# Patient Record
Sex: MALE | Race: WHITE | Employment: FULL TIME | URBAN - METROPOLITAN AREA
[De-identification: names, ages, dates, MRNs, and addresses within clinical notes are randomized per-mention and may not be internally consistent; named-entity substitution may affect disease eponyms.]

---

## 2017-09-26 ENCOUNTER — HOSPITAL ENCOUNTER (INPATIENT)
Age: 52
LOS: 7 days | Discharge: HOME HEALTH | DRG: 233 | End: 2017-10-04
Attending: EMERGENCY MEDICINE | Admitting: THORACIC SURGERY (CARDIOTHORACIC VASCULAR SURGERY)
Payer: COMMERCIAL

## 2017-09-26 ENCOUNTER — APPOINTMENT (OUTPATIENT)
Dept: GENERAL RADIOLOGY | Age: 52
DRG: 233 | End: 2017-09-26
Attending: EMERGENCY MEDICINE
Payer: COMMERCIAL

## 2017-09-26 DIAGNOSIS — I21.4 NSTEMI (NON-ST ELEVATED MYOCARDIAL INFARCTION) (HCC): Primary | ICD-10-CM

## 2017-09-26 PROBLEM — Z82.49 FAMILY HISTORY OF EARLY CAD: Status: ACTIVE | Noted: 2017-09-26

## 2017-09-26 LAB
ALBUMIN SERPL-MCNC: 4.2 G/DL (ref 3.5–5)
ALBUMIN/GLOB SERPL: 1.5 {RATIO} (ref 1.1–2.2)
ALP SERPL-CCNC: 97 U/L (ref 45–117)
ALT SERPL-CCNC: 32 U/L (ref 12–78)
ANION GAP SERPL CALC-SCNC: 10 MMOL/L (ref 5–15)
APPEARANCE UR: CLEAR
APTT PPP: 23.8 SEC (ref 22.1–32.5)
APTT PPP: 29.4 SEC (ref 22.1–32.5)
AST SERPL-CCNC: 33 U/L (ref 15–37)
ATRIAL RATE: 110 BPM
BACTERIA URNS QL MICRO: NEGATIVE /HPF
BASOPHILS # BLD: 0 K/UL (ref 0–0.1)
BASOPHILS # BLD: 0 K/UL (ref 0–0.1)
BASOPHILS NFR BLD: 0 % (ref 0–1)
BASOPHILS NFR BLD: 0 % (ref 0–1)
BILIRUB SERPL-MCNC: 0.6 MG/DL (ref 0.2–1)
BILIRUB UR QL: NEGATIVE
BUN SERPL-MCNC: 22 MG/DL (ref 6–20)
BUN/CREAT SERPL: 15 (ref 12–20)
CALCIUM SERPL-MCNC: 8.8 MG/DL (ref 8.5–10.1)
CALCULATED P AXIS, ECG09: 48 DEGREES
CALCULATED R AXIS, ECG10: -14 DEGREES
CALCULATED T AXIS, ECG11: 120 DEGREES
CHLORIDE SERPL-SCNC: 105 MMOL/L (ref 97–108)
CK MB CFR SERPL CALC: 11.9 % (ref 0–2.5)
CK MB SERPL-MCNC: 40.4 NG/ML (ref 5–25)
CK SERPL-CCNC: 197 U/L (ref 39–308)
CK SERPL-CCNC: 340 U/L (ref 39–308)
CO2 SERPL-SCNC: 22 MMOL/L (ref 21–32)
COLOR UR: ABNORMAL
CREAT SERPL-MCNC: 1.43 MG/DL (ref 0.7–1.3)
D DIMER PPP FEU-MCNC: 0.37 MG/L FEU (ref 0–0.65)
DIAGNOSIS, 93000: NORMAL
EOSINOPHIL # BLD: 0 K/UL (ref 0–0.4)
EOSINOPHIL # BLD: 0 K/UL (ref 0–0.4)
EOSINOPHIL NFR BLD: 0 % (ref 0–7)
EOSINOPHIL NFR BLD: 0 % (ref 0–7)
EPITH CASTS URNS QL MICRO: ABNORMAL /LPF
ERYTHROCYTE [DISTWIDTH] IN BLOOD BY AUTOMATED COUNT: 13.4 % (ref 11.5–14.5)
ERYTHROCYTE [DISTWIDTH] IN BLOOD BY AUTOMATED COUNT: 13.5 % (ref 11.5–14.5)
GLOBULIN SER CALC-MCNC: 2.8 G/DL (ref 2–4)
GLUCOSE BLD STRIP.AUTO-MCNC: 160 MG/DL (ref 65–100)
GLUCOSE BLD STRIP.AUTO-MCNC: 262 MG/DL (ref 65–100)
GLUCOSE SERPL-MCNC: 364 MG/DL (ref 65–100)
GLUCOSE UR STRIP.AUTO-MCNC: >1000 MG/DL
HCT VFR BLD AUTO: 40.9 % (ref 36.6–50.3)
HCT VFR BLD AUTO: 41.5 % (ref 36.6–50.3)
HGB BLD-MCNC: 14.4 G/DL (ref 12.1–17)
HGB BLD-MCNC: 14.5 G/DL (ref 12.1–17)
HGB UR QL STRIP: NEGATIVE
HYALINE CASTS URNS QL MICRO: ABNORMAL /LPF (ref 0–5)
KETONES UR QL STRIP.AUTO: ABNORMAL MG/DL
LEUKOCYTE ESTERASE UR QL STRIP.AUTO: NEGATIVE
LIPASE SERPL-CCNC: 95 U/L (ref 73–393)
LYMPHOCYTES # BLD: 1 K/UL (ref 0.8–3.5)
LYMPHOCYTES # BLD: 2.1 K/UL (ref 0.8–3.5)
LYMPHOCYTES NFR BLD: 10 % (ref 12–49)
LYMPHOCYTES NFR BLD: 19 % (ref 12–49)
MAGNESIUM SERPL-MCNC: 2.2 MG/DL (ref 1.6–2.4)
MCH RBC QN AUTO: 28.7 PG (ref 26–34)
MCH RBC QN AUTO: 28.8 PG (ref 26–34)
MCHC RBC AUTO-ENTMCNC: 34.9 G/DL (ref 30–36.5)
MCHC RBC AUTO-ENTMCNC: 35.2 G/DL (ref 30–36.5)
MCV RBC AUTO: 81.8 FL (ref 80–99)
MCV RBC AUTO: 82.2 FL (ref 80–99)
MONOCYTES # BLD: 0.6 K/UL (ref 0–1)
MONOCYTES # BLD: 0.6 K/UL (ref 0–1)
MONOCYTES NFR BLD: 6 % (ref 5–13)
MONOCYTES NFR BLD: 6 % (ref 5–13)
NEUTS SEG # BLD: 7.8 K/UL (ref 1.8–8)
NEUTS SEG # BLD: 8.3 K/UL (ref 1.8–8)
NEUTS SEG NFR BLD: 75 % (ref 32–75)
NEUTS SEG NFR BLD: 84 % (ref 32–75)
NITRITE UR QL STRIP.AUTO: NEGATIVE
P-R INTERVAL, ECG05: 176 MS
PH UR STRIP: 6.5 [PH] (ref 5–8)
PLATELET # BLD AUTO: 161 K/UL (ref 150–400)
PLATELET # BLD AUTO: 168 K/UL (ref 150–400)
POTASSIUM SERPL-SCNC: 4.8 MMOL/L (ref 3.5–5.1)
PROT SERPL-MCNC: 7 G/DL (ref 6.4–8.2)
PROT UR STRIP-MCNC: NEGATIVE MG/DL
Q-T INTERVAL, ECG07: 360 MS
QRS DURATION, ECG06: 92 MS
QTC CALCULATION (BEZET), ECG08: 487 MS
RBC # BLD AUTO: 5 M/UL (ref 4.1–5.7)
RBC # BLD AUTO: 5.05 M/UL (ref 4.1–5.7)
RBC #/AREA URNS HPF: ABNORMAL /HPF (ref 0–5)
SERVICE CMNT-IMP: ABNORMAL
SERVICE CMNT-IMP: ABNORMAL
SODIUM SERPL-SCNC: 137 MMOL/L (ref 136–145)
SP GR UR REFRACTOMETRY: 1.02 (ref 1–1.03)
THERAPEUTIC RANGE,PTTT: NORMAL SECS (ref 58–77)
THERAPEUTIC RANGE,PTTT: NORMAL SECS (ref 58–77)
TROPONIN I SERPL-MCNC: 1.78 NG/ML
TROPONIN I SERPL-MCNC: 10.3 NG/ML
TSH SERPL DL<=0.05 MIU/L-ACNC: 1.15 UIU/ML (ref 0.36–3.74)
UA: UC IF INDICATED,UAUC: ABNORMAL
UROBILINOGEN UR QL STRIP.AUTO: 0.2 EU/DL (ref 0.2–1)
VENTRICULAR RATE, ECG03: 110 BPM
WBC # BLD AUTO: 11 K/UL (ref 4.1–11.1)
WBC # BLD AUTO: 9.4 K/UL (ref 4.1–11.1)
WBC URNS QL MICRO: ABNORMAL /HPF (ref 0–4)

## 2017-09-26 PROCEDURE — 96374 THER/PROPH/DIAG INJ IV PUSH: CPT

## 2017-09-26 PROCEDURE — 83735 ASSAY OF MAGNESIUM: CPT | Performed by: EMERGENCY MEDICINE

## 2017-09-26 PROCEDURE — 99218 HC RM OBSERVATION: CPT

## 2017-09-26 PROCEDURE — 36415 COLL VENOUS BLD VENIPUNCTURE: CPT | Performed by: EMERGENCY MEDICINE

## 2017-09-26 PROCEDURE — C1769 GUIDE WIRE: HCPCS

## 2017-09-26 PROCEDURE — C1894 INTRO/SHEATH, NON-LASER: HCPCS

## 2017-09-26 PROCEDURE — 85730 THROMBOPLASTIN TIME PARTIAL: CPT | Performed by: EMERGENCY MEDICINE

## 2017-09-26 PROCEDURE — 77030019569 HC BND COMPR RAD TERU -B

## 2017-09-26 PROCEDURE — 82553 CREATINE MB FRACTION: CPT | Performed by: NURSE PRACTITIONER

## 2017-09-26 PROCEDURE — 74011250636 HC RX REV CODE- 250/636: Performed by: NURSE PRACTITIONER

## 2017-09-26 PROCEDURE — 93005 ELECTROCARDIOGRAM TRACING: CPT

## 2017-09-26 PROCEDURE — 82962 GLUCOSE BLOOD TEST: CPT

## 2017-09-26 PROCEDURE — 74011636637 HC RX REV CODE- 636/637: Performed by: NURSE PRACTITIONER

## 2017-09-26 PROCEDURE — 85730 THROMBOPLASTIN TIME PARTIAL: CPT | Performed by: NURSE PRACTITIONER

## 2017-09-26 PROCEDURE — 74011250636 HC RX REV CODE- 250/636

## 2017-09-26 PROCEDURE — 77030004549 HC CATH ANGI DX PRF MRTM -A

## 2017-09-26 PROCEDURE — B2151ZZ FLUOROSCOPY OF LEFT HEART USING LOW OSMOLAR CONTRAST: ICD-10-PCS | Performed by: INTERNAL MEDICINE

## 2017-09-26 PROCEDURE — 84443 ASSAY THYROID STIM HORMONE: CPT | Performed by: EMERGENCY MEDICINE

## 2017-09-26 PROCEDURE — 83690 ASSAY OF LIPASE: CPT | Performed by: EMERGENCY MEDICINE

## 2017-09-26 PROCEDURE — 74011250637 HC RX REV CODE- 250/637: Performed by: NURSE PRACTITIONER

## 2017-09-26 PROCEDURE — 99285 EMERGENCY DEPT VISIT HI MDM: CPT

## 2017-09-26 PROCEDURE — 74011250636 HC RX REV CODE- 250/636: Performed by: EMERGENCY MEDICINE

## 2017-09-26 PROCEDURE — 82550 ASSAY OF CK (CPK): CPT | Performed by: EMERGENCY MEDICINE

## 2017-09-26 PROCEDURE — 93041 RHYTHM ECG TRACING: CPT

## 2017-09-26 PROCEDURE — 4A023N8 MEASUREMENT OF CARDIAC SAMPLING AND PRESSURE, BILATERAL, PERCUTANEOUS APPROACH: ICD-10-PCS | Performed by: INTERNAL MEDICINE

## 2017-09-26 PROCEDURE — 74011250636 HC RX REV CODE- 250/636: Performed by: INTERNAL MEDICINE

## 2017-09-26 PROCEDURE — 74011000250 HC RX REV CODE- 250

## 2017-09-26 PROCEDURE — 85025 COMPLETE CBC W/AUTO DIFF WBC: CPT | Performed by: EMERGENCY MEDICINE

## 2017-09-26 PROCEDURE — C1751 CATH, INF, PER/CENT/MIDLINE: HCPCS

## 2017-09-26 PROCEDURE — 71010 XR CHEST PORT: CPT

## 2017-09-26 PROCEDURE — 77030029065 HC DRSG HEMO QCLOT ZMED -B

## 2017-09-26 PROCEDURE — 96375 TX/PRO/DX INJ NEW DRUG ADDON: CPT

## 2017-09-26 PROCEDURE — B2111ZZ FLUOROSCOPY OF MULTIPLE CORONARY ARTERIES USING LOW OSMOLAR CONTRAST: ICD-10-PCS | Performed by: INTERNAL MEDICINE

## 2017-09-26 PROCEDURE — 77030019698 HC SYR ANGI MDLON MRTM -A

## 2017-09-26 PROCEDURE — 93458 L HRT ARTERY/VENTRICLE ANGIO: CPT

## 2017-09-26 PROCEDURE — 77030010221 HC SPLNT WR POS TELE -B

## 2017-09-26 PROCEDURE — 81001 URINALYSIS AUTO W/SCOPE: CPT | Performed by: EMERGENCY MEDICINE

## 2017-09-26 PROCEDURE — 93460 R&L HRT ART/VENTRICLE ANGIO: CPT

## 2017-09-26 PROCEDURE — 84484 ASSAY OF TROPONIN QUANT: CPT | Performed by: EMERGENCY MEDICINE

## 2017-09-26 PROCEDURE — 77030008543 HC TBNG MON PRSS MRTM -A

## 2017-09-26 PROCEDURE — 85379 FIBRIN DEGRADATION QUANT: CPT | Performed by: EMERGENCY MEDICINE

## 2017-09-26 PROCEDURE — 80053 COMPREHEN METABOLIC PANEL: CPT | Performed by: EMERGENCY MEDICINE

## 2017-09-26 PROCEDURE — 74011636320 HC RX REV CODE- 636/320

## 2017-09-26 PROCEDURE — 74011000250 HC RX REV CODE- 250: Performed by: EMERGENCY MEDICINE

## 2017-09-26 RX ORDER — SODIUM CHLORIDE 0.9 % (FLUSH) 0.9 %
5-10 SYRINGE (ML) INJECTION AS NEEDED
Status: DISCONTINUED | OUTPATIENT
Start: 2017-09-26 | End: 2017-09-29

## 2017-09-26 RX ORDER — LIDOCAINE HYDROCHLORIDE 10 MG/ML
INJECTION, SOLUTION EPIDURAL; INFILTRATION; INTRACAUDAL; PERINEURAL
Status: COMPLETED
Start: 2017-09-26 | End: 2017-09-26

## 2017-09-26 RX ORDER — HEPARIN SODIUM 10000 [USP'U]/100ML
9.44-25 INJECTION, SOLUTION INTRAVENOUS
Status: DISCONTINUED | OUTPATIENT
Start: 2017-09-26 | End: 2017-09-26 | Stop reason: SDUPTHER

## 2017-09-26 RX ORDER — METOPROLOL TARTRATE 5 MG/5ML
5 INJECTION INTRAVENOUS
Status: COMPLETED | OUTPATIENT
Start: 2017-09-26 | End: 2017-09-26

## 2017-09-26 RX ORDER — MORPHINE SULFATE 4 MG/ML
2 INJECTION, SOLUTION INTRAMUSCULAR; INTRAVENOUS
Status: DISCONTINUED | OUTPATIENT
Start: 2017-09-26 | End: 2017-09-29

## 2017-09-26 RX ORDER — HEPARIN SODIUM 5000 [USP'U]/ML
4000 INJECTION, SOLUTION INTRAVENOUS; SUBCUTANEOUS AS NEEDED
Status: DISCONTINUED | OUTPATIENT
Start: 2017-09-26 | End: 2017-09-27

## 2017-09-26 RX ORDER — SODIUM CHLORIDE 0.9 % (FLUSH) 0.9 %
5-10 SYRINGE (ML) INJECTION EVERY 8 HOURS
Status: DISCONTINUED | OUTPATIENT
Start: 2017-09-26 | End: 2017-09-29

## 2017-09-26 RX ORDER — DEXTROSE 50 % IN WATER (D50W) INTRAVENOUS SYRINGE
12.5-25 AS NEEDED
Status: DISCONTINUED | OUTPATIENT
Start: 2017-09-26 | End: 2017-09-26 | Stop reason: SDUPTHER

## 2017-09-26 RX ORDER — MIDAZOLAM HYDROCHLORIDE 1 MG/ML
INJECTION, SOLUTION INTRAMUSCULAR; INTRAVENOUS
Status: COMPLETED
Start: 2017-09-26 | End: 2017-09-26

## 2017-09-26 RX ORDER — HEPARIN SODIUM 1000 [USP'U]/ML
2500 INJECTION, SOLUTION INTRAVENOUS; SUBCUTANEOUS ONCE
Status: COMPLETED | OUTPATIENT
Start: 2017-09-26 | End: 2017-09-26

## 2017-09-26 RX ORDER — HEPARIN SODIUM 200 [USP'U]/100ML
500 INJECTION, SOLUTION INTRAVENOUS ONCE
Status: COMPLETED | OUTPATIENT
Start: 2017-09-26 | End: 2017-09-26

## 2017-09-26 RX ORDER — HEPARIN SODIUM 5000 [USP'U]/ML
5000 INJECTION, SOLUTION INTRAVENOUS; SUBCUTANEOUS
Status: COMPLETED | OUTPATIENT
Start: 2017-09-26 | End: 2017-09-26

## 2017-09-26 RX ORDER — FUROSEMIDE 10 MG/ML
20 INJECTION INTRAMUSCULAR; INTRAVENOUS ONCE
Status: COMPLETED | OUTPATIENT
Start: 2017-09-26 | End: 2017-09-26

## 2017-09-26 RX ORDER — SODIUM CHLORIDE 9 MG/ML
100 INJECTION, SOLUTION INTRAVENOUS CONTINUOUS
Status: DISCONTINUED | OUTPATIENT
Start: 2017-09-26 | End: 2017-09-26

## 2017-09-26 RX ORDER — INSULIN LISPRO 100 [IU]/ML
INJECTION, SOLUTION INTRAVENOUS; SUBCUTANEOUS
Status: DISCONTINUED | OUTPATIENT
Start: 2017-09-26 | End: 2017-09-29

## 2017-09-26 RX ORDER — HEPARIN SODIUM 10000 [USP'U]/100ML
9.4-25 INJECTION, SOLUTION INTRAVENOUS
Status: DISCONTINUED | OUTPATIENT
Start: 2017-09-26 | End: 2017-09-27

## 2017-09-26 RX ORDER — VERAPAMIL HYDROCHLORIDE 2.5 MG/ML
INJECTION, SOLUTION INTRAVENOUS
Status: COMPLETED
Start: 2017-09-26 | End: 2017-09-26

## 2017-09-26 RX ORDER — METOPROLOL TARTRATE 25 MG/1
12.5 TABLET, FILM COATED ORAL EVERY 12 HOURS
Status: DISCONTINUED | OUTPATIENT
Start: 2017-09-26 | End: 2017-09-29

## 2017-09-26 RX ORDER — FENTANYL CITRATE 50 UG/ML
25-50 INJECTION, SOLUTION INTRAMUSCULAR; INTRAVENOUS
Status: DISCONTINUED | OUTPATIENT
Start: 2017-09-26 | End: 2017-09-26

## 2017-09-26 RX ORDER — HEPARIN SODIUM 5000 [USP'U]/ML
2000 INJECTION, SOLUTION INTRAVENOUS; SUBCUTANEOUS AS NEEDED
Status: DISCONTINUED | OUTPATIENT
Start: 2017-09-26 | End: 2017-09-27

## 2017-09-26 RX ORDER — MAGNESIUM SULFATE 100 %
4 CRYSTALS MISCELLANEOUS AS NEEDED
Status: DISCONTINUED | OUTPATIENT
Start: 2017-09-26 | End: 2017-09-28 | Stop reason: SDUPTHER

## 2017-09-26 RX ORDER — HEPARIN SODIUM 200 [USP'U]/100ML
INJECTION, SOLUTION INTRAVENOUS
Status: COMPLETED
Start: 2017-09-26 | End: 2017-09-26

## 2017-09-26 RX ORDER — DEXTROSE MONOHYDRATE 100 MG/ML
125-250 INJECTION, SOLUTION INTRAVENOUS AS NEEDED
Status: DISCONTINUED | OUTPATIENT
Start: 2017-09-26 | End: 2017-09-29

## 2017-09-26 RX ORDER — VERAPAMIL HYDROCHLORIDE 2.5 MG/ML
2.5 INJECTION, SOLUTION INTRAVENOUS ONCE
Status: COMPLETED | OUTPATIENT
Start: 2017-09-26 | End: 2017-09-26

## 2017-09-26 RX ORDER — INSULIN LISPRO 100 [IU]/ML
INJECTION, SOLUTION INTRAVENOUS; SUBCUTANEOUS 2 TIMES DAILY
Status: DISCONTINUED | OUTPATIENT
Start: 2017-09-26 | End: 2017-09-26

## 2017-09-26 RX ORDER — LIDOCAINE HYDROCHLORIDE 10 MG/ML
1-20 INJECTION INFILTRATION; PERINEURAL
Status: DISCONTINUED | OUTPATIENT
Start: 2017-09-26 | End: 2017-09-26

## 2017-09-26 RX ORDER — ATORVASTATIN CALCIUM 20 MG/1
20 TABLET, FILM COATED ORAL
Status: DISCONTINUED | OUTPATIENT
Start: 2017-09-26 | End: 2017-09-29

## 2017-09-26 RX ORDER — FENTANYL CITRATE 50 UG/ML
INJECTION, SOLUTION INTRAMUSCULAR; INTRAVENOUS
Status: COMPLETED
Start: 2017-09-26 | End: 2017-09-26

## 2017-09-26 RX ORDER — LIDOCAINE HYDROCHLORIDE 10 MG/ML
1-30 INJECTION, SOLUTION EPIDURAL; INFILTRATION; INTRACAUDAL; PERINEURAL
Status: DISCONTINUED | OUTPATIENT
Start: 2017-09-26 | End: 2017-09-26

## 2017-09-26 RX ORDER — LIDOCAINE HYDROCHLORIDE 10 MG/ML
INJECTION INFILTRATION; PERINEURAL
Status: COMPLETED
Start: 2017-09-26 | End: 2017-09-26

## 2017-09-26 RX ORDER — ACETAMINOPHEN 325 MG/1
650 TABLET ORAL
Status: DISCONTINUED | OUTPATIENT
Start: 2017-09-26 | End: 2017-09-29

## 2017-09-26 RX ORDER — ASPIRIN 81 MG/1
81 TABLET ORAL DAILY
Status: DISCONTINUED | OUTPATIENT
Start: 2017-09-27 | End: 2017-09-29

## 2017-09-26 RX ORDER — HEPARIN SODIUM 10000 [USP'U]/100ML
12-25 INJECTION, SOLUTION INTRAVENOUS CONTINUOUS
Status: DISCONTINUED | OUTPATIENT
Start: 2017-09-26 | End: 2017-09-26 | Stop reason: SDUPTHER

## 2017-09-26 RX ORDER — HEPARIN SODIUM 1000 [USP'U]/ML
INJECTION, SOLUTION INTRAVENOUS; SUBCUTANEOUS
Status: COMPLETED
Start: 2017-09-26 | End: 2017-09-26

## 2017-09-26 RX ORDER — MIDAZOLAM HYDROCHLORIDE 1 MG/ML
.5-2 INJECTION, SOLUTION INTRAMUSCULAR; INTRAVENOUS
Status: DISCONTINUED | OUTPATIENT
Start: 2017-09-26 | End: 2017-09-26

## 2017-09-26 RX ADMIN — LIDOCAINE HYDROCHLORIDE 2 ML: 10 INJECTION, SOLUTION EPIDURAL; INFILTRATION; INTRACAUDAL; PERINEURAL at 20:44

## 2017-09-26 RX ADMIN — HEPARIN SODIUM 2500 UNITS: 1000 INJECTION, SOLUTION INTRAVENOUS; SUBCUTANEOUS at 20:45

## 2017-09-26 RX ADMIN — HEPARIN SODIUM 1000 UNITS: 200 INJECTION, SOLUTION INTRAVENOUS at 20:53

## 2017-09-26 RX ADMIN — FENTANYL CITRATE 25 MCG: 50 INJECTION, SOLUTION INTRAMUSCULAR; INTRAVENOUS at 21:11

## 2017-09-26 RX ADMIN — HEPARIN SODIUM 4000 UNITS: 5000 INJECTION, SOLUTION INTRAVENOUS; SUBCUTANEOUS at 23:19

## 2017-09-26 RX ADMIN — FENTANYL CITRATE 25 MCG: 50 INJECTION, SOLUTION INTRAMUSCULAR; INTRAVENOUS at 20:45

## 2017-09-26 RX ADMIN — METOPROLOL TARTRATE 5 MG: 5 INJECTION INTRAVENOUS at 14:43

## 2017-09-26 RX ADMIN — VERAPAMIL HYDROCHLORIDE 2.5 MG: 2.5 INJECTION INTRAVENOUS at 20:51

## 2017-09-26 RX ADMIN — ATORVASTATIN CALCIUM 20 MG: 20 TABLET, FILM COATED ORAL at 20:03

## 2017-09-26 RX ADMIN — FENTANYL CITRATE 50 MCG: 50 INJECTION, SOLUTION INTRAMUSCULAR; INTRAVENOUS at 20:40

## 2017-09-26 RX ADMIN — IOPAMIDOL 70 ML: 755 INJECTION, SOLUTION INTRAVENOUS at 21:12

## 2017-09-26 RX ADMIN — NITROGLYCERIN 200 MCG: 5 INJECTION, SOLUTION INTRAVENOUS at 20:45

## 2017-09-26 RX ADMIN — MIDAZOLAM HYDROCHLORIDE 1 MG: 1 INJECTION, SOLUTION INTRAMUSCULAR; INTRAVENOUS at 20:50

## 2017-09-26 RX ADMIN — INSULIN LISPRO 5 UNITS: 100 INJECTION, SOLUTION INTRAVENOUS; SUBCUTANEOUS at 17:26

## 2017-09-26 RX ADMIN — VERAPAMIL HYDROCHLORIDE 2.5 MG: 2.5 INJECTION, SOLUTION INTRAVENOUS at 20:51

## 2017-09-26 RX ADMIN — Medication 10 ML: at 20:06

## 2017-09-26 RX ADMIN — MIDAZOLAM HYDROCHLORIDE 2 MG: 1 INJECTION, SOLUTION INTRAMUSCULAR; INTRAVENOUS at 20:40

## 2017-09-26 RX ADMIN — SODIUM CHLORIDE 1000 ML: 900 INJECTION, SOLUTION INTRAVENOUS at 14:42

## 2017-09-26 RX ADMIN — LIDOCAINE HYDROCHLORIDE 15 ML: 10 INJECTION, SOLUTION INFILTRATION; PERINEURAL at 21:11

## 2017-09-26 RX ADMIN — MIDAZOLAM HYDROCHLORIDE 2 MG: 1 INJECTION INTRAMUSCULAR; INTRAVENOUS at 20:40

## 2017-09-26 RX ADMIN — LIDOCAINE HYDROCHLORIDE 15 ML: 10 INJECTION INFILTRATION; PERINEURAL at 21:11

## 2017-09-26 RX ADMIN — HEPARIN SODIUM 1000 UNITS: 200 INJECTION, SOLUTION INTRAVENOUS at 20:51

## 2017-09-26 RX ADMIN — SODIUM CHLORIDE 100 ML/HR: 900 INJECTION, SOLUTION INTRAVENOUS at 17:19

## 2017-09-26 RX ADMIN — METOPROLOL TARTRATE 12.5 MG: 25 TABLET ORAL at 20:03

## 2017-09-26 RX ADMIN — Medication 10 ML: at 20:07

## 2017-09-26 RX ADMIN — HEPARIN SODIUM 5000 UNITS: 5000 INJECTION, SOLUTION INTRAVENOUS; SUBCUTANEOUS at 14:43

## 2017-09-26 RX ADMIN — HEPARIN SODIUM 1000 UNITS: 200 INJECTION, SOLUTION INTRAVENOUS at 20:52

## 2017-09-26 RX ADMIN — HEPARIN SODIUM AND DEXTROSE 9.4 UNITS/KG/HR: 10000; 5 INJECTION INTRAVENOUS at 17:20

## 2017-09-26 RX ADMIN — FUROSEMIDE 20 MG: 10 INJECTION, SOLUTION INTRAMUSCULAR; INTRAVENOUS at 23:18

## 2017-09-26 RX ADMIN — MIDAZOLAM HYDROCHLORIDE 1 MG: 1 INJECTION, SOLUTION INTRAMUSCULAR; INTRAVENOUS at 21:10

## 2017-09-26 RX ADMIN — IOPAMIDOL 18 ML: 755 INJECTION, SOLUTION INTRAVENOUS at 20:58

## 2017-09-26 RX ADMIN — MIDAZOLAM HYDROCHLORIDE 1 MG: 1 INJECTION, SOLUTION INTRAMUSCULAR; INTRAVENOUS at 20:45

## 2017-09-26 NOTE — ROUTINE PROCESS
Bedside shift change report given to Merry Saucedo (oncoming nurse) by Will RN (offgoing nurse). Report included the following information SBAR, Kardex, Intake/Output and MAR.

## 2017-09-26 NOTE — H&P
[address] [facility]  [phone]          CARDIOLOGY HISTORY AND PHYSICAL    Date of  Admission: 9/26/2017 12:55 PM     Admission type:Emergency     Subjective:      Mando Helms is a 45 y.o. male admitted for NSTEMI (non-ST elevated myocardial infarction) ([facility] No previous cardiac history. Significant family hx of mother and brother with MIs early 46s. Patient admitted with palpitations, mod substernal CP that started while he was at work this afternoon and lasted 40minutes. Associated chills, diaphoresis, n/v.  Had similar episode 1 month ago, but did not follow up with MD.  ECG with flipped Twaves and slight ST depression laterally. Troponin 1.7. Currently pain free. Cardiac risk factors: family history. Patient Active Problem List    Diagnosis Date Noted    NSTEMI (non-ST elevated myocardial infarction) ([facility] 09/26/2017    Family history of early CAD 09/26/2017      None  Past Medical History:   Diagnosis Date    Family history of early CAD 9/26/2017    Significant for mother, brother      Social History     Social History    Marital status:      Spouse name: N/A    Number of children: N/A    Years of education: N/A     Social History Main Topics    Smoking status: Never Smoker    Smokeless tobacco: Never Used    Alcohol use No    Drug use: None    Sexual activity: Not Asked     Other Topics Concern    None     Social History Narrative    None     No Known Allergies   History reviewed. No pertinent family history. Current Facility-Administered Medications   Medication Dose Route Frequency    sodium chloride (NS) flush 5-10 mL  5-10 mL IntraVENous Q8H    sodium chloride (NS) flush 5-10 mL  5-10 mL IntraVENous PRN     No current outpatient prescriptions on file.          Review of Symptoms: Prior to admitting symptoms  Constitutional: negative  Eyes: negative  Ears, nose, mouth, throat, and face: negative  Respiratory: negative  Cardiovascular: negative  Gastrointestinal: negative  Genitourinary:negative  Musculoskeletal:negative  Neurological: negative  Behvioral/Psych: negative  Endocrine: negative     Objective:   Physical Exam:  General Appearance:  WNWD  male in no acute distress  Chest:   Clear  Cardiovascular:  Regular rate and rhythm, no murmur.   Abdomen:   Soft, non-tender, bowel sounds are active.   Extremities: no peripheral edema  Skin:  Warm and dry.     Visit Vitals    BP (!) 152/98 (BP 1 Location: Right arm, BP Patient Position: At rest)    Pulse (!) 110    Temp 97.9 °F (36.6 °C)    Resp 18    Ht 6' 2\" (1.88 m)    Wt 105.9 kg (233 lb 7.5 oz)    SpO2 97%    BMI 29.98 kg/m2       Cardiographics    Telemetry: ST   ECG: ST with ST changes laterally  Echocardiogram: pending    Labs:  Recent Labs      09/26/17   1330   WBC  9.4   HGB  14.5   HCT  41.5   PLT  161     Recent Labs      09/26/17   1330   NA  137   K  4.8   CL  105   CO2  22   GLU  364*   BUN  22*   CREA  1.43*   CA  8.8   MG  2.2   ALB  4.2   TBILI  0.6   SGOT  33   ALT  32       Recent Labs      09/26/17   1330   TROIQ  1.78*           Assessment:       Active Problems:    NSTEMI (non-ST elevated myocardial infarction) ([facility] (9/26/2017)      Family history of early CAD (9/26/2017)      Overview: Significant for mother, brother         Plan:     NSTEMI:  Admit for cardiac cath today  Started on BB, IV heparin. Received ASA on arrival to ED. Start Lipitor tonight. Dr. Isabel Dickerson and I discussed the risks/benefits/alternatives of cardiac catheterization +/- PCI with the patient. Risks include (but are not limited to) bleeding, infection, cva/mi/tamponade/death. The patient understands and wishes to proceed.     HTN:  Starting BB, continue to monitor    ST/arrhythmia:  Starting BB    Elevated BS:  No previous hx of DM  Sliding scale now for coverage  Check HA1C in AM    SARA:  Likely r/t dehydration, n/v  Monitor  IV fluids pre cath      [facility]    9/26/2017         Agree with note as outlined by  NP. I confirm findings in history and physical exam. No additional findings noted. Agree with plan as outlined above.      Cady Dubois MD

## 2017-09-26 NOTE — IP AVS SNAPSHOT
[address] [facility] 
[phone] Patient: Jayce Vargas MRN: [id number] :1965 Current Discharge Medication List  
  
START taking these medications Dose & Instructions Dispensing Information Comments Morning Noon Evening Bedtime  
 aspirin 81 mg chewable tablet Your last dose was: Your next dose is:    
   
   
 Dose:  81 mg Take 1 Tab by mouth daily for 360 days. Quantity:  30 Tab Refills:  11  
     
   
   
   
  
 atorvastatin 20 mg tablet Commonly known as:  LIPITOR Your last dose was: Your next dose is:    
   
   
 Dose:  20 mg Take 1 Tab by mouth nightly. Quantity:  30 Tab Refills:  2  
     
   
   
   
  
 carvedilol 12.5 mg tablet Commonly known as:  Elisahlsatish Sharma Your last dose was: Your next dose is:    
   
   
 Dose:  12.5 mg Take 1 Tab by mouth two (2) times daily (with meals). Quantity:  60 Tab Refills:  2  
     
   
   
   
  
 furosemide 40 mg tablet Commonly known as:  LASIX Your last dose was: Your next dose is:    
   
   
 Dose:  40 mg Take 1 Tab by mouth daily for 7 days. Quantity:  7 Tab Refills:  1  
     
   
   
   
  
 glipiZIDE 5 mg tablet Commonly known as:  Syeda Marshall Your last dose was: Your next dose is:    
   
   
 Dose:  5 mg Take 1 Tab by mouth Before breakfast and dinner. Quantity:  60 Tab Refills:  2  
     
   
   
   
  
 glucose blood VI test strips strip Commonly known as:  ACCU-CHEK GUIDE Your last dose was: Your next dose is:    
   
   
 Dose:  1 Each  
1 Each by Does Not Apply route See Admin Instructions. Quantity:  50 Strip Refills:  1  
     
   
   
   
  
 * insulin glargine 100 unit/mL injection Commonly known as:  LANTUS Your last dose was: Your next dose is:    
   
   
 Dose:  25 Units 25 Units by SubCUTAneous route daily. Quantity:  2 Vial  
Refills:  2  
     
   
   
   
  
 * insulin glargine 100 unit/mL (3 mL) Inpn Commonly known as:  Marshall Popper Your last dose was: Your next dose is:    
   
   
 Dose:  25 Units 25 Units by SubCUTAneous route daily for 60 days. Quantity:  15 mL Refills:  1 Insulin Needles (Disposable) 32 gauge x 5/32\" Ndle Commonly known as:  Cinthia Pen Needle Your last dose was: Your next dose is:    
   
   
 Use as directed Quantity:  100 Pen Needle Refills:  1 Lancets Misc Commonly known as:  ACCU-CHEK FASTCLIX Your last dose was: Your next dose is:    
   
   
 by Does Not Apply route two (2) times a day. Quantity:  1 Each Refills:  2  
     
   
   
   
  
 lisinopril 2.5 mg tablet Commonly known as:  Mel Scales Your last dose was: Your next dose is:    
   
   
 Dose:  2.5 mg Take 1 Tab by mouth daily. Quantity:  30 Tab Refills:  2  
     
   
   
   
  
 oxyCODONE-acetaminophen 5-325 mg per tablet Commonly known as:  PERCOCET Your last dose was: Your next dose is:    
   
   
 Dose:  1-2 Tab Take 1-2 Tabs by mouth every six (6) hours as needed. Max Daily Amount: 8 Tabs. Indications: Pain Quantity:  40 Tab Refills:  0  
     
   
   
   
  
 potassium chloride SR 20 mEq tablet Commonly known as:  K-TAB Your last dose was: Your next dose is:    
   
   
 Dose:  20 mEq Take 1 Tab by mouth daily for 7 days. Quantity:  7 Tab Refills:  1  
     
   
   
   
  
 senna-docusate 8.6-50 mg per tablet Commonly known as:  Mariya Lock Your last dose was: Your next dose is:    
   
   
 Dose:  1 Tab Take 1 Tab by mouth two (2) times a day. Quantity:  60 Tab Refills:  0  
     
   
   
   
  
 * Notice:   This list has 2 medication(s) that are the same as other medications prescribed for you. Read the directions carefully, and ask your doctor or other care provider to review them with you. Where to Get Your Medications Information on where to get these meds will be given to you by the nurse or doctor. ! Ask your nurse or doctor about these medications  
  aspirin 81 mg chewable tablet  
 atorvastatin 20 mg tablet  
 carvedilol 12.5 mg tablet  
 furosemide 40 mg tablet  
 glipiZIDE 5 mg tablet  
 glucose blood VI test strips strip  
 insulin glargine 100 unit/mL (3 mL) Inpn  
 insulin glargine 100 unit/mL injection Insulin Needles (Disposable) 32 gauge x 5/32\" Ndle Lancets Misc  
 lisinopril 2.5 mg tablet  
 oxyCODONE-acetaminophen 5-325 mg per tablet  
 potassium chloride SR 20 mEq tablet  
 senna-docusate 8.6-50 mg per tablet

## 2017-09-26 NOTE — ED PROVIDER NOTES
[address]  EMERGENCY DEPARTMENT HISTORY AND PHYSICAL EXAM       Date of Service: 9/26/2017   Patient Name: Preeti Mcgarry   YOB: 1965  Medical Record Number: [id number]    History of Presenting Illness     Chief Complaint   Patient presents with    Chest Pain        History Provided By:  patient    Additional History:   Preeti Mcgarry is a 45 y.o. male who presents via EMS with spouse to the ED with cc of palpitations and moderate substernal chest pain that onset while he was at work this afternoon and lasted for 40 minutes before subsiding, along with associated chills, diaphoresis, nausea, and 2 episodes of vomiting. Pt states his symptoms were gradually improving but he went to [facility] and they administered aspirin 325 mg and one dose of nitroglycerine, and the symptoms have improved more since arriving to the ED. Pt reports history of similar symptoms twice before with palpitations and diaphoresis, but he has never been medically evaluate for the symptoms. He denies any shortness of breath, abdominal pain. He also denies any cardiac or GI history, but his family has a history of diabetes, cancer, and heart disease. Social Hx: - Tobacco, - EtOH, - Illicit Drugs    There are no other complaints, changes or physical findings at this time. Primary Care Provider: None     Past History     Past Medical History:   Past Medical History:   Diagnosis Date    Family history of early CAD 9/26/2017    Significant for mother, brother        Past Surgical History:   Past Surgical History:   Procedure Laterality Date    HX ORTHOPAEDIC      left side face metal         Family History:   History reviewed. No pertinent family history.      Social History:   Social History   Substance Use Topics    Smoking status: Never Smoker    Smokeless tobacco: Never Used    Alcohol use No        Allergies:   No Known Allergies      Review of Systems   Review of Systems Constitutional: Positive for chills and diaphoresis. Negative for fever. HENT: Negative. Negative for congestion, rhinorrhea and sinus pressure. Eyes: Negative. Negative for discharge and redness. Respiratory: Negative. Negative for chest tightness and shortness of breath. Cardiovascular: Positive for chest pain and palpitations. Gastrointestinal: Positive for nausea and vomiting. Negative for abdominal pain and blood in stool. Endocrine: Negative. Genitourinary: Negative. Negative for flank pain and hematuria. Musculoskeletal: Negative. Negative for back pain. Skin: Negative. Negative for rash. Neurological: Negative. Negative for dizziness, seizures, weakness, numbness and headaches. Hematological: Negative. All other systems reviewed and are negative. Physical Exam  Physical Exam   Constitutional: He is oriented to person, place, and time. He appears well-developed and well-nourished. No distress. HENT:   Head: Normocephalic and atraumatic. Nose: Nose normal.   Mouth/Throat: No oropharyngeal exudate. Eyes: Conjunctivae and EOM are normal. Pupils are equal, round, and reactive to light. No scleral icterus. Neck: Normal range of motion. Neck supple. No JVD present. No thyromegaly present. Cardiovascular: Regular rhythm, normal heart sounds, intact distal pulses and normal pulses. Tachycardia present. PMI is not displaced. Exam reveals no gallop and no friction rub. No murmur heard. Pulmonary/Chest: Effort normal and breath sounds normal. No stridor. No respiratory distress. He has no decreased breath sounds. He has no wheezes. He has no rhonchi. He has no rales. He exhibits no tenderness. Abdominal: Soft. Normal aorta and bowel sounds are normal. He exhibits no distension, no abdominal bruit, no pulsatile midline mass and no mass. There is no hepatosplenomegaly. There is no tenderness. There is no rebound, no guarding and no CVA tenderness. No hernia. Neurological: He is alert and oriented to person, place, and time. He has normal reflexes. He displays no atrophy and no tremor. No cranial nerve deficit or sensory deficit. He exhibits normal muscle tone. He displays no seizure activity. Coordination normal. GCS eye subscore is 4. GCS verbal subscore is 5. GCS motor subscore is 6. Reflex Scores:       Patellar reflexes are 2+ on the right side and 2+ on the left side. Skin: Skin is warm. No rash noted. He is not diaphoretic. No erythema. Nursing note and vitals reviewed. Medical Decision Making   I am the first provider for this patient. I reviewed the vital signs, available nursing notes, past medical history, past surgical history, family history and social history. Old Medical Records: Ambulance run sheet. Provider Notes:   DDx: coronary syndrome, aortic dissection, PE, arrhythmia, referred GI pain, endocrine abnormality     Impression/Plan: pt presents with rapid heart beat, vomiting, and vague chest discomfort earlier while at work. Went to [facility] was given nitro and Asprin with some improvement of symptoms. In the ER has elevated troponin. Will be seen by cardiology with plan for possible cardiac catheterization. Critical Care Time:   CRITICAL CARE NOTE :    2:30 PM      IMPENDING DETERIORATION -Cardiovascular    ASSOCIATED RISK FACTORS - Hypoxia and Dysrhythmia    MANAGEMENT- Bedside Assessment and Supervision of Care    INTERPRETATION -  ECG, xray     INTERVENTIONS - anticoagulation, IV betablocker     CASE REVIEW - Medical Sub-Specialist, Nursing and Family    TREATMENT RESPONSE -Stable    PERFORMED BY - Self        NOTES   :      I have spent 35 minutes of critical care time involved in lab review, consultations with specialist, family decision- making, bedside attention and documentation. During this entire length of time I was immediately available to the patient .     Jocy Carlson MD    ED Course:  1:37 PM Initial assessment performed. The patients presenting problems have been discussed, and they are in agreement with the care plan formulated and outlined with them. I have encouraged them to ask questions as they arise throughout their visit. Consult Note  2:25 PM  Jeanine Alonso MD spoke with Dr. Afshan Kessler,   Specialty: Cardiology  Discussed pt's hx, disposition, and available diagnostic and imaging results. Reviewed care plans. Consultant recommends plan as outlined: administered betablocker, bolus of anticoagulation, and they will evaluate in the ED and plan for possible cardiac catheterization. Diagnostic Study Results     Labs -      Recent Results (from the past 12 hour(s))   EKG, 12 LEAD, INITIAL    Collection Time: 09/26/17  1:03 PM   Result Value Ref Range    Ventricular Rate 110 BPM    Atrial Rate 110 BPM    P-R Interval 176 ms    QRS Duration 92 ms    Q-T Interval 360 ms    QTC Calculation (Bezet) 487 ms    Calculated P Axis 48 degrees    Calculated R Axis -14 degrees    Calculated T Axis 120 degrees    Diagnosis       Sinus tachycardia with occasional premature ventricular complexes  Left ventricular hypertrophy with repolarization abnormality  Abnormal ECG  No previous ECGs available     CK W/ REFLX CKMB    Collection Time: 09/26/17  1:30 PM   Result Value Ref Range     39 - 308 U/L   TROPONIN I    Collection Time: 09/26/17  1:30 PM   Result Value Ref Range    Troponin-I, Qt. 1.78 (H) <0.05 ng/mL   MAGNESIUM    Collection Time: 09/26/17  1:30 PM   Result Value Ref Range    Magnesium 2.2 1.6 - 2.4 mg/dL   CBC WITH AUTOMATED DIFF    Collection Time: 09/26/17  1:30 PM   Result Value Ref Range    WBC 9.4 4.1 - 11.1 K/uL    RBC 5.05 4. 10 - 5.70 M/uL    HGB 14.5 12.1 - 17.0 g/dL    HCT 41.5 36.6 - 50.3 %    MCV 82.2 80.0 - 99.0 FL    MCH 28.7 26.0 - 34.0 PG    MCHC 34.9 30.0 - 36.5 g/dL    RDW 13.5 11.5 - 14.5 %    PLATELET 264 663 - 367 K/uL    NEUTROPHILS 84 (H) 32 - 75 %    LYMPHOCYTES 10 (L) 12 - 49 %    MONOCYTES 6 5 - 13 %    EOSINOPHILS 0 0 - 7 %    BASOPHILS 0 0 - 1 %    ABS. NEUTROPHILS 7.8 1.8 - 8.0 K/UL    ABS. LYMPHOCYTES 1.0 0.8 - 3.5 K/UL    ABS. MONOCYTES 0.6 0.0 - 1.0 K/UL    ABS. EOSINOPHILS 0.0 0.0 - 0.4 K/UL    ABS. BASOPHILS 0.0 0.0 - 0.1 K/UL   METABOLIC PANEL, COMPREHENSIVE    Collection Time: 09/26/17  1:30 PM   Result Value Ref Range    Sodium 137 136 - 145 mmol/L    Potassium 4.8 3.5 - 5.1 mmol/L    Chloride 105 97 - 108 mmol/L    CO2 22 21 - 32 mmol/L    Anion gap 10 5 - 15 mmol/L    Glucose 364 (H) 65 - 100 mg/dL    BUN 22 (H) 6 - 20 MG/DL    Creatinine 1.43 (H) 0.70 - 1.30 MG/DL    BUN/Creatinine ratio 15 12 - 20      GFR est AA >60 >60 ml/min/1.73m2    GFR est non-AA 52 (L) >60 ml/min/1.73m2    Calcium 8.8 8.5 - 10.1 MG/DL    Bilirubin, total 0.6 0.2 - 1.0 MG/DL    ALT (SGPT) 32 12 - 78 U/L    AST (SGOT) 33 15 - 37 U/L    Alk. phosphatase 97 45 - 117 U/L    Protein, total 7.0 6.4 - 8.2 g/dL    Albumin 4.2 3.5 - 5.0 g/dL    Globulin 2.8 2.0 - 4.0 g/dL    A-G Ratio 1.5 1.1 - 2.2     D DIMER    Collection Time: 09/26/17  2:31 PM   Result Value Ref Range    D-dimer 0.37 0.00 - 0.65 mg/L FEU   TSH 3RD GENERATION    Collection Time: 09/26/17  2:31 PM   Result Value Ref Range    TSH 1.15 0.36 - 3.74 uIU/mL   LIPASE    Collection Time: 09/26/17  2:45 PM   Result Value Ref Range    Lipase 95 73 - 393 U/L       Radiologic Studies -  The following have been ordered and reviewed:  XR CHEST PORT   Final Result        CT Results  (Last 48 hours)    None        CXR Results  (Last 48 hours)               09/26/17 1439  XR CHEST PORT Final result    Impression:  IMPRESSION:   No acute cardiopulmonary disease radiographically. .  . Narrative:  INDICATION:  SOB        EXAM: Chest single view. COMPARISON: None. Chelsey Richey FINDINGS: A single frontal view of the chest at 1419 hours shows clear lungs.     The heart, mediastinum and pulmonary vasculature are normal  .  The bony thorax is unremarkable for age. .                 Vital Signs-Reviewed the patient's vital signs. Patient Vitals for the past 12 hrs:   Temp Pulse Resp BP SpO2   09/26/17 1607 - 88 16 112/82 99 %   09/26/17 1304 97.9 °F (36.6 °C) (!) 110 18 (!) 152/98 97 %       Medications Given in the ED:  Medications   sodium chloride (NS) flush 5-10 mL (not administered)   sodium chloride (NS) flush 5-10 mL (not administered)   sodium chloride (NS) flush 5-10 mL (not administered)   sodium chloride (NS) flush 5-10 mL (not administered)   acetaminophen (TYLENOL) tablet 650 mg (not administered)   morphine injection 2 mg (not administered)   aspirin delayed-release tablet 81 mg (not administered)   metoprolol tartrate (LOPRESSOR) tablet 12.5 mg (not administered)   atorvastatin (LIPITOR) tablet 20 mg (not administered)   heparin 25,000 units in D5W 250 ml infusion (not administered)   insulin lispro (HUMALOG) injection (not administered)   glucose chewable tablet 16 g (not administered)   glucagon (GLUCAGEN) injection 1 mg (not administered)   0.9% sodium chloride infusion (not administered)   dextrose 10% infusion 125-250 mL (not administered)   sodium chloride 0.9 % bolus infusion 1,000 mL (1,000 mL IntraVENous New Bag 9/26/17 1442)   heparin (porcine) injection 5,000 Units (5,000 Units IntraVENous Given 9/26/17 1443)   metoprolol (LOPRESSOR) injection 5 mg (5 mg IntraVENous Given 9/26/17 1443)       Pulse Oximetry Analysis - Normal 97% on room air     Cardiac Monitor:   Rate: 110  Rhythm: Sinus Tachycardia with occasional PVC's     EKG interpretation: (Preliminary) 1:03 PM  Rhythm: sinus tachycardia and PVC's; and regular . Rate (approx.): 110; Axis: normal; NM interval: normal; QRS interval: normal ; ST/T wave: normal; Other findings: left ventricular hypertrophy. Written by Mundo Diehl. Ke Donnelly, ED Scribe, as dictated by Nena Murray MD    Diagnosis   Clinical Impression:   1.  NSTEMI (non-ST elevated myocardial infarction) [facility])         Plan:  1: Admit to cardiology     Disposition Note:  4:16 PM  Patient is being admitted to the hospital. The results of their tests and reasons for their admission have been discussed with them and/or available family. They convey agreement and understanding for the need to be admitted and for their admission diagnosis. Consultation has been made with the inpatient physician specialist for hospitalization. Attestations: This note is prepared by Rosamaria Villaseñor. Alisa Lyons, acting as Scribe for Jules Aggarwal MD.    Jules Aggarawl MD: The scribe's documentation has been prepared under my direction and personally reviewed by me in its entirety. I confirm that the note above accurately reflects all work, treatment, procedures, and medical decision making performed by me.

## 2017-09-26 NOTE — IP AVS SNAPSHOT
[address] [facility] 
[phone] Patient: Philly Collier MRN: [id number] :1965 You are allergic to the following Allergen Reactions Latex Other (comments) Allergy documented in admission data base Influenza Virus Vaccine, Specific Other (comments) Allergy found in admission data base Recent Documentation Height Weight BMI Smoking Status 1.88 m 110.8 kg 31.36 kg/m2 Never Smoker Unresulted Labs Order Current Status BLOOD GAS, ARTERIAL Preliminary result Emergency Contacts Name Discharge Info Relation Home Work Mobile Pascale Bruno DISCHARGE CAREGIVER [3] Spouse [3]   [phone] About your hospitalization You were admitted on:  2017 You last received care in the:  [facility] 2 CRITICAL CARE 1 You were discharged on:  2017 Unit phone number:  [phone] Why you were hospitalized Your primary diagnosis was:  Nstemi (Non-St Elevated Myocardial Infarction) ([facility]) Your diagnoses also included:  Family History Of Early Cad, Systolic Heart Failure ([facility]), Coronary Artery Disease Involving Native Coronary Artery With Unstable Angina Pectoris (Hcc), Type Ii Diabetes Mellitus, Uncontrolled (Hcc), Cad (Coronary Artery Disease), Native Coronary Artery, Cad (Coronary Artery Disease), S/P Cabg X 3 Providers Seen During Your Hospitalizations Provider Role Specialty Primary office phone Verenice Stauffer MD Attending Provider Emergency Medicine [phone] Diane Lebron MD Attending Provider Cardiology [phone] Sumeet Hatch MD Attending Provider Cardiothoracic Surgery [phone] Your Primary Care Physician (PCP) Primary Care Physician Office Phone Office Fax NONE ** None ** ** None ** Follow-up Information Follow up With Details Comments Contact Info [facility]  This is your home health provider. They will contact you after discharge to arrange the first visit. [phone] Suresh Sorto MD Go on 11/7/2017 Cardiology follow up at [address] [facility], [address] [facility] 
[phone] Feliciano Galva III, DO Go on 11/21/2017 New PCP appointment at 11:00 AM. Please arrive at 10:30 AM with insurance card, photo ID, a list of medications, and copay if needed. Danyel Rashid [address] [facility] 
[phone] None   None ([phone]) Patient stated that they have no PCP Your Appointments Wednesday October 11, 2017  2:00 PM EDT  
POST OP with Suzy Fontanez MD  
[facility] - [address] [address] [phone] Wednesday October 11, 2017  2:30 PM EDT  
DIABETES INDIVIDUAL 1HR with MONIQUE [place]  
[facility] ([address]) [facility] [address] [facility]  
[phone] Monday November 06, 2017  1:15 PM EST  
POST OP with Suzy Fontanez MD  
[facility] - [address] [facility] ([facility]) [address] [facility] [address] [address]  
[phone] Tuesday November 07, 2017 11:15 AM EST HOSPITAL FOLLOW-UP with Suresh Sorto MD  
[facility] [address] [place] [address]) [address] [facility]  
[phone] Current Discharge Medication List  
  
START taking these medications Dose & Instructions Dispensing Information Comments Morning Noon Evening Bedtime  
 aspirin 81 mg chewable tablet Your last dose was: Your next dose is:    
   
   
 Dose:  81 mg Take 1 Tab by mouth daily for 360 days. Quantity:  30 Tab Refills:  11  
     
   
   
 atorvastatin 20 mg tablet Commonly known as:  LIPITOR Your last dose was: Your next dose is:    
   
   
 Dose:  20 mg Take 1 Tab by mouth nightly. Quantity:  30 Tab Refills:  2  
     
   
   
   
  
 carvedilol 12.5 mg tablet Commonly known as:  Carmina Sharma Your last dose was: Your next dose is:    
   
   
 Dose:  12.5 mg Take 1 Tab by mouth two (2) times daily (with meals). Quantity:  60 Tab Refills:  2  
     
   
   
   
  
 furosemide 40 mg tablet Commonly known as:  LASIX Your last dose was: Your next dose is:    
   
   
 Dose:  40 mg Take 1 Tab by mouth daily for 7 days. Quantity:  7 Tab Refills:  1  
     
   
   
   
  
 glipiZIDE 5 mg tablet Commonly known as:  Syeda Marshall Your last dose was: Your next dose is:    
   
   
 Dose:  5 mg Take 1 Tab by mouth Before breakfast and dinner. Quantity:  60 Tab Refills:  2  
     
   
   
   
  
 glucose blood VI test strips strip Commonly known as:  ACCU-CHEK GUIDE Your last dose was: Your next dose is:    
   
   
 Dose:  1 Each  
1 Each by Does Not Apply route See Admin Instructions. Quantity:  50 Strip Refills:  1  
     
   
   
   
  
 * insulin glargine 100 unit/mL injection Commonly known as:  LANTUS Your last dose was: Your next dose is:    
   
   
 Dose:  25 Units 25 Units by SubCUTAneous route daily. Quantity:  2 Vial  
Refills:  2  
     
   
   
   
  
 * insulin glargine 100 unit/mL (3 mL) Inpn Commonly known as:  Rocky Briceño Your last dose was: Your next dose is:    
   
   
 Dose:  25 Units 25 Units by SubCUTAneous route daily for 60 days. Quantity:  15 mL Refills:  1 Insulin Needles (Disposable) 32 gauge x 5/32\" Ndle Commonly known as:  Cinthia Pen Needle Your last dose was: Your next dose is:    
   
   
 Use as directed Quantity:  100 Pen Needle Refills:  1 Lancets Misc Commonly known as:  ACCU-CHEK FASTCLIX Your last dose was: Your next dose is:    
   
   
 by Does Not Apply route two (2) times a day. Quantity:  1 Each Refills:  2  
     
   
   
   
  
 lisinopril 2.5 mg tablet Commonly known as:  Thersia Kubas Your last dose was: Your next dose is:    
   
   
 Dose:  2.5 mg Take 1 Tab by mouth daily. Quantity:  30 Tab Refills:  2  
     
   
   
   
  
 oxyCODONE-acetaminophen 5-325 mg per tablet Commonly known as:  PERCOCET Your last dose was: Your next dose is:    
   
   
 Dose:  1-2 Tab Take 1-2 Tabs by mouth every six (6) hours as needed. Max Daily Amount: 8 Tabs. Indications: Pain Quantity:  40 Tab Refills:  0  
     
   
   
   
  
 potassium chloride SR 20 mEq tablet Commonly known as:  K-TAB Your last dose was: Your next dose is:    
   
   
 Dose:  20 mEq Take 1 Tab by mouth daily for 7 days. Quantity:  7 Tab Refills:  1  
     
   
   
   
  
 senna-docusate 8.6-50 mg per tablet Commonly known as:  Nick Spikes Your last dose was: Your next dose is:    
   
   
 Dose:  1 Tab Take 1 Tab by mouth two (2) times a day. Quantity:  60 Tab Refills:  0  
     
   
   
   
  
 * Notice: This list has 2 medication(s) that are the same as other medications prescribed for you. Read the directions carefully, and ask your doctor or other care provider to review them with you. Where to Get Your Medications Information on where to get these meds will be given to you by the nurse or doctor. ! Ask your nurse or doctor about these medications  
  aspirin 81 mg chewable tablet  
 atorvastatin 20 mg tablet  
 carvedilol 12.5 mg tablet  
 furosemide 40 mg tablet  
 glipiZIDE 5 mg tablet  
 glucose blood VI test strips strip  
 insulin glargine 100 unit/mL (3 mL) Inpn insulin glargine 100 unit/mL injection Insulin Needles (Disposable) 32 gauge x 5/32\" Ndle Lancets Misc  
 lisinopril 2.5 mg tablet  
 oxyCODONE-acetaminophen 5-325 mg per tablet  
 potassium chloride SR 20 mEq tablet  
 senna-docusate 8.6-50 mg per tablet Discharge Instructions [facility] 
 
[address] [place], [place] [address] [place] [address] [address] [facility] Office- [phone]  Fax- [phone]       Office- [phone]  Fax- [phone] 
_____________________________________________________________ Dr. Gregoria Mccullough, [facility] Sindy Padilla PA-C Name:[facility] & Date:  CABG 9/29/17 Discharge Date: 10/4/2017 Discharge to:  Home INSTRUCTIONS: 
NO SMOKING OR TOBACCO PRODUCTS Follow all the instructions in your discharge book You may shower. Wash all incisions twice daily with mild soap and water. No lotions, ointments or powder. Call the office immediately for any redness, swelling, or drainage from your incision. Take your temperature daily and call for a temperature of 101 degrees or higher or for any symptoms that make you think you have and infection. Weigh yourself each morning. Call if you gain more than 5 pounds in 48 hours. Use the incentive spirometer 6-8 times a day-10 breaths each time. Use a pillow or your bear to splint your breastbone when coughing or sneezing. If you feel your breast bone clicking or popping, notify the office immediately. Walk several hundred feet several times daily. DIET Eat an American Heart Association diet. If you are having trouble with your appetite, eat what you can. Try eating small, frequent meals throughout the day. Kcal ADA diet. Check your blood sugars  And keep a log of your results. ACTIVITY 
NO DRIVINGyou will be evaluated to drive at your follow up visit. Increase your activity by walking several times a day. Stay out of bed most of the day. When sitting, keep your legs elevated. You may ride in a car, but you must get out every hour and walk around. If you ride in a car with an airbag that can not be switched off, put the seat ALL the way back or ride in the back seat. NO LIFTING MORE THAN 5 POUND FOR 1 MONTH, THEN YOU CAN INCREASE TO NO MORE THAN 10 LBS FOR THE NEXT 2 MONTHS. AFTER 3 MONTHS, YOU WILL NO LONGER HAVE ANY LIFTING RESTRICTIONS. FOLLOW UP 
 
       1. Your first follow up appointment will be on 10/11 at 2pm 
       2. Your second follow up appointment will be on 11/6 at 1:15pm 
       3. Please call our office at [phone] if you are unable to make either one of these appointments. 4.  Call your cardiologist to make an appointment to see them in 4-5 weeks. 5.  Consult you primary care physician regarding your influenza &  
pneumovax vaccines. 6.   PLEASE bring your medication bottles to each appointment. 7. Please call Cardiac Rehab at [phone] if you do not already have an appointment. CALL [phone] FOR ANY QUESTIONS OR PROBLEMS. Signature:___________________________________________________ [employer] Activation Thank you for requesting access to [employer]. Please follow the instructions below to securely access and download your online medical record. [employer] allows you to send messages to your doctor, view your test results, renew your prescriptions, schedule appointments, and more. How Do I Sign Up? 1. In your internet browser, go to [email or web address] 
2. Click on the First Time User? Click Here link in the Sign In box.  You will be redirect to the New Member Sign Up page. 3. Enter your Crelow Access Code exactly as it appears below. You will not need to use this code after youve completed the sign-up process. If you do not sign up before the expiration date, you must request a new code. Crelow Access Code: [email or web address] Expires: 2017  1:40 PM (This is the date your Crelow access code will ) 4. Enter the last four digits of your Social Security Number (xxxx) and Date of Birth (mm/dd/yyyy) as indicated and click Submit. You will be taken to the next sign-up page. 5. Create a Crelow ID. This will be your Crelow login ID and cannot be changed, so think of one that is secure and easy to remember. 6. Create a Crelow password. You can change your password at any time. 7. Enter your Password Reset Question and Answer. This can be used at a later time if you forget your password. 8. Enter your e-mail address. You will receive e-mail notification when new information is available in [address] 9. Click Sign Up. You can now view and download portions of your medical record. 10. Click the Download Summary menu link to download a portable copy of your medical information. Additional Information If you have questions, please visit the Frequently Asked Questions section of the Crelow website at [email or web address] Remember, Crelow is NOT to be used for urgent needs. For medical emergencies, dial 911. Discharge Orders None Crelow Announcement We are excited to announce that we are making your provider's discharge notes available to you in Crelow. You will see these notes when they are completed and signed by the physician that discharged you from your recent hospital stay.   If you have any questions or concerns about any information you see in Crelow, please call the [employer] Information Department where you were seen or reach out to your Primary Care Provider for more information about your plan of care. Introducing [facility]! Gavi Augustin introduces DermLink patient portal. Now you can access parts of your medical record, email your doctor's office, and request medication refills online. 1. In your internet browser, go to [email or web address] 2. Click on the First Time User? Click Here link in the Sign In box. You will see the New Member Sign Up page. 3. Enter your DermLink Access Code exactly as it appears below. You will not need to use this code after youve completed the sign-up process. If you do not sign up before the expiration date, you must request a new code. · DermLink Access Code: [email or web address] Expires: 12/25/2017  1:40 PM 
 
4. Enter the last four digits of your Social Security Number (xxxx) and Date of Birth (mm/dd/yyyy) as indicated and click Submit. You will be taken to the next sign-up page. 5. Create a DermLink ID. This will be your DermLink login ID and cannot be changed, so think of one that is secure and easy to remember. 6. Create a DermLink password. You can change your password at any time. 7. Enter your Password Reset Question and Answer. This can be used at a later time if you forget your password. 8. Enter your e-mail address. You will receive e-mail notification when new information is available in [address] 9. Click Sign Up. You can now view and download portions of your medical record. 10. Click the Download Summary menu link to download a portable copy of your medical information. If you have questions, please visit the Frequently Asked Questions section of the DermLink website. Remember, DermLink is NOT to be used for urgent needs. For medical emergencies, dial 911. Now available from your iPhone and Android! General Information Please provide this summary of care documentation to your next provider. Patient Signature:  ____________________________________________________________ Date:  ____________________________________________________________  
  
Earnstine Da Provider Signature:  ____________________________________________________________ Date:  ____________________________________________________________

## 2017-09-26 NOTE — IP AVS SNAPSHOT
Summary of Care Report The Summary of Care report has been created to help improve care coordination. Users with access to [employer] or [address] (Web-based application) may access additional patient information including the Discharge Summary. If you are not currently a [address] user and need more information, please call the number listed below in the [place] [facility] section and ask to be connected with Medical Records. Facility Information Name Address Phone [address] [address] [phone] Patient Information Patient Name Sex  Melinda Alamo ([id number]) Male 1965 Discharge Information Admitting Provider Service Area Unit Catrachita Redd MD / [phone] [facility] 2 Critical Care  / [phone] Discharge Provider Discharge Date/Time Discharge Disposition Destination (none) 10/4/2017 (Pending) [facility] (none) Patient Language Language ENGLISH [13] Hospital Problems as of 10/4/2017  Reviewed: 2017  4:38 PM by Gissell Newsome MD  
  
  
  
 Class Noted - Resolved Last Modified POA Active Problems * (Principal)NSTEMI (non-ST elevated myocardial infarction) ([facility]  2017 - Present 2017 by Keri Mcfadden MD Yes Entered by Irma Salas NP Family history of early CAD  2017 - Present 2017 by Irma Salas NP Unknown Entered by Irma Salas NP Overview Signed 2017  3:23 PM by Irma Salas NP Significant for mother, brother Systolic heart failure ([facility]  2017 - Present 2017 by ITZ Gresham Unknown Entered by ITZ Gresham   Coronary artery disease involving native coronary artery with unstable angina pectoris ([facility]  9/27/2017 - Present 9/27/2017 by ITZ Rai Unknown Entered by ITZ Rai Type II diabetes mellitus, uncontrolled ([facility]  9/27/2017 - Present 9/27/2017 by ITZ Rai Unknown Entered by ITZ Rai  
  CAD (coronary artery disease), native coronary artery  9/27/2017 - Present 9/27/2017 by ITZ Rai Unknown Entered by ITZ Rai  
  CAD (coronary artery disease)  9/29/2017 - Present 9/29/2017 by Costa Carter MD Unknown Entered by Costa Carter MD  
  S/P CABG x 3  9/29/2017 - Present 9/29/2017 by ITZ Leon Unknown Entered by ITZ Leon Overview Signed 9/29/2017  2:00 PM by ITZ Leon  
   ON PUMP CABG X 3, LIMA to LAD, LRAG to OM, RSVG to PDA Left radial artery harvest 
Right greater saphenous vein harvest 
  
  
  
Non-Hospital Problems as of 10/4/2017  Reviewed: 9/27/2017  4:38 PM by Monique Goldstein MD  
 None You are allergic to the following Allergen Reactions Latex Other (comments) Allergy documented in admission data base Influenza Virus Vaccine, Specific Other (comments) Allergy found in admission data base Current Discharge Medication List  
  
START taking these medications Dose & Instructions Dispensing Information Comments  
 aspirin 81 mg chewable tablet Dose:  81 mg Take 1 Tab by mouth daily for 360 days. Quantity:  30 Tab Refills:  11  
   
 atorvastatin 20 mg tablet Commonly known as:  LIPITOR Dose:  20 mg Take 1 Tab by mouth nightly. Quantity:  30 Tab Refills:  2  
   
 carvedilol 12.5 mg tablet Commonly known as:  Haskel Scarce Dose:  12.5 mg Take 1 Tab by mouth two (2) times daily (with meals). Quantity:  60 Tab Refills:  2  
   
 furosemide 40 mg tablet Commonly known as:  LASIX Dose:  40 mg Take 1 Tab by mouth daily for 7 days. Quantity:  7 Tab Refills:  1 glipiZIDE 5 mg tablet Commonly known as:  Olevia Handy Dose:  5 mg Take 1 Tab by mouth Before breakfast and dinner. Quantity:  60 Tab Refills:  2  
   
 glucose blood VI test strips strip Commonly known as:  ACCU-CHEK GUIDE Dose:  1 Each  
1 Each by Does Not Apply route See Admin Instructions. Quantity:  50 Strip Refills:  1  
   
 * insulin glargine 100 unit/mL injection Commonly known as:  LANTUS Dose:  25 Units 25 Units by SubCUTAneous route daily. Quantity:  2 Vial  
Refills:  2  
   
 * insulin glargine 100 unit/mL (3 mL) Inpn Commonly known as:  Ellis Tovar Dose:  25 Units 25 Units by SubCUTAneous route daily for 60 days. Quantity:  15 mL Refills:  1 Insulin Needles (Disposable) 32 gauge x 5/32\" Ndle Commonly known as:  Cinthia Pen Needle Use as directed Quantity:  100 Pen Needle Refills:  1 Lancets Misc Commonly known as:  ACCU-CHEK FASTCLIX  
 by Does Not Apply route two (2) times a day. Quantity:  1 Each Refills:  2  
   
 lisinopril 2.5 mg tablet Commonly known as:  Lavell Shows Dose:  2.5 mg Take 1 Tab by mouth daily. Quantity:  30 Tab Refills:  2  
   
 oxyCODONE-acetaminophen 5-325 mg per tablet Commonly known as:  PERCOCET Dose:  1-2 Tab Take 1-2 Tabs by mouth every six (6) hours as needed. Max Daily Amount: 8 Tabs. Indications: Pain Quantity:  40 Tab Refills:  0  
   
 potassium chloride SR 20 mEq tablet Commonly known as:  K-TAB Dose:  20 mEq Take 1 Tab by mouth daily for 7 days. Quantity:  7 Tab Refills:  1  
   
 senna-docusate 8.6-50 mg per tablet Commonly known as:  Bonnie Pilon Dose:  1 Tab Take 1 Tab by mouth two (2) times a day. Quantity:  60 Tab Refills:  0  
   
 * Notice: This list has 2 medication(s) that are the same as other medications prescribed for you. Read the directions carefully, and ask your doctor or other care provider to review them with you. Surgery Information ID Date/Time Status Primary Surgeon All Procedures Location [id number] 9/29/2017 MD Dangelo ON PUMP CABG X 3 USING RIGHT SAPHENOUS VEIN AND LEFT RADIAL ARTERY VIA EVH WITH INTRAOPERATIVE ALFREDO MRM OPEN HEART Follow-up Information Follow up With Details Comments Contact Info [facility]  This is your home health provider. They will contact you after discharge to arrange the first visit. [phone] Hong Mohr MD Go on 11/7/2017 Cardiology follow up at [address] [facility],  [address] 
[phone] Gordo Kumar III, DO Go on 11/21/2017 New PCP appointment at 11:00 AM. Please arrive at 10:30 AM with insurance card, photo ID, a list of medications, and copay if needed. [address] [facility] [address] 
[phone] None   None ([phone]) Patient stated that they have no PCP Discharge Instructions Cardiac Surgery Specialist 
 
[address] [facility] 23                                       [facility], [address] [facility] Office- [phone]  Fax- [phone]       Office- [phone]  Fax- [phone] 
_____________________________________________________________ Dr. Sophia Madison, ACNP       [place]  ACNP Kanwal Shock CSA         BJ Sindy Minor PA-C Name:Robin [facility] Surgery & Date:  CABG 9/29/17 Discharge Date: 10/4/2017 Discharge to:  Home INSTRUCTIONS: 
NO SMOKING OR TOBACCO PRODUCTS Follow all the instructions in your discharge book You may shower. Wash all incisions twice daily with mild soap and water. No lotions, ointments or powder. Call the office immediately for any redness, swelling, or drainage from your incision. Take your temperature daily and call for a temperature of 101 degrees or higher or for any symptoms that make you think you have and infection. Weigh yourself each morning. Call if you gain more than 5 pounds in 48 hours. Use the incentive spirometer 6-8 times a day-10 breaths each time. Use a pillow or your bear to splint your breastbone when coughing or sneezing. If you feel your breast bone clicking or popping, notify the office immediately. Walk several hundred feet several times daily. DIET Eat an American Heart Association diet. If you are having trouble with your appetite, eat what you can. Try eating small, frequent meals throughout the day. Kcal ADA diet. Check your blood sugars  And keep a log of your results. ACTIVITY 
NO DRIVINGyou will be evaluated to drive at your follow up visit. Increase your activity by walking several times a day. Stay out of bed most of the day. When sitting, keep your legs elevated. You may ride in a car, but you must get out every hour and walk around. If you ride in a car with an airbag that can not be switched off, put the seat ALL the way back or ride in the back seat. NO LIFTING MORE THAN 5 POUND FOR 1 MONTH, THEN YOU CAN INCREASE TO NO MORE THAN 10 LBS FOR THE NEXT 2 MONTHS. AFTER 3 MONTHS, YOU WILL NO LONGER HAVE ANY LIFTING RESTRICTIONS. FOLLOW UP 
 
       1. Your first follow up appointment will be on 10/11 at 2pm 
       2. Your second follow up appointment will be on 11/6 at 1:15pm 
       3. Please call our office at [phone] if you are unable to make either one of these appointments. 4.  Call your cardiologist to make an appointment to see them in 4-5 weeks. 5.  Consult you primary care physician regarding your influenza &  
pneumovax vaccines. 6. PLEASE bring your medication bottles to each appointment. 7. Please call Cardiac Rehab at [phone] if you do not already have an appointment. CALL [phone] FOR ANY QUESTIONS OR PROBLEMS. Signature:___________________________________________________ MyChart Activation Thank you for requesting access to [employer]. Please follow the instructions below to securely access and download your online medical record. [employer] allows you to send messages to your doctor, view your test results, renew your prescriptions, schedule appointments, and more. How Do I Sign Up? 1. In your internet browser, go to [email or web address] 
2. Click on the First Time User? Click Here link in the Sign In box. You will be redirect to the New Member Sign Up page. 3. Enter your [employer] Access Code exactly as it appears below. You will not need to use this code after youve completed the sign-up process. If you do not sign up before the expiration date, you must request a new code. [employer] Access Code: [email or web address] Expires: 2017  1:40 PM (This is the date your [employer] access code will ) 4. Enter the last four digits of your Social Security Number (xxxx) and Date of Birth (mm/dd/yyyy) as indicated and click Submit. You will be taken to the next sign-up page. 5. Create a [employer] ID. This will be your [employer] login ID and cannot be changed, so think of one that is secure and easy to remember. 6. Create a [employer] password. You can change your password at any time. 7. Enter your Password Reset Question and Answer. This can be used at a later time if you forget your password. 8. Enter your e-mail address. You will receive e-mail notification when new information is available in [address] 9. Click Sign Up. You can now view and download portions of your medical record. 10. Click the Download Summary menu link to download a portable copy of your medical information. Additional Information If you have questions, please visit the Frequently Asked Questions section of the [employer] website at [email or web address] Remember, [employer] is NOT to be used for urgent needs. For medical emergencies, dial 911. Chart Review Routing History No Routing History on File

## 2017-09-26 NOTE — ED NOTES
Patient brought in via ems from [facility] for an abnormal ekg. States he was at work when he experienced substernal chest pain, diaphoresis, and felt like his heart was racing, nausea, vomiting x2. Episode lasted 40min according to patient. Denies cardiac hx.  Symptoms have resolved

## 2017-09-27 ENCOUNTER — ANESTHESIA EVENT (OUTPATIENT)
Dept: CARDIOTHORACIC SURGERY | Age: 52
DRG: 233 | End: 2017-09-27
Payer: COMMERCIAL

## 2017-09-27 ENCOUNTER — APPOINTMENT (OUTPATIENT)
Dept: GENERAL RADIOLOGY | Age: 52
DRG: 233 | End: 2017-09-27
Payer: COMMERCIAL

## 2017-09-27 ENCOUNTER — APPOINTMENT (OUTPATIENT)
Dept: GENERAL RADIOLOGY | Age: 52
DRG: 233 | End: 2017-09-27
Attending: THORACIC SURGERY (CARDIOTHORACIC VASCULAR SURGERY)
Payer: COMMERCIAL

## 2017-09-27 PROBLEM — I25.110 CORONARY ARTERY DISEASE INVOLVING NATIVE CORONARY ARTERY WITH UNSTABLE ANGINA PECTORIS (HCC): Status: ACTIVE | Noted: 2017-09-27

## 2017-09-27 PROBLEM — I50.20 SYSTOLIC HEART FAILURE (HCC): Status: ACTIVE | Noted: 2017-09-27

## 2017-09-27 PROBLEM — I25.10 CAD (CORONARY ARTERY DISEASE), NATIVE CORONARY ARTERY: Status: ACTIVE | Noted: 2017-09-27

## 2017-09-27 LAB
ANION GAP SERPL CALC-SCNC: 9 MMOL/L (ref 5–15)
APPEARANCE UR: CLEAR
APTT PPP: 30.8 SEC (ref 22.1–32.5)
ARTERIAL PATENCY WRIST A: YES
ATRIAL RATE: 86 BPM
ATRIAL RATE: 93 BPM
BACTERIA URNS QL MICRO: NEGATIVE /HPF
BASE DEFICIT BLDA-SCNC: 0.7 MMOL/L
BDY SITE: ABNORMAL
BILIRUB UR QL: NEGATIVE
BREATHS.SPONTANEOUS ON VENT: 18
BUN SERPL-MCNC: 19 MG/DL (ref 6–20)
BUN/CREAT SERPL: 15 (ref 12–20)
CALCIUM SERPL-MCNC: 8.4 MG/DL (ref 8.5–10.1)
CALCULATED P AXIS, ECG09: 41 DEGREES
CALCULATED P AXIS, ECG09: 53 DEGREES
CALCULATED R AXIS, ECG10: -19 DEGREES
CALCULATED R AXIS, ECG10: -8 DEGREES
CALCULATED T AXIS, ECG11: 103 DEGREES
CALCULATED T AXIS, ECG11: 136 DEGREES
CHLORIDE SERPL-SCNC: 106 MMOL/L (ref 97–108)
CHOLEST SERPL-MCNC: 158 MG/DL
CO2 SERPL-SCNC: 24 MMOL/L (ref 21–32)
COLOR UR: ABNORMAL
CREAT SERPL-MCNC: 1.26 MG/DL (ref 0.7–1.3)
DIAGNOSIS, 93000: NORMAL
DIAGNOSIS, 93000: NORMAL
EPITH CASTS URNS QL MICRO: ABNORMAL /LPF
ERYTHROCYTE [DISTWIDTH] IN BLOOD BY AUTOMATED COUNT: 13.6 % (ref 11.5–14.5)
EST. AVERAGE GLUCOSE BLD GHB EST-MCNC: 223 MG/DL
FIO2 ON VENT: 21 %
GAS FLOW.O2 O2 DELIVERY SYS: 0 L/MIN
GLUCOSE BLD STRIP.AUTO-MCNC: 225 MG/DL (ref 65–100)
GLUCOSE BLD STRIP.AUTO-MCNC: 247 MG/DL (ref 65–100)
GLUCOSE BLD STRIP.AUTO-MCNC: 268 MG/DL (ref 65–100)
GLUCOSE BLD STRIP.AUTO-MCNC: 281 MG/DL (ref 65–100)
GLUCOSE SERPL-MCNC: 246 MG/DL (ref 65–100)
GLUCOSE UR STRIP.AUTO-MCNC: 100 MG/DL
HBA1C MFR BLD: 9.4 % (ref 4.2–6.3)
HCO3 BLDA-SCNC: 22 MMOL/L (ref 22–26)
HCT VFR BLD AUTO: 38.2 % (ref 36.6–50.3)
HDLC SERPL-MCNC: 39 MG/DL
HDLC SERPL: 4.1 {RATIO} (ref 0–5)
HGB BLD-MCNC: 13.2 G/DL (ref 12.1–17)
HGB UR QL STRIP: NEGATIVE
HYALINE CASTS URNS QL MICRO: ABNORMAL /LPF (ref 0–5)
KETONES UR QL STRIP.AUTO: NEGATIVE MG/DL
LDLC SERPL CALC-MCNC: 79 MG/DL (ref 0–100)
LEUKOCYTE ESTERASE UR QL STRIP.AUTO: NEGATIVE
LIPID PROFILE,FLP: ABNORMAL
MCH RBC QN AUTO: 28.6 PG (ref 26–34)
MCHC RBC AUTO-ENTMCNC: 34.6 G/DL (ref 30–36.5)
MCV RBC AUTO: 82.7 FL (ref 80–99)
NITRITE UR QL STRIP.AUTO: NEGATIVE
P-R INTERVAL, ECG05: 174 MS
P-R INTERVAL, ECG05: 182 MS
PCO2 BLDA: 31 MMHG (ref 35–45)
PH BLDA: 7.47 [PH] (ref 7.35–7.45)
PH UR STRIP: 5 [PH] (ref 5–8)
PLATELET # BLD AUTO: 154 K/UL (ref 150–400)
PO2 BLDA: 96 MMHG (ref 80–100)
POTASSIUM SERPL-SCNC: 3.9 MMOL/L (ref 3.5–5.1)
PROT UR STRIP-MCNC: NEGATIVE MG/DL
Q-T INTERVAL, ECG07: 386 MS
Q-T INTERVAL, ECG07: 394 MS
QRS DURATION, ECG06: 94 MS
QRS DURATION, ECG06: 94 MS
QTC CALCULATION (BEZET), ECG08: 471 MS
QTC CALCULATION (BEZET), ECG08: 479 MS
RBC # BLD AUTO: 4.62 M/UL (ref 4.1–5.7)
RBC #/AREA URNS HPF: ABNORMAL /HPF (ref 0–5)
SAO2 % BLD: 98 % (ref 92–97)
SAO2% DEVICE SAO2% SENSOR NAME: ABNORMAL
SERVICE CMNT-IMP: ABNORMAL
SODIUM SERPL-SCNC: 139 MMOL/L (ref 136–145)
SP GR UR REFRACTOMETRY: 1.01 (ref 1–1.03)
SPECIMEN SITE: ABNORMAL
THERAPEUTIC RANGE,PTTT: NORMAL SECS (ref 58–77)
TRIGL SERPL-MCNC: 200 MG/DL (ref ?–150)
TROPONIN I SERPL-MCNC: 7.29 NG/ML
UROBILINOGEN UR QL STRIP.AUTO: 0.2 EU/DL (ref 0.2–1)
VENTRICULAR RATE, ECG03: 86 BPM
VENTRICULAR RATE, ECG03: 93 BPM
VLDLC SERPL CALC-MCNC: 40 MG/DL
WBC # BLD AUTO: 9.4 K/UL (ref 4.1–11.1)
WBC URNS QL MICRO: ABNORMAL /HPF (ref 0–4)

## 2017-09-27 PROCEDURE — 74011250637 HC RX REV CODE- 250/637: Performed by: NURSE PRACTITIONER

## 2017-09-27 PROCEDURE — 87086 URINE CULTURE/COLONY COUNT: CPT | Performed by: PHYSICIAN ASSISTANT

## 2017-09-27 PROCEDURE — 83036 HEMOGLOBIN GLYCOSYLATED A1C: CPT | Performed by: NURSE PRACTITIONER

## 2017-09-27 PROCEDURE — 3E043XZ INTRODUCTION OF VASOPRESSOR INTO CENTRAL VEIN, PERCUTANEOUS APPROACH: ICD-10-PCS | Performed by: INTERNAL MEDICINE

## 2017-09-27 PROCEDURE — 74011250636 HC RX REV CODE- 250/636

## 2017-09-27 PROCEDURE — 36600 WITHDRAWAL OF ARTERIAL BLOOD: CPT | Performed by: PHYSICIAN ASSISTANT

## 2017-09-27 PROCEDURE — 93880 EXTRACRANIAL BILAT STUDY: CPT

## 2017-09-27 PROCEDURE — 84484 ASSAY OF TROPONIN QUANT: CPT | Performed by: NURSE PRACTITIONER

## 2017-09-27 PROCEDURE — 74011250636 HC RX REV CODE- 250/636: Performed by: NURSE PRACTITIONER

## 2017-09-27 PROCEDURE — 86920 COMPATIBILITY TEST SPIN: CPT | Performed by: PHYSICIAN ASSISTANT

## 2017-09-27 PROCEDURE — 93306 TTE W/DOPPLER COMPLETE: CPT

## 2017-09-27 PROCEDURE — 74011250636 HC RX REV CODE- 250/636: Performed by: THORACIC SURGERY (CARDIOTHORACIC VASCULAR SURGERY)

## 2017-09-27 PROCEDURE — 02HV33Z INSERTION OF INFUSION DEVICE INTO SUPERIOR VENA CAVA, PERCUTANEOUS APPROACH: ICD-10-PCS | Performed by: ANESTHESIOLOGY

## 2017-09-27 PROCEDURE — 36415 COLL VENOUS BLD VENIPUNCTURE: CPT | Performed by: NURSE PRACTITIONER

## 2017-09-27 PROCEDURE — 77030018857

## 2017-09-27 PROCEDURE — 86900 BLOOD TYPING SEROLOGIC ABO: CPT | Performed by: PHYSICIAN ASSISTANT

## 2017-09-27 PROCEDURE — 94060 EVALUATION OF WHEEZING: CPT

## 2017-09-27 PROCEDURE — 85730 THROMBOPLASTIN TIME PARTIAL: CPT | Performed by: NURSE PRACTITIONER

## 2017-09-27 PROCEDURE — 99218 HC RM OBSERVATION: CPT

## 2017-09-27 PROCEDURE — 85027 COMPLETE CBC AUTOMATED: CPT | Performed by: NURSE PRACTITIONER

## 2017-09-27 PROCEDURE — 74011250636 HC RX REV CODE- 250/636: Performed by: INTERNAL MEDICINE

## 2017-09-27 PROCEDURE — 74011250637 HC RX REV CODE- 250/637: Performed by: PHYSICIAN ASSISTANT

## 2017-09-27 PROCEDURE — 71010 XR CHEST PORT: CPT

## 2017-09-27 PROCEDURE — 93922 UPR/L XTREMITY ART 2 LEVELS: CPT

## 2017-09-27 PROCEDURE — 77030012390 HC DRN CHST BTL GTNG -B

## 2017-09-27 PROCEDURE — 82803 BLOOD GASES ANY COMBINATION: CPT | Performed by: PHYSICIAN ASSISTANT

## 2017-09-27 PROCEDURE — 82962 GLUCOSE BLOOD TEST: CPT

## 2017-09-27 PROCEDURE — 74011636637 HC RX REV CODE- 636/637: Performed by: INTERNAL MEDICINE

## 2017-09-27 PROCEDURE — 65270000029 HC RM PRIVATE

## 2017-09-27 PROCEDURE — 80048 BASIC METABOLIC PNL TOTAL CA: CPT | Performed by: NURSE PRACTITIONER

## 2017-09-27 PROCEDURE — 93005 ELECTROCARDIOGRAM TRACING: CPT

## 2017-09-27 PROCEDURE — 80061 LIPID PANEL: CPT | Performed by: NURSE PRACTITIONER

## 2017-09-27 PROCEDURE — 81001 URINALYSIS AUTO W/SCOPE: CPT | Performed by: PHYSICIAN ASSISTANT

## 2017-09-27 RX ORDER — MUPIROCIN 20 MG/G
OINTMENT TOPICAL 2 TIMES DAILY
Status: DISCONTINUED | OUTPATIENT
Start: 2017-09-28 | End: 2017-09-29

## 2017-09-27 RX ORDER — SODIUM CHLORIDE 0.9 % (FLUSH) 0.9 %
5-10 SYRINGE (ML) INJECTION AS NEEDED
Status: DISCONTINUED | OUTPATIENT
Start: 2017-09-27 | End: 2017-09-29

## 2017-09-27 RX ORDER — CEFAZOLIN SODIUM IN 0.9 % NACL 2 G/100 ML
2 PLASTIC BAG, INJECTION (ML) INTRAVENOUS
Status: DISCONTINUED | OUTPATIENT
Start: 2017-09-27 | End: 2017-09-27

## 2017-09-27 RX ORDER — SODIUM CHLORIDE 9 MG/ML
20 INJECTION, SOLUTION INTRAVENOUS CONTINUOUS
Status: DISCONTINUED | OUTPATIENT
Start: 2017-09-27 | End: 2017-09-29

## 2017-09-27 RX ORDER — ENOXAPARIN SODIUM 100 MG/ML
1 INJECTION SUBCUTANEOUS EVERY 12 HOURS
Status: COMPLETED | OUTPATIENT
Start: 2017-09-27 | End: 2017-09-27

## 2017-09-27 RX ORDER — SODIUM CHLORIDE 0.9 % (FLUSH) 0.9 %
5-10 SYRINGE (ML) INJECTION EVERY 8 HOURS
Status: DISCONTINUED | OUTPATIENT
Start: 2017-09-27 | End: 2017-09-29

## 2017-09-27 RX ORDER — FUROSEMIDE 40 MG/1
20 TABLET ORAL DAILY
Status: DISCONTINUED | OUTPATIENT
Start: 2017-09-27 | End: 2017-09-29

## 2017-09-27 RX ORDER — CHLORHEXIDINE GLUCONATE 1.2 MG/ML
15 RINSE ORAL EVERY 12 HOURS
Status: DISCONTINUED | OUTPATIENT
Start: 2017-09-28 | End: 2017-09-29

## 2017-09-27 RX ORDER — CHLORHEXIDINE GLUCONATE 1.2 MG/ML
15 RINSE ORAL EVERY 12 HOURS
Status: DISCONTINUED | OUTPATIENT
Start: 2017-09-27 | End: 2017-09-27

## 2017-09-27 RX ORDER — ENOXAPARIN SODIUM 100 MG/ML
1 INJECTION SUBCUTANEOUS EVERY 12 HOURS
Status: DISCONTINUED | OUTPATIENT
Start: 2017-09-28 | End: 2017-09-28

## 2017-09-27 RX ORDER — AMIODARONE HYDROCHLORIDE 200 MG/1
400 TABLET ORAL 2 TIMES DAILY
Status: DISCONTINUED | OUTPATIENT
Start: 2017-09-27 | End: 2017-09-29

## 2017-09-27 RX ORDER — SODIUM CHLORIDE 9 MG/ML
100 INJECTION, SOLUTION INTRAVENOUS CONTINUOUS
Status: DISCONTINUED | OUTPATIENT
Start: 2017-09-28 | End: 2017-09-29

## 2017-09-27 RX ADMIN — Medication 10 ML: at 21:15

## 2017-09-27 RX ADMIN — Medication 10 ML: at 16:01

## 2017-09-27 RX ADMIN — SODIUM CHLORIDE 20 ML/HR: 900 INJECTION, SOLUTION INTRAVENOUS at 19:00

## 2017-09-27 RX ADMIN — HEPARIN SODIUM 4000 UNITS: 5000 INJECTION, SOLUTION INTRAVENOUS; SUBCUTANEOUS at 06:43

## 2017-09-27 RX ADMIN — Medication 10 ML: at 21:16

## 2017-09-27 RX ADMIN — AMIODARONE HYDROCHLORIDE 400 MG: 200 TABLET ORAL at 18:37

## 2017-09-27 RX ADMIN — Medication 10 ML: at 06:00

## 2017-09-27 RX ADMIN — INSULIN LISPRO 3 UNITS: 100 INJECTION, SOLUTION INTRAVENOUS; SUBCUTANEOUS at 16:37

## 2017-09-27 RX ADMIN — ENOXAPARIN SODIUM 100 MG: 100 INJECTION SUBCUTANEOUS at 11:19

## 2017-09-27 RX ADMIN — INSULIN LISPRO 5 UNITS: 100 INJECTION, SOLUTION INTRAVENOUS; SUBCUTANEOUS at 21:14

## 2017-09-27 RX ADMIN — INSULIN LISPRO 3 UNITS: 100 INJECTION, SOLUTION INTRAVENOUS; SUBCUTANEOUS at 07:30

## 2017-09-27 RX ADMIN — METOPROLOL TARTRATE 12.5 MG: 25 TABLET ORAL at 21:09

## 2017-09-27 RX ADMIN — ENOXAPARIN SODIUM 100 MG: 100 INJECTION SUBCUTANEOUS at 21:14

## 2017-09-27 RX ADMIN — FUROSEMIDE 20 MG: 20 TABLET ORAL at 11:18

## 2017-09-27 RX ADMIN — INSULIN LISPRO 5 UNITS: 100 INJECTION, SOLUTION INTRAVENOUS; SUBCUTANEOUS at 11:30

## 2017-09-27 RX ADMIN — ATORVASTATIN CALCIUM 20 MG: 20 TABLET, FILM COATED ORAL at 21:04

## 2017-09-27 RX ADMIN — METOPROLOL TARTRATE 12.5 MG: 25 TABLET ORAL at 08:18

## 2017-09-27 RX ADMIN — ASPIRIN 81 MG: 81 TABLET, COATED ORAL at 08:18

## 2017-09-27 RX ADMIN — Medication 10 ML: at 16:02

## 2017-09-27 RX ADMIN — AMIODARONE HYDROCHLORIDE 400 MG: 200 TABLET ORAL at 11:19

## 2017-09-27 NOTE — PROGRESS NOTES
1400: Assumed care of patient from [facility] at this time. Patient in bed, wife at bedside, resting comfortably on room air. No c/o pain; patient in NSR. AOx4. Lung sounds clear, but diminished at the bases bilaterally. 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 Trace edema BLE. 1530: MD Yesy Magana at bedside; no new orders received. 1600: DTC nurse at bedside.

## 2017-09-27 NOTE — PROGRESS NOTES
[facility] Consult Service Progress NOTE  Pulmonary, Critical Care, and Sleep Medicine    Name: Jonas Felix MRN: [id number]   : 1965 Hospital: [place] [facility]   Date: 2017  Admission Date: 2017     Chart and notes reviewed. Data reviewed. I have evaluated and examined the patient. Jonas Felix is a 45 y.o. male admitted for NSTEMI (non-ST elevated myocardial infarction) ([facility] No previous cardiac history. Significant family hx of mother and brother with MIs early 46s. Pt  had palpitations, mod substernal CP that started while he was at work. Lasted 40minutes. Associated chills, diaphoresis, n/v. Had similar episode 1 month ago, but did not follow up with MD.  ECG with flipped Twaves and slight ST depression laterally. Troponin 1.7. Patient describes one month history of exertional and rest occurring palpitations associated with nausea. Yesterday patient vomited twice. Now denies chest pain, SOB,  claudication,  PND or syncope. Symptoms normally relieved with rest until day of admission. Currently pain free. Cardiac risk factors: family history. Taken to cath lab by Maggie Chavira and shown to have significant lesions. CTs consulted. Moved to CCU for PA catheter. Echo pending. Pt has No PCP nor does his wife. . No longer checks sugars. Goes to urgent care prn. Moved to [place] years ago. Very much against vaccinations. No recent chest or sinus infection. No GERD or GI complaints. IMPRESSION:   1. NSTEMI  2. CAD- cardiac cath LM-normal LAD-proximal 95% sequential lesions, CEDRIC 1 flow distally  LCX-mid 90% . RCA-distal 99%, collaterals to left. LV-dilated, EF 30-35%, LVEDP 35 RA 7, PA 28/12, W 23 CO 5.1, CI 2.4.   3. Systolic HF  4. Type 2 DM uncontrolled  5. Never Smoker      RECOMMENDATIONS/PLAN:   1. CCU meds and IABP per Cardiology  2.  Pt education- strongly encouraged to not only get a PCP but also to get all of his vaccinations given his age, risk factors. Hope we reinforce this before discharge  3. Glycemic control   4. DTC consult     Risk of deterioration: medium and high    [x]      High complexity decision making was performed   [x]      See my orders for details    Hospital Day: 2    Allergies   Allergen Reactions    Latex Other (comments)     Allergy documented in admission data base    Influenza Virus Vaccine, Specific Other (comments)     Allergy found in admission data base        Current Facility-Administered Medications   Medication    [START ON 2017] mupirocin (BACTROBAN) 2 % ointment    [START ON 2017] chlorhexidine (PERIDEX) 0.12 % mouthwash 15 mL    amiodarone (CORDARONE) tablet 400 mg    sodium chloride (NS) flush 5-10 mL    sodium chloride (NS) flush 5-10 mL    sodium phosphate (FLEET'S) enema 118 mL    [START ON 2017] 0.9% sodium chloride infusion    enoxaparin (LOVENOX) injection 100 mg    furosemide (LASIX) tablet 20 mg    sodium chloride (NS) flush 5-10 mL    sodium chloride (NS) flush 5-10 mL    sodium chloride (NS) flush 5-10 mL    sodium chloride (NS) flush 5-10 mL    acetaminophen (TYLENOL) tablet 650 mg    morphine injection 2 mg    aspirin delayed-release tablet 81 mg    metoprolol tartrate (LOPRESSOR) tablet 12.5 mg    atorvastatin (LIPITOR) tablet 20 mg    glucose chewable tablet 16 g    glucagon (GLUCAGEN) injection 1 mg    dextrose 10% infusion 125-250 mL    insulin lispro (HUMALOG) injection      Social History   Substance Use Topics    Smoking status: Never Smoker    Smokeless tobacco: Never Used    Alcohol use No      History reviewed. No pertinent family history.      Vital Signs: Intake/Output: Intake/Output:   Temp (24hrs), Av.4 °F (36.9 °C), Min:97.8 °F (36.6 °C), Max:98.8 °F (37.1 °C)      Visit Vitals    /90    Pulse 90    Temp 98.7 °F (37.1 °C)    Resp 24    Ht 6' 2\" (1.88 m)    Wt 99 kg (218 lb 4.1 oz)    SpO2 99%    BMI 28.02 kg/m2         O2 Device: Room air       Wt Readings from Last 4 Encounters:   09/27/17 99 kg (218 lb 4.1 oz)          Intake/Output Summary (Last 24 hours) at 09/27/17 1503  Last data filed at 09/27/17 1241   Gross per 24 hour   Intake          1007.67 ml   Output             1350 ml   Net          -342.33 ml       Last shift:      09/27 0701 - 09/27 1900  In: -   Out: 100 [Urine:100]    Last 3 shifts: 09/25 1901 - 09/27 0700  In: 1007.7 [P.O.:720; I.V.:287.7]  Out: 1250 [Urine:1250]     Telemetry:normal sinus rhythm    Physical Exam:    General: WM in no apparent distress, well developed and well nourished, non-toxic, alert, oriented times 3, afebrile and normal vitals   HEENT: NCAT   Lungs: decreased air exchange bibasilar   Heart: Regular rate and rhythm   Abdomen: soft, non-tender, without masses or organomegaly   :    Extremity: negative, cyanosis, clubbing    Skin: Skin color, texture, turgor normal. No rashes or lesions;    Normal upper and lower extremity pulses    Tubes:         DATA:  MAR reviewed and pertinent medications noted or modified as needed    Labs:    Recent Labs      09/27/17   0535  09/26/17   1642  09/26/17   1431  09/26/17   1330   WBC  9.4  11.0   --   9.4   HGB  13.2  14.4   --   14.5   PLT  154  168   --   161   APTT  30.8  29.4  23.8   --      Recent Labs      09/27/17   0535  09/26/17   1445  09/26/17   1330   NA  139   --   137   K  3.9   --   4.8   CL  106   --   105   CO2  24   --   22   GLU  246*   --   364*   BUN  19   --   22*   CREA  1.26   --   1.43*   CA  8.4*   --   8.8   MG   --    --   2.2   ALB   --    --   4.2   SGOT   --    --   33   ALT   --    --   32   LPSE   --   95   --      Recent Labs      09/27/17   1240   PH  7.47*   PCO2  31*   PO2  96   HCO3  22   FIO2  21     Recent Labs      09/27/17   0535  09/26/17   1642  09/26/17   1330   CPK   --   340*   --    CKNDX   --   11.9*   --    TROIQ  7.29*  10.30*  1.78*     No results found for: BNPP, BNP   No results found for: CULT  Lab Results   Component Value Date/Time     09/26/2017 04:42 PM     Lab Results   Component Value Date/Time    Color YELLOW/STRAW 09/26/2017 06:54 PM    Appearance CLEAR 09/26/2017 06:54 PM    Specific gravity 1.020 09/26/2017 06:54 PM    pH (UA) 6.5 09/26/2017 06:54 PM    Protein NEGATIVE  09/26/2017 06:54 PM    Glucose >1000 09/26/2017 06:54 PM    Ketone TRACE 09/26/2017 06:54 PM    Bilirubin NEGATIVE  09/26/2017 06:54 PM    Blood NEGATIVE  09/26/2017 06:54 PM    Urobilinogen 0.2 09/26/2017 06:54 PM    Nitrites NEGATIVE  09/26/2017 06:54 PM    Leukocyte Esterase NEGATIVE  09/26/2017 06:54 PM    WBC 0-4 09/26/2017 06:54 PM    RBC 0-5 09/26/2017 06:54 PM    Bacteria NEGATIVE  09/26/2017 06:54 PM       No results found for: TOXA1, RPR, HBCM, HBSAG, HAAB, HCAB1, HAAT, G6PD, CRYAC, HIVGT, HIVR, HIV1, HIV12, HIVPC, HIVRPI  No results found for: IRON, FE, TIBC, IBCT, PSAT, FERR  Lab Results   Component Value Date/Time    TSH 1.15 09/26/2017 02:31 PM       Imaging:  []                           I have personally reviewed the patients radiographs and reports:      Results from [place] encounter on 09/26/17   XR CHEST PORT   Narrative EXAM:  XR CHEST PORT    INDICATION:  Pre-op heart surgery. Z 82.49, R 25.110, I 50.20, E11.65.    COMPARISON:  9/26/2017    FINDINGS: A portable AP radiograph of the chest was obtained at 1401 hours. The  lungs are clear. There is no pneumothorax or pleural effusion. Cardiac,  mediastinal and hilar contours are normal.  The bones and soft tissues are  grossly within normal limits. Impression IMPRESSION: No acute findings. No results found for this or any previous visit.       My assessment/plan was discussed with:  nursing    respiratory therapy Dr. family Ibis Lala MD

## 2017-09-27 NOTE — PROCEDURES
LM-normal  LAD-proximal 95% sequential lesions, CEDRIC 1 flow distally   LCX-mid 90% . RCA-distal 99%, collaterals to left. LV-dilated, EF 30-35%, LVEDP 35  RA 7, PA 28/12, W 23 CO 5.1, CI 2.4. Right radial access. No complications. EBL-minimal.   Specimen-none. Reviewed with Dr. Rosemarie Coleman. -CT surgery consult  -start insulin for DM.  -continue heparin.

## 2017-09-27 NOTE — PROGRESS NOTES
1230  Received patient to CCU room 2548 from [facility]; for PA line placement today, work-up for cardiac surgery scheduled for Friday, September 29.    1245  To pulmonary function lab for pre-op PFT and ABG testing. 1330  Back in room. Dr. Zohra Maldonado here to see patient. PCXR completed at bedside. 1400  Anesthesia notified that patient is back in room. Will place PA line later today. 1500  Bedside carotid doppler study in progress. [phone]  Dr. Karo Reis at bedside; consent obtained for PA line and quad lumen catheter. Lines placed by Dr. Karo Reis using ultrasound and sterile technique. [address] [facility] [address] completed. 1900  Radiologist on phone and states that the PA line is in the RA. Attempted to call Dr. Karo Reis on ext. [phone] but no answer. Bedside and verbal report to Anton Reis here to attempt to reposition PA line. Assisted by Jacquelyn Campbell RN.

## 2017-09-27 NOTE — CONSULTS
Cardiothoracic Surgery Consult      Subjective:      Chief Complaint: palpitations    Manuel Morales is a 45 y.o. no past medical history, non smoking, cau male who was referred for opinion and advise regarding coronary revascularization with NSTEMI by Dr. Cara Parmar / None. Patient describes one month history of exertional and rest occurring palpitations associated with nausea. Four episodes total. Yesterday patient also vomited twice. Denies chest pain, SOB,  claudication,  PND or syncope. Symptoms relieved with rest until yesterday. Cardiac Testing:     Echocardiogram pending     Cardiac catheretization films were personally reviewed  LM-normal  LAD-proximal 95% sequential lesions, CEDRIC 1 flow distally   LCX-mid 90% . RCA-distal 99%, collaterals to left. LV-dilated, EF 30-35%, LVEDP 35  RA 7, PA 28/12, W 23 CO 5.1, CI 2.4. Past Medical History:   Diagnosis Date    Coronary artery disease involving native coronary artery with unstable angina pectoris ([facility] 9/27/2017    Family history of early CAD 9/26/2017    Significant for mother, brother    Type II diabetes mellitus, uncontrolled ([facility] 9/27/2017     Past Surgical History:   Procedure Laterality Date    HX ORTHOPAEDIC      left side face metal       Social History   Substance Use Topics    Smoking status: Never Smoker    Smokeless tobacco: Never Used    Alcohol use No      History reviewed. No pertinent family history. Prior to Admission medications    Not on File       Allergies   Allergen Reactions    Latex Other (comments)     Allergy documented in admission data base    Influenza Virus Vaccine, Specific Other (comments)     Allergy found in admission data base       Alcohol use (within 30 days of surgery):   1) < or = 1 drink per week (rare or occasional drink) one beer, one glass of wine or one shot2)   2 ? 7 drinks per week (social drinker)  3) > or = 8 drinks per week (heavy drinker)  4) None (non?drinker)  5) Unknown?  patient/family unable to provide history    Recreational drug use:NO    Morris status or cause of death in parents and siblings if  Heart problems, stroke, or vascular disease in family members : YES    HISTORY OF NON COMPLIANCE WITH MEDICINES:NO    Prior to Admission medications    Not on File       REVIEW OF SYSTEMS:     [] Unable to obtain  ROS due to  []mental status change  []sedated   []intubated   [x]Total of 13 systems reviewed as follows:     Review of Systems:   Consititutional: Denies fever or chills. Eyes:  Denies use of glasses or vision problems(cataracts). ENT:  Denies hearing or swallowing difficulty. CV: Denies CP, claudication, HTN. Resp: Denies dyspnea, productive cough. : Denies dialysis or kidney problems. GI: Denies ulcers, esophageal strictures, liver problems. M/S: Denies joint or bone problems, or implanted artificial hardware. Skin: Denies varicose veins, edema. Neuro: Denies strokes, or TIAs. Psych: Denies anxiety or depression. Endocrine: Denies thyroid problems or diabetes. Heme/Lymphatic: Denies easy bruising or lymphedema. Objective:     Visit Vitals    /74    Pulse 90    Temp 97.8 °F (36.6 °C)    Resp 16    Ht 6' 2\" (1.88 m)    Wt 218 lb 4.1 oz (99 kg)    SpO2 94%    BMI 28.02 kg/m2       EXAM:  General:  Alert, cooperative, no distress   Mouth/Throat: Teeth and gums normal.   Neck: Supple, symmetrical, trachea midline, no adenopathy, thyroid: no enlargement/tenderness/nodules, no carotid bruit and no JVD. Lungs:   Clear to auscultation bilaterally. Heart:  Regular rate and rhythm, S1, S2 normal, no murmur, click, rub or gallop. Abdomen:   Soft, non-tender. Bowel sounds normal. No masses,  No organomegaly. Extremities: Extremities normal, atraumatic, no cyanosis or edema. Pulses: 2+ and symmetric all extremities. Skin:  No rashes or lesions       Neurologic:  Gross motor and sensory apparatus intact.      Labs:   Recent Labs      09/27/17   4949  09/27/17 0535   WBC   --   9.4   HGB   --   13.2   HCT   --   38.2   PLT   --   154   NA   --   139   K   --   3.9   BUN   --   19   CREA   --   1.26   GLU   --   246*   GLUCPOC  247*   --        DIAGNOSTICS:    Carotid Artery Doppler:    Pulmonary Function Testing:  FVC-   FEV1-       Assessment:     Principal Problem:    NSTEMI (non-ST elevated myocardial infarction) ([facility] (9/26/2017)    Active Problems:    Family history of early CAD (9/26/2017)      Overview: Significant for mother, brother      Systolic heart failure ([facility] (9/27/2017)      Coronary artery disease involving native coronary artery with unstable angina pectoris ([facility] (9/27/2017)      Type II diabetes mellitus, uncontrolled ([facility] (9/27/2017)        STS Risk Calculator:  Procedure: CAB Only   Risk of Mortality: 0.545%   Morbidity or Mortality: 10.146%   Long Length of Stay: 2.891%   Short Length of Stay: 58.757%   Permanent Stroke: 0.39%   Prolonged Ventilation: 6.257%   DSW Infection: 0.394%   Renal Failure: 2.359%   Reoperation: 3.734%       Plan:     The risk and benefit of surgery were reviewed with patient and family and all questions answered and the patient wishes to proceed. Risk include infection, bleeding, stroke, heart attack, irregular heart rhythm, kidney failure and death. Surgery is scheduled for Friday 9/29/17. Pre- operative assecessment in progress including: Carotid doppler and PFT screening.     Diabetic management will include pre-op hydration and insulin infusion      Signed By: ITZ Diop     September 27, 2017       levions

## 2017-09-27 NOTE — PROGRESS NOTES
[facility] V[facility]  Preliminary Report:  Modified Rashaad's Test    A modified Rashaad's test was performed. Right digitial pressure decrease was greater than 40 mmHg with radial artery compression. Right digital amplitude signal was reduced with radial artery compression. No significant change in amplitude and pressure noted on the left, suggesting normal left modified rashaad's test.    Final report to follow.

## 2017-09-27 NOTE — DIABETES MGMT
DTC Cardiac Surgery Education Note    Recommendations/ Comments: note patient begin insulin drip prior to surgery on Friday. Chart reviewed and initial evaluation complete on [facility]. Wife present at bedside. Patient appears very concerned about surgery. States he was just told he has diabetes. Reports mother and 2 brothers  from DM so he is concerned that he will need insulin. He is afraid of insulin shots and would prefer pills over insulin. Wife states if needed, she will be willing to give him insulin. Wife and patient report they like to the natural approach to their medical care- discussed how insulin is likely the most natural of all the medications. Patient is a 45 y.o. male with new dx of DM. CABG scheduled for Friday. DTC will follow up with patient after surgery to provide meter and nutrition education. A1c:   Lab Results   Component Value Date/Time    Hemoglobin A1c 9.4 2017 05:35 AM       Assessed and instructed patient on the following:   · risk of sternal wound infection/ delayed healing   · interpretation of lab results, exercise, SMBG skills, nutrition and referred to Diabetes Educator. Provided patient with the following: [x]         Survival skills education materials                            [x]         Outpatient DTC contact number                   Recent Glucose Results: Lab Results   Component Value Date/Time     (H) 2017 05:35 AM    GLUCPOC 225 (H) 2017 04:06 PM    GLUCPOC 281 (H) 2017 11:14 AM    GLUCPOC 247 (H) 2017 08:08 AM        Lab Results   Component Value Date/Time    Creatinine 1.26 2017 05:35 AM     Estimated Creatinine Clearance: 86.2 mL/min (based on Cr of 1.26).       Active Orders   Diet    DIET CARDIAC Regular; Consistent Carb 1500-1600kcal    DIET NPO    DIET NPO        PO intake: Patient Vitals for the past 72 hrs:   % Diet Eaten   17 2230 100 %         Will continue to follow as needed. Thank you.   Bethany Mcginnis, [address] [place] [address], [address]   Office:  [phone]

## 2017-09-27 NOTE — PROCEDURES
[facility]  *** FINAL REPORT ***    Name: Scotty Frost  MRN: [id number]    Inpatient  : 1965  HIS Order #: [id number]  [address] [facility] Visit #: [id number]  Date: 27 Sep 2017    TYPE OF TEST: Cerebrovascular Duplex    REASON FOR TEST  Neck Bruit    Right Carotid:-             Proximal               Mid                 Distal  cm/s  Systolic  Diastolic  Systolic  Diastolic  Systolic  Diastolic  CCA:     44.1      17.0                            73.0      23.0  Bulb:  ECA:     45.0       9.0  ICA:     58.0      22.0       47.0      24.0       54.0      27.0  ICA/CCA:  0.8       1.0    ICA Stenosis: Normal    Right Vertebral:-  Finding: Antegrade  Sys:       30.0  Jo:       14.0    Right Subclavian: Normal    Left Carotid:-            Proximal                Mid                 Distal  cm/s  Systolic  Diastolic  Systolic  Diastolic  Systolic  Diastolic  CCA:     62.8      23.0                            93.0      27.0  Bulb:  ECA:    132.0      22.0  ICA:     57.0      18.0       51.0      20.0       60.0      30.0  ICA/CCA:  0.6       0.7    ICA Stenosis: Normal    Left Vertebral:-  Finding: Antegrade  Sys:       44.0  Jo:       21.0    Left Subclavian: Normal    INTERPRETATION/FINDINGS  PROCEDURE:  Carotid Duplex Examination using B-mode, color and  spectral Doppler of the extracranial cerebrovascular arteries. 1. No evidence of significant arterial occlusive disease in the  internal carotid arteries. 2. No significant stenosis in the external carotid arteries  bilaterally. 3. Antegrade flow in both vertebral arteries. 4. Normal flow in both subclavian arteries. ADDITIONAL COMMENTS    I have personally reviewed the data relevant to the interpretation of  this  study. TECHNOLOGIST: Min Red RVT, RDMS  Signed: 2017 10:16 AM    PHYSICIAN: Monique Bro MD  Signed: 2017 12:29 PM

## 2017-09-27 NOTE — CARDIO/PULMONARY
C/P Rehab Note:    Chart Reviewed. Admitting diagnosis of NSTEMI, S/p PCI,(9/26/17), LV EF 30-35%, CTS surgery consult in place. History significant for:  - Family history of early CAD    Will hold off on teaching until after CTS consult.

## 2017-09-27 NOTE — PROGRESS NOTES
[address]  [phone]      Cardiology Progress Note      9/27/2017 9:00AM    Admit Date: 9/26/2017    Admit Diagnosis:   NSTEMI (non-ST elevated myocardial infarction) [facility])  CAD     Subjective:     Darnell Alvarez has no c/o CP, SOB. S/p cardiac cath with significant 3VD. CTS planning CABG on 9/29. EF 35% by cardiac cath, echo pending. HGBA1c 9.4, new diagnosis, managed with insulin at this time.       Visit Vitals    /74    Pulse 90    Temp 97.8 °F (36.6 °C)    Resp 16    Ht 6' 2\" (1.88 m)    Wt 99 kg (218 lb 4.1 oz)    SpO2 94%    BMI 28.02 kg/m2       Current Facility-Administered Medications   Medication Dose Route Frequency    [START ON 9/28/2017] mupirocin (BACTROBAN) 2 % ointment   Both Nostrils BID    [START ON 9/28/2017] chlorhexidine (PERIDEX) 0.12 % mouthwash 15 mL  15 mL Oral Q12H    amiodarone (CORDARONE) tablet 400 mg  400 mg Oral BID    sodium chloride (NS) flush 5-10 mL  5-10 mL IntraVENous Q8H    sodium chloride (NS) flush 5-10 mL  5-10 mL IntraVENous PRN    sodium phosphate (FLEET'S) enema 118 mL  1 Enema Rectal PRN    [START ON 9/28/2017] 0.9% sodium chloride infusion  100 mL/hr IntraVENous CONTINUOUS    enoxaparin (LOVENOX) injection 100 mg  1 mg/kg SubCUTAneous Q12H    sodium chloride (NS) flush 5-10 mL  5-10 mL IntraVENous Q8H    sodium chloride (NS) flush 5-10 mL  5-10 mL IntraVENous PRN    sodium chloride (NS) flush 5-10 mL  5-10 mL IntraVENous Q8H    sodium chloride (NS) flush 5-10 mL  5-10 mL IntraVENous PRN    acetaminophen (TYLENOL) tablet 650 mg  650 mg Oral Q4H PRN    morphine injection 2 mg  2 mg IntraVENous Q4H PRN    aspirin delayed-release tablet 81 mg  81 mg Oral DAILY    metoprolol tartrate (LOPRESSOR) tablet 12.5 mg  12.5 mg Oral Q12H    atorvastatin (LIPITOR) tablet 20 mg  20 mg Oral QHS    glucose chewable tablet 16 g  4 Tab Oral PRN    glucagon (GLUCAGEN) injection 1 mg  1 mg IntraMUSCular PRN  dextrose 10% infusion 125-250 mL  125-250 mL IntraVENous PRN    insulin lispro (HUMALOG) injection   SubCUTAneous AC&HS    heparin (porcine) injection 4,000 Units  4,000 Units IntraVENous PRN    heparin (porcine) injection 2,000 Units  2,000 Units SubCUTAneous PRN       Objective:      Physical Exam:  General Appearance:  WNWD  male in no acute distress  Chest:   Clear  Cardiovascular:  Regular rate and rhythm, no murmur.   Abdomen:   Soft, non-tender, bowel sounds are active.   Extremities: right radial site D/I, no hematoma; right groin site D/I, no hematoma, no bruits, +DP/PT  Skin:  Warm and dry.     Data Review:   Recent Labs      09/27/17   0535  09/26/17   1642  09/26/17   1330   WBC  9.4  11.0  9.4   HGB  13.2  14.4  14.5   HCT  38.2  40.9  41.5   PLT  154  168  161     Recent Labs      09/27/17   0535  09/26/17   1330   NA  139  137   K  3.9  4.8   CL  106  105   CO2  24  22   GLU  246*  364*   BUN  19  22*   CREA  1.26  1.43*   CA  8.4*  8.8   MG   --   2.2   ALB   --   4.2   TBILI   --   0.6   SGOT   --   33   ALT   --   32       Recent Labs      09/27/17   0535  09/26/17   1642  09/26/17   1330   TROIQ  7.29*  10.30*  1.78*   CPK   --   340*   --    CKMB   --   40.4*   --          Intake/Output Summary (Last 24 hours) at 09/27/17 1010  Last data filed at 09/27/17 0626   Gross per 24 hour   Intake          1007.67 ml   Output             1250 ml   Net          -242.33 ml        Telemetry: SR  EKG:SR      Cardia cath:   LM-normal  LAD-proximal 95% sequential lesions, CEDRIC 1 flow distally   LCX-mid 90% . RCA-distal 99%, collaterals to left. LV-dilated, EF 30-35%, LVEDP 35  RA 7, PA 28/12, W 23 CO 5.1, CI 2.4.      Assessment:     Principal Problem:    NSTEMI (non-ST elevated myocardial infarction) ([facility] (9/26/2017)    Active Problems:    Family history of early CAD (9/26/2017)      Overview: Significant for mother, brother      Systolic heart failure ([facility] (9/27/2017)      Coronary artery disease involving native coronary artery with unstable angina pectoris ([facility] (9/27/2017)      Type II diabetes mellitus, uncontrolled ([facility] (9/27/2017)        Plan:     NSTEMI:  3VD, awaiting CABG  Continue on ASA, BB, statin  Change from IV hep to Lovenox BID    DM:  Consult to DM management for education and management post CABG  Cover with insulin sliding scale, then will be on insulin IV post CABG    New systolic HF EF 41%:  Echo pending  Received IV lasix last evening with good output  Low dose Lasix daily with elevated EDP  Monitor I/Os, daily weights, labs  No ACEI prior to CABG, will start post CABG as BP allows        [facility]    9/27/2017         Agree with note as outlined by  NP. I confirm findings in history and physical exam. No additional findings noted. Agree with plan as outlined above. Discussed with CT surgery IABP tomorrow before CABG Friday.     Og James MD

## 2017-09-27 NOTE — PROGRESS NOTES
2030-  Pt transported to Cath Lab via bed. Pt denied pain at time of transport. Heparin gtt stopped for cardiac cath. 2145-  Pt back to room from cardiac cath lab. Dr. Facundo Sylvester in talking with pt's wife and pt. Noted TR band to right radial.  No bleeding or hematoma noted to site. Pt also has a right groin cath site. No bleeding or hematoma noted to site. .  Will continue to monitor. 2155-  Patent hemostasis to right radial TR band achieved @ 11 ml. Normal saline infusion discontinued as per order. 2230-  Pt sitting up in bed eating meal tray from cafeteria. FSBS= 160 prior to meal.  No coverage ordered for blood sugar. 2330-  Lasix 20 mg IVP x 1 dose given per order. Heparin bolus of 4,000 units IV given per PRN order and Heparin gtt resumed at previous rate of 9.4units/ kg/ min (10 ml/hr)  Next PTT to be drawn at 0530.    0130-  Pt in bed resting quietly with eyes closed. No bleeding or hematoma noted to right radial or right groin cath sites since restarting Heparin gtt. Will continue to monitor. 0530-  Pt up ambulating in room. Tolerated well. No bleeding or hematoma noted to right radial or right groin cath sites. Pt is currently;y sitting up in chair. 2193-  PTT= 30.8. Heparin gtt increased to 13.4 units/kg/hr (14.2 ml/hr). Pt also received Heparin 4,000 unit bolus. Next PTT to be drawn at 1245. Pt remains up in chair. No c/o voiced.

## 2017-09-27 NOTE — PROGRESS NOTES
[facility] V[facility]  Preliminary Report:  Carotid Duplex Scan    Right:  No plaque noted in the right carotid system. Right ICA velocities suggest 0% diameter reduction. Right vertebral artery flow is antegrade. Left:  No plaque noted in the left carotid system. Left ICA velocities suggest 0% diameter reduction. Left vertebral artery flow is antegrade. Final report to follow.

## 2017-09-27 NOTE — PROGRESS NOTES
1920 Report received from Marcelino Renteria, RN  2122 Dr Tawanda Betancur at bedside to advance swan as per [employer] is in [place].  0171 After multiple attempts unable to advance swan to PA. Dr Fredrick Alcantar notifed. To have Dr Tawanda Betancur leave swan in RA overnight. 1952 [place] at 30 with RA waveform. 2000 Patient assessed. 2200 Patient asleep. 0000 Patient reassessed. 0248 No orders for AM labs. 5942 Patient asleep, turning himself in bed.  0720 Bedside shift change report given to Luis Antonio Peguero (oncoming nurse) by Shannan Maya (offgoing nurse). Report included the following information SBAR, Kardex, ED Summary, Procedure Summary, Intake/Output, MAR, Accordion, Recent Results, Med Rec Status and Cardiac Rhythm SR w/ 1st degree AVB.

## 2017-09-27 NOTE — CARDIO/PULMONARY
C/P Rehab Note:     Chart Reviewed. Admitting diagnosis of NSTEMI, S/p PCI,(9/26/17), LV EF 30-35%, CTS surgery consult in place.      History significant for:  - Family history of early CAD    Chart reviewed and pt visited. The \"Going for Heart Surgery\" booklet was provided and reviewed. Printed materials given and discussed re:cardiac surgery and CABG. Introduced self and role of CP Rehab. Patient and wife are still in shock over events in last 24 hours. Therapeutic listening provided, reassurance given. Medical staff at bedside for procedure. Encouraged patient/wife to review materials, ask for clarification on any questions they may have and CP Rehab will continue to follow for support/teaching. Patient and wife indicated understanding and had no questions at this time but will review materials. Wife expressed appreciation for visit, information and reassurance.

## 2017-09-28 LAB
ACT BLD: 136 SECS (ref 79–138)
ADMINISTERED INITIALS, ADMINIT: NORMAL
APTT PPP: 29.4 SEC (ref 22.1–32.5)
APTT PPP: 38.2 SEC (ref 22.1–32.5)
BASOPHILS # BLD: 0 K/UL (ref 0–0.1)
BASOPHILS NFR BLD: 0 % (ref 0–1)
D50 ADMINISTERED, D50ADM: 0 ML
D50 ADMINISTERED, D50ADM: 11 ML
D50 ADMINISTERED, D50ADM: 14 ML
D50 ORDER, D50ORD: 0 ML
D50 ORDER, D50ORD: 11 ML
D50 ORDER, D50ORD: 14 ML
EOSINOPHIL # BLD: 0.1 K/UL (ref 0–0.4)
EOSINOPHIL NFR BLD: 1 % (ref 0–7)
ERYTHROCYTE [DISTWIDTH] IN BLOOD BY AUTOMATED COUNT: 13.2 % (ref 11.5–14.5)
GLSCOM COMMENTS: NORMAL
GLUCOSE BLD STRIP.AUTO-MCNC: 125 MG/DL (ref 65–100)
GLUCOSE BLD STRIP.AUTO-MCNC: 127 MG/DL (ref 65–100)
GLUCOSE BLD STRIP.AUTO-MCNC: 145 MG/DL (ref 65–100)
GLUCOSE BLD STRIP.AUTO-MCNC: 148 MG/DL (ref 65–100)
GLUCOSE BLD STRIP.AUTO-MCNC: 162 MG/DL (ref 65–100)
GLUCOSE BLD STRIP.AUTO-MCNC: 162 MG/DL (ref 65–100)
GLUCOSE BLD STRIP.AUTO-MCNC: 185 MG/DL (ref 65–100)
GLUCOSE BLD STRIP.AUTO-MCNC: 198 MG/DL (ref 65–100)
GLUCOSE BLD STRIP.AUTO-MCNC: 199 MG/DL (ref 65–100)
GLUCOSE BLD STRIP.AUTO-MCNC: 275 MG/DL (ref 65–100)
GLUCOSE BLD STRIP.AUTO-MCNC: 64 MG/DL (ref 65–100)
GLUCOSE BLD STRIP.AUTO-MCNC: 72 MG/DL (ref 65–100)
GLUCOSE BLD STRIP.AUTO-MCNC: 94 MG/DL (ref 65–100)
GLUCOSE, GLC: 125 MG/DL
GLUCOSE, GLC: 127 MG/DL
GLUCOSE, GLC: 145 MG/DL
GLUCOSE, GLC: 148 MG/DL
GLUCOSE, GLC: 162 MG/DL
GLUCOSE, GLC: 162 MG/DL
GLUCOSE, GLC: 185 MG/DL
GLUCOSE, GLC: 198 MG/DL
GLUCOSE, GLC: 199 MG/DL
GLUCOSE, GLC: 64 MG/DL
GLUCOSE, GLC: 72 MG/DL
GLUCOSE, GLC: 94 MG/DL
HCT VFR BLD AUTO: 38.7 % (ref 36.6–50.3)
HGB BLD-MCNC: 13.5 G/DL (ref 12.1–17)
HIGH TARGET, HITG: 130 MG/DL
HIGH TARGET, HITG: 140 MG/DL
INSULIN ADMINSTERED, INSADM: 0 UNITS/HOUR
INSULIN ADMINSTERED, INSADM: 12.5 UNITS/HOUR
INSULIN ADMINSTERED, INSADM: 3.4 UNITS/HOUR
INSULIN ADMINSTERED, INSADM: 4.1 UNITS/HOUR
INSULIN ADMINSTERED, INSADM: 4.6 UNITS/HOUR
INSULIN ADMINSTERED, INSADM: 4.7 UNITS/HOUR
INSULIN ADMINSTERED, INSADM: 5.1 UNITS/HOUR
INSULIN ADMINSTERED, INSADM: 5.3 UNITS/HOUR
INSULIN ADMINSTERED, INSADM: 8.2 UNITS/HOUR
INSULIN ADMINSTERED, INSADM: 8.8 UNITS/HOUR
INSULIN ORDER, INSORD: 0 UNITS/HOUR
INSULIN ORDER, INSORD: 0 UNITS/HOUR
INSULIN ORDER, INSORD: 0.9 UNITS/HOUR
INSULIN ORDER, INSORD: 12.5 UNITS/HOUR
INSULIN ORDER, INSORD: 3.4 UNITS/HOUR
INSULIN ORDER, INSORD: 4.1 UNITS/HOUR
INSULIN ORDER, INSORD: 4.6 UNITS/HOUR
INSULIN ORDER, INSORD: 4.7 UNITS/HOUR
INSULIN ORDER, INSORD: 5.1 UNITS/HOUR
INSULIN ORDER, INSORD: 5.3 UNITS/HOUR
INSULIN ORDER, INSORD: 8.2 UNITS/HOUR
INSULIN ORDER, INSORD: 8.8 UNITS/HOUR
LOW TARGET, LOT: 100 MG/DL
LOW TARGET, LOT: 95 MG/DL
LYMPHOCYTES # BLD: 2 K/UL (ref 0.8–3.5)
LYMPHOCYTES NFR BLD: 26 % (ref 12–49)
MCH RBC QN AUTO: 28.5 PG (ref 26–34)
MCHC RBC AUTO-ENTMCNC: 34.9 G/DL (ref 30–36.5)
MCV RBC AUTO: 81.8 FL (ref 80–99)
MINUTES UNTIL NEXT BG, NBG: 15 MIN
MINUTES UNTIL NEXT BG, NBG: 15 MIN
MINUTES UNTIL NEXT BG, NBG: 60 MIN
MONOCYTES # BLD: 0.7 K/UL (ref 0–1)
MONOCYTES NFR BLD: 8 % (ref 5–13)
MULTIPLIER, MUL: 0.03
MULTIPLIER, MUL: 0.03
MULTIPLIER, MUL: 0.04
MULTIPLIER, MUL: 0.04
MULTIPLIER, MUL: 0.05
MULTIPLIER, MUL: 0.06
MULTIPLIER, MUL: 0.07
MULTIPLIER, MUL: 0.08
MULTIPLIER, MUL: 0.09
NEUTS SEG # BLD: 5 K/UL (ref 1.8–8)
NEUTS SEG NFR BLD: 65 % (ref 32–75)
ORDER INITIALS, ORDINIT: NORMAL
PLATELET # BLD AUTO: 149 K/UL (ref 150–400)
RBC # BLD AUTO: 4.73 M/UL (ref 4.1–5.7)
SERVICE CMNT-IMP: ABNORMAL
SERVICE CMNT-IMP: NORMAL
SERVICE CMNT-IMP: NORMAL
THERAPEUTIC RANGE,PTTT: ABNORMAL SECS (ref 58–77)
THERAPEUTIC RANGE,PTTT: NORMAL SECS (ref 58–77)
WBC # BLD AUTO: 7.8 K/UL (ref 4.1–11.1)

## 2017-09-28 PROCEDURE — 74011250637 HC RX REV CODE- 250/637: Performed by: PHYSICIAN ASSISTANT

## 2017-09-28 PROCEDURE — 74011250637 HC RX REV CODE- 250/637: Performed by: NURSE PRACTITIONER

## 2017-09-28 PROCEDURE — P9047 ALBUMIN (HUMAN), 25%, 50ML: HCPCS | Performed by: THORACIC SURGERY (CARDIOTHORACIC VASCULAR SURGERY)

## 2017-09-28 PROCEDURE — 74011000250 HC RX REV CODE- 250: Performed by: INTERNAL MEDICINE

## 2017-09-28 PROCEDURE — 74011000258 HC RX REV CODE- 258: Performed by: THORACIC SURGERY (CARDIOTHORACIC VASCULAR SURGERY)

## 2017-09-28 PROCEDURE — 99152 MOD SED SAME PHYS/QHP 5/>YRS: CPT

## 2017-09-28 PROCEDURE — 74011000250 HC RX REV CODE- 250: Performed by: THORACIC SURGERY (CARDIOTHORACIC VASCULAR SURGERY)

## 2017-09-28 PROCEDURE — 74011636637 HC RX REV CODE- 636/637: Performed by: PHYSICIAN ASSISTANT

## 2017-09-28 PROCEDURE — 5A02210 ASSISTANCE WITH CARDIAC OUTPUT USING BALLOON PUMP, CONTINUOUS: ICD-10-PCS | Performed by: INTERNAL MEDICINE

## 2017-09-28 PROCEDURE — 74011250636 HC RX REV CODE- 250/636: Performed by: NURSE PRACTITIONER

## 2017-09-28 PROCEDURE — 77030002996 HC SUT SLK J&J -A

## 2017-09-28 PROCEDURE — 77030027138 HC INCENT SPIROMETER -A

## 2017-09-28 PROCEDURE — 77030013797 HC KT TRNSDUC PRSSR EDWD -A

## 2017-09-28 PROCEDURE — 4A023N6 MEASUREMENT OF CARDIAC SAMPLING AND PRESSURE, RIGHT HEART, PERCUTANEOUS APPROACH: ICD-10-PCS | Performed by: INTERNAL MEDICINE

## 2017-09-28 PROCEDURE — 77030013798 HC KT TRNSDUC PRSSR EDWD -B

## 2017-09-28 PROCEDURE — 33967 INSERT I-AORT PERCUT DEVICE: CPT

## 2017-09-28 PROCEDURE — 77030004731 HC CATH BLN INTAORT GTNG -G

## 2017-09-28 PROCEDURE — 65270000029 HC RM PRIVATE

## 2017-09-28 PROCEDURE — 82962 GLUCOSE BLOOD TEST: CPT

## 2017-09-28 PROCEDURE — 85025 COMPLETE CBC W/AUTO DIFF WBC: CPT | Performed by: NURSE PRACTITIONER

## 2017-09-28 PROCEDURE — 74011250636 HC RX REV CODE- 250/636: Performed by: THORACIC SURGERY (CARDIOTHORACIC VASCULAR SURGERY)

## 2017-09-28 PROCEDURE — 74011000250 HC RX REV CODE- 250

## 2017-09-28 PROCEDURE — 74011636637 HC RX REV CODE- 636/637: Performed by: THORACIC SURGERY (CARDIOTHORACIC VASCULAR SURGERY)

## 2017-09-28 PROCEDURE — C1894 INTRO/SHEATH, NON-LASER: HCPCS

## 2017-09-28 PROCEDURE — 36415 COLL VENOUS BLD VENIPUNCTURE: CPT | Performed by: NURSE PRACTITIONER

## 2017-09-28 PROCEDURE — 74011636637 HC RX REV CODE- 636/637: Performed by: INTERNAL MEDICINE

## 2017-09-28 PROCEDURE — 85730 THROMBOPLASTIN TIME PARTIAL: CPT | Performed by: NURSE PRACTITIONER

## 2017-09-28 PROCEDURE — 74011000258 HC RX REV CODE- 258: Performed by: PHYSICIAN ASSISTANT

## 2017-09-28 PROCEDURE — 93325 DOPPLER ECHO COLOR FLOW MAPG: CPT

## 2017-09-28 PROCEDURE — 74011250636 HC RX REV CODE- 250/636: Performed by: INTERNAL MEDICINE

## 2017-09-28 PROCEDURE — 77030014102

## 2017-09-28 PROCEDURE — 74011250636 HC RX REV CODE- 250/636

## 2017-09-28 PROCEDURE — 85347 COAGULATION TIME ACTIVATED: CPT

## 2017-09-28 RX ORDER — HEPARIN SODIUM 5000 [USP'U]/ML
4000 INJECTION, SOLUTION INTRAVENOUS; SUBCUTANEOUS AS NEEDED
Status: DISCONTINUED | OUTPATIENT
Start: 2017-09-28 | End: 2017-09-29

## 2017-09-28 RX ORDER — LIDOCAINE HYDROCHLORIDE 10 MG/ML
INJECTION INFILTRATION; PERINEURAL
Status: COMPLETED
Start: 2017-09-28 | End: 2017-09-28

## 2017-09-28 RX ORDER — HEPARIN SODIUM 1000 [USP'U]/ML
1000-10000 INJECTION, SOLUTION INTRAVENOUS; SUBCUTANEOUS
Status: DISCONTINUED | OUTPATIENT
Start: 2017-09-28 | End: 2017-09-28

## 2017-09-28 RX ORDER — MAGNESIUM SULFATE HEPTAHYDRATE 40 MG/ML
2 INJECTION, SOLUTION INTRAVENOUS ONCE
Status: DISCONTINUED | OUTPATIENT
Start: 2017-09-29 | End: 2017-09-29

## 2017-09-28 RX ORDER — FENTANYL CITRATE 50 UG/ML
INJECTION, SOLUTION INTRAMUSCULAR; INTRAVENOUS
Status: COMPLETED
Start: 2017-09-28 | End: 2017-09-28

## 2017-09-28 RX ORDER — DESMOPRESSIN ACETATE 4 UG/ML
2 INJECTION, SOLUTION INTRAVENOUS; SUBCUTANEOUS ONCE
Status: DISCONTINUED | OUTPATIENT
Start: 2017-09-29 | End: 2017-09-29

## 2017-09-28 RX ORDER — PROTAMINE SULFATE 10 MG/ML
250 INJECTION, SOLUTION INTRAVENOUS
Status: DISCONTINUED | OUTPATIENT
Start: 2017-09-29 | End: 2017-09-29

## 2017-09-28 RX ORDER — HEPARIN SODIUM 200 [USP'U]/100ML
500 INJECTION, SOLUTION INTRAVENOUS ONCE
Status: COMPLETED | OUTPATIENT
Start: 2017-09-28 | End: 2017-09-28

## 2017-09-28 RX ORDER — MAGNESIUM SULFATE 100 %
4 CRYSTALS MISCELLANEOUS AS NEEDED
Status: DISCONTINUED | OUTPATIENT
Start: 2017-09-28 | End: 2017-09-29

## 2017-09-28 RX ORDER — HEPARIN SODIUM 1000 [USP'U]/ML
60 INJECTION, SOLUTION INTRAVENOUS; SUBCUTANEOUS ONCE
Status: DISCONTINUED | OUTPATIENT
Start: 2017-09-28 | End: 2017-09-28

## 2017-09-28 RX ORDER — LIDOCAINE HYDROCHLORIDE 20 MG/ML
SOLUTION OROPHARYNGEAL
Status: COMPLETED
Start: 2017-09-28 | End: 2017-09-28

## 2017-09-28 RX ORDER — INSULIN LISPRO 100 [IU]/ML
INJECTION, SOLUTION INTRAVENOUS; SUBCUTANEOUS
Status: DISCONTINUED | OUTPATIENT
Start: 2017-09-28 | End: 2017-09-29

## 2017-09-28 RX ORDER — NITROGLYCERIN 20 MG/100ML
0-200 INJECTION INTRAVENOUS
Status: DISCONTINUED | OUTPATIENT
Start: 2017-09-29 | End: 2017-09-29

## 2017-09-28 RX ORDER — LIDOCAINE HYDROCHLORIDE 20 MG/ML
15 SOLUTION OROPHARYNGEAL ONCE
Status: COMPLETED | OUTPATIENT
Start: 2017-09-28 | End: 2017-09-28

## 2017-09-28 RX ORDER — MIDAZOLAM HYDROCHLORIDE 1 MG/ML
INJECTION, SOLUTION INTRAMUSCULAR; INTRAVENOUS
Status: COMPLETED
Start: 2017-09-28 | End: 2017-09-28

## 2017-09-28 RX ORDER — LIDOCAINE HYDROCHLORIDE 10 MG/ML
1-20 INJECTION INFILTRATION; PERINEURAL ONCE
Status: COMPLETED | OUTPATIENT
Start: 2017-09-28 | End: 2017-09-28

## 2017-09-28 RX ORDER — MIDAZOLAM HYDROCHLORIDE 1 MG/ML
.5-2 INJECTION, SOLUTION INTRAMUSCULAR; INTRAVENOUS
Status: DISCONTINUED | OUTPATIENT
Start: 2017-09-28 | End: 2017-09-28 | Stop reason: ALTCHOICE

## 2017-09-28 RX ORDER — HEPARIN SODIUM 10000 [USP'U]/100ML
8-25 INJECTION, SOLUTION INTRAVENOUS
Status: DISCONTINUED | OUTPATIENT
Start: 2017-09-28 | End: 2017-09-29

## 2017-09-28 RX ORDER — DEXTROSE 50 % IN WATER (D50W) INTRAVENOUS SYRINGE
12.5-25 AS NEEDED
Status: DISCONTINUED | OUTPATIENT
Start: 2017-09-28 | End: 2017-09-28 | Stop reason: SDUPTHER

## 2017-09-28 RX ORDER — HEPARIN SODIUM 1000 [USP'U]/ML
INJECTION, SOLUTION INTRAVENOUS; SUBCUTANEOUS
Status: COMPLETED
Start: 2017-09-28 | End: 2017-09-28

## 2017-09-28 RX ORDER — HEPARIN SODIUM 10000 [USP'U]/100ML
800 INJECTION, SOLUTION INTRAVENOUS
Status: DISCONTINUED | OUTPATIENT
Start: 2017-09-28 | End: 2017-09-28 | Stop reason: DRUGHIGH

## 2017-09-28 RX ORDER — HEPARIN SODIUM 200 [USP'U]/100ML
INJECTION, SOLUTION INTRAVENOUS
Status: COMPLETED
Start: 2017-09-28 | End: 2017-09-28

## 2017-09-28 RX ORDER — DOPAMINE HYDROCHLORIDE 320 MG/100ML
5-20 INJECTION, SOLUTION INTRAVENOUS
Status: DISCONTINUED | OUTPATIENT
Start: 2017-09-29 | End: 2017-09-29

## 2017-09-28 RX ORDER — HEPARIN SODIUM 5000 [USP'U]/ML
2000 INJECTION, SOLUTION INTRAVENOUS; SUBCUTANEOUS AS NEEDED
Status: DISCONTINUED | OUTPATIENT
Start: 2017-09-28 | End: 2017-09-29

## 2017-09-28 RX ORDER — FENTANYL CITRATE 50 UG/ML
25-50 INJECTION, SOLUTION INTRAMUSCULAR; INTRAVENOUS
Status: DISCONTINUED | OUTPATIENT
Start: 2017-09-28 | End: 2017-09-28

## 2017-09-28 RX ORDER — POTASSIUM CHLORIDE 29.8 MG/ML
20 INJECTION INTRAVENOUS ONCE
Status: DISCONTINUED | OUTPATIENT
Start: 2017-09-29 | End: 2017-09-29

## 2017-09-28 RX ORDER — FENTANYL CITRATE 50 UG/ML
25-50 INJECTION, SOLUTION INTRAMUSCULAR; INTRAVENOUS
Status: DISCONTINUED | OUTPATIENT
Start: 2017-09-28 | End: 2017-09-28 | Stop reason: ALTCHOICE

## 2017-09-28 RX ORDER — HEPARIN SODIUM 5000 [USP'U]/ML
4000 INJECTION, SOLUTION INTRAVENOUS; SUBCUTANEOUS ONCE
Status: COMPLETED | OUTPATIENT
Start: 2017-09-28 | End: 2017-09-28

## 2017-09-28 RX ORDER — SODIUM CHLORIDE 0.9 % (FLUSH) 0.9 %
SYRINGE (ML) INJECTION
Status: DISCONTINUED
Start: 2017-09-28 | End: 2017-09-29

## 2017-09-28 RX ORDER — DOBUTAMINE HYDROCHLORIDE 200 MG/100ML
0-10 INJECTION INTRAVENOUS
Status: DISCONTINUED | OUTPATIENT
Start: 2017-09-29 | End: 2017-10-02

## 2017-09-28 RX ORDER — ENOXAPARIN SODIUM 100 MG/ML
1 INJECTION SUBCUTANEOUS ONCE
Status: DISCONTINUED | OUTPATIENT
Start: 2017-09-28 | End: 2017-09-28

## 2017-09-28 RX ORDER — MIDAZOLAM HYDROCHLORIDE 1 MG/ML
.5-2 INJECTION, SOLUTION INTRAMUSCULAR; INTRAVENOUS
Status: DISCONTINUED | OUTPATIENT
Start: 2017-09-28 | End: 2017-09-28

## 2017-09-28 RX ADMIN — MUPIROCIN: 20 OINTMENT TOPICAL at 18:38

## 2017-09-28 RX ADMIN — MIDAZOLAM HYDROCHLORIDE 1 MG: 1 INJECTION INTRAMUSCULAR; INTRAVENOUS at 10:11

## 2017-09-28 RX ADMIN — MUPIROCIN: 20 OINTMENT TOPICAL at 13:45

## 2017-09-28 RX ADMIN — HEPARIN SODIUM 2000 UNITS: 1000 INJECTION, SOLUTION INTRAVENOUS; SUBCUTANEOUS at 11:19

## 2017-09-28 RX ADMIN — HEPARIN SODIUM 3000 UNITS: 1000 INJECTION, SOLUTION INTRAVENOUS; SUBCUTANEOUS at 10:40

## 2017-09-28 RX ADMIN — MIDAZOLAM HYDROCHLORIDE 1 MG: 1 INJECTION, SOLUTION INTRAMUSCULAR; INTRAVENOUS at 09:21

## 2017-09-28 RX ADMIN — CHLORHEXIDINE GLUCONATE 15 ML: 1.2 RINSE ORAL at 21:25

## 2017-09-28 RX ADMIN — FENTANYL CITRATE 50 MCG: 50 INJECTION, SOLUTION INTRAMUSCULAR; INTRAVENOUS at 10:11

## 2017-09-28 RX ADMIN — Medication 10 ML: at 06:00

## 2017-09-28 RX ADMIN — Medication 10 ML: at 21:25

## 2017-09-28 RX ADMIN — INSULIN LISPRO 5 UNITS: 100 INJECTION, SOLUTION INTRAVENOUS; SUBCUTANEOUS at 08:33

## 2017-09-28 RX ADMIN — AMIODARONE HYDROCHLORIDE 400 MG: 200 TABLET ORAL at 13:40

## 2017-09-28 RX ADMIN — METOPROLOL TARTRATE 12.5 MG: 25 TABLET ORAL at 13:39

## 2017-09-28 RX ADMIN — AMIODARONE HYDROCHLORIDE 400 MG: 200 TABLET ORAL at 20:43

## 2017-09-28 RX ADMIN — ASPIRIN 81 MG: 81 TABLET, COATED ORAL at 13:40

## 2017-09-28 RX ADMIN — ATORVASTATIN CALCIUM 20 MG: 20 TABLET, FILM COATED ORAL at 21:26

## 2017-09-28 RX ADMIN — HEPARIN SODIUM 1000 UNITS: 200 INJECTION, SOLUTION INTRAVENOUS at 10:19

## 2017-09-28 RX ADMIN — HEPARIN SODIUM AND DEXTROSE 8 UNITS/KG/HR: 10000; 5 INJECTION INTRAVENOUS at 13:07

## 2017-09-28 RX ADMIN — FENTANYL CITRATE 25 MCG: 50 INJECTION, SOLUTION INTRAMUSCULAR; INTRAVENOUS at 09:20

## 2017-09-28 RX ADMIN — MIDAZOLAM HYDROCHLORIDE 1 MG: 1 INJECTION, SOLUTION INTRAMUSCULAR; INTRAVENOUS at 10:40

## 2017-09-28 RX ADMIN — FUROSEMIDE 20 MG: 20 TABLET ORAL at 13:40

## 2017-09-28 RX ADMIN — SODIUM CHLORIDE 8.2 UNITS/HR: 900 INJECTION, SOLUTION INTRAVENOUS at 20:24

## 2017-09-28 RX ADMIN — FENTANYL CITRATE 25 MCG: 50 INJECTION, SOLUTION INTRAMUSCULAR; INTRAVENOUS at 09:11

## 2017-09-28 RX ADMIN — MIDAZOLAM HYDROCHLORIDE 1 MG: 1 INJECTION, SOLUTION INTRAMUSCULAR; INTRAVENOUS at 10:11

## 2017-09-28 RX ADMIN — MIDAZOLAM HYDROCHLORIDE 1 MG: 1 INJECTION, SOLUTION INTRAMUSCULAR; INTRAVENOUS at 11:42

## 2017-09-28 RX ADMIN — MIDAZOLAM HYDROCHLORIDE 2 MG: 1 INJECTION, SOLUTION INTRAMUSCULAR; INTRAVENOUS at 09:13

## 2017-09-28 RX ADMIN — Medication 10 ML: at 06:01

## 2017-09-28 RX ADMIN — Medication 10 ML: at 15:14

## 2017-09-28 RX ADMIN — MIDAZOLAM HYDROCHLORIDE 1 MG: 1 INJECTION, SOLUTION INTRAMUSCULAR; INTRAVENOUS at 11:22

## 2017-09-28 RX ADMIN — HEPARIN SODIUM 1000 UNITS: 200 INJECTION, SOLUTION INTRAVENOUS at 10:20

## 2017-09-28 RX ADMIN — MIDAZOLAM HYDROCHLORIDE 1 MG: 1 INJECTION, SOLUTION INTRAMUSCULAR; INTRAVENOUS at 10:56

## 2017-09-28 RX ADMIN — HEPARIN SODIUM 4000 UNITS: 5000 INJECTION, SOLUTION INTRAVENOUS; SUBCUTANEOUS at 21:50

## 2017-09-28 RX ADMIN — LIDOCAINE HYDROCHLORIDE 15 ML: 20 SOLUTION ORAL; TOPICAL at 09:13

## 2017-09-28 RX ADMIN — LIDOCAINE HYDROCHLORIDE 15 ML: 20 SOLUTION OROPHARYNGEAL at 09:13

## 2017-09-28 RX ADMIN — LIDOCAINE HYDROCHLORIDE 17 ML: 10 INJECTION, SOLUTION INFILTRATION; PERINEURAL at 10:20

## 2017-09-28 RX ADMIN — LIDOCAINE HYDROCHLORIDE 17 ML: 10 INJECTION INFILTRATION; PERINEURAL at 10:20

## 2017-09-28 RX ADMIN — METOPROLOL TARTRATE 12.5 MG: 25 TABLET ORAL at 20:43

## 2017-09-28 RX ADMIN — BENZOCAINE, BUTAMBEN, AND TETRACAINE HYDROCHLORIDE 1 SPRAY: .028; .004; .004 AEROSOL, SPRAY TOPICAL at 09:14

## 2017-09-28 RX ADMIN — SODIUM CHLORIDE 4.1 UNITS/HR: 900 INJECTION, SOLUTION INTRAVENOUS at 12:50

## 2017-09-28 RX ADMIN — MIDAZOLAM HYDROCHLORIDE 1 MG: 1 INJECTION, SOLUTION INTRAMUSCULAR; INTRAVENOUS at 09:19

## 2017-09-28 NOTE — PROCEDURES
LV-dilated, EF 20-25%. RV-normal.  AV-trileaflet, normal.  MV-normal, mild to moderate MR.  TV-normal.  No intracardiac mass, thrombus, PFO or ASD. EBL-none. Complications-none. Specimen-none.

## 2017-09-28 NOTE — DIABETES MGMT
DTC Progress Note    Recommendations/ Comments: Pt discussed with rounding team and Dr. Gus Langston and Mary Delgado PA - will start pt on insulin gtt once he has returned from procedure. Chart reviewed on Zaid Renee during Multidisciplinary Rounds. A1c:   Lab Results   Component Value Date/Time    Hemoglobin A1c 9.4 09/27/2017 05:35 AM           Recent Glucose Results: Lab Results   Component Value Date/Time    GLUCPOC 275 (H) 09/28/2017 08:28 AM    GLUCPOC 268 (H) 09/27/2017 09:07 PM    GLUCPOC 225 (H) 09/27/2017 04:06 PM        Lab Results   Component Value Date/Time    Creatinine 1.26 09/27/2017 05:35 AM     Estimated Creatinine Clearance: 86.2 mL/min (based on Cr of 1.26). Active Orders   Diet    DIET NPO        PO intake: Patient Vitals for the past 72 hrs:   % Diet Eaten   09/27/17 1900 100 %   09/27/17 1500 100 %   09/26/17 2230 100 %       Will continue to follow as needed. Thank you.   Hoa Harris RD CDE

## 2017-09-28 NOTE — CDMP QUERY
Based on the need for increased specificity in documentation for ICD 10 coding system, please include the following components in your documentation regarding: \"CHF\"     Documentation for heart failure must:    Specify Acuity : Acute, Chronic, acute on chronic   Identify Type: Systolic, Diastolic, Combined systolic and diastolic failure    List the relationship of HTN to Heart Failure   Identify the underlying cause     Presentation: EF 35% by cardiac cath PN 8/57 \"New systolic HF EF 75%:  Echo pending  Received IV lasix last evening with good output  Low dose Lasix daily with elevated EDP  Monitor I/Os, daily weights, labs\"    Please clarify and document your clinical opinion in the progress notes and discharge summary including the definitive and/or presumptive diagnosis, (suspected or probable), related to the above clinical findings. Please include clinical findings supporting your diagnosis.   Thank [facility], [address] [place] [address] Rd     [phone]

## 2017-09-28 NOTE — PROGRESS NOTES
Central Line Procedure Note    Indication: Need for vasopressors    Risks, benefits, alternatives explained and patient agrees to proceed. Patient positioned in Trendelenburg. 7-Step Sterility Protocol followed. (cap, mask sterile gown, sterile gloves, large sterile sheet, hand hygiene, 2% chlorhexidine for cutaneous antisepsis)  5 mL 1% Lidocaine placed at insertion site. Right internal jugular cannulated x 1 attempt(s) utilizing ultrasound guidance. Catheter secured & Biopatch applied. Sterile Tegaderm placed. A double stick was performed in the right IJ and a 9 fr cordis was placed distal to the quad lumen above. A S-G catheter was inserted via the cordis and multiple attempts were made to float the catheter into the PA without success as it would not exit the ventricle. Cardiac surgery aware. CXR pending.     Care turned over to covering Attending MD.

## 2017-09-28 NOTE — PROGRESS NOTES
Attended Interdisciplinary rounds in Critical Care Unit, where patient care was discussed. Visit by: Julio Alcantar. [facility].  Dimas Blizzard, MA, [address]

## 2017-09-28 NOTE — PROGRESS NOTES
Cardiology Progress Note      9/28/2017 3:14 PM    Admit Date: 9/26/2017    Admit Diagnosis: NSTEMI (non-ST elevated myocardial infarction) (HCC);CAD ;C*      Subjective:     Welford Never is s/p ALFREDO, IABP and PA catheter placement.      Visit Vitals    /67    Pulse 79    Temp 98.7 °F (37.1 °C)    Resp 11    Ht 6' 2\" (1.88 m)    Wt 223 lb 5.2 oz (101.3 kg)    SpO2 98%    BMI 28.67 kg/m2       Current Facility-Administered Medications   Medication Dose Route Frequency    sodium chloride (NS) 0.9 % flush        iopamidol (ISOVUE-370) 76 % injection        insulin regular (NOVOLIN R, HUMULIN R) 100 Units in 0.9% sodium chloride 100 mL infusion  1-50 Units/hr IntraVENous TITRATE    glucose chewable tablet 16 g  4 Tab Oral PRN    glucagon (GLUCAGEN) injection 1 mg  1 mg IntraMUSCular PRN    insulin lispro (HUMALOG) injection   SubCUTAneous TIDAC    insulin lispro (HUMALOG) injection   SubCUTAneous AC&HS    heparin 25,000 units in D5W 250 ml infusion  8-25 Units/kg/hr IntraVENous TITRATE    [START ON 9/29/2017] DOBUTamine (DOBUTREX) 500 mg/250 mL (2,000 mcg/mL) infusion  0-10 mcg/kg/min IntraVENous TITRATE    [START ON 9/29/2017] DOPamine (INTROPIN) 800 mg/250 mL (3,200 mcg/mL) infusion  5-20 mcg/kg/min IntraVENous TITRATE    [START ON 9/29/2017] magnesium sulfate 2 g/50 ml IVPB (premix or compounded)  2 g IntraVENous ONCE    [START ON 9/29/2017] nitroglycerin (Tridil) 200 mcg/ml infusion  0-200 mcg/min IntraVENous TITRATE    [START ON 9/29/2017] potassium chloride 20 mEq in 50 ml IVPB  20 mEq IntraVENous ONCE    [START ON 9/29/2017] aminocaproic acid (AMICAR) 15 g in 0.9% sodium chloride 150 mL infusion  1 g/hr IntraVENous CONTINUOUS    [START ON 9/29/2017] dexmedeTOMidine (PRECEDEX) 400 mcg in 0.9% sodium chloride 100 mL infusion  0.2-0.7 mcg/kg/hr IntraVENous TITRATE    [START ON 9/29/2017] insulin regular (NOVOLIN R, HUMULIN R) 100 Units in 0.9% sodium chloride 100 mL infusion  1-10 Units/hr IntraVENous TITRATE    [START ON 9/29/2017] PHENYLephrine (NEOSYNEPHRINE) 10,000 mcg in 0.9% sodium chloride 250 mL infusion   mcg/min IntraVENous TITRATE    [START ON 9/29/2017] PHENYLephrine (NEOSYNEPHRINE) 20,000 mcg in 0.9% sodium chloride 250 mL infusion   mcg/min IntraVENous TITRATE    [START ON 9/29/2017] PHENYLephrine (NEOSYNEPHRINE) 30,000 mcg in 0.9% sodium chloride 250 mL infusion   mcg/min IntraVENous TITRATE    [START ON 9/29/2017] niCARdipine (CARDENE) 25 mg in 0.9% sodium chloride 250 mL infusion  0-15 mg/hr IntraVENous TITRATE    [START ON 9/29/2017] amiodarone (CORDARONE) 150 mg in dextrose 5% 100 mL bolus infusion  150 mg IntraVENous ONCE    [START ON 9/29/2017] EPINEPHrine (ADRENALIN) 2,000 mcg in 0.9% sodium chloride 250 mL infusion  1-10 mcg/min IntraVENous TITRATE    [START ON 9/29/2017] NOREPINephrine (LEVOPHED) 4,000 mcg in dextrose 5% 250 mL infusion  2-16 mcg/min IntraVENous TITRATE    [START ON 9/29/2017] protamine injection 250 mg  250 mg IntraVENous NOW    [START ON 9/29/2017] desmopressin (DDAVP) 4 mcg/mL injection 2 mcg  2 mcg SubCUTAneous ONCE    [START ON 9/29/2017] cardioplegia infusion   IntraVENous ONCE    [START ON 9/29/2017] amiodarone (CORDARONE) 900 mg/250 ml D5W infusion  0.5-1 mg/min IntraVENous TITRATE    mupirocin (BACTROBAN) 2 % ointment   Both Nostrils BID    chlorhexidine (PERIDEX) 0.12 % mouthwash 15 mL  15 mL Oral Q12H    amiodarone (CORDARONE) tablet 400 mg  400 mg Oral BID    sodium chloride (NS) flush 5-10 mL  5-10 mL IntraVENous Q8H    sodium chloride (NS) flush 5-10 mL  5-10 mL IntraVENous PRN    sodium phosphate (FLEET'S) enema 118 mL  1 Enema Rectal PRN    0.9% sodium chloride infusion  100 mL/hr IntraVENous CONTINUOUS    furosemide (LASIX) tablet 20 mg  20 mg Oral DAILY    0.9% sodium chloride infusion  20 mL/hr IntraVENous CONTINUOUS    sodium chloride (NS) flush 5-10 mL  5-10 mL IntraVENous Q8H    sodium chloride (NS) flush 5-10 mL  5-10 mL IntraVENous PRN    sodium chloride (NS) flush 5-10 mL  5-10 mL IntraVENous Q8H    sodium chloride (NS) flush 5-10 mL  5-10 mL IntraVENous PRN    acetaminophen (TYLENOL) tablet 650 mg  650 mg Oral Q4H PRN    morphine injection 2 mg  2 mg IntraVENous Q4H PRN    aspirin delayed-release tablet 81 mg  81 mg Oral DAILY    metoprolol tartrate (LOPRESSOR) tablet 12.5 mg  12.5 mg Oral Q12H    atorvastatin (LIPITOR) tablet 20 mg  20 mg Oral QHS    dextrose 10% infusion 125-250 mL  125-250 mL IntraVENous PRN    insulin lispro (HUMALOG) injection   SubCUTAneous AC&HS         Objective:      Physical Exam:  Visit Vitals    /67    Pulse 79    Temp 98.7 °F (37.1 °C)    Resp 11    Ht 6' 2\" (1.88 m)    Wt 223 lb 5.2 oz (101.3 kg)    SpO2 98%    BMI 28.67 kg/m2     General Appearance:  Well developed, well nourished,alert and oriented x 3, and individual in no acute distress. Ears/Nose/Mouth/Throat:   Hearing grossly normal.         Neck: Supple. Chest:   Lungs clear to auscultation bilaterally. Cardiovascular:  Regular rate and rhythm, S1, S2 normal, no murmur. Abdomen:   Soft, non-tender, bowel sounds are active. Extremities: No edema bilaterally. Skin: Warm and dry.                  Data Review:   Labs:  Recent Results (from the past 24 hour(s))   URINALYSIS W/MICROSCOPIC    Collection Time: 09/27/17  3:39 PM   Result Value Ref Range    Color YELLOW/STRAW      Appearance CLEAR CLEAR      Specific gravity 1.013 1.003 - 1.030      pH (UA) 5.0 5.0 - 8.0      Protein NEGATIVE  NEG mg/dL    Glucose 100 (A) NEG mg/dL    Ketone NEGATIVE  NEG mg/dL    Bilirubin NEGATIVE  NEG      Blood NEGATIVE  NEG      Urobilinogen 0.2 0.2 - 1.0 EU/dL    Nitrites NEGATIVE  NEG      Leukocyte Esterase NEGATIVE  NEG      WBC 0-4 0 - 4 /hpf    RBC 0-5 0 - 5 /hpf    Epithelial cells FEW FEW /lpf    Bacteria NEGATIVE  NEG /hpf    Hyaline cast 0-2 0 - 5 /lpf   GLUCOSE, POC    Collection Time: 09/27/17  4:06 PM   Result Value Ref Range    Glucose (POC) 225 (H) 65 - 100 mg/dL    Performed by Javier Srivastava    TYPE + CROSSMATCH    Collection Time: 09/27/17  6:39 PM   Result Value Ref Range    Crossmatch Expiration 09/30/2017     ABO/Rh(D) O NEGATIVE     Antibody screen NEG     Unit number [id number]     Blood component type RC LR AS3,2     Unit division 00     Status of unit ALLOCATED     Crossmatch result Compatible     Unit number [id number]     Blood component type RC LR AS1     Unit division 00     Status of unit ALLOCATED     Crossmatch result Compatible    GLUCOSE, POC    Collection Time: 09/27/17  9:07 PM   Result Value Ref Range    Glucose (POC) 268 (H) 65 - 100 mg/dL    Performed by Natalya Bagley    GLUCOSE, POC    Collection Time: 09/28/17  8:28 AM   Result Value Ref Range    Glucose (POC) 275 (H) 65 - 100 mg/dL    Performed by Earline Pedroza    GLUCOSE, POC    Collection Time: 09/28/17 12:14 PM   Result Value Ref Range    Glucose (POC) 198 (H) 65 - 100 mg/dL    Performed by Suman Cantrell    CBC WITH AUTOMATED DIFF    Collection Time: 09/28/17 12:40 PM   Result Value Ref Range    WBC 7.8 4.1 - 11.1 K/uL    RBC 4.73 4.10 - 5.70 M/uL    HGB 13.5 12.1 - 17.0 g/dL    HCT 38.7 36.6 - 50.3 %    MCV 81.8 80.0 - 99.0 FL    MCH 28.5 26.0 - 34.0 PG    MCHC 34.9 30.0 - 36.5 g/dL    RDW 13.2 11.5 - 14.5 %    PLATELET 760 (L) 321 - 400 K/uL    NEUTROPHILS 65 32 - 75 %    LYMPHOCYTES 26 12 - 49 %    MONOCYTES 8 5 - 13 %    EOSINOPHILS 1 0 - 7 %    BASOPHILS 0 0 - 1 %    ABS. NEUTROPHILS 5.0 1.8 - 8.0 K/UL    ABS. LYMPHOCYTES 2.0 0.8 - 3.5 K/UL    ABS. MONOCYTES 0.7 0.0 - 1.0 K/UL    ABS. EOSINOPHILS 0.1 0.0 - 0.4 K/UL    ABS.  BASOPHILS 0.0 0.0 - 0.1 K/UL   PTT    Collection Time: 09/28/17 12:40 PM   Result Value Ref Range    aPTT 38.2 (H) 22.1 - 32.5 sec    aPTT, therapeutic range     58.0 - 77.0 SECS   GLUCOSTABILIZER    Collection Time: 09/28/17 12:49 PM   Result Value Ref Range    Glucose 198 mg/dL    Insulin order 4.1 units/hour    Insulin adminstered 4.1 units/hour    Multiplier 0.030     Low target 95 mg/dL    High target 130 mg/dL    D50 order 0.0 ml    D50 administered 0.00 ml    Minutes until next BG 60 min    Order initials CNB     Administered initials CNB     GLSCOM Comments     GLUCOSE, POC    Collection Time: 09/28/17  1:54 PM   Result Value Ref Range    Glucose (POC) 145 (H) 65 - 100 mg/dL    Performed by [place]    Collection Time: 09/28/17  1:55 PM   Result Value Ref Range    Glucose 145 mg/dL    Insulin order 3.4 units/hour    Insulin adminstered 3.4 units/hour    Multiplier 0.040     Low target 95 mg/dL    High target 130 mg/dL    D50 order 0.0 ml    D50 administered 0.00 ml    Minutes until next BG 60 min    Order initials CNB     Administered initials CNB     GLSCOM Comments     GLUCOSE, POC    Collection Time: 09/28/17  2:58 PM   Result Value Ref Range    Glucose (POC) 162 (H) 65 - 100 mg/dL    Performed by [place]    Collection Time: 09/28/17  2:59 PM   Result Value Ref Range    Glucose 162 mg/dL    Insulin order 5.1 units/hour    Insulin adminstered 5.1 units/hour    Multiplier 0.050     Low target 95 mg/dL    High target 130 mg/dL    D50 order 0.0 ml    D50 administered 0.00 ml    Minutes until next BG 60 min    Order initials CNB     Administered initials CNB     GLSCOM Comments         Telemetry: normal sinus rhythm      Assessment:     Principal Problem:    NSTEMI (non-ST elevated myocardial infarction) ([facility] (9/26/2017)    Active Problems:    Family history of early CAD (9/26/2017)      Overview: Significant for mother, brother      Systolic heart failure ([facility] (9/27/2017)      Coronary artery disease involving native coronary artery with unstable angina pectoris ([facility] (9/27/2017)      Type II diabetes mellitus, uncontrolled ([facility] (9/27/2017)      CAD (coronary artery disease), native coronary artery (9/27/2017)        Plan: Stable. CHF well compensated. Await PA catheter readings.     CABG in AM.    Marcello Lino MD

## 2017-09-28 NOTE — PROGRESS NOTES
[facility] Consult Service Progress NOTE  Pulmonary, Critical Care, and Sleep Medicine    Name: Philly Collier MRN: [id number]   : 1965 Hospital: [facility]   Date: 2017  Admission Date: 2017     Chart and notes reviewed. Data reviewed. I have evaluated and examined the patient. Philly Collier is a 45 y.o. male admitted for NSTEMI (non-ST elevated myocardial infarction) ([facility] No previous cardiac history. Significant family hx of mother and brother with MIs early 46s. Pt  had palpitations, mod substernal CP that started while he was at work. Lasted 40minutes. Associated chills, diaphoresis, n/v. Had similar episode 1 month ago, but did not follow up with MD.  ECG with flipped [place] and slight ST depression laterally. Troponin 1.7. Patient describes one month history of exertional and rest occurring palpitations associated with nausea. Yesterday patient vomited twice. Now denies chest pain, SOB,  claudication,  PND or syncope. Symptoms normally relieved with rest until day of admission. Currently pain free. Cardiac risk factors: family history. Taken to cath lab by Rubina Force and shown to have significant lesions. CTs consulted. Moved to CCU for PA catheter. Echo pending. Pt has No PCP nor does his wife. . No longer checks sugars. Goes to urgent care prn. Moved to [place] years ago. Very much against vaccinations. No recent chest or sinus infection. No GERD or GI complaints.  PA cathter in place but unable to get wedge. Poor waveform. Pt in no distress except for neck pain      IMPRESSION:   1. NSTEMI  2. CAD- cardiac cath LM-normal LAD-proximal 95% sequential lesions, CEDRIC 1 flow distally  LCX-mid 90% . RCA-distal 99%, collaterals to left. LV-dilated, EF 30-35%, LVEDP 35 RA 7, PA 28/, W 23 CO 5.1, CI 2.4.   3. Systolic HF  4. Type 2 DM uncontrolled  5. Never Smoker      RECOMMENDATIONS/PLAN:   1. CCU meds and  2.  PA catheter per CTS  3. Insulin drip  4. IABP per Cardiology  5. Pt education- strongly encouraged to not only get a PCP but also to get all of his vaccinations given his age, risk factors. Hope we reinforce this before discharge  6. Glycemic control   7. DTC consult     Risk of deterioration: medium and high    [x]      High complexity decision making was performed   [x]      See my orders for details    Hospital Day: 3    Allergies   Allergen Reactions    Latex Other (comments)     Allergy documented in admission data base    Influenza Virus Vaccine, Specific Other (comments)     Allergy found in admission data base        Current Facility-Administered Medications   Medication    mupirocin (BACTROBAN) 2 % ointment    chlorhexidine (PERIDEX) 0.12 % mouthwash 15 mL    amiodarone (CORDARONE) tablet 400 mg    sodium chloride (NS) flush 5-10 mL    sodium chloride (NS) flush 5-10 mL    sodium phosphate (FLEET'S) enema 118 mL    0.9% sodium chloride infusion    furosemide (LASIX) tablet 20 mg    enoxaparin (LOVENOX) injection 100 mg    0.9% sodium chloride infusion    sodium chloride (NS) flush 5-10 mL    sodium chloride (NS) flush 5-10 mL    sodium chloride (NS) flush 5-10 mL    sodium chloride (NS) flush 5-10 mL    acetaminophen (TYLENOL) tablet 650 mg    morphine injection 2 mg    aspirin delayed-release tablet 81 mg    metoprolol tartrate (LOPRESSOR) tablet 12.5 mg    atorvastatin (LIPITOR) tablet 20 mg    glucose chewable tablet 16 g    glucagon (GLUCAGEN) injection 1 mg    dextrose 10% infusion 125-250 mL    insulin lispro (HUMALOG) injection      Social History   Substance Use Topics    Smoking status: Never Smoker    Smokeless tobacco: Never Used    Alcohol use No      History reviewed. No pertinent family history.      Vital Signs: Intake/Output: Intake/Output:   Temp (24hrs), Av.5 °F (36.9 °C), Min:98.2 °F (36.8 °C), Max:98.8 °F (37.1 °C)      Visit Vitals    /87    Pulse 89  Temp 98.5 °F (36.9 °C)    Resp 15    Ht 6' 2\" (1.88 m)    Wt 99 kg (218 lb 4.1 oz)    SpO2 97%    BMI 28.02 kg/m2         O2 Device: Room air       Wt Readings from Last 4 Encounters:   09/27/17 99 kg (218 lb 4.1 oz)          Intake/Output Summary (Last 24 hours) at 09/28/17 0639  Last data filed at 09/28/17 0622   Gross per 24 hour   Intake          1217.33 ml   Output             1375 ml   Net          -157.67 ml       Last shift:      09/27 1901 - 09/28 0700  In: 227.3 [I.V.:227.3]  Out: 283 [Urine:875]    Last 3 shifts: 09/26 0701 - 09/27 1900  In: 1997.7 [P.O.:1700; I.V.:297.7]  Out: 1750 [Urine:1750]     Telemetry:normal sinus rhythm    Physical Exam:    General: WM in no apparent distress, well developed and well nourished, non-toxic, alert, oriented times 3, afebrile and normal vitals   HEENT: NCAT   Lungs: decreased air exchange bibasilar   Heart: Regular rate and rhythm   Abdomen: soft, non-tender, without masses or organomegaly   :    Extremity: negative, cyanosis, clubbing    Skin: Skin color, texture, turgor normal. No rashes or lesions;    Normal upper and lower extremity pulses    Tubes:         DATA:  MAR reviewed and pertinent medications noted or modified as needed    Labs:    Recent Labs      09/27/17   0535  09/26/17   1642  09/26/17   1431  09/26/17   1330   WBC  9.4  11.0   --   9.4   HGB  13.2  14.4   --   14.5   PLT  154  168   --   161   APTT  30.8  29.4  23.8   --      Recent Labs      09/27/17   0535  09/26/17   1445  09/26/17   1330   NA  139   --   137   K  3.9   --   4.8   CL  106   --   105   CO2  24   --   22   GLU  246*   --   364*   BUN  19   --   22*   CREA  1.26   --   1.43*   CA  8.4*   --   8.8   MG   --    --   2.2   ALB   --    --   4.2   SGOT   --    --   33   ALT   --    --   32   LPSE   --   95   --      Recent Labs      09/27/17   1240   PH  7.47*   PCO2  31*   PO2  96   HCO3  22   FIO2  21     Recent Labs      09/27/17   0535  09/26/17   1642  09/26/17   1330 CPK   --   340*   --    CKNDX   --   11.9*   --    TROIQ  7.29*  10.30*  1.78*     No results found for: BNPP, BNP   No results found for: CULT  Lab Results   Component Value Date/Time     09/26/2017 04:42 PM     Lab Results   Component Value Date/Time    Color YELLOW/STRAW 09/27/2017 03:39 PM    Appearance CLEAR 09/27/2017 03:39 PM    Specific gravity 1.020 09/26/2017 06:54 PM    pH (UA) 5.0 09/27/2017 03:39 PM    Protein NEGATIVE  09/27/2017 03:39 PM    Glucose 100 09/27/2017 03:39 PM    Ketone NEGATIVE  09/27/2017 03:39 PM    Bilirubin NEGATIVE  09/27/2017 03:39 PM    Blood NEGATIVE  09/27/2017 03:39 PM    Urobilinogen 0.2 09/27/2017 03:39 PM    Nitrites NEGATIVE  09/27/2017 03:39 PM    Leukocyte Esterase NEGATIVE  09/27/2017 03:39 PM    WBC 0-4 09/27/2017 03:39 PM    RBC 0-5 09/27/2017 03:39 PM    Bacteria NEGATIVE  09/27/2017 03:39 PM       No results found for: TOXA1, RPR, HBCM, HBSAG, HAAB, HCAB1, HAAT, G6PD, CRYAC, HIVGT, HIVR, HIV1, HIV12, HIVPC, HIVRPI  No results found for: IRON, FE, TIBC, IBCT, PSAT, FERR  Lab Results   Component Value Date/Time    TSH 1.15 09/26/2017 02:31 PM       Imaging:  []                           I have personally reviewed the patients radiographs and reports:      Results from [place] encounter on 09/26/17   XR CHEST PORT   Narrative EXAM:  XR CHEST PORT    INDICATION:  swan placement and quad lumen; follow-up non-ST elevated myocardial  infarction. COMPARISON:  9/27/2017    FINDINGS: A portable AP radiograph of the chest was obtained at 1842 hours. There is interval placement of a right IJ pulmonary line terminating in the  right atrium near the inferior vena cava, and a right IJ line terminating in the  superior vena cava. There is no pneumothorax or pleural effusion. The lungs are  clear. Cardiac, mediastinal and hilar contours are within normal limits for AP  view. The bones and soft tissues are grossly within normal limits.           Impression IMPRESSION: Abnormal position of pulmonary arterial line with termination  inferior right atrium near inferior vena cava. No acute pulmonary findings. The findings were called to nurse Anneliese Underwood on 9/27/2017 at 7:02 PM by myself. [phone]              No results found for this or any previous visit.       My assessment/plan was discussed with:  nursing    respiratory therapy Dr. family Prema Zamora MD

## 2017-09-28 NOTE — PROCEDURES
IABP introduced under fluruoscopic guidance. Good augmentation. PA catheter introduced via existing cordis. Advanced to wedge position under fluruoscopy. No complications. EBL-minimal  Specimen-none.

## 2017-09-28 NOTE — PROCEDURES
[facility]  *** FINAL REPORT ***    Name: Sera Keane  MRN: [id number]    Inpatient  : 1965  HIS Order #: [id number]  [id number] [facility] Visit #: [id number]  Date: 27 Sep 2017    TYPE OF TEST: Peripheral Arterial Testing    REASON FOR TEST  Pre-Op CABG    Right Arm  Segmentals:                     mmHg  Brachial  Radial  Ulnar  Doppler:  PVR:  Digit press.:  Wrist/Brachial:    Left Arm  Segmentals:                     mmHg  Brachial  Radial  Ulnar  Doppler:  PVR:  Digit press.:  Wrist/Brachial:    INTERPRETATION/FINDINGS  PROCEDURE:  Digital PPG waveform and systolic pressures measurements  at rest, with radial artery compression and with ulnar artery  compression. 1.  Abnormal right Modified Rashaad's test > 40mmhg drop with radial  artery compression and depressed amplitude of PPG signal.  2.  Normal left Modified Rashaad's test.    ADDITIONAL COMMENTS    I have personally reviewed the data relevant to the interpretation of  this  study. TECHNOLOGIST: Brendon Giang.  DYLON Red, STEWARTMS  Signed: 2017 08:19 AM    PHYSICIAN: Yisel Hager MD  Signed: 2017 12:29 PM

## 2017-09-28 NOTE — PROGRESS NOTES
Dr. Jose Mccullough in to see patient and perform ALFREDO. Dr. Jose Mccullough notified of swan-luna not in proper postitioning. .. [place] at 35cm. Dr. Jose Mccullough aware and will re-position when patient in cath lab.  0930 Patient tolerated procedure well. [id number] Patient to cath lab for placement of IABP and proper positioning of swan-luna catheter. 1210 Patient back to room from cath lab with IABP in place in right groin. EKG trigger with 1:2 timing. Patient with swan-luna catheter in place with adequate waveform. Patient denies any discomfort at this time. VSS.   1250 Insulin drip started at this time utilizing gluco stabilizer. 1301 Patient has IABP in place and unable to determine if patient has 30cc/hr per hour due to patient not having jesus in place. Spoke with Alix MOBLEY and order received to place jesus catheter now. 1315 Jesus catheter placed without difficulty. Patient tolerated well. 12 Dr. Jose Mccullough in to see patient. No change in orders at this time. 1800 Patient resting quietly. VSS. Patients wife at bedside. 1900 No changes. Report to Dai Louie RN.

## 2017-09-28 NOTE — PROGRESS NOTES
Cardiac Surgery Preoperative Note    Admit Date: 2017      Plan/Recommendations/Medical Decision Making:     IABP placement this morning and re float PA catheter    Reviewed B-Blocker/aspirin/statin/ACE criteria    Patient seen and reviewed with Dr. Gabriel Olivera MD        Assessment:     Principal Problem:    NSTEMI (non-ST elevated myocardial infarction) ([facility] [address] (2017)    Active Problems:    Family history of early CAD (2017)      Overview: Significant for mother, brother      Systolic heart failure ([facility] [address] (2017)      Coronary artery disease involving native coronary artery with unstable angina pectoris ([facility] [address] (2017)      Type II diabetes mellitus, uncontrolled ([facility] [address] (2017)      CAD (coronary artery disease), native coronary artery (2017)              Summary:     Stable hemodynamics in sinus rhythm without ectopy on no support. No chest pain or shortness of breath. [place] not in [place]    Objective:     Visit Vitals    /89    Pulse 93    Temp 98.7 °F (37.1 °C)    Resp 17    Ht 6' 2\" (1.88 m)    Wt 218 lb 4.1 oz (99 kg)    SpO2 97%    BMI 28.02 kg/m2       Temp (24hrs), Av.5 °F (36.9 °C), Min:98.2 °F (36.8 °C), Max:98.7 °F (37.1 °C)      Last 3 Recorded Weights in this Encounter    17 1304 17 0623   Weight: 233 lb 7.5 oz (105.9 kg) 218 lb 4.1 oz (99 kg)       Lab Data Reviewed: Recent Labs      17   0535   WBC  9.4   HGB  13.2   HCT  38.2   PLT  154   CREA  1.26       CXR Results  (Last 48 hours)               17 1857  XR CHEST PORT Final result    Impression:  IMPRESSION: Abnormal position of pulmonary arterial line with termination   inferior right atrium near inferior vena cava. No acute pulmonary findings. The findings were called to nurse Christophe Contreras on 2017 at 7:02 PM by myself. 789                   Narrative:  EXAM:  XR CHEST PORT       INDICATION:  swan placement and quad lumen; follow-up non-ST elevated myocardial   infarction. COMPARISON:  9/27/2017       FINDINGS: A portable AP radiograph of the chest was obtained at 1842 hours. There is interval placement of a right IJ pulmonary line terminating in the   right atrium near the inferior vena cava, and a right IJ line terminating in the   superior vena cava. There is no pneumothorax or pleural effusion. The lungs are   clear. Cardiac, mediastinal and hilar contours are within normal limits for AP   view. The bones and soft tissues are grossly within normal limits. 09/27/17 1435  XR CHEST PORT Final result    Impression:  IMPRESSION: No acute findings. Narrative:  EXAM:  XR CHEST PORT       INDICATION:  Pre-op heart surgery. Z 82.49, R 25.110, I 50.20, E11.65.       COMPARISON:  9/26/2017       FINDINGS: A portable AP radiograph of the chest was obtained at 1401 hours. The   lungs are clear. There is no pneumothorax or pleural effusion. Cardiac,   mediastinal and hilar contours are normal.  The bones and soft tissues are   grossly within normal limits. 09/26/17 1439  XR CHEST PORT Final result    Impression:  IMPRESSION:   No acute cardiopulmonary disease radiographically. .  . Narrative:  INDICATION:  SOB        EXAM: Chest single view. COMPARISON: None. Hiren Sanderson FINDINGS: A single frontal view of the chest at 1419 hours shows clear lungs. The heart, mediastinum and pulmonary vasculature are normal  .  The bony thorax   is unremarkable for age. .                 Last 24hr Input/Output:    Intake/Output Summary (Last 24 hours) at 09/28/17 0901  Last data filed at 09/28/17 0622   Gross per 24 hour   Intake          1217.33 ml   Output             1375 ml   Net          -157.67 ml      Anti-cogulation: heparin infusion for IABP    Admission Weight: Last Weight   Weight: 233 lb 7.5 oz (105.9 kg) Weight: 218 lb 4.1 oz (99 kg)       EXAM:      Lungs:   Clear to auscultation bilaterally.        Heart:  Regular rate and rhythm, S1, S2 normal, no murmur, no click, no rub      Abdomen:   Soft, non-tender. Bowel sounds present. No masses,  No organomegaly. Extremities:  No edema     Neurologic:  Gross motor and sensory apparatus intact.          Signed By: ITZ Mehta

## 2017-09-29 ENCOUNTER — ANESTHESIA (OUTPATIENT)
Dept: CARDIOTHORACIC SURGERY | Age: 52
DRG: 233 | End: 2017-09-29
Payer: COMMERCIAL

## 2017-09-29 ENCOUNTER — APPOINTMENT (OUTPATIENT)
Dept: GENERAL RADIOLOGY | Age: 52
DRG: 233 | End: 2017-09-29
Attending: PHYSICIAN ASSISTANT
Payer: COMMERCIAL

## 2017-09-29 PROBLEM — Z95.1 S/P CABG X 3: Status: ACTIVE | Noted: 2017-09-29

## 2017-09-29 PROBLEM — I25.10 CAD (CORONARY ARTERY DISEASE): Status: ACTIVE | Noted: 2017-09-29

## 2017-09-29 LAB
ADMINISTERED INITIALS, ADMINIT: NORMAL
ALBUMIN SERPL-MCNC: 3.2 G/DL (ref 3.5–5)
ALBUMIN SERPL-MCNC: 3.4 G/DL (ref 3.5–5)
ALBUMIN SERPL-MCNC: 3.4 G/DL (ref 3.5–5)
ALBUMIN/GLOB SERPL: 1.5 {RATIO} (ref 1.1–2.2)
ALBUMIN/GLOB SERPL: 1.7 {RATIO} (ref 1.1–2.2)
ALBUMIN/GLOB SERPL: 1.9 {RATIO} (ref 1.1–2.2)
ALP SERPL-CCNC: 42 U/L (ref 45–117)
ALP SERPL-CCNC: 47 U/L (ref 45–117)
ALP SERPL-CCNC: 71 U/L (ref 45–117)
ALT SERPL-CCNC: 20 U/L (ref 12–78)
ALT SERPL-CCNC: 23 U/L (ref 12–78)
ALT SERPL-CCNC: 28 U/L (ref 12–78)
ANION GAP SERPL CALC-SCNC: 10 MMOL/L (ref 5–15)
ANION GAP SERPL CALC-SCNC: 7 MMOL/L (ref 5–15)
ANION GAP SERPL CALC-SCNC: 8 MMOL/L (ref 5–15)
APTT PPP: 30.3 SEC (ref 22.1–32.5)
APTT PPP: 42.7 SEC (ref 22.1–32.5)
ARTERIAL PATENCY WRIST A: ABNORMAL
AST SERPL-CCNC: 20 U/L (ref 15–37)
AST SERPL-CCNC: 33 U/L (ref 15–37)
AST SERPL-CCNC: 35 U/L (ref 15–37)
BACTERIA SPEC CULT: NORMAL
BASE DEFICIT BLDA-SCNC: 3.9 MMOL/L
BASOPHILS # BLD: 0 K/UL (ref 0–0.1)
BASOPHILS NFR BLD: 0 % (ref 0–1)
BDY SITE: ABNORMAL
BILIRUB SERPL-MCNC: 0.6 MG/DL (ref 0.2–1)
BILIRUB SERPL-MCNC: 0.9 MG/DL (ref 0.2–1)
BILIRUB SERPL-MCNC: 1.1 MG/DL (ref 0.2–1)
BUN SERPL-MCNC: 14 MG/DL (ref 6–20)
BUN SERPL-MCNC: 14 MG/DL (ref 6–20)
BUN SERPL-MCNC: 17 MG/DL (ref 6–20)
BUN/CREAT SERPL: 11 (ref 12–20)
BUN/CREAT SERPL: 12 (ref 12–20)
BUN/CREAT SERPL: 15 (ref 12–20)
CALCIUM SERPL-MCNC: 7.4 MG/DL (ref 8.5–10.1)
CALCIUM SERPL-MCNC: 7.8 MG/DL (ref 8.5–10.1)
CALCIUM SERPL-MCNC: 8.2 MG/DL (ref 8.5–10.1)
CC UR VC: NORMAL
CHLORIDE SERPL-SCNC: 108 MMOL/L (ref 97–108)
CHLORIDE SERPL-SCNC: 110 MMOL/L (ref 97–108)
CHLORIDE SERPL-SCNC: 110 MMOL/L (ref 97–108)
CO2 SERPL-SCNC: 21 MMOL/L (ref 21–32)
CO2 SERPL-SCNC: 23 MMOL/L (ref 21–32)
CO2 SERPL-SCNC: 26 MMOL/L (ref 21–32)
CREAT SERPL-MCNC: 1.1 MG/DL (ref 0.7–1.3)
CREAT SERPL-MCNC: 1.17 MG/DL (ref 0.7–1.3)
CREAT SERPL-MCNC: 1.32 MG/DL (ref 0.7–1.3)
D50 ADMINISTERED, D50ADM: 0 ML
D50 ORDER, D50ORD: 0 ML
EOSINOPHIL # BLD: 0 K/UL (ref 0–0.4)
EOSINOPHIL NFR BLD: 0 % (ref 0–7)
EPAP/CPAP/PEEP, PAPEEP: 6
ERYTHROCYTE [DISTWIDTH] IN BLOOD BY AUTOMATED COUNT: 13.1 % (ref 11.5–14.5)
ERYTHROCYTE [DISTWIDTH] IN BLOOD BY AUTOMATED COUNT: 13.2 % (ref 11.5–14.5)
ERYTHROCYTE [DISTWIDTH] IN BLOOD BY AUTOMATED COUNT: 13.3 % (ref 11.5–14.5)
FIO2 ON VENT: 100 %
GAS FLOW.O2 SETTING OXYMISER: 12 L/MIN
GLOBULIN SER CALC-MCNC: 1.8 G/DL (ref 2–4)
GLOBULIN SER CALC-MCNC: 1.9 G/DL (ref 2–4)
GLOBULIN SER CALC-MCNC: 2.3 G/DL (ref 2–4)
GLSCOM COMMENTS: NORMAL
GLUCOSE BLD STRIP.AUTO-MCNC: 110 MG/DL (ref 65–100)
GLUCOSE BLD STRIP.AUTO-MCNC: 117 MG/DL (ref 65–100)
GLUCOSE BLD STRIP.AUTO-MCNC: 127 MG/DL (ref 65–100)
GLUCOSE BLD STRIP.AUTO-MCNC: 130 MG/DL (ref 65–100)
GLUCOSE BLD STRIP.AUTO-MCNC: 131 MG/DL (ref 65–100)
GLUCOSE BLD STRIP.AUTO-MCNC: 151 MG/DL (ref 65–100)
GLUCOSE BLD STRIP.AUTO-MCNC: 158 MG/DL (ref 65–100)
GLUCOSE BLD STRIP.AUTO-MCNC: 166 MG/DL (ref 65–100)
GLUCOSE BLD STRIP.AUTO-MCNC: 189 MG/DL (ref 65–100)
GLUCOSE BLD STRIP.AUTO-MCNC: 197 MG/DL (ref 65–100)
GLUCOSE BLD STRIP.AUTO-MCNC: 200 MG/DL (ref 65–100)
GLUCOSE BLD STRIP.AUTO-MCNC: 211 MG/DL (ref 65–100)
GLUCOSE BLD STRIP.AUTO-MCNC: 211 MG/DL (ref 65–100)
GLUCOSE BLD STRIP.AUTO-MCNC: 223 MG/DL (ref 65–100)
GLUCOSE BLD STRIP.AUTO-MCNC: 225 MG/DL (ref 65–100)
GLUCOSE BLD STRIP.AUTO-MCNC: 227 MG/DL (ref 65–100)
GLUCOSE SERPL-MCNC: 130 MG/DL (ref 65–100)
GLUCOSE SERPL-MCNC: 196 MG/DL (ref 65–100)
GLUCOSE SERPL-MCNC: 204 MG/DL (ref 65–100)
GLUCOSE, GLC: 110 MG/DL
GLUCOSE, GLC: 117 MG/DL
GLUCOSE, GLC: 127 MG/DL
GLUCOSE, GLC: 130 MG/DL
GLUCOSE, GLC: 131 MG/DL
GLUCOSE, GLC: 135 MG/DL
GLUCOSE, GLC: 137 MG/DL
GLUCOSE, GLC: 138 MG/DL
GLUCOSE, GLC: 143 MG/DL
GLUCOSE, GLC: 151 MG/DL
GLUCOSE, GLC: 151 MG/DL
GLUCOSE, GLC: 158 MG/DL
GLUCOSE, GLC: 166 MG/DL
GLUCOSE, GLC: 167 MG/DL
GLUCOSE, GLC: 189 MG/DL
GLUCOSE, GLC: 197 MG/DL
GLUCOSE, GLC: 200 MG/DL
GLUCOSE, GLC: 211 MG/DL
GLUCOSE, GLC: 211 MG/DL
GLUCOSE, GLC: 223 MG/DL
GLUCOSE, GLC: 225 MG/DL
GLUCOSE, GLC: 227 MG/DL
HCO3 BLDA-SCNC: 22 MMOL/L (ref 22–26)
HCT VFR BLD AUTO: 29.3 % (ref 36.6–50.3)
HCT VFR BLD AUTO: 29.6 % (ref 36.6–50.3)
HCT VFR BLD AUTO: 37.6 % (ref 36.6–50.3)
HGB BLD-MCNC: 10.4 G/DL (ref 12.1–17)
HGB BLD-MCNC: 10.4 G/DL (ref 12.1–17)
HGB BLD-MCNC: 13.4 G/DL (ref 12.1–17)
HIGH TARGET, HITG: 130 MG/DL
HIGH TARGET, HITG: 140 MG/DL
INR PPP: 1.1 (ref 0.9–1.1)
INR PPP: 1.2 (ref 0.9–1.1)
INR PPP: 1.3 (ref 0.9–1.1)
INSULIN ADMINSTERED, INSADM: 10.4 UNITS/HOUR
INSULIN ADMINSTERED, INSADM: 10.9 UNITS/HOUR
INSULIN ADMINSTERED, INSADM: 12.7 UNITS/HOUR
INSULIN ADMINSTERED, INSADM: 13 UNITS/HOUR
INSULIN ADMINSTERED, INSADM: 13.2 UNITS/HOUR
INSULIN ADMINSTERED, INSADM: 13.4 UNITS/HOUR
INSULIN ADMINSTERED, INSADM: 15 UNITS/HOUR
INSULIN ADMINSTERED, INSADM: 2.3 UNITS/HOUR
INSULIN ADMINSTERED, INSADM: 2.3 UNITS/HOUR
INSULIN ADMINSTERED, INSADM: 2.6 UNITS/HOUR
INSULIN ADMINSTERED, INSADM: 3.1 UNITS/HOUR
INSULIN ADMINSTERED, INSADM: 3.2 UNITS/HOUR
INSULIN ADMINSTERED, INSADM: 3.3 UNITS/HOUR
INSULIN ADMINSTERED, INSADM: 4.2 UNITS/HOUR
INSULIN ADMINSTERED, INSADM: 4.3 UNITS/HOUR
INSULIN ADMINSTERED, INSADM: 4.5 UNITS/HOUR
INSULIN ADMINSTERED, INSADM: 4.6 UNITS/HOUR
INSULIN ADMINSTERED, INSADM: 4.7 UNITS/HOUR
INSULIN ADMINSTERED, INSADM: 5 UNITS/HOUR
INSULIN ADMINSTERED, INSADM: 5.9 UNITS/HOUR
INSULIN ADMINSTERED, INSADM: 6.9 UNITS/HOUR
INSULIN ADMINSTERED, INSADM: 7.8 UNITS/HOUR
INSULIN ORDER, INSORD: 10.4 UNITS/HOUR
INSULIN ORDER, INSORD: 10.9 UNITS/HOUR
INSULIN ORDER, INSORD: 12.7 UNITS/HOUR
INSULIN ORDER, INSORD: 13 UNITS/HOUR
INSULIN ORDER, INSORD: 13.2 UNITS/HOUR
INSULIN ORDER, INSORD: 13.4 UNITS/HOUR
INSULIN ORDER, INSORD: 15 UNITS/HOUR
INSULIN ORDER, INSORD: 2.3 UNITS/HOUR
INSULIN ORDER, INSORD: 2.3 UNITS/HOUR
INSULIN ORDER, INSORD: 2.6 UNITS/HOUR
INSULIN ORDER, INSORD: 3.1 UNITS/HOUR
INSULIN ORDER, INSORD: 3.2 UNITS/HOUR
INSULIN ORDER, INSORD: 3.3 UNITS/HOUR
INSULIN ORDER, INSORD: 4.2 UNITS/HOUR
INSULIN ORDER, INSORD: 4.3 UNITS/HOUR
INSULIN ORDER, INSORD: 4.5 UNITS/HOUR
INSULIN ORDER, INSORD: 4.6 UNITS/HOUR
INSULIN ORDER, INSORD: 4.7 UNITS/HOUR
INSULIN ORDER, INSORD: 5 UNITS/HOUR
INSULIN ORDER, INSORD: 5.9 UNITS/HOUR
INSULIN ORDER, INSORD: 6.9 UNITS/HOUR
INSULIN ORDER, INSORD: 7.8 UNITS/HOUR
LOW TARGET, LOT: 100 MG/DL
LOW TARGET, LOT: 95 MG/DL
LYMPHOCYTES # BLD: 0.9 K/UL (ref 0.8–3.5)
LYMPHOCYTES NFR BLD: 7 % (ref 12–49)
MAGNESIUM SERPL-MCNC: 1.9 MG/DL (ref 1.6–2.4)
MAGNESIUM SERPL-MCNC: 2.1 MG/DL (ref 1.6–2.4)
MAGNESIUM SERPL-MCNC: 2.1 MG/DL (ref 1.6–2.4)
MCH RBC QN AUTO: 28.9 PG (ref 26–34)
MCH RBC QN AUTO: 29 PG (ref 26–34)
MCH RBC QN AUTO: 29.1 PG (ref 26–34)
MCHC RBC AUTO-ENTMCNC: 35.1 G/DL (ref 30–36.5)
MCHC RBC AUTO-ENTMCNC: 35.5 G/DL (ref 30–36.5)
MCHC RBC AUTO-ENTMCNC: 35.6 G/DL (ref 30–36.5)
MCV RBC AUTO: 81.6 FL (ref 80–99)
MCV RBC AUTO: 81.7 FL (ref 80–99)
MCV RBC AUTO: 82.2 FL (ref 80–99)
MINUTES UNTIL NEXT BG, NBG: 120 MIN
MINUTES UNTIL NEXT BG, NBG: 60 MIN
MONOCYTES # BLD: 0.9 K/UL (ref 0–1)
MONOCYTES NFR BLD: 7 % (ref 5–13)
MULTIPLIER, MUL: 0.03
MULTIPLIER, MUL: 0.04
MULTIPLIER, MUL: 0.04
MULTIPLIER, MUL: 0.05
MULTIPLIER, MUL: 0.06
MULTIPLIER, MUL: 0.07
MULTIPLIER, MUL: 0.08
MULTIPLIER, MUL: 0.09
MULTIPLIER, MUL: 0.1
MULTIPLIER, MUL: 0.11
MULTIPLIER, MUL: 0.12
MULTIPLIER, MUL: 0.13
NEUTS SEG # BLD: 11.6 K/UL (ref 1.8–8)
NEUTS SEG NFR BLD: 86 % (ref 32–75)
ORDER INITIALS, ORDINIT: NORMAL
PCO2 BLDA: 42 MMHG (ref 35–45)
PH BLDA: 7.33 [PH] (ref 7.35–7.45)
PLATELET # BLD AUTO: 136 K/UL (ref 150–400)
PLATELET # BLD AUTO: 74 K/UL (ref 150–400)
PLATELET # BLD AUTO: 75 K/UL (ref 150–400)
PO2 BLDA: 95 MMHG (ref 80–100)
POTASSIUM SERPL-SCNC: 3.9 MMOL/L (ref 3.5–5.1)
POTASSIUM SERPL-SCNC: 3.9 MMOL/L (ref 3.5–5.1)
POTASSIUM SERPL-SCNC: 4.3 MMOL/L (ref 3.5–5.1)
PRESSURE SUPPORT SETTING VENT: 10 CM[H2O]
PROT SERPL-MCNC: 5.1 G/DL (ref 6.4–8.2)
PROT SERPL-MCNC: 5.2 G/DL (ref 6.4–8.2)
PROT SERPL-MCNC: 5.7 G/DL (ref 6.4–8.2)
PROTHROMBIN TIME: 11 SEC (ref 9–11.1)
PROTHROMBIN TIME: 12.3 SEC (ref 9–11.1)
PROTHROMBIN TIME: 13 SEC (ref 9–11.1)
RBC # BLD AUTO: 3.59 M/UL (ref 4.1–5.7)
RBC # BLD AUTO: 3.6 M/UL (ref 4.1–5.7)
RBC # BLD AUTO: 4.6 M/UL (ref 4.1–5.7)
SAO2 % BLD: 97 % (ref 92–97)
SAO2% DEVICE SAO2% SENSOR NAME: ABNORMAL
SERVICE CMNT-IMP: ABNORMAL
SERVICE CMNT-IMP: NORMAL
SODIUM SERPL-SCNC: 139 MMOL/L (ref 136–145)
SODIUM SERPL-SCNC: 141 MMOL/L (ref 136–145)
SODIUM SERPL-SCNC: 143 MMOL/L (ref 136–145)
SPECIMEN SITE: ABNORMAL
THERAPEUTIC RANGE,PTTT: ABNORMAL SECS (ref 58–77)
THERAPEUTIC RANGE,PTTT: NORMAL SECS (ref 58–77)
VENTILATION MODE VENT: ABNORMAL
VT SETTING VENT: 600 ML
WBC # BLD AUTO: 11.1 K/UL (ref 4.1–11.1)
WBC # BLD AUTO: 12.5 K/UL (ref 4.1–11.1)
WBC # BLD AUTO: 13.4 K/UL (ref 4.1–11.1)

## 2017-09-29 PROCEDURE — 74011250636 HC RX REV CODE- 250/636

## 2017-09-29 PROCEDURE — 77030011235 HC DRN PERCRD SUMP MEDT -B: Performed by: THORACIC SURGERY (CARDIOTHORACIC VASCULAR SURGERY)

## 2017-09-29 PROCEDURE — 74011250636 HC RX REV CODE- 250/636: Performed by: THORACIC SURGERY (CARDIOTHORACIC VASCULAR SURGERY)

## 2017-09-29 PROCEDURE — 03BC4ZZ EXCISION OF LEFT RADIAL ARTERY, PERCUTANEOUS ENDOSCOPIC APPROACH: ICD-10-PCS | Performed by: THORACIC SURGERY (CARDIOTHORACIC VASCULAR SURGERY)

## 2017-09-29 PROCEDURE — 77030020263 HC SOL INJ SOD CL0.9% LFCR 1000ML: Performed by: THORACIC SURGERY (CARDIOTHORACIC VASCULAR SURGERY)

## 2017-09-29 PROCEDURE — 94002 VENT MGMT INPAT INIT DAY: CPT

## 2017-09-29 PROCEDURE — 85610 PROTHROMBIN TIME: CPT | Performed by: THORACIC SURGERY (CARDIOTHORACIC VASCULAR SURGERY)

## 2017-09-29 PROCEDURE — 36415 COLL VENOUS BLD VENIPUNCTURE: CPT | Performed by: THORACIC SURGERY (CARDIOTHORACIC VASCULAR SURGERY)

## 2017-09-29 PROCEDURE — 74011000258 HC RX REV CODE- 258: Performed by: PHYSICIAN ASSISTANT

## 2017-09-29 PROCEDURE — 74011000250 HC RX REV CODE- 250: Performed by: PHYSICIAN ASSISTANT

## 2017-09-29 PROCEDURE — 74011000258 HC RX REV CODE- 258

## 2017-09-29 PROCEDURE — 77030020061 HC IV BLD WRMR ADMIN SET 3M -B: Performed by: ANESTHESIOLOGY

## 2017-09-29 PROCEDURE — 77030034848: Performed by: THORACIC SURGERY (CARDIOTHORACIC VASCULAR SURGERY)

## 2017-09-29 PROCEDURE — P9047 ALBUMIN (HUMAN), 25%, 50ML: HCPCS

## 2017-09-29 PROCEDURE — 74011000258 HC RX REV CODE- 258: Performed by: THORACIC SURGERY (CARDIOTHORACIC VASCULAR SURGERY)

## 2017-09-29 PROCEDURE — 06BP4ZZ EXCISION OF RIGHT SAPHENOUS VEIN, PERCUTANEOUS ENDOSCOPIC APPROACH: ICD-10-PCS | Performed by: THORACIC SURGERY (CARDIOTHORACIC VASCULAR SURGERY)

## 2017-09-29 PROCEDURE — 74011000272 HC RX REV CODE- 272: Performed by: THORACIC SURGERY (CARDIOTHORACIC VASCULAR SURGERY)

## 2017-09-29 PROCEDURE — 77030006247 HC LD PCMKR MYOCRD BPLR TEMP MEDT -B: Performed by: THORACIC SURGERY (CARDIOTHORACIC VASCULAR SURGERY)

## 2017-09-29 PROCEDURE — 77030002986 HC SUT PROL J&J -A: Performed by: THORACIC SURGERY (CARDIOTHORACIC VASCULAR SURGERY)

## 2017-09-29 PROCEDURE — 74011000250 HC RX REV CODE- 250: Performed by: THORACIC SURGERY (CARDIOTHORACIC VASCULAR SURGERY)

## 2017-09-29 PROCEDURE — 77030005520 HC CATH URETH FOL38 BARD -A: Performed by: THORACIC SURGERY (CARDIOTHORACIC VASCULAR SURGERY)

## 2017-09-29 PROCEDURE — 77030008771 HC TU NG SALEM SUMP -A: Performed by: THORACIC SURGERY (CARDIOTHORACIC VASCULAR SURGERY)

## 2017-09-29 PROCEDURE — 77030034351 HC SUT DEKLENE MX TELE -A: Performed by: THORACIC SURGERY (CARDIOTHORACIC VASCULAR SURGERY)

## 2017-09-29 PROCEDURE — 77030003020 HC SUT TICRN COVD -A: Performed by: THORACIC SURGERY (CARDIOTHORACIC VASCULAR SURGERY)

## 2017-09-29 PROCEDURE — 77030010605 HC BLWR MR MAL MEDT -B: Performed by: THORACIC SURGERY (CARDIOTHORACIC VASCULAR SURGERY)

## 2017-09-29 PROCEDURE — 77030016151 HC PROTCTR LNS DFOG COVD -B: Performed by: THORACIC SURGERY (CARDIOTHORACIC VASCULAR SURGERY)

## 2017-09-29 PROCEDURE — 77030011256 HC DRSG MEPILEX <16IN NO BORD MOLN -A: Performed by: THORACIC SURGERY (CARDIOTHORACIC VASCULAR SURGERY)

## 2017-09-29 PROCEDURE — 36600 WITHDRAWAL OF ARTERIAL BLOOD: CPT

## 2017-09-29 PROCEDURE — 76060000044 HC ANESTHESIA 6.5 TO 7 HR: Performed by: THORACIC SURGERY (CARDIOTHORACIC VASCULAR SURGERY)

## 2017-09-29 PROCEDURE — 80053 COMPREHEN METABOLIC PANEL: CPT | Performed by: THORACIC SURGERY (CARDIOTHORACIC VASCULAR SURGERY)

## 2017-09-29 PROCEDURE — 74011636637 HC RX REV CODE- 636/637: Performed by: PHYSICIAN ASSISTANT

## 2017-09-29 PROCEDURE — 77030010938 HC CLP LIG TELE -A: Performed by: THORACIC SURGERY (CARDIOTHORACIC VASCULAR SURGERY)

## 2017-09-29 PROCEDURE — P9045 ALBUMIN (HUMAN), 5%, 250 ML: HCPCS | Performed by: PHYSICIAN ASSISTANT

## 2017-09-29 PROCEDURE — 77030019579 HC CBL PACE DISP REMG -B: Performed by: THORACIC SURGERY (CARDIOTHORACIC VASCULAR SURGERY)

## 2017-09-29 PROCEDURE — 77030020256 HC SOL INJ NACL 0.9%  500ML: Performed by: THORACIC SURGERY (CARDIOTHORACIC VASCULAR SURGERY)

## 2017-09-29 PROCEDURE — 77030034487: Performed by: THORACIC SURGERY (CARDIOTHORACIC VASCULAR SURGERY)

## 2017-09-29 PROCEDURE — 77030005401 HC CATH RAD ARRO -A: Performed by: ANESTHESIOLOGY

## 2017-09-29 PROCEDURE — 85730 THROMBOPLASTIN TIME PARTIAL: CPT | Performed by: PHYSICIAN ASSISTANT

## 2017-09-29 PROCEDURE — 77030008517 HC TBNG INSUF ENDO STOR -B: Performed by: THORACIC SURGERY (CARDIOTHORACIC VASCULAR SURGERY)

## 2017-09-29 PROCEDURE — 85027 COMPLETE CBC AUTOMATED: CPT | Performed by: THORACIC SURGERY (CARDIOTHORACIC VASCULAR SURGERY)

## 2017-09-29 PROCEDURE — 85025 COMPLETE CBC W/AUTO DIFF WBC: CPT | Performed by: PHYSICIAN ASSISTANT

## 2017-09-29 PROCEDURE — 83735 ASSAY OF MAGNESIUM: CPT | Performed by: THORACIC SURGERY (CARDIOTHORACIC VASCULAR SURGERY)

## 2017-09-29 PROCEDURE — 83735 ASSAY OF MAGNESIUM: CPT | Performed by: PHYSICIAN ASSISTANT

## 2017-09-29 PROCEDURE — 5A1221Z PERFORMANCE OF CARDIAC OUTPUT, CONTINUOUS: ICD-10-PCS | Performed by: THORACIC SURGERY (CARDIOTHORACIC VASCULAR SURGERY)

## 2017-09-29 PROCEDURE — 77030018316 HC SEAL FBRN TISSL 4ML BAXT -C: Performed by: THORACIC SURGERY (CARDIOTHORACIC VASCULAR SURGERY)

## 2017-09-29 PROCEDURE — 77030006920 HC BLD STRNL SAW STRY -B: Performed by: THORACIC SURGERY (CARDIOTHORACIC VASCULAR SURGERY)

## 2017-09-29 PROCEDURE — 77030005537 HC CATH URETH BARD -A: Performed by: THORACIC SURGERY (CARDIOTHORACIC VASCULAR SURGERY)

## 2017-09-29 PROCEDURE — 65620000000 HC RM CCU GENERAL

## 2017-09-29 PROCEDURE — 021009W BYPASS CORONARY ARTERY, ONE ARTERY FROM AORTA WITH AUTOLOGOUS VENOUS TISSUE, OPEN APPROACH: ICD-10-PCS | Performed by: THORACIC SURGERY (CARDIOTHORACIC VASCULAR SURGERY)

## 2017-09-29 PROCEDURE — 82803 BLOOD GASES ANY COMBINATION: CPT | Performed by: PHYSICIAN ASSISTANT

## 2017-09-29 PROCEDURE — 77030020268 HC MISC GENERAL SUPPLY: Performed by: THORACIC SURGERY (CARDIOTHORACIC VASCULAR SURGERY)

## 2017-09-29 PROCEDURE — 74011636637 HC RX REV CODE- 636/637: Performed by: THORACIC SURGERY (CARDIOTHORACIC VASCULAR SURGERY)

## 2017-09-29 PROCEDURE — 80053 COMPREHEN METABOLIC PANEL: CPT | Performed by: PHYSICIAN ASSISTANT

## 2017-09-29 PROCEDURE — 77030008771 HC TU NG SALEM SUMP -A: Performed by: ANESTHESIOLOGY

## 2017-09-29 PROCEDURE — 74011250636 HC RX REV CODE- 250/636: Performed by: PHYSICIAN ASSISTANT

## 2017-09-29 PROCEDURE — 02100AW BYPASS CORONARY ARTERY, ONE ARTERY FROM AORTA WITH AUTOLOGOUS ARTERIAL TISSUE, OPEN APPROACH: ICD-10-PCS | Performed by: THORACIC SURGERY (CARDIOTHORACIC VASCULAR SURGERY)

## 2017-09-29 PROCEDURE — 77030018836 HC SOL IRR NACL ICUM -A: Performed by: THORACIC SURGERY (CARDIOTHORACIC VASCULAR SURGERY)

## 2017-09-29 PROCEDURE — 77030019908 HC STETH ESOPH SIMS -A: Performed by: ANESTHESIOLOGY

## 2017-09-29 PROCEDURE — 02100Z9 BYPASS CORONARY ARTERY, ONE ARTERY FROM LEFT INTERNAL MAMMARY, OPEN APPROACH: ICD-10-PCS | Performed by: THORACIC SURGERY (CARDIOTHORACIC VASCULAR SURGERY)

## 2017-09-29 PROCEDURE — 82962 GLUCOSE BLOOD TEST: CPT

## 2017-09-29 PROCEDURE — 77030011255 HC DSG AQUACEL AG BMS -A: Performed by: THORACIC SURGERY (CARDIOTHORACIC VASCULAR SURGERY)

## 2017-09-29 PROCEDURE — 77030018846 HC SOL IRR STRL H20 ICUM -A: Performed by: THORACIC SURGERY (CARDIOTHORACIC VASCULAR SURGERY)

## 2017-09-29 PROCEDURE — 74011250637 HC RX REV CODE- 250/637

## 2017-09-29 PROCEDURE — 77030013798 HC KT TRNSDUC PRSSR EDWD -B: Performed by: ANESTHESIOLOGY

## 2017-09-29 PROCEDURE — 77030033269 HC SLV COMPR SCD KNE2 CARD -B

## 2017-09-29 PROCEDURE — 71010 XR CHEST PORT: CPT

## 2017-09-29 PROCEDURE — 77030010389 HC WRE ATR PACE AEMC -B: Performed by: THORACIC SURGERY (CARDIOTHORACIC VASCULAR SURGERY)

## 2017-09-29 PROCEDURE — 85730 THROMBOPLASTIN TIME PARTIAL: CPT | Performed by: THORACIC SURGERY (CARDIOTHORACIC VASCULAR SURGERY)

## 2017-09-29 PROCEDURE — 77030003010 HC SUT SURG STL J&J -B: Performed by: THORACIC SURGERY (CARDIOTHORACIC VASCULAR SURGERY)

## 2017-09-29 PROCEDURE — 74011250637 HC RX REV CODE- 250/637: Performed by: PHYSICIAN ASSISTANT

## 2017-09-29 PROCEDURE — 77030010796: Performed by: THORACIC SURGERY (CARDIOTHORACIC VASCULAR SURGERY)

## 2017-09-29 PROCEDURE — 77030008684 HC TU ET CUF COVD -B: Performed by: ANESTHESIOLOGY

## 2017-09-29 PROCEDURE — 76010000119 HC CV SURG 6.5 TO 7 HR: Performed by: THORACIC SURGERY (CARDIOTHORACIC VASCULAR SURGERY)

## 2017-09-29 PROCEDURE — P9045 ALBUMIN (HUMAN), 5%, 250 ML: HCPCS

## 2017-09-29 PROCEDURE — 77030012407 HC DRN WND BARD -B: Performed by: THORACIC SURGERY (CARDIOTHORACIC VASCULAR SURGERY)

## 2017-09-29 PROCEDURE — 77030006921 HC BLD STRNL SAW TERU -B: Performed by: THORACIC SURGERY (CARDIOTHORACIC VASCULAR SURGERY)

## 2017-09-29 PROCEDURE — B24BZZ4 ULTRASONOGRAPHY OF HEART WITH AORTA, TRANSESOPHAGEAL: ICD-10-PCS | Performed by: ANESTHESIOLOGY

## 2017-09-29 PROCEDURE — 74011000250 HC RX REV CODE- 250

## 2017-09-29 PROCEDURE — 77030022199 HC SYS HARV VESL GTNG -G1: Performed by: THORACIC SURGERY (CARDIOTHORACIC VASCULAR SURGERY)

## 2017-09-29 PROCEDURE — 93005 ELECTROCARDIOGRAM TRACING: CPT

## 2017-09-29 PROCEDURE — 36569 INSJ PICC 5 YR+ W/O IMAGING: CPT

## 2017-09-29 PROCEDURE — 77030011640 HC PAD GRND REM COVD -A: Performed by: THORACIC SURGERY (CARDIOTHORACIC VASCULAR SURGERY)

## 2017-09-29 PROCEDURE — 77030032490 HC SLV COMPR SCD KNE COVD -B

## 2017-09-29 PROCEDURE — 74011250637 HC RX REV CODE- 250/637: Performed by: THORACIC SURGERY (CARDIOTHORACIC VASCULAR SURGERY)

## 2017-09-29 PROCEDURE — 77030013861 HC PNCH AORT CLNCUT QUES -B: Performed by: THORACIC SURGERY (CARDIOTHORACIC VASCULAR SURGERY)

## 2017-09-29 PROCEDURE — 77030013798 HC KT TRNSDUC PRSSR EDWD -B: Performed by: THORACIC SURGERY (CARDIOTHORACIC VASCULAR SURGERY)

## 2017-09-29 PROCEDURE — 77030018835 HC SOL IRR LR ICUM -A: Performed by: THORACIC SURGERY (CARDIOTHORACIC VASCULAR SURGERY)

## 2017-09-29 PROCEDURE — 77030010516 HC APPL HEMA CLP TELE -B: Performed by: THORACIC SURGERY (CARDIOTHORACIC VASCULAR SURGERY)

## 2017-09-29 PROCEDURE — 77030002933 HC SUT MCRYL J&J -A: Performed by: THORACIC SURGERY (CARDIOTHORACIC VASCULAR SURGERY)

## 2017-09-29 PROCEDURE — 77030013904 HC PRB VASC PARSONT IMPR -B: Performed by: THORACIC SURGERY (CARDIOTHORACIC VASCULAR SURGERY)

## 2017-09-29 PROCEDURE — 77030026438 HC STYL ET INTUB CARD -A: Performed by: ANESTHESIOLOGY

## 2017-09-29 DEVICE — SEALANT KT FIBRIN HEMSTAT 4ML -- TISSEEL: Type: IMPLANTABLE DEVICE | Status: FUNCTIONAL

## 2017-09-29 RX ORDER — POTASSIUM CHLORIDE 29.8 MG/ML
20 INJECTION INTRAVENOUS ONCE
Status: DISCONTINUED | OUTPATIENT
Start: 2017-09-29 | End: 2017-09-29 | Stop reason: RX

## 2017-09-29 RX ORDER — NALOXONE HYDROCHLORIDE 0.4 MG/ML
0.4 INJECTION, SOLUTION INTRAMUSCULAR; INTRAVENOUS; SUBCUTANEOUS AS NEEDED
Status: DISCONTINUED | OUTPATIENT
Start: 2017-09-29 | End: 2017-10-04 | Stop reason: HOSPADM

## 2017-09-29 RX ORDER — FAMOTIDINE 20 MG/1
20 TABLET, FILM COATED ORAL EVERY 12 HOURS
Status: DISCONTINUED | OUTPATIENT
Start: 2017-09-30 | End: 2017-09-29

## 2017-09-29 RX ORDER — SODIUM CHLORIDE 450 MG/100ML
10 INJECTION, SOLUTION INTRAVENOUS CONTINUOUS
Status: DISCONTINUED | OUTPATIENT
Start: 2017-09-29 | End: 2017-10-03

## 2017-09-29 RX ORDER — HEPARIN SODIUM 1000 [USP'U]/ML
2000 INJECTION, SOLUTION INTRAVENOUS; SUBCUTANEOUS ONCE
Status: COMPLETED | OUTPATIENT
Start: 2017-09-29 | End: 2017-09-29

## 2017-09-29 RX ORDER — OXYCODONE AND ACETAMINOPHEN 5; 325 MG/1; MG/1
2 TABLET ORAL
Status: DISCONTINUED | OUTPATIENT
Start: 2017-09-29 | End: 2017-10-04 | Stop reason: HOSPADM

## 2017-09-29 RX ORDER — SUFENTANIL CITRATE 50 UG/ML
INJECTION EPIDURAL; INTRAVENOUS AS NEEDED
Status: DISCONTINUED | OUTPATIENT
Start: 2017-09-29 | End: 2017-09-29 | Stop reason: HOSPADM

## 2017-09-29 RX ORDER — ONDANSETRON 2 MG/ML
4 INJECTION INTRAMUSCULAR; INTRAVENOUS
Status: DISCONTINUED | OUTPATIENT
Start: 2017-09-29 | End: 2017-10-02

## 2017-09-29 RX ORDER — INSULIN GLARGINE 100 [IU]/ML
1-50 INJECTION, SOLUTION SUBCUTANEOUS
Status: COMPLETED | OUTPATIENT
Start: 2017-09-29 | End: 2017-10-01

## 2017-09-29 RX ORDER — GUAIFENESIN 100 MG/5ML
81 LIQUID (ML) ORAL DAILY
Status: DISCONTINUED | OUTPATIENT
Start: 2017-09-30 | End: 2017-10-04 | Stop reason: HOSPADM

## 2017-09-29 RX ORDER — AMOXICILLIN 250 MG
1 CAPSULE ORAL 2 TIMES DAILY
Status: DISCONTINUED | OUTPATIENT
Start: 2017-09-30 | End: 2017-10-04 | Stop reason: HOSPADM

## 2017-09-29 RX ORDER — SODIUM BICARBONATE 1 MEQ/ML
SYRINGE (ML) INTRAVENOUS AS NEEDED
Status: DISCONTINUED | OUTPATIENT
Start: 2017-09-29 | End: 2017-09-29 | Stop reason: HOSPADM

## 2017-09-29 RX ORDER — PROPOFOL 10 MG/ML
INJECTION, EMULSION INTRAVENOUS
Status: COMPLETED
Start: 2017-09-29 | End: 2017-09-29

## 2017-09-29 RX ORDER — AMOXICILLIN 250 MG
1 CAPSULE ORAL 2 TIMES DAILY
Status: DISCONTINUED | OUTPATIENT
Start: 2017-09-30 | End: 2017-09-29

## 2017-09-29 RX ORDER — INSULIN LISPRO 100 [IU]/ML
INJECTION, SOLUTION INTRAVENOUS; SUBCUTANEOUS
Status: DISCONTINUED | OUTPATIENT
Start: 2017-09-29 | End: 2017-10-04 | Stop reason: HOSPADM

## 2017-09-29 RX ORDER — SODIUM CHLORIDE 0.9 % (FLUSH) 0.9 %
5-10 SYRINGE (ML) INJECTION AS NEEDED
Status: DISCONTINUED | OUTPATIENT
Start: 2017-09-29 | End: 2017-10-04 | Stop reason: HOSPADM

## 2017-09-29 RX ORDER — CEFAZOLIN SODIUM IN 0.9 % NACL 2 G/100 ML
PLASTIC BAG, INJECTION (ML) INTRAVENOUS AS NEEDED
Status: DISCONTINUED | OUTPATIENT
Start: 2017-09-29 | End: 2017-09-29 | Stop reason: HOSPADM

## 2017-09-29 RX ORDER — ACETAMINOPHEN 325 MG/1
650 TABLET ORAL EVERY 4 HOURS
Status: DISPENSED | OUTPATIENT
Start: 2017-09-29 | End: 2017-10-01

## 2017-09-29 RX ORDER — LANOLIN ALCOHOL/MO/W.PET/CERES
400 CREAM (GRAM) TOPICAL 2 TIMES DAILY
Status: DISCONTINUED | OUTPATIENT
Start: 2017-09-30 | End: 2017-10-04 | Stop reason: HOSPADM

## 2017-09-29 RX ORDER — ROCURONIUM BROMIDE 10 MG/ML
INJECTION, SOLUTION INTRAVENOUS AS NEEDED
Status: DISCONTINUED | OUTPATIENT
Start: 2017-09-29 | End: 2017-09-29 | Stop reason: HOSPADM

## 2017-09-29 RX ORDER — HEPARIN SODIUM 1000 [USP'U]/ML
INJECTION, SOLUTION INTRAVENOUS; SUBCUTANEOUS AS NEEDED
Status: DISCONTINUED | OUTPATIENT
Start: 2017-09-29 | End: 2017-09-29 | Stop reason: HOSPADM

## 2017-09-29 RX ORDER — ALBUTEROL SULFATE 0.83 MG/ML
2.5 SOLUTION RESPIRATORY (INHALATION)
Status: DISCONTINUED | OUTPATIENT
Start: 2017-09-29 | End: 2017-10-04 | Stop reason: HOSPADM

## 2017-09-29 RX ORDER — DEXTROSE 50 % IN WATER (D50W) INTRAVENOUS SYRINGE
12.5-25 AS NEEDED
Status: DISCONTINUED | OUTPATIENT
Start: 2017-09-29 | End: 2017-10-04 | Stop reason: HOSPADM

## 2017-09-29 RX ORDER — PROTAMINE SULFATE 10 MG/ML
INJECTION, SOLUTION INTRAVENOUS AS NEEDED
Status: DISCONTINUED | OUTPATIENT
Start: 2017-09-29 | End: 2017-09-29 | Stop reason: HOSPADM

## 2017-09-29 RX ORDER — SODIUM CHLORIDE 9 MG/ML
50 INJECTION, SOLUTION INTRAVENOUS CONTINUOUS
Status: DISCONTINUED | OUTPATIENT
Start: 2017-09-29 | End: 2017-10-03

## 2017-09-29 RX ORDER — FACIAL-BODY WIPES
10 EACH TOPICAL DAILY PRN
Status: DISCONTINUED | OUTPATIENT
Start: 2017-09-29 | End: 2017-10-04 | Stop reason: HOSPADM

## 2017-09-29 RX ORDER — CEFAZOLIN SODIUM IN 0.9 % NACL 2 G/100 ML
2 PLASTIC BAG, INJECTION (ML) INTRAVENOUS EVERY 8 HOURS
Status: COMPLETED | OUTPATIENT
Start: 2017-09-29 | End: 2017-10-01

## 2017-09-29 RX ORDER — ALBUMIN HUMAN 50 G/1000ML
12.5 SOLUTION INTRAVENOUS
Status: COMPLETED | OUTPATIENT
Start: 2017-09-29 | End: 2017-09-30

## 2017-09-29 RX ORDER — CALCIUM CHLORIDE INJECTION 100 MG/ML
INJECTION, SOLUTION INTRAVENOUS AS NEEDED
Status: DISCONTINUED | OUTPATIENT
Start: 2017-09-29 | End: 2017-09-29 | Stop reason: HOSPADM

## 2017-09-29 RX ORDER — POTASSIUM CHLORIDE 14.9 MG/ML
10 INJECTION INTRAVENOUS
Status: ACTIVE | OUTPATIENT
Start: 2017-09-29 | End: 2017-09-29

## 2017-09-29 RX ORDER — SODIUM CHLORIDE 9 MG/ML
9 INJECTION, SOLUTION INTRAVENOUS CONTINUOUS
Status: DISCONTINUED | OUTPATIENT
Start: 2017-09-29 | End: 2017-10-03

## 2017-09-29 RX ORDER — DESMOPRESSIN ACETATE 4 UG/ML
INJECTION, SOLUTION INTRAVENOUS; SUBCUTANEOUS AS NEEDED
Status: DISCONTINUED | OUTPATIENT
Start: 2017-09-29 | End: 2017-09-29 | Stop reason: HOSPADM

## 2017-09-29 RX ORDER — MORPHINE SULFATE 10 MG/ML
4 INJECTION, SOLUTION INTRAMUSCULAR; INTRAVENOUS
Status: DISCONTINUED | OUTPATIENT
Start: 2017-09-29 | End: 2017-10-04 | Stop reason: HOSPADM

## 2017-09-29 RX ORDER — PROPOFOL 10 MG/ML
INJECTION, EMULSION INTRAVENOUS AS NEEDED
Status: DISCONTINUED | OUTPATIENT
Start: 2017-09-29 | End: 2017-09-29 | Stop reason: HOSPADM

## 2017-09-29 RX ORDER — FAMOTIDINE 20 MG/1
20 TABLET, FILM COATED ORAL EVERY 12 HOURS
Status: DISCONTINUED | OUTPATIENT
Start: 2017-09-30 | End: 2017-10-04 | Stop reason: HOSPADM

## 2017-09-29 RX ORDER — MAGNESIUM SULFATE 1 G/100ML
1 INJECTION INTRAVENOUS AS NEEDED
Status: DISCONTINUED | OUTPATIENT
Start: 2017-09-29 | End: 2017-10-04 | Stop reason: HOSPADM

## 2017-09-29 RX ORDER — ALBUMIN HUMAN 50 G/1000ML
SOLUTION INTRAVENOUS AS NEEDED
Status: DISCONTINUED | OUTPATIENT
Start: 2017-09-29 | End: 2017-09-29 | Stop reason: HOSPADM

## 2017-09-29 RX ORDER — ESMOLOL HYDROCHLORIDE 10 MG/ML
INJECTION INTRAVENOUS AS NEEDED
Status: DISCONTINUED | OUTPATIENT
Start: 2017-09-29 | End: 2017-09-29 | Stop reason: HOSPADM

## 2017-09-29 RX ORDER — POTASSIUM CHLORIDE 29.8 MG/ML
20 INJECTION INTRAVENOUS
Status: DISPENSED | OUTPATIENT
Start: 2017-09-29 | End: 2017-09-30

## 2017-09-29 RX ORDER — CEFAZOLIN SODIUM IN 0.9 % NACL 2 G/100 ML
2 PLASTIC BAG, INJECTION (ML) INTRAVENOUS
Status: DISCONTINUED | OUTPATIENT
Start: 2017-09-29 | End: 2017-09-29

## 2017-09-29 RX ORDER — MILRINONE LACTATE 0.2 MG/ML
0.38 INJECTION, SOLUTION INTRAVENOUS CONTINUOUS
Status: DISCONTINUED | OUTPATIENT
Start: 2017-09-29 | End: 2017-10-02

## 2017-09-29 RX ORDER — MAGNESIUM SULFATE 100 %
4 CRYSTALS MISCELLANEOUS AS NEEDED
Status: DISCONTINUED | OUTPATIENT
Start: 2017-09-29 | End: 2017-10-04 | Stop reason: HOSPADM

## 2017-09-29 RX ORDER — POLYETHYLENE GLYCOL 3350 17 G/17G
17 POWDER, FOR SOLUTION ORAL DAILY
Status: DISCONTINUED | OUTPATIENT
Start: 2017-09-30 | End: 2017-10-02

## 2017-09-29 RX ORDER — SODIUM CHLORIDE 9 MG/ML
INJECTION, SOLUTION INTRAVENOUS
Status: DISCONTINUED | OUTPATIENT
Start: 2017-09-29 | End: 2017-09-29 | Stop reason: HOSPADM

## 2017-09-29 RX ORDER — PAPAVERINE HYDROCHLORIDE 30 MG/ML
30 INJECTION INTRAMUSCULAR; INTRAVENOUS ONCE
Status: COMPLETED | OUTPATIENT
Start: 2017-09-29 | End: 2017-09-29

## 2017-09-29 RX ORDER — CHLORHEXIDINE GLUCONATE 1.2 MG/ML
10 RINSE ORAL 2 TIMES DAILY
Status: DISCONTINUED | OUTPATIENT
Start: 2017-09-29 | End: 2017-09-30 | Stop reason: SDUPTHER

## 2017-09-29 RX ORDER — AMIODARONE HYDROCHLORIDE 200 MG/1
400 TABLET ORAL EVERY 12 HOURS
Status: DISCONTINUED | OUTPATIENT
Start: 2017-09-30 | End: 2017-10-02

## 2017-09-29 RX ORDER — LIDOCAINE HYDROCHLORIDE 20 MG/ML
INJECTION, SOLUTION EPIDURAL; INFILTRATION; INTRACAUDAL; PERINEURAL AS NEEDED
Status: DISCONTINUED | OUTPATIENT
Start: 2017-09-29 | End: 2017-09-29 | Stop reason: HOSPADM

## 2017-09-29 RX ORDER — PROPOFOL 10 MG/ML
0-50 VIAL (ML) INTRAVENOUS
Status: DISCONTINUED | OUTPATIENT
Start: 2017-09-29 | End: 2017-10-02

## 2017-09-29 RX ORDER — SUCCINYLCHOLINE CHLORIDE 20 MG/ML
INJECTION INTRAMUSCULAR; INTRAVENOUS AS NEEDED
Status: DISCONTINUED | OUTPATIENT
Start: 2017-09-29 | End: 2017-09-29 | Stop reason: HOSPADM

## 2017-09-29 RX ORDER — POTASSIUM CHLORIDE 29.8 MG/ML
20 INJECTION INTRAVENOUS
Status: ACTIVE | OUTPATIENT
Start: 2017-09-29 | End: 2017-09-30

## 2017-09-29 RX ORDER — MUPIROCIN 20 MG/G
OINTMENT TOPICAL 2 TIMES DAILY
Status: COMPLETED | OUTPATIENT
Start: 2017-09-29 | End: 2017-10-04

## 2017-09-29 RX ORDER — MIDAZOLAM HYDROCHLORIDE 1 MG/ML
INJECTION, SOLUTION INTRAMUSCULAR; INTRAVENOUS AS NEEDED
Status: DISCONTINUED | OUTPATIENT
Start: 2017-09-29 | End: 2017-09-29 | Stop reason: HOSPADM

## 2017-09-29 RX ORDER — INSULIN LISPRO 100 [IU]/ML
INJECTION, SOLUTION INTRAVENOUS; SUBCUTANEOUS
Status: DISCONTINUED | OUTPATIENT
Start: 2017-09-29 | End: 2017-10-02 | Stop reason: ALTCHOICE

## 2017-09-29 RX ORDER — SODIUM CHLORIDE 0.9 % (FLUSH) 0.9 %
5-10 SYRINGE (ML) INJECTION EVERY 8 HOURS
Status: DISCONTINUED | OUTPATIENT
Start: 2017-09-29 | End: 2017-10-04 | Stop reason: HOSPADM

## 2017-09-29 RX ORDER — CEFAZOLIN SODIUM 1 G/3ML
1 INJECTION, POWDER, FOR SOLUTION INTRAMUSCULAR; INTRAVENOUS ONCE
Status: COMPLETED | OUTPATIENT
Start: 2017-09-29 | End: 2017-09-29

## 2017-09-29 RX ORDER — CHLORHEXIDINE GLUCONATE 1.2 MG/ML
15 RINSE ORAL EVERY 12 HOURS
Status: DISCONTINUED | OUTPATIENT
Start: 2017-09-29 | End: 2017-10-04 | Stop reason: HOSPADM

## 2017-09-29 RX ORDER — SODIUM CHLORIDE, SODIUM LACTATE, POTASSIUM CHLORIDE, CALCIUM CHLORIDE 600; 310; 30; 20 MG/100ML; MG/100ML; MG/100ML; MG/100ML
INJECTION, SOLUTION INTRAVENOUS
Status: DISCONTINUED | OUTPATIENT
Start: 2017-09-29 | End: 2017-09-29 | Stop reason: HOSPADM

## 2017-09-29 RX ORDER — OXYCODONE AND ACETAMINOPHEN 5; 325 MG/1; MG/1
1 TABLET ORAL
Status: DISCONTINUED | OUTPATIENT
Start: 2017-09-29 | End: 2017-10-04 | Stop reason: HOSPADM

## 2017-09-29 RX ORDER — SUFENTANIL CITRATE 50 UG/ML
INJECTION EPIDURAL; INTRAVENOUS
Status: DISCONTINUED | OUTPATIENT
Start: 2017-09-29 | End: 2017-09-29 | Stop reason: HOSPADM

## 2017-09-29 RX ADMIN — Medication 2 G: at 08:10

## 2017-09-29 RX ADMIN — PHENYLEPHRINE HYDROCHLORIDE 75 MCG/MIN: 10 INJECTION INTRAVENOUS at 17:45

## 2017-09-29 RX ADMIN — ALBUMIN HUMAN 250 ML: 50 SOLUTION INTRAVENOUS at 13:51

## 2017-09-29 RX ADMIN — PHENYLEPHRINE HYDROCHLORIDE 80 MCG/MIN: 10 INJECTION INTRAVENOUS at 13:24

## 2017-09-29 RX ADMIN — ACETAMINOPHEN 650 MG: 325 TABLET ORAL at 20:57

## 2017-09-29 RX ADMIN — CHLORHEXIDINE GLUCONATE 15 ML: 1.2 RINSE ORAL at 21:12

## 2017-09-29 RX ADMIN — BACITRACIN: 50000 INJECTION, POWDER, FOR SOLUTION INTRAMUSCULAR at 07:00

## 2017-09-29 RX ADMIN — ALBUMIN (HUMAN) 12.5 G: 12.5 INJECTION, SOLUTION INTRAVENOUS at 19:08

## 2017-09-29 RX ADMIN — SODIUM CHLORIDE 5.9 UNITS/HR: 900 INJECTION, SOLUTION INTRAVENOUS at 00:12

## 2017-09-29 RX ADMIN — SODIUM CHLORIDE 13.2 UNITS/HR: 900 INJECTION, SOLUTION INTRAVENOUS at 20:14

## 2017-09-29 RX ADMIN — SODIUM CHLORIDE 2.3 UNITS/HR: 900 INJECTION, SOLUTION INTRAVENOUS at 07:30

## 2017-09-29 RX ADMIN — POTASSIUM CHLORIDE 20 MEQ: 400 INJECTION, SOLUTION INTRAVENOUS at 16:00

## 2017-09-29 RX ADMIN — ESMOLOL HYDROCHLORIDE 10 MG: 10 INJECTION INTRAVENOUS at 13:57

## 2017-09-29 RX ADMIN — CEFAZOLIN 1 G: 1 INJECTION, POWDER, FOR SOLUTION INTRAMUSCULAR; INTRAVENOUS; PARENTERAL at 07:00

## 2017-09-29 RX ADMIN — AMINOCAPROIC ACID 1 G: 250 INJECTION, SOLUTION INTRAVENOUS at 12:20

## 2017-09-29 RX ADMIN — FAMOTIDINE 20 MG: 10 INJECTION, SOLUTION INTRAVENOUS at 17:15

## 2017-09-29 RX ADMIN — VASOPRESSIN 0.04 UNITS/MIN: 20 INJECTION INTRAVENOUS at 22:42

## 2017-09-29 RX ADMIN — FAMOTIDINE 20 MG: 10 INJECTION, SOLUTION INTRAVENOUS at 21:19

## 2017-09-29 RX ADMIN — PROTAMINE SULFATE 400 MG: 10 INJECTION, SOLUTION INTRAVENOUS at 12:40

## 2017-09-29 RX ADMIN — LIDOCAINE HYDROCHLORIDE 80 MG: 20 INJECTION, SOLUTION EPIDURAL; INFILTRATION; INTRACAUDAL; PERINEURAL at 07:56

## 2017-09-29 RX ADMIN — Medication 10 ML: at 05:33

## 2017-09-29 RX ADMIN — PROPOFOL 30 MCG/KG/MIN: 10 INJECTION, EMULSION INTRAVENOUS at 15:00

## 2017-09-29 RX ADMIN — SODIUM CHLORIDE 0.5 MCG/KG/HR: 900 INJECTION, SOLUTION INTRAVENOUS at 18:22

## 2017-09-29 RX ADMIN — MUPIROCIN: 20 OINTMENT TOPICAL at 17:09

## 2017-09-29 RX ADMIN — Medication 10 ML: at 05:32

## 2017-09-29 RX ADMIN — AMINOCAPROIC ACID 1 G: 250 INJECTION, SOLUTION INTRAVENOUS at 11:20

## 2017-09-29 RX ADMIN — AMIODARONE HYDROCHLORIDE 0.5 MG/MIN: 50 INJECTION, SOLUTION INTRAVENOUS at 21:10

## 2017-09-29 RX ADMIN — SUFENTANIL CITRATE 5 MCG: 50 INJECTION EPIDURAL; INTRAVENOUS at 07:56

## 2017-09-29 RX ADMIN — SUFENTANIL CITRATE 5 MCG: 50 INJECTION EPIDURAL; INTRAVENOUS at 09:12

## 2017-09-29 RX ADMIN — SUFENTANIL CITRATE 5 MCG: 50 INJECTION EPIDURAL; INTRAVENOUS at 08:24

## 2017-09-29 RX ADMIN — PHENYLEPHRINE HYDROCHLORIDE 25 MCG/MIN: 10 INJECTION INTRAVENOUS at 07:59

## 2017-09-29 RX ADMIN — PROPOFOL 50 MCG/KG/MIN: 10 INJECTION, EMULSION INTRAVENOUS at 21:53

## 2017-09-29 RX ADMIN — ROCURONIUM BROMIDE 50 MG: 10 INJECTION, SOLUTION INTRAVENOUS at 08:55

## 2017-09-29 RX ADMIN — EPINEPHRINE 1 MCG/MIN: 1 INJECTION PARENTERAL at 12:10

## 2017-09-29 RX ADMIN — PAPAVERINE HYDROCHLORIDE 60 MG: 30 INJECTION, SOLUTION INTRAVENOUS at 07:00

## 2017-09-29 RX ADMIN — AMIODARONE HYDROCHLORIDE 150 MG: 50 INJECTION, SOLUTION INTRAVENOUS at 14:50

## 2017-09-29 RX ADMIN — MIDAZOLAM HYDROCHLORIDE 2 MG: 1 INJECTION, SOLUTION INTRAMUSCULAR; INTRAVENOUS at 06:40

## 2017-09-29 RX ADMIN — DOBUTAMINE IN DEXTROSE 10 MCG/KG/MIN: 200 INJECTION, SOLUTION INTRAVENOUS at 19:37

## 2017-09-29 RX ADMIN — SODIUM CHLORIDE: 9 INJECTION, SOLUTION INTRAVENOUS at 12:15

## 2017-09-29 RX ADMIN — ROCURONIUM BROMIDE 30 MG: 10 INJECTION, SOLUTION INTRAVENOUS at 10:26

## 2017-09-29 RX ADMIN — ROCURONIUM BROMIDE 40 MG: 10 INJECTION, SOLUTION INTRAVENOUS at 14:12

## 2017-09-29 RX ADMIN — SODIUM CHLORIDE, SODIUM LACTATE, POTASSIUM CHLORIDE, CALCIUM CHLORIDE: 600; 310; 30; 20 INJECTION, SOLUTION INTRAVENOUS at 08:00

## 2017-09-29 RX ADMIN — ROCURONIUM BROMIDE 10 MG: 10 INJECTION, SOLUTION INTRAVENOUS at 13:52

## 2017-09-29 RX ADMIN — Medication 1 MG/HR: at 14:33

## 2017-09-29 RX ADMIN — HEPARIN SODIUM 5000 UNITS: 1000 INJECTION, SOLUTION INTRAVENOUS; SUBCUTANEOUS at 10:37

## 2017-09-29 RX ADMIN — ROCURONIUM BROMIDE 45 MG: 10 INJECTION, SOLUTION INTRAVENOUS at 08:05

## 2017-09-29 RX ADMIN — SODIUM CHLORIDE 6.9 UNITS/HR: 900 INJECTION, SOLUTION INTRAVENOUS at 14:58

## 2017-09-29 RX ADMIN — SUFENTANIL CITRATE 0.2 MCG/KG/HR: 50 INJECTION EPIDURAL; INTRAVENOUS at 08:20

## 2017-09-29 RX ADMIN — VASOPRESSIN 0.04 UNITS/MIN: 20 INJECTION INTRAVENOUS at 17:30

## 2017-09-29 RX ADMIN — CHLORHEXIDINE GLUCONATE 10 ML: 1.2 RINSE ORAL at 17:28

## 2017-09-29 RX ADMIN — AMINOCAPROIC ACID 1 G: 250 INJECTION, SOLUTION INTRAVENOUS at 10:20

## 2017-09-29 RX ADMIN — SODIUM CHLORIDE 10 ML/HR: 450 INJECTION, SOLUTION INTRAVENOUS at 15:16

## 2017-09-29 RX ADMIN — ROCURONIUM BROMIDE 5 MG: 10 INJECTION, SOLUTION INTRAVENOUS at 07:56

## 2017-09-29 RX ADMIN — AMINOCAPROIC ACID 1 G: 250 INJECTION, SOLUTION INTRAVENOUS at 09:22

## 2017-09-29 RX ADMIN — AMIODARONE HYDROCHLORIDE 1 MG/MIN: 50 INJECTION, SOLUTION INTRAVENOUS at 15:09

## 2017-09-29 RX ADMIN — PROPOFOL 200 MG: 10 INJECTION, EMULSION INTRAVENOUS at 07:56

## 2017-09-29 RX ADMIN — SODIUM CHLORIDE: 9 INJECTION, SOLUTION INTRAVENOUS at 07:36

## 2017-09-29 RX ADMIN — CALCIUM CHLORIDE INJECTION 1 G: 100 INJECTION, SOLUTION INTRAVENOUS at 12:45

## 2017-09-29 RX ADMIN — Medication 10 ML: at 21:11

## 2017-09-29 RX ADMIN — ALBUMIN HUMAN 250 ML: 50 SOLUTION INTRAVENOUS at 13:23

## 2017-09-29 RX ADMIN — HEPARIN SODIUM 2000 UNITS: 1000 INJECTION, SOLUTION INTRAVENOUS; SUBCUTANEOUS at 07:00

## 2017-09-29 RX ADMIN — SODIUM CHLORIDE 100 ML/HR: 900 INJECTION, SOLUTION INTRAVENOUS at 00:14

## 2017-09-29 RX ADMIN — SUCCINYLCHOLINE CHLORIDE 160 MG: 20 INJECTION INTRAMUSCULAR; INTRAVENOUS at 07:56

## 2017-09-29 RX ADMIN — EPINEPHRINE 1 MCG/MIN: 1 INJECTION PARENTERAL at 17:23

## 2017-09-29 RX ADMIN — DESMOPRESSIN ACETATE 30 MCG: 4 INJECTION, SOLUTION INTRAVENOUS; SUBCUTANEOUS at 12:46

## 2017-09-29 RX ADMIN — AMINOCAPROIC ACID 10 G: 250 INJECTION, SOLUTION INTRAVENOUS at 08:20

## 2017-09-29 RX ADMIN — SODIUM CHLORIDE 9 ML/HR: 900 INJECTION, SOLUTION INTRAVENOUS at 15:58

## 2017-09-29 RX ADMIN — Medication 10 ML: at 16:54

## 2017-09-29 RX ADMIN — ALBUMIN (HUMAN) 12.5 G: 12.5 INJECTION, SOLUTION INTRAVENOUS at 21:32

## 2017-09-29 RX ADMIN — CEFAZOLIN 2 G: 10 INJECTION, POWDER, FOR SOLUTION INTRAVENOUS; PARENTERAL at 19:48

## 2017-09-29 RX ADMIN — Medication 50 MEQ: at 13:51

## 2017-09-29 RX ADMIN — AMINOCAPROIC ACID 1 G: 250 INJECTION, SOLUTION INTRAVENOUS at 13:20

## 2017-09-29 RX ADMIN — ACETAMINOPHEN 650 MG: 325 TABLET ORAL at 17:15

## 2017-09-29 RX ADMIN — PROPOFOL 40 MCG/KG/MIN: 10 INJECTION, EMULSION INTRAVENOUS at 18:19

## 2017-09-29 RX ADMIN — SODIUM CHLORIDE 0.3 MCG/KG/HR: 900 INJECTION, SOLUTION INTRAVENOUS at 12:28

## 2017-09-29 RX ADMIN — PHENYLEPHRINE HYDROCHLORIDE: 10 INJECTION INTRAVENOUS at 10:27

## 2017-09-29 RX ADMIN — DOBUTAMINE IN DEXTROSE 10 MCG/KG/MIN: 200 INJECTION, SOLUTION INTRAVENOUS at 12:10

## 2017-09-29 RX ADMIN — Medication 2 G: at 12:05

## 2017-09-29 RX ADMIN — HEPARIN SODIUM 40000 UNITS: 1000 INJECTION, SOLUTION INTRAVENOUS; SUBCUTANEOUS at 09:57

## 2017-09-29 RX ADMIN — POTASSIUM CHLORIDE 20 MEQ: 400 INJECTION, SOLUTION INTRAVENOUS at 18:16

## 2017-09-29 RX ADMIN — Medication 50 MEQ: at 13:11

## 2017-09-29 RX ADMIN — SODIUM CHLORIDE 50 ML/HR: 900 INJECTION, SOLUTION INTRAVENOUS at 19:00

## 2017-09-29 RX ADMIN — ALBUMIN HUMAN 250 ML: 50 SOLUTION INTRAVENOUS at 13:31

## 2017-09-29 NOTE — PERIOP NOTES
TRANSFER - OUT REPORT:    Verbal report given to Nelia Osborne on Neeru  being transferred to CCU for routine post - op       Report consisted of patients Situation, Background, Assessment and   Recommendations(SBAR). Information from the following report(s) OR Summary and Procedure Summary was reviewed with the receiving nurse. Opportunity for questions and clarification was provided.       Patient transported with:   Monitor  O2 @ 10 liters  Registered Nurse   Perfusion  CRNA  Gtts to be reviewed upon arrival to the CCU by the CRNA

## 2017-09-29 NOTE — ANESTHESIA PREPROCEDURE EVALUATION
Anesthetic History   No history of anesthetic complications            Review of Systems / Medical History  Patient summary reviewed, nursing notes reviewed and pertinent labs reviewed    Pulmonary  Within defined limits                 Neuro/Psych   Within defined limits           Cardiovascular          CHF    Past MI and CAD    Exercise tolerance: <4 METS     GI/Hepatic/Renal  Within defined limits              Endo/Other    Diabetes         Other Findings              Physical Exam    Airway  Mallampati: II  TM Distance: > 6 cm  Neck ROM: normal range of motion   Mouth opening: Normal     Cardiovascular    Rhythm: regular  Rate: normal        Comments: IABP Dental  No notable dental hx       Pulmonary  Breath sounds clear to auscultation               Abdominal  GI exam deferred       Other Findings            Anesthetic Plan    ASA: 4  Anesthesia type: general    Monitoring Plan: Arterial line, Buckner-Kristen, ALFREDO, BIS and CVP    Post procedure ventilation   Induction: Intravenous  Anesthetic plan and risks discussed with: Patient

## 2017-09-29 NOTE — PROGRESS NOTES
Orders received, chart reviewed. Noted pt underwent CABG procedure this date therefore will f/u tomorrow for PT evaluation per guidelines. Thank you    Fantasma Crews.  MEREDITH HerreraT

## 2017-09-29 NOTE — PROGRESS NOTES
Problem: Falls - Risk of  Goal: *Absence of Falls  Document [place]ity Fall Risk and appropriate interventions in the flowsheet.    Outcome: Progressing Towards Goal  Fall Risk Interventions:        Mentation Interventions: Adequate sleep, hydration, pain control, Bed/chair exit alarm, Door open when patient unattended, Evaluate medications/consider consulting pharmacy, Increase mobility, More frequent rounding, Room close to nurse's station, Toileting rounds, Update white board     Medication Interventions: Bed/chair exit alarm, Evaluate medications/consider consulting pharmacy     Elimination Interventions: Bed/chair exit alarm, Call light in reach, Toileting schedule/hourly rounds

## 2017-09-29 NOTE — PROGRESS NOTES
[facility] Consult Service Progress NOTE  Pulmonary, Critical Care, and Sleep Medicine    Name: Dave Weber MRN: [id number]   : 1965 Hospital: [facility]   Date: 2017  Admission Date: 2017     IMPRESSION:   Pt is s/p ON PUMP CABG X 3, LIMA to LAD, LRAG to OM, RSVG to PDA  Left radial artery harvest Right greater saphenous vein harvest  1. Post op Respiratory Support. 2. NSTEMI  3. CAD- cardiac cath LM-normal LAD-proximal 95% sequential lesions, CEDRIC 1 flow distally  LCX-mid 90% . RCA-distal 99%, collaterals to left. LV-dilated, EF 30-35%, LVEDP 35 RA 7, PA 28/12, W 23 CO 5.1, CI 2.4.   4. Systolic HF  5. Type 2 DM uncontrolled  6. Never Smoker  7. Reported Latex Allergy? RECOMMENDATIONS/PLAN:   1. Wean vent per CT surgery. Anticipate Extubation in am.   2. Vasopressor drips per CTS  3. Pain control  4. On sedation, Target RASS of zero to minus 1  5. Will recheck serial ABGS  6. Insulin drip  7. IABP per Cardiology/CTS  8. Pt education- strongly encouraged to not only get a PCP but also to get all of his vaccinations given his age, risk factors. Hope we reinforce this before discharge  9. Glycemic control      17 Pt was seen in the ICU post op from CABG. He is on vent, pressors, sedation. Chest tube in place. IABP is in place at 1:1. Chart and notes reviewed. Data reviewed. I have evaluated and examined the patient. Dave Weber is a 45 y.o. male admitted for NSTEMI (non-ST elevated myocardial infarction) ([facility] [address] No previous cardiac history. Significant family hx of mother and brother with MIs early 46s. Pt  had palpitations, mod substernal CP that started while he was at work. Lasted 40minutes. Associated chills, diaphoresis, n/v. Had similar episode 1 month ago, but did not follow up with MD.  ECG with flipped Twaves and slight ST depression laterally. Troponin 1.7.     Patient describes one month history of exertional and rest occurring palpitations associated with nausea. Yesterday patient vomited twice. Now denies chest pain, SOB,  claudication,  PND or syncope. Symptoms normally relieved with rest until day of admission. Currently pain free. Cardiac risk factors: family history. Taken to cath lab by Tamra Angeles and shown to have significant lesions. CTs consulted. Moved to CCU for PA catheter. Echo pending. Pt has No PCP nor does his wife. . No longer checks sugars. Goes to urgent care prn. Moved to [place] years ago. Very much against vaccinations. No recent chest or sinus infection. No GERD or GI complaints. 9/28 PA cathter in place but unable to get wedge. Poor waveform.  Pt in no distress except for neck pain            Risk of deterioration: medium and high    [x]      High complexity decision making was performed   [x]      See my orders for details    Hospital Day: 4    Allergies   Allergen Reactions    Latex Other (comments)     Allergy documented in admission data base    Influenza Virus Vaccine, Specific Other (comments)     Allergy found in admission data base        Current Facility-Administered Medications   Medication    ceFAZolin (ANCEF) 2g in 100 ml 0.9% NS IVPB    sodium chloride (NS) flush 5-10 mL    sodium chloride (NS) flush 5-10 mL    albumin human 5% (BUMINATE) solution 12.5 g    0.45% sodium chloride infusion    0.9% sodium chloride infusion    acetaminophen (TYLENOL) tablet 650 mg    oxyCODONE-acetaminophen (PERCOCET) 5-325 mg per tablet 1 Tab    oxyCODONE-acetaminophen (PERCOCET) 5-325 mg per tablet 2 Tab    morphine injection 4 mg    naloxone (NARCAN) injection 0.4 mg    mupirocin (BACTROBAN) 2 % ointment    ceFAZolin (ANCEF) 2g in 100 ml 0.9% NS IVPB    [START ON 9/30/2017] amiodarone (CORDARONE) tablet 400 mg    ondansetron (ZOFRAN) injection 4 mg    albuterol (PROVENTIL VENTOLIN) nebulizer solution 2.5 mg    [START ON 9/30/2017] aspirin chewable tablet 81 mg    chlorhexidine (PERIDEX) 0.12 % mouthwash 10 mL    famotidine (PF) (PEPCID) 20 mg in sodium chloride 0.9 % 10 mL injection    [START ON 9/30/2017] famotidine (PEPCID) tablet 20 mg    [START ON 9/30/2017] magnesium oxide (MAG-OX) tablet 400 mg    calcium chloride 1 g in 0.9% sodium chloride 50 mL IVPB    bisacodyl (DULCOLAX) suppository 10 mg    [START ON 9/30/2017] senna-docusate (PERICOLACE) 8.6-50 mg per tablet 1 Tab    [START ON 9/30/2017] polyethylene glycol (MIRALAX) packet 17 g    ELECTROLYTE REPLACEMENT PROTOCOL    magnesium sulfate 1 g/100 ml IVPB (premix or compounded)    insulin regular (NOVOLIN R, HUMULIN R) 100 Units in 0.9% sodium chloride 100 mL infusion    glucose chewable tablet 16 g    dextrose (D50W) injection syrg 12.5-25 g    glucagon (GLUCAGEN) injection 1 mg    insulin lispro (HUMALOG) injection    insulin lispro (HUMALOG) injection    insulin glargine (LANTUS) injection 1-50 Units    sodium chloride 0.9 % bolus infusion 250 mL    calcium chloride 1 g in 0.9% sodium chloride 50 mL IVPB    famotidine (PF) (PEPCID) 20 mg in sodium chloride 0.9 % 10 mL injection    [START ON 9/30/2017] famotidine (PEPCID) tablet 20 mg    potassium chloride 20 mEq in 50 ml IVPB    potassium chloride 20 mEq in 50 ml IVPB    [START ON 9/30/2017] senna-docusate (PERICOLACE) 8.6-50 mg per tablet 1 Tab    milrinone (PRIMACOR) 20 MG/100 ML D5W infusion    cardioplegia infusion    epoprostenol (VELETRI) 1,500,000 ng in 0.9% sodium chloride 50 mL inhalation solution    0.9% sodium chloride inhalation    DOBUTamine (DOBUTREX) 500 mg/250 mL (2,000 mcg/mL) infusion    dexmedeTOMidine (PRECEDEX) 400 mcg in 0.9% sodium chloride 100 mL infusion    PHENYLephrine (NEOSYNEPHRINE) 30,000 mcg in 0.9% sodium chloride 250 mL infusion    niCARdipine (CARDENE) 25 mg in 0.9% sodium chloride 250 mL infusion    amiodarone (CORDARONE) 900 mg/250 ml D5W infusion     Facility-Administered Medications Ordered in Other Encounters   Medication    midazolam (VERSED) injection    ceFAZolin (ANCEF) 2g in 100 ml 0.9% NS IVPB    SUFentanil (SUFENTA) injection    SUFentanil (SUFENTA) injection    lidocaine (PF) (XYLOCAINE) 20 mg/mL (2 %) injection    propofol (DIPRIVAN) 10 mg/mL injection    rocuronium (ZEMURON) injection    succinylcholine (ANECTINE) injection    0.9% sodium chloride infusion    lactated Ringers infusion    nitroglycerin (TRIDIL) 40 mcg in 0.9% sodium chloride 1 mL injection    heparin (porcine) 1,000 unit/mL injection    protamine injection    calcium chloride injection    desmopressin (DDAVP) 4 mcg/mL injection    vasopressin (VASOSTRICT) 100 Units in 0.9% sodium chloride 100 mL infusion    vasopressin (PITRESSIN) 20 unit/mL injection    sodium bicarbonate 8.4 % (1 mEq/mL) injection    albumin human 5% (BUMINATE) solution    esmolol (BREVIBLOC) 100 mg/10 mL (10 mg/mL) injection      Social History   Substance Use Topics    Smoking status: Never Smoker    Smokeless tobacco: Never Used    Alcohol use No      History reviewed. No pertinent family history. Vital Signs: Intake/Output: Intake/Output:   Temp (24hrs), Av °F (37.2 °C), Min:98.5 °F (36.9 °C), Max:100.2 °F (37.9 °C)      Visit Vitals    /58    Pulse 82    Temp 98.5 °F (36.9 °C)    Resp 14    Ht 6' 2\" (1.88 m)    Wt 101.3 kg (223 lb 5.2 oz)    SpO2 95%    BMI 28.67 kg/m2         O2 Device: Room air  O2 Flow Rate (L/min): 2 l/min    Wt Readings from Last 4 Encounters:   17 101.3 kg (223 lb 5.2 oz)          Intake/Output Summary (Last 24 hours) at 17 1430  Last data filed at 17 1400   Gross per 24 hour   Intake           3384.9 ml   Output             3431 ml   Net            -46.1 ml       Last shift:       07 -  1900  In: 1700 [I.V.:1700]  Out: 1389 [Urine:585]    Last 3 shifts: 1901 -  0700  In: .7 [P.O.:610;  I.V.:1467.7]  Out:  [Urine:]     Telemetry:normal sinus rhythm    Physical Exam:    General: WM intubated and sedated. HEENT: NCAT              Chest:  Sternal dressing is intact, chest tube intact. Lungs: decreased air exchange bibasilar but clear anteriorly. Heart: Pulses in 120s, has IABP in place at 1:1   Abdomen: soft, non-tender, without masses or organomegaly   : jesus in place. Extremity: negative, cyanosis, clubbing, warm. Skin: Skin color, texture, turgor normal. No rashes or lesions; Normal upper and lower extremity pulses    Tubes:   Noted  ET tube in place  Chest tubes in place. Jesus in place.        DATA:  MAR reviewed and pertinent medications noted or modified as needed    Labs:    Recent Labs      09/29/17   0220 09/28/17   1923  09/28/17   1240  09/27/17   0535   WBC  11.1   --   7.8  9.4   HGB  13.4   --   13.5  13.2   PLT  136*   --   149*  154   INR  1.1   --    --    --    APTT  42.7*  29.4  38.2*  30.8     Recent Labs      09/29/17   0220  09/27/17   0535  09/26/17   1445   NA  141  139   --    K  3.9  3.9   --    CL  108  106   --    CO2  26  24   --    GLU  130*  246*   --    BUN  17  19   --    CREA  1.10  1.26   --    CA  8.2*  8.4*   --    MG  1.9   --    --    ALB  3.4*   --    --    SGOT  20   --    --    ALT  28   --    --    LPSE   --    --   95     Recent Labs      09/27/17   1240   PH  7.47*   PCO2  31*   PO2  96   HCO3  22   FIO2  21     Recent Labs      09/27/17   0535  09/26/17   1642   CPK   --   340*   CKNDX   --   11.9*   TROIQ  7.29*  10.30*     No results found for: BNPP, BNP   Lab Results   Component Value Date/Time    Culture result: NO GROWTH 2 DAYS 09/27/2017 03:39 PM     Lab Results   Component Value Date/Time     09/26/2017 04:42 PM     Lab Results   Component Value Date/Time    Color YELLOW/STRAW 09/27/2017 03:39 PM    Appearance CLEAR 09/27/2017 03:39 PM    Specific gravity 1.020 09/26/2017 06:54 PM    pH (UA) 5.0 09/27/2017 03:39 PM    Protein NEGATIVE  09/27/2017 03:39 PM    Glucose 100 09/27/2017 03:39 PM    Ketone NEGATIVE  09/27/2017 03:39 PM    Bilirubin NEGATIVE  09/27/2017 03:39 PM    Blood NEGATIVE  09/27/2017 03:39 PM    Urobilinogen 0.2 09/27/2017 03:39 PM    Nitrites NEGATIVE  09/27/2017 03:39 PM    Leukocyte Esterase NEGATIVE  09/27/2017 03:39 PM    WBC 0-4 09/27/2017 03:39 PM    RBC 0-5 09/27/2017 03:39 PM    Bacteria NEGATIVE  09/27/2017 03:39 PM       No results found for: TOXA1, RPR, HBCM, HBSAG, HAAB, HCAB1, HAAT, G6PD, CRYAC, HIVGT, HIVR, HIV1, HIV12, HIVPC, HIVRPI  No results found for: IRON, FE, TIBC, IBCT, PSAT, FERR  Lab Results   Component Value Date/Time    TSH 1.15 09/26/2017 02:31 PM       Imaging:  []                           I have personally reviewed the patients radiographs and reports:      Results from [place] encounter on 09/26/17   XR CHEST PORT   Narrative EXAM:  XR CHEST PORT    INDICATION:  swan placement and quad lumen; follow-up non-ST elevated myocardial  infarction. COMPARISON:  9/27/2017    FINDINGS: A portable AP radiograph of the chest was obtained at 1842 hours. There is interval placement of a right IJ pulmonary line terminating in the  right atrium near the inferior vena cava, and a right IJ line terminating in the  superior vena cava. There is no pneumothorax or pleural effusion. The lungs are  clear. Cardiac, mediastinal and hilar contours are within normal limits for AP  view. The bones and soft tissues are grossly within normal limits. Impression IMPRESSION: Abnormal position of pulmonary arterial line with termination  inferior right atrium near inferior vena cava. No acute pulmonary findings. The findings were called to nurse Maria Ines Nichols on 9/27/2017 at 7:02 PM by myself. 789              No results found for this or any previous visit. 9-29-17: CXR: COMPARISON:  9/27/2017     FINDINGS: A portable AP radiograph of the chest was obtained at 1450 hours. The  patient is on a cardiac monitor.   The endotracheal tube terminates about 4 cm  above the lazara. The [place]-Kristen catheter extends to the main pulmonary artery. A  right IJ line terminates over the cavoatrial junction. There are mediastinal and  left chest tubes in place without a definite pneumothorax. A small amount of  pneumomediastinum is noted. There is central congestion without overt CHF.   IMPRESSION:      Central congestion without overt CHF.   Small amount of pneumomediastinum can be seen in the immediate postoperative  setting      My assessment/plan was discussed with:  Nursing xx    respiratory therapy xx Mr. Asiya Velazquez MD

## 2017-09-29 NOTE — ANESTHESIA POSTPROCEDURE EVALUATION
Post-Anesthesia Evaluation and Assessment    Patient: Lashay Camarillo MRN: [id number]  SSN: [id number]    YOB: 1965  Age: 45 y.o. Sex: male       Cardiovascular Function/Vital Signs  Visit Vitals    /67    Pulse (!) 131    Temp 36.8 °C (98.3 °F)    Resp (!) 0    Ht 6' 2\" (1.88 m)    Wt 101.3 kg (223 lb 5.2 oz)    SpO2 96%    BMI 28.67 kg/m2       Patient is status post general anesthesia for Procedure(s):  ON PUMP CABG X 3 USING RIGHT SAPHENOUS VEIN AND LEFT RADIAL ARTERY VIA EVH WITH INTRAOPERATIVE ALFREDO. Nausea/Vomiting: None    Postoperative hydration reviewed and adequate. Pain:  Pain Scale 1: Behavioral Pain Scale (BPS) (09/29/17 1427)  Pain Intensity 1: 3 (09/29/17 1427)   Managed    Neurological Status:   Neuro  Neurologic State: Alert;Eyes open spontaneously (09/28/17 2000)  Orientation Level: Oriented X4 (09/28/17 2000)  Cognition: Appropriate decision making; Appropriate for age attention/concentration; Appropriate safety awareness; Follows commands;Recognition of people/places (09/28/17 2000)  Speech: Clear (09/28/17 2000)  Assessment L Pupil: Other (Comment) (blind L eye) (09/28/17 2000)  Assessment R Pupil: Brisk (09/28/17 2000)  LUE Motor Response: Purposeful;Spontaneous  (09/28/17 2000)  LLE Motor Response: Purposeful;Spontaneous  (09/28/17 2000)  RUE Motor Response: Purposeful;Spontaneous  (09/28/17 2000)  RLE Motor Response: Purposeful;Spontaneous  (09/28/17 2000)   Sedated    Mental Status and Level of Consciousness: Sedated    Pulmonary Status:   O2 Device: Endotracheal tube;Ventilator (09/29/17 1427)   Adequate oxygenation and airway patent    Complications related to anesthesia: None    Post-anesthesia assessment completed.  ICU care    Signed By: Terese Ferrera DO     September 29, 2017

## 2017-09-29 NOTE — PERIOP NOTES
Radial harvest start at 1391- out at 0922- prepped by [phone]  Vein harvest start at 0835- out at 0915- prepped by [phone]

## 2017-09-29 NOTE — PROGRESS NOTES
1900 Report from Luis M Tariq, [address] [facility] Patient with R groin 7.5/40 cc IABP, slight old drainage from the site, negative hematoma, positive pulses. Amio at .5 mg and insulin gtt infusing. 2000 Explained visitation policy to patient's wife. 2105 Patient education on IS performed. Patient volume 1350 with minimal assist  2200 Patient placed on bedpan. 2210 Patient removed from bedpan unable to have a BM.  2256 Patient with BG of 64 given 1 cup of apple juice. 0000 Patient reassessed. 0400 Patient reassessed. 0630 Dr Milagros Nieves at bedside to place R radial arterial line. 0730 OR team at bedside to take patient to OR.

## 2017-09-29 NOTE — ANESTHESIA PROCEDURE NOTES
Arterial Line Placement    Performed by: Manuel Constantino  Authorized by: Manuel Constantino     Pre-Procedure  Indications:  Arterial pressure monitoring and blood sampling  Preanesthetic Checklist: patient identified, risks and benefits discussed, anesthesia consent, site marked, patient being monitored, timeout performed and patient being monitored      Procedure:   Prep:  ChloraPrep  Seldinger Technique?: Yes    Orientation:  Right  Location:  Radial artery  Catheter size:  20 G  Number of attempts:  1  Cont Cardiac Output Sensor: Yes      Assessment:   Post-procedure:  Line secured and sterile dressing applied  Patient Tolerance:  Patient tolerated the procedure well with no immediate complications  Comment:   Collateral perfusion verified

## 2017-09-29 NOTE — PROGRESS NOTES
0700  Bedside and verbal report from Ashok Haile RN. Patient is awake, alert, on IABP 1:2,  right IJ QLC, cordis, and PA line in place,  peripheral pulses palpable x 4 extremeties. Lungs clear. Active bowel sounds x 4 quads. Lucas catheter draining clear yellow urine.    0745  To OR via bed, accompanied by OR team.

## 2017-09-29 NOTE — DIABETES MGMT
[facility] Cardiac Surgery Progress Note    Recommendations/ Comments:  Pt arrived to CCU at 1427 on 9/29/17. Consider continuing insulin gtt for at least 48hrs post-op and eating 50% solid foods then,  1) transition off gtt per [employer] Protocol   2) continue accu-checks and humalog correctional insulin ac & hs   3) ADA/AHA diet as diet advanced  4) Please start pt on Metformin 500mg bid and Tradjenta 5mg daily as he is newly diagnosed Type 2 diabetic. Insulin gtt should not be stopped until after 1427 on 10/1/17 to complete 48hr post-op time frame. Pt could receive transition as early as 1227 on 10/1/17 if all criteria met per protocol. Chart reviewed on Manuel Morales. Patient is 45 y.o. male s/p Cardiac Surgery. POD 0. Newly diagnosed Type 2 Diabetes on none at home. A1c:   Lab Results   Component Value Date/Time    Hemoglobin A1c 9.4 09/27/2017 05:35 AM         Recent Glucose Results: Lab Results   Component Value Date/Time     (H) 09/29/2017 02:20 AM    GLUCPOC 110 (H) 09/29/2017 06:33 AM    GLUCPOC 131 (H) 09/29/2017 05:28 AM    GLUCPOC 130 (H) 09/29/2017 04:23 AM        Lab Results   Component Value Date/Time    Creatinine 1.10 09/29/2017 02:20 AM     Estimated Creatinine Clearance: 99.8 mL/min (based on Cr of 1.1). Active Orders   Diet    DIET NPO        PO intake: Patient Vitals for the past 72 hrs:   % Diet Eaten   09/28/17 1900 100 %   09/28/17 1500 100 %   09/27/17 1900 100 %   09/27/17 1500 100 %   09/26/17 2230 100 %       Currently on insulin gtt. Will continue to follow as needed. Thank you.     Ever Chavez, [address], [address]  Office: [phone]

## 2017-09-29 NOTE — PROGRESS NOTES
1400 Report received from primary nurse Lev Carreon RN    1453 Primary Nurses Liang Tovar RN and Lev Carreon RN performed a dual skin assessment on this patient Impairment noted- see wound doc flow sheet  Jareth score is 11      1515 ET tube advanced 2cm by Respiratory Therapist per CXR. OR RN advanced femoral sheath slightly     1525 Dr. Afshan Kessler changed Balloon pump from 1:1 ratio to 1:2. Patient tolerated well and Dr. Afshan Kessler states to keep patient on rate of 1:2.    1531 Patient's wife into see patient. Updated on patient's situation. [address] [facility] Dr. Sherri Mcleod on patient status and vital signs. 1900 Verbal report given to oncoming nurse Godfrey Jerez RN    Report consisted of patients Situation, Background, Assessment, and   Recommendations    Information was reviewed with the receiving nurse. Opportunity for questions and clarification was provided.       Kamron Mosher RN

## 2017-09-29 NOTE — PROGRESS NOTES
Cardiology Progress Note      9/29/2017 3:51 PM    Admit Date: 9/26/2017    Admit Diagnosis: NSTEMI (non-ST elevated myocardial infarction) (HCC);CAD ;C*      Subjective:     Destiny Escobar is s/p CABG. Stable on multiple pressors, IABP. Remains intubated.     Visit Vitals    /59    Pulse (!) 117    Temp 98.4 °F (36.9 °C)    Resp 17    Ht 6' 2\" (1.88 m)    Wt 223 lb 5.2 oz (101.3 kg)    SpO2 95%    BMI 28.67 kg/m2       Current Facility-Administered Medications   Medication Dose Route Frequency    sodium chloride (NS) flush 5-10 mL  5-10 mL IntraVENous Q8H    sodium chloride (NS) flush 5-10 mL  5-10 mL IntraVENous PRN    albumin human 5% (BUMINATE) solution 12.5 g  12.5 g IntraVENous Q2H PRN    0.45% sodium chloride infusion  10 mL/hr IntraVENous CONTINUOUS    0.9% sodium chloride infusion  9 mL/hr IntraVENous CONTINUOUS    acetaminophen (TYLENOL) tablet 650 mg  650 mg Oral Q4H    oxyCODONE-acetaminophen (PERCOCET) 5-325 mg per tablet 1 Tab  1 Tab Oral Q4H PRN    oxyCODONE-acetaminophen (PERCOCET) 5-325 mg per tablet 2 Tab  2 Tab Oral Q4H PRN    morphine injection 4 mg  4 mg IntraVENous Q2H PRN    naloxone (NARCAN) injection 0.4 mg  0.4 mg IntraVENous PRN    mupirocin (BACTROBAN) 2 % ointment   Both Nostrils BID    ceFAZolin (ANCEF) 2g in 100 ml 0.9% NS IVPB  2 g IntraVENous Q8H    [START ON 9/30/2017] amiodarone (CORDARONE) tablet 400 mg  400 mg Oral Q12H    ondansetron (ZOFRAN) injection 4 mg  4 mg IntraVENous Q4H PRN    albuterol (PROVENTIL VENTOLIN) nebulizer solution 2.5 mg  2.5 mg Nebulization Q4H PRN    [START ON 9/30/2017] aspirin chewable tablet 81 mg  81 mg Oral DAILY    chlorhexidine (PERIDEX) 0.12 % mouthwash 10 mL  10 mL Oral BID    famotidine (PF) (PEPCID) 20 mg in sodium chloride 0.9 % 10 mL injection  20 mg IntraVENous Q12H    [START ON 9/30/2017] famotidine (PEPCID) tablet 20 mg  20 mg Oral Q12H    [START ON 9/30/2017] magnesium oxide (MAG-OX) tablet 400 mg  400 mg Oral BID    calcium chloride 1 g in 0.9% sodium chloride 50 mL IVPB  1 g IntraVENous PRN    bisacodyl (DULCOLAX) suppository 10 mg  10 mg Rectal DAILY PRN    [START ON 9/30/2017] senna-docusate (PERICOLACE) 8.6-50 mg per tablet 1 Tab  1 Tab Oral BID    [START ON 9/30/2017] polyethylene glycol (MIRALAX) packet 17 g  17 g Oral DAILY    ELECTROLYTE REPLACEMENT PROTOCOL  1 Each Other PRN    magnesium sulfate 1 g/100 ml IVPB (premix or compounded)  1 g IntraVENous PRN    insulin regular (NOVOLIN R, HUMULIN R) 100 Units in 0.9% sodium chloride 100 mL infusion  1-50 Units/hr IntraVENous TITRATE    glucose chewable tablet 16 g  4 Tab Oral PRN    dextrose (D50W) injection syrg 12.5-25 g  12.5-25 g IntraVENous PRN    glucagon (GLUCAGEN) injection 1 mg  1 mg IntraMUSCular PRN    insulin lispro (HUMALOG) injection   SubCUTAneous TIDAC    insulin lispro (HUMALOG) injection   SubCUTAneous AC&HS    insulin glargine (LANTUS) injection 1-50 Units  1-50 Units SubCUTAneous ONCE PRN    sodium chloride 0.9 % bolus infusion 250 mL  250 mL IntraVENous ONCE PRN    potassium chloride 20 mEq in 50 ml IVPB  20 mEq IntraVENous Q2H PRN    potassium chloride 20 mEq in 50 ml IVPB  20 mEq IntraVENous Q2H PRN    milrinone (PRIMACOR) 20 MG/100 ML D5W infusion  0.375 mcg/kg/min IntraVENous CONTINUOUS    cardioplegia infusion   IntraVENous ONCE    epoprostenol (VELETRI) 1,500,000 ng in 0.9% sodium chloride 50 mL inhalation solution  50 ng/kg/min (Ideal) Inhalation CONTINUOUS    0.9% sodium chloride inhalation  3.8-14 mL/hr Inhalation CONTINUOUS    amiodarone (CORDARONE) 150 mg in dextrose 5% 100 mL bolus infusion  150 mg IntraVENous ONCE    propofol (DIPRIVAN) infusion  0-50 mcg/kg/min IntraVENous TITRATE    propofol (DIPRIVAN) 10 mg/mL injection        DOBUTamine (DOBUTREX) 500 mg/250 mL (2,000 mcg/mL) infusion  0-10 mcg/kg/min IntraVENous TITRATE    dexmedeTOMidine (PRECEDEX) 400 mcg in 0.9% sodium chloride 100 mL infusion  0.2-0.7 mcg/kg/hr IntraVENous TITRATE    PHENYLephrine (NEOSYNEPHRINE) 30,000 mcg in 0.9% sodium chloride 250 mL infusion   mcg/min IntraVENous TITRATE    niCARdipine (CARDENE) 25 mg in 0.9% sodium chloride 250 mL infusion  0-15 mg/hr IntraVENous TITRATE    amiodarone (CORDARONE) 900 mg/250 ml D5W infusion  0.5-1 mg/min IntraVENous TITRATE         Objective:      Physical Exam:  Visit Vitals    /59    Pulse (!) 117    Temp 98.4 °F (36.9 °C)    Resp 17    Ht 6' 2\" (1.88 m)    Wt 223 lb 5.2 oz (101.3 kg)    SpO2 95%    BMI 28.67 kg/m2     General Appearance:  Well developed, well nourished,alert and oriented x 0, and individual in no acute distress. Ears/Nose/Mouth/Throat:   intubated         Neck: Supple. Chest:   Lungs clear to auscultation bilaterally. Cardiovascular:  Regular rate and rhythm, S1, S2 normal, no murmur. Abdomen:   Soft, non-tender, bowel sounds are active. Extremities: No edema bilaterally. Skin: Warm and dry.                  Data Review:   Labs:  Recent Results (from the past 24 hour(s))   GLUCOSE, POC    Collection Time: 09/28/17  3:58 PM   Result Value Ref Range    Glucose (POC) 148 (H) 65 - 100 mg/dL    Performed by Yaima Nixon    Collection Time: 09/28/17  4:01 PM   Result Value Ref Range    Glucose 148 mg/dL    Insulin order 5.3 units/hour    Insulin adminstered 5.3 units/hour    Multiplier 0.060     Low target 95 mg/dL    High target 130 mg/dL    D50 order 0.0 ml    D50 administered 0.00 ml    Minutes until next BG 60 min    Order initials CNB     Administered initials CNB     GLSCOM Comments     GLUCOSE, POC    Collection Time: 09/28/17  5:04 PM   Result Value Ref Range    Glucose (POC) 185 (H) 65 - 100 mg/dL    Performed by Bob Betancourt    Collection Time: 09/28/17  5:05 PM   Result Value Ref Range    Glucose 185 mg/dL    Insulin order 8.8 units/hour    Insulin adminstered 8.8 units/hour    Multiplier 0.070     Low target 95 mg/dL    High target 130 mg/dL    D50 order 0.0 ml    D50 administered 0.00 ml    Minutes until next BG 60 min    Order initials sl     Administered initials sl     GREGOR Comments     GLUCOSE, POC    Collection Time: 09/28/17  6:17 PM   Result Value Ref Range    Glucose (POC) 125 (H) 65 - 100 mg/dL    Performed by Winsome Marques    Collection Time: 09/28/17  6:18 PM   Result Value Ref Range    Glucose 125 mg/dL    Insulin order 4.6 units/hour    Insulin adminstered 4.6 units/hour    Multiplier 0.070     Low target 95 mg/dL    High target 130 mg/dL    D50 order 0.0 ml    D50 administered 0.00 ml    Minutes until next BG 60 min    Order initials CNB     Administered initials CNB     GREGOR Comments     PTT    Collection Time: 09/28/17  7:23 PM   Result Value Ref Range    aPTT 29.4 22.1 - 32.5 sec    aPTT, therapeutic range     58.0 - 77.0 SECS   GLUCOSE, POC    Collection Time: 09/28/17  7:32 PM   Result Value Ref Range    Glucose (POC) 127 (H) 65 - 100 mg/dL    Performed by Kunal Grove    Collection Time: 09/28/17  7:32 PM   Result Value Ref Range    Glucose 127 mg/dL    Insulin order 4.7 units/hour    Insulin adminstered 4.7 units/hour    Multiplier 0.070     Low target 95 mg/dL    High target 130 mg/dL    D50 order 0.0 ml    D50 administered 0.00 ml    Minutes until next BG 60 min    Order initials CNB     Administered initials KEYANA BUNDY Comments     GLUCOSE, POC    Collection Time: 09/28/17  8:24 PM   Result Value Ref Range    Glucose (POC) 162 (H) 65 - 100 mg/dL    Performed by Yeyo Minor    Collection Time: 09/28/17  8:24 PM   Result Value Ref Range    Glucose 162 mg/dL    Insulin order 8.2 units/hour    Insulin adminstered 8.2 units/hour    Multiplier 0.080     Low target 95 mg/dL    High target 130 mg/dL    D50 order 0.0 ml    D50 administered 0.00 ml    Minutes until next BG 60 min    Order initials Kassandra Mtz Administered initials kishan     GLSCOM Comments     GLUCOSE, POC    Collection Time: 09/28/17  9:28 PM   Result Value Ref Range    Glucose (POC) 199 (H) 65 - 100 mg/dL    Performed by Eddie Jaeger    Collection Time: 09/28/17  9:28 PM   Result Value Ref Range    Glucose 199 mg/dL    Insulin order 12.5 units/hour    Insulin adminstered 12.5 units/hour    Multiplier 0.090     Low target 95 mg/dL    High target 130 mg/dL    D50 order 0.0 ml    D50 administered 0.00 ml    Minutes until next BG 60 min    Order initials kishan     Administered initials kishan     GLSCOM Comments     GLUCOSE, POC    Collection Time: 09/28/17 10:31 PM   Result Value Ref Range    Glucose (POC) 72 65 - 100 mg/dL    Performed by Eddie Jaeger    Collection Time: 09/28/17 10:31 PM   Result Value Ref Range    Glucose 72 mg/dL    Insulin order 0.0 units/hour    Insulin adminstered 0.0 units/hour    Multiplier 0.072     Low target 95 mg/dL    High target 130 mg/dL    D50 order 11.0 ml    D50 administered 11.00 ml    Minutes until next BG 15 min    Order initials kishan     Administered initials kishan     GLSCOM Comments     GLUCOSE, POC    Collection Time: 09/28/17 10:52 PM   Result Value Ref Range    Glucose (POC) 64 (L) 65 - 100 mg/dL    Performed by Eddie Jaeger    Collection Time: 09/28/17 10:52 PM   Result Value Ref Range    Glucose 64 mg/dL    Insulin order 0.0 units/hour    Insulin adminstered 0.0 units/hour    Multiplier 0.036     Low target 95 mg/dL    High target 130 mg/dL    D50 order 14.0 ml    D50 administered 14.00 ml    Minutes until next BG 15 min    Order initials kishan     Administered initials kishan     GLSCOM Comments     GLUCOSE, POC    Collection Time: 09/28/17 11:07 PM   Result Value Ref Range    Glucose (POC) 94 65 - 100 mg/dL    Performed by Madi Mejia    GLUCOSTABILIZER    Collection Time: 09/28/17 11:08 PM   Result Value Ref Range    Glucose 94 mg/dL    Insulin order 0.9 units/hour    Insulin adminstered 0.0 units/hour    Multiplier 0.026     Low target 100 mg/dL    High target 140 mg/dL    D50 order 0.0 ml    D50 administered 0.00 ml    Minutes until next BG 60 min    Order initials kishan     Administered initials kishan     OSMINOM Comments just gave glucose    GLUCOSE, POC    Collection Time: 09/29/17 12:11 AM   Result Value Ref Range    Glucose (POC) 223 (H) 65 - 100 mg/dL    Performed by [employer]    GLUCOSTABILIZER    Collection Time: 09/29/17 12:11 AM   Result Value Ref Range    Glucose 223 mg/dL    Insulin order 5.9 units/hour    Insulin adminstered 5.9 units/hour    Multiplier 0.036     Low target 100 mg/dL    High target 140 mg/dL    D50 order 0.0 ml    D50 administered 0.00 ml    Minutes until next BG 60 min    Order initials kishan     Administered initials kishan Atkins     GLUCOSE, POC    Collection Time: 09/29/17  1:16 AM   Result Value Ref Range    Glucose (POC) 151 (H) 65 - 100 mg/dL    Performed by [employer]    GLUCOSTABILIZER    Collection Time: 09/29/17  1:17 AM   Result Value Ref Range    Glucose 151 mg/dL    Insulin order 4.2 units/hour    Insulin adminstered 4.2 units/hour    Multiplier 0.046     Low target 100 mg/dL    High target 140 mg/dL    D50 order 0.0 ml    D50 administered 0.00 ml    Minutes until next BG 60 min    Order initials kishan     Administered initials kishan Atkins     GLUCOSE, POC    Collection Time: 09/29/17  2:15 AM   Result Value Ref Range    Glucose (POC) 127 (H) 65 - 100 mg/dL    Performed by [employer]    GLUCOSTABILIZER    Collection Time: 09/29/17  2:16 AM   Result Value Ref Range    Glucose 127 mg/dL    Insulin order 3.1 units/hour    Insulin adminstered 3.1 units/hour    Multiplier 0.046     Low target 100 mg/dL    High target 140 mg/dL    D50 order 0.0 ml    D50 administered 0.00 ml    Minutes until next BG 60 min    Order initials kishan     Administered initials kishan     GREGOR Atkins     METABOLIC PANEL, COMPREHENSIVE    Collection Time: 09/29/17  2:20 AM   Result Value Ref Range    Sodium 141 136 - 145 mmol/L    Potassium 3.9 3.5 - 5.1 mmol/L    Chloride 108 97 - 108 mmol/L    CO2 26 21 - 32 mmol/L    Anion gap 7 5 - 15 mmol/L    Glucose 130 (H) 65 - 100 mg/dL    BUN 17 6 - 20 MG/DL    Creatinine 1.10 0.70 - 1.30 MG/DL    BUN/Creatinine ratio 15 12 - 20      GFR est AA >60 >60 ml/min/1.73m2    GFR est non-AA >60 >60 ml/min/1.73m2    Calcium 8.2 (L) 8.5 - 10.1 MG/DL    Bilirubin, total 0.6 0.2 - 1.0 MG/DL    ALT (SGPT) 28 12 - 78 U/L    AST (SGOT) 20 15 - 37 U/L    Alk.  phosphatase 71 45 - 117 U/L    Protein, total 5.7 (L) 6.4 - 8.2 g/dL    Albumin 3.4 (L) 3.5 - 5.0 g/dL    Globulin 2.3 2.0 - 4.0 g/dL    A-G Ratio 1.5 1.1 - 2.2     MAGNESIUM    Collection Time: 09/29/17  2:20 AM   Result Value Ref Range    Magnesium 1.9 1.6 - 2.4 mg/dL   CBC W/O DIFF    Collection Time: 09/29/17  2:20 AM   Result Value Ref Range    WBC 11.1 4.1 - 11.1 K/uL    RBC 4.60 4.10 - 5.70 M/uL    HGB 13.4 12.1 - 17.0 g/dL    HCT 37.6 36.6 - 50.3 %    MCV 81.7 80.0 - 99.0 FL    MCH 29.1 26.0 - 34.0 PG    MCHC 35.6 30.0 - 36.5 g/dL    RDW 13.1 11.5 - 14.5 %    PLATELET 714 (L) 094 - 400 K/uL   PTT    Collection Time: 09/29/17  2:20 AM   Result Value Ref Range    aPTT 42.7 (H) 22.1 - 32.5 sec    aPTT, therapeutic range     58.0 - 77.0 SECS   PROTHROMBIN TIME + INR    Collection Time: 09/29/17  2:20 AM   Result Value Ref Range    INR 1.1 0.9 - 1.1      Prothrombin time 11.0 9.0 - 11.1 sec   GLUCOSE, POC    Collection Time: 09/29/17  3:20 AM   Result Value Ref Range    Glucose (POC) 117 (H) 65 - 100 mg/dL    Performed by Teri Langford    Collection Time: 09/29/17  3:20 AM   Result Value Ref Range    Glucose 117 mg/dL    Insulin order 2.6 units/hour    Insulin adminstered 2.6 units/hour    Multiplier 0.046     Low target 100 mg/dL    High target 140 mg/dL    D50 order 0.0 ml    D50 administered 0.00 ml    Minutes until next BG 60 min    Order initials kishan     Administered initials kishan SOLORIOOM Comments     GLUCOSE, POC    Collection Time: 09/29/17  4:23 AM   Result Value Ref Range    Glucose (POC) 130 (H) 65 - 100 mg/dL    Performed by [employer]    GLUCOSTABILIZER    Collection Time: 09/29/17  4:23 AM   Result Value Ref Range    Glucose 130 mg/dL    Insulin order 3.2 units/hour    Insulin adminstered 3.2 units/hour    Multiplier 0.046     Low target 100 mg/dL    High target 140 mg/dL    D50 order 0.0 ml    D50 administered 0.00 ml    Minutes until next BG 60 min    Order initials kishan     Administered initials kishan     GLSCOM Comments     GLUCOSE, POC    Collection Time: 09/29/17  5:28 AM   Result Value Ref Range    Glucose (POC) 131 (H) 65 - 100 mg/dL    Performed by [employer]    GLUCOSTABILIZER    Collection Time: 09/29/17  5:29 AM   Result Value Ref Range    Glucose 131 mg/dL    Insulin order 3.3 units/hour    Insulin adminstered 3.3 units/hour    Multiplier 0.046     Low target 100 mg/dL    High target 140 mg/dL    D50 order 0.0 ml    D50 administered 0.00 ml    Minutes until next BG 60 min    Order initials kishan     Administered initials kishan BUNDY Comments     GLUCOSE, POC    Collection Time: 09/29/17  6:33 AM   Result Value Ref Range    Glucose (POC) 110 (H) 65 - 100 mg/dL    Performed by [employer]    GLUCOSTABILIZER    Collection Time: 09/29/17  6:34 AM   Result Value Ref Range    Glucose 110 mg/dL    Insulin order 2.3 units/hour    Insulin adminstered 2.3 units/hour    Multiplier 0.046     Low target 100 mg/dL    High target 140 mg/dL    D50 order 0.0 ml    D50 administered 0.00 ml    Minutes until next  min    Order initials kishan     Administered initials kishan     GLSCOM Comments     GLUCOSTABILIZER    Collection Time: 09/29/17  8:10 AM   Result Value Ref Range    Glucose 137 mg/dL    Insulin order 2.3 units/hour    Insulin adminstered 2.3 units/hour    Multiplier 0.030     Low target 100 mg/dL    High target 140 mg/dL    D50 order 0.0 ml    D50 administered 0.00 ml    Minutes until next BG 60 min    Order initials agc     Administered initials agc     GLSCOM Comments     GLUCOSTABILIZER    Collection Time: 09/29/17 10:07 AM   Result Value Ref Range    Glucose 167 mg/dL    Insulin order 4.3 units/hour    Insulin adminstered 4.3 units/hour    Multiplier 0.040     Low target 100 mg/dL    High target 140 mg/dL    D50 order 0.0 ml    D50 administered 0.00 ml    Minutes until next BG 60 min    Order initials vy     Administered initials vy     GLSCOM Comments     GLUCOSTABILIZER    Collection Time: 09/29/17 11:14 AM   Result Value Ref Range    Glucose 151 mg/dL    Insulin order 4.6 units/hour    Insulin adminstered 4.6 units/hour    Multiplier 0.050     Low target 100 mg/dL    High target 140 mg/dL    D50 order 0.0 ml    D50 administered 0.00 ml    Minutes until next BG 60 min    Order initials agc     Administered initials agc     GLSCOM Comments     GLUCOSTABILIZER    Collection Time: 09/29/17 11:40 AM   Result Value Ref Range    Glucose 143 mg/dL    Insulin order 5.0 units/hour    Insulin adminstered 5.0 units/hour    Multiplier 0.060     Low target 100 mg/dL    High target 140 mg/dL    D50 order 0.0 ml    D50 administered 0.00 ml    Minutes until next BG 60 min    Order initials agc     Administered initials agc     GLSCOM Comments     GLUCOSTABILIZER    Collection Time: 09/29/17 12:29 PM   Result Value Ref Range    Glucose 138 mg/dL    Insulin order 4.7 units/hour    Insulin adminstered 4.7 units/hour    Multiplier 0.060     Low target 100 mg/dL    High target 140 mg/dL    D50 order 0.0 ml    D50 administered 0.00 ml    Minutes until next BG 60 min    Order initials agc     Administered initials agc     GLSCOM Comments     GLUCOSTABILIZER    Collection Time: 09/29/17  1:07 PM   Result Value Ref Range    Glucose 135 mg/dL    Insulin order 4.5 units/hour    Insulin adminstered 4.5 units/hour    Multiplier 0.060 Low target 100 mg/dL    High target 140 mg/dL    D50 order 0.0 ml    D50 administered 0.00 ml    Minutes until next BG 60 min    Order initials vy     Administered initials vy     GLSCOM Comments     METABOLIC PANEL, COMPREHENSIVE    Collection Time: 09/29/17  2:55 PM   Result Value Ref Range    Sodium 143 136 - 145 mmol/L    Potassium 3.9 3.5 - 5.1 mmol/L    Chloride 110 (H) 97 - 108 mmol/L    CO2 23 21 - 32 mmol/L    Anion gap 10 5 - 15 mmol/L    Glucose 204 (H) 65 - 100 mg/dL    BUN 14 6 - 20 MG/DL    Creatinine 1.17 0.70 - 1.30 MG/DL    BUN/Creatinine ratio 12 12 - 20      GFR est AA >60 >60 ml/min/1.73m2    GFR est non-AA >60 >60 ml/min/1.73m2    Calcium 7.8 (L) 8.5 - 10.1 MG/DL    Bilirubin, total 1.1 (H) 0.2 - 1.0 MG/DL    ALT (SGPT) 20 12 - 78 U/L    AST (SGOT) 33 15 - 37 U/L    Alk. phosphatase 47 45 - 117 U/L    Protein, total 5.1 (L) 6.4 - 8.2 g/dL    Albumin 3.2 (L) 3.5 - 5.0 g/dL    Globulin 1.9 (L) 2.0 - 4.0 g/dL    A-G Ratio 1.7 1.1 - 2.2     MAGNESIUM    Collection Time: 09/29/17  2:55 PM   Result Value Ref Range    Magnesium 2.1 1.6 - 2.4 mg/dL   CBC WITH AUTOMATED DIFF    Collection Time: 09/29/17  2:55 PM   Result Value Ref Range    WBC 13.4 (H) 4.1 - 11.1 K/uL    RBC 3.60 (L) 4.10 - 5.70 M/uL    HGB 10.4 (L) 12.1 - 17.0 g/dL    HCT 29.6 (L) 36.6 - 50.3 %    MCV 82.2 80.0 - 99.0 FL    MCH 28.9 26.0 - 34.0 PG    MCHC 35.1 30.0 - 36.5 g/dL    RDW 13.3 11.5 - 14.5 %    PLATELET 74 (L) 463 - 400 K/uL    NEUTROPHILS 86 (H) 32 - 75 %    LYMPHOCYTES 7 (L) 12 - 49 %    MONOCYTES 7 5 - 13 %    EOSINOPHILS 0 0 - 7 %    BASOPHILS 0 0 - 1 %    ABS. NEUTROPHILS 11.6 (H) 1.8 - 8.0 K/UL    ABS. LYMPHOCYTES 0.9 0.8 - 3.5 K/UL    ABS. MONOCYTES 0.9 0.0 - 1.0 K/UL    ABS. EOSINOPHILS 0.0 0.0 - 0.4 K/UL    ABS.  BASOPHILS 0.0 0.0 - 0.1 K/UL   PTT    Collection Time: 09/29/17  2:55 PM   Result Value Ref Range    aPTT 30.3 22.1 - 32.5 sec    aPTT, therapeutic range     58.0 - 77.0 SECS   PROTHROMBIN TIME + INR Collection Time: 09/29/17  2:55 PM   Result Value Ref Range    INR 1.3 (H) 0.9 - 1.1      Prothrombin time 13.0 (H) 9.0 - 11.1 sec   GLUCOSE, POC    Collection Time: 09/29/17  2:56 PM   Result Value Ref Range    Glucose (POC) 211 (H) 65 - 100 mg/dL    Performed by Mesha Pope    Collection Time: 09/29/17  2:58 PM   Result Value Ref Range    Glucose 211 mg/dL    Insulin order 6.9 units/hour    Insulin adminstered 6.9 units/hour    Multiplier 0.046     Low target 95 mg/dL    High target 130 mg/dL    D50 order 0.0 ml    D50 administered 0.00 ml    Minutes until next BG 60 min    Order initials mdf     Administered initials mdf     GLSCOM Comments     BLOOD GAS, ARTERIAL    Collection Time: 09/29/17  3:00 PM   Result Value Ref Range    pH 7.33 (L) 7.35 - 7.45      PCO2 42 35.0 - 45.0 mmHg    PO2 95 80 - 100 mmHg    O2 SAT 97 92 - 97 %    BICARBONATE 22 22 - 26 mmol/L    BASE DEFICIT 3.9 mmol/L    O2 METHOD VENTILATOR      FIO2 100 %    MODE SIMV      Tidal volume 600      SET RATE 12      PRESSURE SUPPORT 10.0      EPAP/CPAP/PEEP 6.0      Sample source ARTERIAL      SITE DRAWN FROM ARTERIAL LINE      YVES'S TEST N/A         Telemetry: AFIB      Assessment:     Principal Problem:    NSTEMI (non-ST elevated myocardial infarction) ([facility] [address] (9/26/2017)    Active Problems:    Family history of early CAD (9/26/2017)      Overview: Significant for mother, brother      Systolic heart failure ([facility] [address] (9/27/2017)      Coronary artery disease involving native coronary artery with unstable angina pectoris ([facility] [address] (9/27/2017)      Type II diabetes mellitus, uncontrolled ([facility] [address] (9/27/2017)      CAD (coronary artery disease), native coronary artery (9/27/2017)      CAD (coronary artery disease) (9/29/2017)      S/P CABG x 3 (9/29/2017)      Overview: ON PUMP CABG X 3, LIMA to LAD, LRAG to OM, RSVG to PDA      Left radial artery harvest      Right greater saphenous vein harvest        Plan:     Stable post CABG.    Continue supportive care. Change IABP to 2:1, better augmentation. Wife updated.     [address] Kevin Mcfadden MD

## 2017-09-29 NOTE — BRIEF OP NOTE
BRIEF OP NOTE  Pre-Op Diagnosis: CAD     Post-Op Diagnosis: CAD      Procedure:   ON PUMP CABG X 3, LIMA to LAD, LRAG to OM, RSVG to PDA  Left radial artery harvest  Right greater saphenous vein harvest    Surgeon: Lisa Mc MD    Assistant(s): Jose De Jesus Germain PA-C    Anesthesia: General     Infusions: Amiodarone, precedex, insulin, keagan, vaso, epi,     X clamp: 63 min    CPB: 107 min    Estimated Blood Loss: 1167    Cell Saver: 675    Specimens: * No specimens in log *    Drains and pacing wires: 2 atrial wires, 1 bipolar ventricular wire, 3 jonathan drains    Complications: None    Findings: CAD    Implants: * No implants in log *

## 2017-09-30 ENCOUNTER — APPOINTMENT (OUTPATIENT)
Dept: GENERAL RADIOLOGY | Age: 52
DRG: 233 | End: 2017-09-30
Attending: PHYSICIAN ASSISTANT
Payer: COMMERCIAL

## 2017-09-30 LAB
ADMINISTERED INITIALS, ADMINIT: NORMAL
ALBUMIN SERPL-MCNC: 3.2 G/DL (ref 3.5–5)
ALBUMIN/GLOB SERPL: 1.7 {RATIO} (ref 1.1–2.2)
ALP SERPL-CCNC: 41 U/L (ref 45–117)
ALT SERPL-CCNC: 20 U/L (ref 12–78)
ANION GAP SERPL CALC-SCNC: 10 MMOL/L (ref 5–15)
ARTERIAL PATENCY WRIST A: ABNORMAL
AST SERPL-CCNC: 30 U/L (ref 15–37)
ATRIAL RATE: 108 BPM
BASE DEFICIT BLDA-SCNC: 1.1 MMOL/L
BDY SITE: ABNORMAL
BILIRUB SERPL-MCNC: 0.8 MG/DL (ref 0.2–1)
BREATHS.SPONTANEOUS ON VENT: 24
BUN SERPL-MCNC: 15 MG/DL (ref 6–20)
BUN/CREAT SERPL: 15 (ref 12–20)
CA-I BLD-SCNC: 1.12 MMOL/L (ref 1.13–1.32)
CALCIUM SERPL-MCNC: 7.5 MG/DL (ref 8.5–10.1)
CALCULATED P AXIS, ECG09: 38 DEGREES
CALCULATED R AXIS, ECG10: 49 DEGREES
CALCULATED T AXIS, ECG11: -81 DEGREES
CHLORIDE SERPL-SCNC: 111 MMOL/L (ref 97–108)
CO2 SERPL-SCNC: 20 MMOL/L (ref 21–32)
CREAT SERPL-MCNC: 1.02 MG/DL (ref 0.7–1.3)
D50 ADMINISTERED, D50ADM: 0 ML
D50 ORDER, D50ORD: 0 ML
DIAGNOSIS, 93000: NORMAL
EPAP/CPAP/PEEP, PAPEEP: 6
ERYTHROCYTE [DISTWIDTH] IN BLOOD BY AUTOMATED COUNT: 13.3 % (ref 11.5–14.5)
FIO2 ON VENT: 50 %
GLOBULIN SER CALC-MCNC: 1.9 G/DL (ref 2–4)
GLSCOM COMMENTS: NORMAL
GLUCOSE BLD STRIP.AUTO-MCNC: 100 MG/DL (ref 65–100)
GLUCOSE BLD STRIP.AUTO-MCNC: 100 MG/DL (ref 65–100)
GLUCOSE BLD STRIP.AUTO-MCNC: 105 MG/DL (ref 65–100)
GLUCOSE BLD STRIP.AUTO-MCNC: 106 MG/DL (ref 65–100)
GLUCOSE BLD STRIP.AUTO-MCNC: 108 MG/DL (ref 65–100)
GLUCOSE BLD STRIP.AUTO-MCNC: 109 MG/DL (ref 65–100)
GLUCOSE BLD STRIP.AUTO-MCNC: 119 MG/DL (ref 65–100)
GLUCOSE BLD STRIP.AUTO-MCNC: 120 MG/DL (ref 65–100)
GLUCOSE BLD STRIP.AUTO-MCNC: 128 MG/DL (ref 65–100)
GLUCOSE BLD STRIP.AUTO-MCNC: 129 MG/DL (ref 65–100)
GLUCOSE BLD STRIP.AUTO-MCNC: 137 MG/DL (ref 65–100)
GLUCOSE BLD STRIP.AUTO-MCNC: 144 MG/DL (ref 65–100)
GLUCOSE BLD STRIP.AUTO-MCNC: 147 MG/DL (ref 65–100)
GLUCOSE BLD STRIP.AUTO-MCNC: 186 MG/DL (ref 65–100)
GLUCOSE BLD STRIP.AUTO-MCNC: 188 MG/DL (ref 65–100)
GLUCOSE BLD STRIP.AUTO-MCNC: 90 MG/DL (ref 65–100)
GLUCOSE BLD STRIP.AUTO-MCNC: 91 MG/DL (ref 65–100)
GLUCOSE BLD STRIP.AUTO-MCNC: 95 MG/DL (ref 65–100)
GLUCOSE BLD STRIP.AUTO-MCNC: 96 MG/DL (ref 65–100)
GLUCOSE BLD STRIP.AUTO-MCNC: 97 MG/DL (ref 65–100)
GLUCOSE SERPL-MCNC: 169 MG/DL (ref 65–100)
GLUCOSE, GLC: 100 MG/DL
GLUCOSE, GLC: 100 MG/DL
GLUCOSE, GLC: 105 MG/DL
GLUCOSE, GLC: 106 MG/DL
GLUCOSE, GLC: 108 MG/DL
GLUCOSE, GLC: 109 MG/DL
GLUCOSE, GLC: 119 MG/DL
GLUCOSE, GLC: 120 MG/DL
GLUCOSE, GLC: 128 MG/DL
GLUCOSE, GLC: 129 MG/DL
GLUCOSE, GLC: 137 MG/DL
GLUCOSE, GLC: 144 MG/DL
GLUCOSE, GLC: 147 MG/DL
GLUCOSE, GLC: 188 MG/DL
GLUCOSE, GLC: 90 MG/DL
GLUCOSE, GLC: 91 MG/DL
GLUCOSE, GLC: 95 MG/DL
GLUCOSE, GLC: 96 MG/DL
GLUCOSE, GLC: 97 MG/DL
HCO3 BLDA-SCNC: 22 MMOL/L (ref 22–26)
HCT VFR BLD AUTO: 26.1 % (ref 36.6–50.3)
HGB BLD-MCNC: 9.3 G/DL (ref 12.1–17)
HIGH TARGET, HITG: 130 MG/DL
INSULIN ADMINSTERED, INSADM: 11.2 UNITS/HOUR
INSULIN ADMINSTERED, INSADM: 11.4 UNITS/HOUR
INSULIN ADMINSTERED, INSADM: 11.5 UNITS/HOUR
INSULIN ADMINSTERED, INSADM: 14.4 UNITS/HOUR
INSULIN ADMINSTERED, INSADM: 20 UNITS/HOUR
INSULIN ADMINSTERED, INSADM: 3.3 UNITS/HOUR
INSULIN ADMINSTERED, INSADM: 3.8 UNITS/HOUR
INSULIN ADMINSTERED, INSADM: 3.9 UNITS/HOUR
INSULIN ADMINSTERED, INSADM: 4 UNITS/HOUR
INSULIN ADMINSTERED, INSADM: 4.2 UNITS/HOUR
INSULIN ADMINSTERED, INSADM: 4.8 UNITS/HOUR
INSULIN ADMINSTERED, INSADM: 4.9 UNITS/HOUR
INSULIN ADMINSTERED, INSADM: 5.2 UNITS/HOUR
INSULIN ADMINSTERED, INSADM: 5.3 UNITS/HOUR
INSULIN ADMINSTERED, INSADM: 5.8 UNITS/HOUR
INSULIN ADMINSTERED, INSADM: 6.4 UNITS/HOUR
INSULIN ADMINSTERED, INSADM: 6.4 UNITS/HOUR
INSULIN ADMINSTERED, INSADM: 7.2 UNITS/HOUR
INSULIN ADMINSTERED, INSADM: 8.6 UNITS/HOUR
INSULIN ORDER, INSORD: 11.2 UNITS/HOUR
INSULIN ORDER, INSORD: 11.4 UNITS/HOUR
INSULIN ORDER, INSORD: 11.5 UNITS/HOUR
INSULIN ORDER, INSORD: 14.4 UNITS/HOUR
INSULIN ORDER, INSORD: 20 UNITS/HOUR
INSULIN ORDER, INSORD: 3.3 UNITS/HOUR
INSULIN ORDER, INSORD: 3.8 UNITS/HOUR
INSULIN ORDER, INSORD: 3.9 UNITS/HOUR
INSULIN ORDER, INSORD: 4 UNITS/HOUR
INSULIN ORDER, INSORD: 4.2 UNITS/HOUR
INSULIN ORDER, INSORD: 4.8 UNITS/HOUR
INSULIN ORDER, INSORD: 4.9 UNITS/HOUR
INSULIN ORDER, INSORD: 5.2 UNITS/HOUR
INSULIN ORDER, INSORD: 5.3 UNITS/HOUR
INSULIN ORDER, INSORD: 5.8 UNITS/HOUR
INSULIN ORDER, INSORD: 6.4 UNITS/HOUR
INSULIN ORDER, INSORD: 6.4 UNITS/HOUR
INSULIN ORDER, INSORD: 7.2 UNITS/HOUR
INSULIN ORDER, INSORD: 8.6 UNITS/HOUR
LOW TARGET, LOT: 95 MG/DL
MAGNESIUM SERPL-MCNC: 2 MG/DL (ref 1.6–2.4)
MCH RBC QN AUTO: 29.1 PG (ref 26–34)
MCHC RBC AUTO-ENTMCNC: 35.6 G/DL (ref 30–36.5)
MCV RBC AUTO: 81.6 FL (ref 80–99)
MINUTES UNTIL NEXT BG, NBG: 120 MIN
MINUTES UNTIL NEXT BG, NBG: 60 MIN
MULTIPLIER, MUL: 0.11
MULTIPLIER, MUL: 0.13
MULTIPLIER, MUL: 0.14
MULTIPLIER, MUL: 0.15
MULTIPLIER, MUL: 0.15
MULTIPLIER, MUL: 0.16
MULTIPLIER, MUL: 0.17
ORDER INITIALS, ORDINIT: NORMAL
P-R INTERVAL, ECG05: 136 MS
PCO2 BLDA: 32 MMHG (ref 35–45)
PH BLDA: 7.45 [PH] (ref 7.35–7.45)
PLATELET # BLD AUTO: 64 K/UL (ref 150–400)
PO2 BLDA: 77 MMHG (ref 80–100)
POTASSIUM SERPL-SCNC: 3.9 MMOL/L (ref 3.5–5.1)
PRESSURE SUPPORT SETTING VENT: 8 CM[H2O]
PROT SERPL-MCNC: 5.1 G/DL (ref 6.4–8.2)
Q-T INTERVAL, ECG07: 402 MS
QRS DURATION, ECG06: 116 MS
QTC CALCULATION (BEZET), ECG08: 538 MS
RBC # BLD AUTO: 3.2 M/UL (ref 4.1–5.7)
SAO2 % BLD: 96 % (ref 92–97)
SAO2% DEVICE SAO2% SENSOR NAME: ABNORMAL
SERVICE CMNT-IMP: ABNORMAL
SERVICE CMNT-IMP: NORMAL
SODIUM SERPL-SCNC: 141 MMOL/L (ref 136–145)
SPECIMEN SITE: ABNORMAL
VENTILATION MODE VENT: ABNORMAL
VENTRICULAR RATE, ECG03: 108 BPM
VT SETTING VENT: 563 ML
WBC # BLD AUTO: 9.9 K/UL (ref 4.1–11.1)

## 2017-09-30 PROCEDURE — 82803 BLOOD GASES ANY COMBINATION: CPT | Performed by: THORACIC SURGERY (CARDIOTHORACIC VASCULAR SURGERY)

## 2017-09-30 PROCEDURE — 74011250637 HC RX REV CODE- 250/637: Performed by: PHYSICIAN ASSISTANT

## 2017-09-30 PROCEDURE — 74011250636 HC RX REV CODE- 250/636: Performed by: THORACIC SURGERY (CARDIOTHORACIC VASCULAR SURGERY)

## 2017-09-30 PROCEDURE — 74011636637 HC RX REV CODE- 636/637: Performed by: PHYSICIAN ASSISTANT

## 2017-09-30 PROCEDURE — 85027 COMPLETE CBC AUTOMATED: CPT | Performed by: PHYSICIAN ASSISTANT

## 2017-09-30 PROCEDURE — 36415 COLL VENOUS BLD VENIPUNCTURE: CPT | Performed by: PHYSICIAN ASSISTANT

## 2017-09-30 PROCEDURE — 80053 COMPREHEN METABOLIC PANEL: CPT | Performed by: PHYSICIAN ASSISTANT

## 2017-09-30 PROCEDURE — 74011250636 HC RX REV CODE- 250/636: Performed by: PHYSICIAN ASSISTANT

## 2017-09-30 PROCEDURE — 65620000000 HC RM CCU GENERAL

## 2017-09-30 PROCEDURE — P9045 ALBUMIN (HUMAN), 5%, 250 ML: HCPCS | Performed by: PHYSICIAN ASSISTANT

## 2017-09-30 PROCEDURE — 74011000250 HC RX REV CODE- 250: Performed by: THORACIC SURGERY (CARDIOTHORACIC VASCULAR SURGERY)

## 2017-09-30 PROCEDURE — 94003 VENT MGMT INPAT SUBQ DAY: CPT

## 2017-09-30 PROCEDURE — 74011000258 HC RX REV CODE- 258: Performed by: PHYSICIAN ASSISTANT

## 2017-09-30 PROCEDURE — 71010 XR CHEST PORT: CPT

## 2017-09-30 PROCEDURE — 82330 ASSAY OF CALCIUM: CPT | Performed by: THORACIC SURGERY (CARDIOTHORACIC VASCULAR SURGERY)

## 2017-09-30 PROCEDURE — P9045 ALBUMIN (HUMAN), 5%, 250 ML: HCPCS | Performed by: THORACIC SURGERY (CARDIOTHORACIC VASCULAR SURGERY)

## 2017-09-30 PROCEDURE — 83735 ASSAY OF MAGNESIUM: CPT | Performed by: PHYSICIAN ASSISTANT

## 2017-09-30 PROCEDURE — 82962 GLUCOSE BLOOD TEST: CPT

## 2017-09-30 PROCEDURE — 74011000258 HC RX REV CODE- 258: Performed by: THORACIC SURGERY (CARDIOTHORACIC VASCULAR SURGERY)

## 2017-09-30 PROCEDURE — 77010033678 HC OXYGEN DAILY

## 2017-09-30 RX ORDER — ALBUMIN HUMAN 50 G/1000ML
12.5 SOLUTION INTRAVENOUS ONCE
Status: COMPLETED | OUTPATIENT
Start: 2017-09-30 | End: 2017-09-30

## 2017-09-30 RX ADMIN — PROPOFOL 50 MCG/KG/MIN: 10 INJECTION, EMULSION INTRAVENOUS at 01:52

## 2017-09-30 RX ADMIN — MUPIROCIN: 20 OINTMENT TOPICAL at 18:00

## 2017-09-30 RX ADMIN — Medication 400 MG: at 17:37

## 2017-09-30 RX ADMIN — SODIUM CHLORIDE 50 ML/HR: 900 INJECTION, SOLUTION INTRAVENOUS at 09:02

## 2017-09-30 RX ADMIN — Medication 10 ML: at 13:18

## 2017-09-30 RX ADMIN — ACETAMINOPHEN 650 MG: 325 TABLET ORAL at 09:25

## 2017-09-30 RX ADMIN — FAMOTIDINE 20 MG: 20 TABLET ORAL at 09:24

## 2017-09-30 RX ADMIN — SODIUM CHLORIDE 20 UNITS/HR: 900 INJECTION, SOLUTION INTRAVENOUS at 04:15

## 2017-09-30 RX ADMIN — SODIUM CHLORIDE 0.5 MCG/KG/HR: 900 INJECTION, SOLUTION INTRAVENOUS at 11:25

## 2017-09-30 RX ADMIN — SODIUM CHLORIDE 0.3 MCG/KG/HR: 900 INJECTION, SOLUTION INTRAVENOUS at 03:17

## 2017-09-30 RX ADMIN — ALBUMIN (HUMAN) 12.5 G: 12.5 INJECTION, SOLUTION INTRAVENOUS at 00:43

## 2017-09-30 RX ADMIN — CEFAZOLIN 2 G: 10 INJECTION, POWDER, FOR SOLUTION INTRAVENOUS; PARENTERAL at 03:57

## 2017-09-30 RX ADMIN — AMIODARONE HYDROCHLORIDE 400 MG: 200 TABLET ORAL at 17:37

## 2017-09-30 RX ADMIN — Medication 1 MG/HR: at 15:11

## 2017-09-30 RX ADMIN — DOCUSATE SODIUM AND SENNOSIDES 1 TABLET: 8.6; 5 TABLET, FILM COATED ORAL at 17:38

## 2017-09-30 RX ADMIN — OXYCODONE HYDROCHLORIDE AND ACETAMINOPHEN 1 TABLET: 5; 325 TABLET ORAL at 03:57

## 2017-09-30 RX ADMIN — Medication 10 ML: at 22:06

## 2017-09-30 RX ADMIN — VASOPRESSIN 0.04 UNITS/MIN: 20 INJECTION INTRAVENOUS at 08:42

## 2017-09-30 RX ADMIN — AMIODARONE HYDROCHLORIDE 0.5 MG/MIN: 50 INJECTION, SOLUTION INTRAVENOUS at 11:19

## 2017-09-30 RX ADMIN — CHLORHEXIDINE GLUCONATE 10 ML: 1.2 RINSE ORAL at 08:59

## 2017-09-30 RX ADMIN — ACETAMINOPHEN 650 MG: 325 TABLET ORAL at 00:13

## 2017-09-30 RX ADMIN — DOBUTAMINE IN DEXTROSE 6 MCG/KG/MIN: 200 INJECTION, SOLUTION INTRAVENOUS at 13:17

## 2017-09-30 RX ADMIN — ACETAMINOPHEN 325 MG: 325 TABLET ORAL at 03:56

## 2017-09-30 RX ADMIN — Medication 10 ML: at 06:00

## 2017-09-30 RX ADMIN — MUPIROCIN: 20 OINTMENT TOPICAL at 08:54

## 2017-09-30 RX ADMIN — ACETAMINOPHEN 650 MG: 325 TABLET ORAL at 21:05

## 2017-09-30 RX ADMIN — SODIUM CHLORIDE 5.8 UNITS/HR: 900 INJECTION, SOLUTION INTRAVENOUS at 07:41

## 2017-09-30 RX ADMIN — MORPHINE SULFATE 2 MG: 10 INJECTION INTRAMUSCULAR; INTRAVENOUS; SUBCUTANEOUS at 06:11

## 2017-09-30 RX ADMIN — CHLORHEXIDINE GLUCONATE 15 ML: 1.2 RINSE ORAL at 21:07

## 2017-09-30 RX ADMIN — SODIUM CHLORIDE 0.3 MCG/KG/HR: 900 INJECTION, SOLUTION INTRAVENOUS at 22:50

## 2017-09-30 RX ADMIN — CEFAZOLIN 2 G: 10 INJECTION, POWDER, FOR SOLUTION INTRAVENOUS; PARENTERAL at 20:00

## 2017-09-30 RX ADMIN — CEFAZOLIN 2 G: 10 INJECTION, POWDER, FOR SOLUTION INTRAVENOUS; PARENTERAL at 12:17

## 2017-09-30 RX ADMIN — FAMOTIDINE 20 MG: 20 TABLET ORAL at 21:04

## 2017-09-30 RX ADMIN — ALBUMIN (HUMAN) 12.5 G: 12.5 INJECTION, SOLUTION INTRAVENOUS at 10:00

## 2017-09-30 RX ADMIN — DOBUTAMINE IN DEXTROSE 10 MCG/KG/MIN: 200 INJECTION, SOLUTION INTRAVENOUS at 03:55

## 2017-09-30 RX ADMIN — SODIUM CHLORIDE 7.2 UNITS/HR: 900 INJECTION, SOLUTION INTRAVENOUS at 19:56

## 2017-09-30 RX ADMIN — POTASSIUM CHLORIDE 20 MEQ: 400 INJECTION, SOLUTION INTRAVENOUS at 07:01

## 2017-09-30 RX ADMIN — ACETAMINOPHEN 650 MG: 325 TABLET ORAL at 12:17

## 2017-09-30 NOTE — OP NOTES
[facility]   [address] [employer], [address]   OP NOTE       Name:  Raymond Kaminski   MR#:  [id number]   :  1965   Account #:  [de-identified]    Surgery Date:  2017   Date of Adm:  2017       PREOPERATIVE DIAGNOSES     1. Native vessel coronary artery disease status post non-ST elevated   myocardial infarction. 2. Ischemic cardiomyopathy. DIAGNOSES     1. Native vessel coronary artery disease status post non-ST elevated   myocardial infarction. 2. Ischemic cardiomyopathy. PROCEDURES PERFORMED   1. On pump coronary artery bypass grafting using the left internal   mammary to the left anterior descending coronary artery, left radial   artery from the aorta to the first marginal, right greater saphenous vein   from the aorta to the posterior descending. 2. Left radial artery harvest.   3. Right greater saphenous vein harvest.    SURGEON: Myron Erickson MD.    ASSISTANT: Aslhey Alexander. ANESTHESIA:  general     ESTIMATED BLOOD LOSS: see perfusion record     SPECIMENS REMOVED: none     INDICATIONS: This is 79-year-old male with a strong family history of   coronary artery disease, who presented with a non-ST elevated MI and   subsequent evaluation revealed severe native vessel coronary artery   disease and depressed left ventricular systolic function with ejection   fraction approximately 20%. Preoperatively, he underwent repeat   transesophageal echocardiogram and placement of an intraaortic   balloon pump in preparation for cardiac surgery. DESCRIPTION OF PROCEDURE: The patient was taken to the   operating room and following induction of endotracheal anesthesia, the   surgical time-out was completed and the transesophageal   echocardiographic findings reviewed. The patient underwent prep and   drape of the anterior chest, abdomen and both lower extremities.  A   median sternotomy was made, the pericardium incised and   suspended, the left internal mammary artery harvested from the chest   wall and prepared with heparin and papaverine. A segment of the left   radial artery was harvested using endoscopic technique as well as a   segment of greater saphenous vein. Following completion of conduit   harvesting, the patient was heparinized, pursestring suture placed in   the ascending aorta and right atrium. The aorta and right atrium were   cannulated. A retrograde coronary sinus cannula was placed, the aorta   cross-clamped, cardioplegia infused, repeat doses given intermittently. Separate saphenous vein grafts were constructed to the posterior   descending using reverse saphenous vein as well as the circumflex   marginal using the previously harvested left radial artery. The internal   mammary artery was then anastomosed to the left anterior   descending. The patient was rewarmed, a warm dose of cardioplegia   administered, the aortic cross-clamp released. The proximal vein grafts   were secured to the anterior wall of the ascending aorta, as was the   radial artery graft. Temporary atrial and ventricular pacemaking leads   were placed. The patient was weaned from bypass, protamine   administered, cannulas removed, and cannulation sites reinforced with   Prolene suture. A left pleural and 2 mediastinal drainage tubes placed,   hemostasis assured, and the sternum reapproximated with interrupted   stainless steel wire and running Vicryl suture. MD Niya Melendez / Josue Maya   D:  09/30/2017   09:50   T:  09/30/2017   10:19   Job #:  [id number]

## 2017-09-30 NOTE — CARDIO/PULMONARY
C/P Rehab Note:      Chart Reviewed. Admitting diagnosis of NSTEMI   S/p PCI,(9/26/17),  S/p CABG x3 (9/29/2017)   History significant for:  - Family history of early CAD  LV EF 30-35%, Echo 9/27/2017  Non Smoker    Attempted to meet with patient several times for post op teaching. Staff remained in room for some time. Patient remains on IABP. Will follow for teaching.

## 2017-09-30 NOTE — PROGRESS NOTES
packet 17 g  17 g Oral DAILY    ELECTROLYTE REPLACEMENT PROTOCOL  1 Each Other PRN    magnesium sulfate 1 g/100 ml IVPB (premix or compounded)  1 g IntraVENous PRN    insulin regular (NOVOLIN R, HUMULIN R) 100 Units in 0.9% sodium chloride 100 mL infusion  1-50 Units/hr IntraVENous TITRATE    glucose chewable tablet 16 g  4 Tab Oral PRN    dextrose (D50W) injection syrg 12.5-25 g  12.5-25 g IntraVENous PRN    glucagon (GLUCAGEN) injection 1 mg  1 mg IntraMUSCular PRN    insulin lispro (HUMALOG) injection   SubCUTAneous TIDAC    insulin lispro (HUMALOG) injection   SubCUTAneous AC&HS    insulin glargine (LANTUS) injection 1-50 Units  1-50 Units SubCUTAneous ONCE PRN    sodium chloride 0.9 % bolus infusion 250 mL  250 mL IntraVENous ONCE PRN    potassium chloride 20 mEq in 50 ml IVPB  20 mEq IntraVENous Q2H PRN    potassium chloride 20 mEq in 50 ml IVPB  20 mEq IntraVENous Q2H PRN    milrinone (PRIMACOR) 20 MG/100 ML D5W infusion  0.375 mcg/kg/min IntraVENous CONTINUOUS    epoprostenol (VELETRI) 1,500,000 ng in 0.9% sodium chloride 50 mL inhalation solution  50 ng/kg/min (Ideal) Inhalation CONTINUOUS    0.9% sodium chloride inhalation  3.8-14 mL/hr Inhalation CONTINUOUS    propofol (DIPRIVAN) infusion  0-50 mcg/kg/min IntraVENous TITRATE    amiodarone (CORDARONE) 900 mg/250 ml D5W infusion  0.5 mg/min IntraVENous TITRATE    vasopressin (VASOSTRICT) 20 Units in 0.9% sodium chloride 100 mL infusion  0-0.04 Units/min IntraVENous TITRATE    chlorhexidine (PERIDEX) 0.12 % mouthwash 15 mL  15 mL Oral Q12H    EPINEPHrine (ADRENALIN) 5,000 mcg in 0.9% sodium chloride 250 mL infusion  1-10 mcg/min IntraVENous TITRATE    0.9% sodium chloride infusion  50 mL/hr IntraVENous CONTINUOUS    DOBUTamine (DOBUTREX) 500 mg/250 mL (2,000 mcg/mL) infusion  0-10 mcg/kg/min IntraVENous TITRATE    dexmedeTOMidine (PRECEDEX) 400 mcg in 0.9% sodium chloride 100 mL infusion  0.2-0.7 mcg/kg/hr IntraVENous TITRATE    PHENYLephrine (NEOSYNEPHRINE) 30,000 mcg in 0.9% sodium chloride 250 mL infusion   mcg/min IntraVENous TITRATE    niCARdipine (CARDENE) 25 mg in 0.9% sodium chloride 250 mL infusion  0-15 mg/hr IntraVENous TITRATE         Objective:      Physical Exam:  Visit Vitals    /66    Pulse (!) 108    Temp 100.4 °F (38 °C)    Resp 27    Ht 6' 2\" (1.88 m)    Wt 239 lb 6.7 oz (108.6 kg)    SpO2 96%    BMI 30.74 kg/m2     General Appearance:  Well developed, well nourished,alert and oriented x 3, and individual in no acute distress. Ears/Nose/Mouth/Throat:   Hearing grossly normal.         Neck: Supple. Chest:   Lungs clear to auscultation bilaterally. Cardiovascular:  Regular rate and rhythm, S1, S2 normal, no murmur. Abdomen:   Soft, non-tender, bowel sounds are active. Extremities: No edema bilaterally. Skin: Warm and dry.                  Data Review:   Labs:  Recent Results (from the past 24 hour(s))   GLUCOSTABILIZER    Collection Time: 09/29/17 11:14 AM   Result Value Ref Range    Glucose 151 mg/dL    Insulin order 4.6 units/hour    Insulin adminstered 4.6 units/hour    Multiplier 0.050     Low target 100 mg/dL    High target 140 mg/dL    D50 order 0.0 ml    D50 administered 0.00 ml    Minutes until next BG 60 min    Order initials agc     Administered initials agc     GLSCOM Comments     GLUCOSTABILIZER    Collection Time: 09/29/17 11:40 AM   Result Value Ref Range    Glucose 143 mg/dL    Insulin order 5.0 units/hour    Insulin adminstered 5.0 units/hour    Multiplier 0.060     Low target 100 mg/dL    High target 140 mg/dL    D50 order 0.0 ml    D50 administered 0.00 ml    Minutes until next BG 60 min    Order initials agc     Administered initials agc     GLSCOM Comments     GLUCOSTABILIZER    Collection Time: 09/29/17 12:29 PM   Result Value Ref Range    Glucose 138 mg/dL    Insulin order 4.7 units/hour    Insulin adminstered 4.7 units/hour    Multiplier 0.060     Low target 100 mg/dL High target 140 mg/dL    D50 order 0.0 ml    D50 administered 0.00 ml    Minutes until next BG 60 min    Order initials agc     Administered initials agc     GLSCOM Comments     GLUCOSTABILIZER    Collection Time: 09/29/17  1:07 PM   Result Value Ref Range    Glucose 135 mg/dL    Insulin order 4.5 units/hour    Insulin adminstered 4.5 units/hour    Multiplier 0.060     Low target 100 mg/dL    High target 140 mg/dL    D50 order 0.0 ml    D50 administered 0.00 ml    Minutes until next BG 60 min    Order initials vy     Administered initials vy     GLSCOM Comments     METABOLIC PANEL, COMPREHENSIVE    Collection Time: 09/29/17  2:55 PM   Result Value Ref Range    Sodium 143 136 - 145 mmol/L    Potassium 3.9 3.5 - 5.1 mmol/L    Chloride 110 (H) 97 - 108 mmol/L    CO2 23 21 - 32 mmol/L    Anion gap 10 5 - 15 mmol/L    Glucose 204 (H) 65 - 100 mg/dL    BUN 14 6 - 20 MG/DL    Creatinine 1.17 0.70 - 1.30 MG/DL    BUN/Creatinine ratio 12 12 - 20      GFR est AA >60 >60 ml/min/1.73m2    GFR est non-AA >60 >60 ml/min/1.73m2    Calcium 7.8 (L) 8.5 - 10.1 MG/DL    Bilirubin, total 1.1 (H) 0.2 - 1.0 MG/DL    ALT (SGPT) 20 12 - 78 U/L    AST (SGOT) 33 15 - 37 U/L    Alk.  phosphatase 47 45 - 117 U/L    Protein, total 5.1 (L) 6.4 - 8.2 g/dL    Albumin 3.2 (L) 3.5 - 5.0 g/dL    Globulin 1.9 (L) 2.0 - 4.0 g/dL    A-G Ratio 1.7 1.1 - 2.2     MAGNESIUM    Collection Time: 09/29/17  2:55 PM   Result Value Ref Range    Magnesium 2.1 1.6 - 2.4 mg/dL   CBC WITH AUTOMATED DIFF    Collection Time: 09/29/17  2:55 PM   Result Value Ref Range    WBC 13.4 (H) 4.1 - 11.1 K/uL    RBC 3.60 (L) 4.10 - 5.70 M/uL    HGB 10.4 (L) 12.1 - 17.0 g/dL    HCT 29.6 (L) 36.6 - 50.3 %    MCV 82.2 80.0 - 99.0 FL    MCH 28.9 26.0 - 34.0 PG    MCHC 35.1 30.0 - 36.5 g/dL    RDW 13.3 11.5 - 14.5 %    PLATELET 74 (L) 593 - 400 K/uL    NEUTROPHILS 86 (H) 32 - 75 %    LYMPHOCYTES 7 (L) 12 - 49 %    MONOCYTES 7 5 - 13 %    EOSINOPHILS 0 0 - 7 %    BASOPHILS 0 0 - 1 % ABS. NEUTROPHILS 11.6 (H) 1.8 - 8.0 K/UL    ABS. LYMPHOCYTES 0.9 0.8 - 3.5 K/UL    ABS. MONOCYTES 0.9 0.0 - 1.0 K/UL    ABS. EOSINOPHILS 0.0 0.0 - 0.4 K/UL    ABS.  BASOPHILS 0.0 0.0 - 0.1 K/UL   PTT    Collection Time: 09/29/17  2:55 PM   Result Value Ref Range    aPTT 30.3 22.1 - 32.5 sec    aPTT, therapeutic range     58.0 - 77.0 SECS   PROTHROMBIN TIME + INR    Collection Time: 09/29/17  2:55 PM   Result Value Ref Range    INR 1.3 (H) 0.9 - 1.1      Prothrombin time 13.0 (H) 9.0 - 11.1 sec   GLUCOSE, POC    Collection Time: 09/29/17  2:56 PM   Result Value Ref Range    Glucose (POC) 211 (H) 65 - 100 mg/dL    Performed by Mesha Pope    Collection Time: 09/29/17  2:58 PM   Result Value Ref Range    Glucose 211 mg/dL    Insulin order 6.9 units/hour    Insulin adminstered 6.9 units/hour    Multiplier 0.046     Low target 95 mg/dL    High target 130 mg/dL    D50 order 0.0 ml    D50 administered 0.00 ml    Minutes until next BG 60 min    Order initials mdf     Administered initials mdf     GLSCOM Comments     BLOOD GAS, ARTERIAL    Collection Time: 09/29/17  3:00 PM   Result Value Ref Range    pH 7.33 (L) 7.35 - 7.45      PCO2 42 35.0 - 45.0 mmHg    PO2 95 80 - 100 mmHg    O2 SAT 97 92 - 97 %    BICARBONATE 22 22 - 26 mmol/L    BASE DEFICIT 3.9 mmol/L    O2 METHOD VENTILATOR      FIO2 100 %    MODE SIMV      Tidal volume 600      SET RATE 12      PRESSURE SUPPORT 10.0      EPAP/CPAP/PEEP 6.0      Sample source ARTERIAL      SITE DRAWN FROM ARTERIAL LINE      YVES'S TEST N/A     GLUCOSE, POC    Collection Time: 09/29/17  4:00 PM   Result Value Ref Range    Glucose (POC) 200 (H) 65 - 100 mg/dL    Performed by Bonita Stephens    Collection Time: 09/29/17  4:00 PM   Result Value Ref Range    Glucose 200 mg/dL    Insulin order 7.8 units/hour    Insulin adminstered 7.8 units/hour    Multiplier 0.056     Low target 95 mg/dL    High target 130 mg/dL    D50 order 0.0 ml    D50 administered 0.00 ml    Minutes until next BG 60 min    Order initials MCB     Administered initials MCB     GLSCOM Comments     EKG, 12 LEAD, INITIAL    Collection Time: 09/29/17  4:41 PM   Result Value Ref Range    Ventricular Rate 108 BPM    Atrial Rate 108 BPM    P-R Interval 136 ms    QRS Duration 116 ms    Q-T Interval 402 ms    QTC Calculation (Bezet) 538 ms    Calculated P Axis 38 degrees    Calculated R Axis 49 degrees    Calculated T Axis -81 degrees    Diagnosis       Sinus tachycardia  Incomplete left bundle branch block  ST & T wave abnormality, consider inferolateral ischemia  Prolonged QT  Abnormal ECG  When compared with ECG of 27-SEP-2017 05:27,  Incomplete left bundle branch block is now present  Minimal criteria for Anterior infarct are no longer present  T wave inversion now evident in Inferior leads  QT has lengthened     GLUCOSE, POC    Collection Time: 09/29/17  5:04 PM   Result Value Ref Range    Glucose (POC) 225 (H) 65 - 100 mg/dL    Performed by Karen Villaseñor    Collection Time: 09/29/17  5:04 PM   Result Value Ref Range    Glucose 225 mg/dL    Insulin order 10.9 units/hour    Insulin adminstered 10.9 units/hour    Multiplier 0.066     Low target 95 mg/dL    High target 130 mg/dL    D50 order 0.0 ml    D50 administered 0.00 ml    Minutes until next BG 60 min    Order initials MCB     Administered initials MCB     GREGOR Comments     GLUCOSE, POC    Collection Time: 09/29/17  6:11 PM   Result Value Ref Range    Glucose (POC) 227 (H) 65 - 100 mg/dL    Performed by Meredith Vazquez    Collection Time: 09/29/17  6:11 PM   Result Value Ref Range    Glucose 227 mg/dL    Insulin order 12.7 units/hour    Insulin adminstered 12.7 units/hour    Multiplier 0.076     Low target 95 mg/dL    High target 130 mg/dL    D50 order 0.0 ml    D50 administered 0.00 ml    Minutes until next BG 60 min    Order initials MCB     Administered initials MCB     GLSCOM Comments GLUCOSE, POC    Collection Time: 09/29/17  7:10 PM   Result Value Ref Range    Glucose (POC) 211 (H) 65 - 100 mg/dL    Performed by Alex Waggoner    Collection Time: 09/29/17  7:11 PM   Result Value Ref Range    Glucose 211 mg/dL    Insulin order 13.0 units/hour    Insulin adminstered 13.0 units/hour    Multiplier 0.086     Low target 95 mg/dL    High target 130 mg/dL    D50 order 0.0 ml    D50 administered 0.00 ml    Minutes until next BG 60 min    Order initials MCB     Administered initials MCB     GLSCOM Comments     METABOLIC PANEL, COMPREHENSIVE    Collection Time: 09/29/17  7:55 PM   Result Value Ref Range    Sodium 139 136 - 145 mmol/L    Potassium 4.3 3.5 - 5.1 mmol/L    Chloride 110 (H) 97 - 108 mmol/L    CO2 21 21 - 32 mmol/L    Anion gap 8 5 - 15 mmol/L    Glucose 196 (H) 65 - 100 mg/dL    BUN 14 6 - 20 MG/DL    Creatinine 1.32 (H) 0.70 - 1.30 MG/DL    BUN/Creatinine ratio 11 (L) 12 - 20      GFR est AA >60 >60 ml/min/1.73m2    GFR est non-AA 57 (L) >60 ml/min/1.73m2    Calcium 7.4 (L) 8.5 - 10.1 MG/DL    Bilirubin, total 0.9 0.2 - 1.0 MG/DL    ALT (SGPT) 23 12 - 78 U/L    AST (SGOT) 35 15 - 37 U/L    Alk.  phosphatase 42 (L) 45 - 117 U/L    Protein, total 5.2 (L) 6.4 - 8.2 g/dL    Albumin 3.4 (L) 3.5 - 5.0 g/dL    Globulin 1.8 (L) 2.0 - 4.0 g/dL    A-G Ratio 1.9 1.1 - 2.2     MAGNESIUM    Collection Time: 09/29/17  7:55 PM   Result Value Ref Range    Magnesium 2.1 1.6 - 2.4 mg/dL   CBC W/O DIFF    Collection Time: 09/29/17  7:55 PM   Result Value Ref Range    WBC 12.5 (H) 4.1 - 11.1 K/uL    RBC 3.59 (L) 4.10 - 5.70 M/uL    HGB 10.4 (L) 12.1 - 17.0 g/dL    HCT 29.3 (L) 36.6 - 50.3 %    MCV 81.6 80.0 - 99.0 FL    MCH 29.0 26.0 - 34.0 PG    MCHC 35.5 30.0 - 36.5 g/dL    RDW 13.2 11.5 - 14.5 %    PLATELET 75 (L) 846 - 400 K/uL   PROTHROMBIN TIME + INR    Collection Time: 09/29/17  7:55 PM   Result Value Ref Range    INR 1.2 (H) 0.9 - 1.1      Prothrombin time 12.3 (H) 9.0 - 11.1 sec GLUCOSE, POC    Collection Time: 09/29/17  8:07 PM   Result Value Ref Range    Glucose (POC) 197 (H) 65 - 100 mg/dL    Performed by Brock Diamond    Collection Time: 09/29/17  8:13 PM   Result Value Ref Range    Glucose 197 mg/dL    Insulin order 13.2 units/hour    Insulin adminstered 13.2 units/hour    Multiplier 0.096     Low target 95 mg/dL    High target 130 mg/dL    D50 order 0.0 ml    D50 administered 0.00 ml    Minutes until next BG 60 min    Order initials mjo     Administered initials mjo     GLSCOM Comments     GLUCOSE, POC    Collection Time: 09/29/17  9:18 PM   Result Value Ref Range    Glucose (POC) 158 (H) 65 - 100 mg/dL    Performed by Brock Diamond    Collection Time: 09/29/17  9:19 PM   Result Value Ref Range    Glucose 158 mg/dL    Insulin order 10.4 units/hour    Insulin adminstered 10.4 units/hour    Multiplier 0.106     Low target 95 mg/dL    High target 130 mg/dL    D50 order 0.0 ml    D50 administered 0.00 ml    Minutes until next BG 60 min    Order initials mjo     Administered initials mjo     GLSCOM Comments     GLUCOSE, POC    Collection Time: 09/29/17 10:24 PM   Result Value Ref Range    Glucose (POC) 189 (H) 65 - 100 mg/dL    Performed by Marquita Segura    Collection Time: 09/29/17 10:24 PM   Result Value Ref Range    Glucose 189 mg/dL    Insulin order 15.0 units/hour    Insulin adminstered 15.0 units/hour    Multiplier 0.116     Low target 95 mg/dL    High target 130 mg/dL    D50 order 0.0 ml    D50 administered 0.00 ml    Minutes until next BG 60 min    Order initials erika     Administered initials erika     GLSCOM Comments     GLUCOSE, POC    Collection Time: 09/29/17 11:23 PM   Result Value Ref Range    Glucose (POC) 166 (H) 65 - 100 mg/dL    Performed by Brock Diamond    Collection Time: 09/29/17 11:23 PM   Result Value Ref Range    Glucose 166 mg/dL    Insulin order 13.4 units/hour    Insulin adminstered 13.4 units/hour    Multiplier 0.126     Low target 95 mg/dL    High target 130 mg/dL    D50 order 0.0 ml    D50 administered 0.00 ml    Minutes until next BG 60 min    Order initials mjo     Administered initials jami BUNDY Comments     GLUCOSE, POC    Collection Time: 09/30/17 12:35 AM   Result Value Ref Range    Glucose (POC) 144 (H) 65 - 100 mg/dL    Performed by Olson Langton    Collection Time: 09/30/17 12:36 AM   Result Value Ref Range    Glucose 144 mg/dL    Insulin order 11.4 units/hour    Insulin adminstered 11.4 units/hour    Multiplier 0.136     Low target 95 mg/dL    High target 130 mg/dL    D50 order 0.0 ml    D50 administered 0.00 ml    Minutes until next BG 60 min    Order initials mjo     Administered initials jami Atkins     GLUCOSE, POC    Collection Time: 09/30/17  1:53 AM   Result Value Ref Range    Glucose (POC) 137 (H) 65 - 100 mg/dL    Performed by Olson Langton    Collection Time: 09/30/17  1:54 AM   Result Value Ref Range    Glucose 137 mg/dL    Insulin order 11.2 units/hour    Insulin adminstered 11.2 units/hour    Multiplier 0.146     Low target 95 mg/dL    High target 130 mg/dL    D50 order 0.0 ml    D50 administered 0.00 ml    Minutes until next BG 60 min    Order initials mjo     Administered initials jami Atkins     GLUCOSE, POC    Collection Time: 09/30/17  2:56 AM   Result Value Ref Range    Glucose (POC) 119 (H) 65 - 100 mg/dL    Performed by Olson Langton    Collection Time: 09/30/17  2:57 AM   Result Value Ref Range    Glucose 119 mg/dL    Insulin order 8.6 units/hour    Insulin adminstered 8.6 units/hour    Multiplier 0.146     Low target 95 mg/dL    High target 130 mg/dL    D50 order 0.0 ml    D50 administered 0.00 ml    Minutes until next BG 60 min    Order initials mjo     Administered initials jami Atkins     METABOLIC PANEL, COMPREHENSIVE    Collection Time: 09/30/17  4:11 AM   Result Value Ref Range    Sodium 141 136 - 145 mmol/L    Potassium 3.9 3.5 - 5.1 mmol/L    Chloride 111 (H) 97 - 108 mmol/L    CO2 20 (L) 21 - 32 mmol/L    Anion gap 10 5 - 15 mmol/L    Glucose 169 (H) 65 - 100 mg/dL    BUN 15 6 - 20 MG/DL    Creatinine 1.02 0.70 - 1.30 MG/DL    BUN/Creatinine ratio 15 12 - 20      GFR est AA >60 >60 ml/min/1.73m2    GFR est non-AA >60 >60 ml/min/1.73m2    Calcium 7.5 (L) 8.5 - 10.1 MG/DL    Bilirubin, total 0.8 0.2 - 1.0 MG/DL    ALT (SGPT) 20 12 - 78 U/L    AST (SGOT) 30 15 - 37 U/L    Alk.  phosphatase 41 (L) 45 - 117 U/L    Protein, total 5.1 (L) 6.4 - 8.2 g/dL    Albumin 3.2 (L) 3.5 - 5.0 g/dL    Globulin 1.9 (L) 2.0 - 4.0 g/dL    A-G Ratio 1.7 1.1 - 2.2     MAGNESIUM    Collection Time: 09/30/17  4:11 AM   Result Value Ref Range    Magnesium 2.0 1.6 - 2.4 mg/dL   CBC W/O DIFF    Collection Time: 09/30/17  4:11 AM   Result Value Ref Range    WBC 9.9 4.1 - 11.1 K/uL    RBC 3.20 (L) 4.10 - 5.70 M/uL    HGB 9.3 (L) 12.1 - 17.0 g/dL    HCT 26.1 (L) 36.6 - 50.3 %    MCV 81.6 80.0 - 99.0 FL    MCH 29.1 26.0 - 34.0 PG    MCHC 35.6 30.0 - 36.5 g/dL    RDW 13.3 11.5 - 14.5 %    PLATELET 64 (L) 556 - 400 K/uL   GLUCOSE, POC    Collection Time: 09/30/17  4:12 AM   Result Value Ref Range    Glucose (POC) 188 (H) 65 - 100 mg/dL    Performed by Maddy Fernandes    Collection Time: 09/30/17  4:12 AM   Result Value Ref Range    Glucose 188 mg/dL    Insulin order 20.0 units/hour    Insulin adminstered 20.0 units/hour    Multiplier 0.156     Low target 95 mg/dL    High target 130 mg/dL    D50 order 0.0 ml    D50 administered 0.00 ml    Minutes until next BG 60 min    Order initials mjo     Administered initials mjo     GLSCOM Milly     GLUCOSE, POC    Collection Time: 09/30/17  4:27 AM   Result Value Ref Range    Glucose (POC) 186 (H) 65 - 100 mg/dL    Performed by Fixya Salts, POC    Collection Time: 09/30/17  5:16 AM   Result Value Ref Range    Glucose (POC) 147 (H) 65 - 100 mg/dL    Performed by Brock Diamond    Collection Time: 09/30/17  5:17 AM   Result Value Ref Range    Glucose 147 mg/dL    Insulin order 14.4 units/hour    Insulin adminstered 14.4 units/hour    Multiplier 0.166     Low target 95 mg/dL    High target 130 mg/dL    D50 order 0.0 ml    D50 administered 0.00 ml    Minutes until next BG 60 min    Order initials mjo     Administered initials mjo     GLSCOM Comments     BLOOD GAS, ARTERIAL    Collection Time: 09/30/17  5:58 AM   Result Value Ref Range    pH 7.46 (H) 7.35 - 7.45      PCO2 30 (L) 35.0 - 45.0 mmHg    PO2 76 (L) 80 - 100 mmHg    O2 SAT PENDING %    BICARBONATE 21 (L) 22 - 26 mmol/L    BASE DEFICIT 2.2 mmol/L    O2 METHOD VENTILATOR      FIO2 40 %    MODE SIMV      Tidal volume 600      SET RATE 12      PRESSURE SUPPORT 10.0      EPAP/CPAP/PEEP 6.0      Sample source ARTERIAL      SITE DRAWN FROM ARTERIAL LINE      YVES'S TEST N/A     IONIZED CALCIUM - IN-HOUSE    Collection Time: 09/30/17  5:58 AM   Result Value Ref Range    Calcium, ionized 1.12 (L) 1.13 - 1.32 mmol/L   GLUCOSE, POC    Collection Time: 09/30/17  5:58 AM   Result Value Ref Range    Glucose (POC) 129 (H) 65 - 100 mg/dL    Performed by Brock Diamond    Collection Time: 09/30/17  6:18 AM   Result Value Ref Range    Glucose 129 mg/dL    Insulin order 11.5 units/hour    Insulin adminstered 11.5 units/hour    Multiplier 0.166     Low target 95 mg/dL    High target 130 mg/dL    D50 order 0.0 ml    D50 administered 0.00 ml    Minutes until next BG 60 min    Order initials mjo     Administered initials mjo     GLSCOM Comments     GLUCOSE, POC    Collection Time: 09/30/17  7:38 AM   Result Value Ref Range    Glucose (POC) 95 65 - 100 mg/dL    Performed by Danielle Banda    Collection Time: 09/30/17  7:39 AM   Result Value Ref Range    Glucose 95 mg/dL    Insulin order 5.8 units/hour    Insulin adminstered 5.8 units/hour    Multiplier 0.166     Low target 95 mg/dL    High target 130 mg/dL    D50 order 0.0 ml    D50 administered 0.00 ml    Minutes until next BG 60 min    Order initials mjo     Administered initials mjo     GLSCOM Comments     BLOOD GAS, ARTERIAL    Collection Time: 09/30/17  8:09 AM   Result Value Ref Range    pH 7.45 7.35 - 7.45      PCO2 32 (L) 35.0 - 45.0 mmHg    PO2 77 (L) 80 - 100 mmHg    O2 SAT 96 92 - 97 %    BICARBONATE 22 22 - 26 mmol/L    BASE DEFICIT 1.1 mmol/L    O2 METHOD VENTILATOR      FIO2 50 %    MODE CPAP      Tidal volume 563      SPONTANEOUS RATE 24.0      PRESSURE SUPPORT 8.0      EPAP/CPAP/PEEP 6.0      Sample source ARTERIAL      SITE DRAWN FROM ARTERIAL LINE      YVES'S TEST N/A     GLUCOSE, POC    Collection Time: 09/30/17  8:48 AM   Result Value Ref Range    Glucose (POC) 90 65 - 100 mg/dL    Performed by Venu Knapp    Collection Time: 09/30/17  8:48 AM   Result Value Ref Range    Glucose 90 mg/dL    Insulin order 4.0 units/hour    Insulin adminstered 4.0 units/hour    Multiplier 0.133     Low target 95 mg/dL    High target 130 mg/dL    D50 order 0.0 ml    D50 administered 0.00 ml    Minutes until next BG 60 min    Order initials mds     Administered initials carlos     GLSCOM Comments     GLUCOSE, POC    Collection Time: 09/30/17  9:52 AM   Result Value Ref Range    Glucose (POC) 100 65 - 100 mg/dL    Performed by Venu Knapp    Collection Time: 09/30/17  9:53 AM   Result Value Ref Range    Glucose 100 mg/dL    Insulin order 5.3 units/hour    Insulin adminstered 5.3 units/hour    Multiplier 0.133     Low target 95 mg/dL    High target 130 mg/dL    D50 order 0.0 ml    D50 administered 0.00 ml    Minutes until next BG 60 min    Order initials mds     Administered initials mds     GLSCOM Comments         Telemetry: normal sinus rhythm      Assessment:     Principal Problem:    NSTEMI (non-ST elevated myocardial infarction) ([facility] [address] (9/26/2017)    Active Problems:    Family history of early CAD (9/26/2017)      Overview: Significant for mother, brother      Systolic heart failure ([facility] [address] (9/27/2017)      Coronary artery disease involving native coronary artery with unstable angina pectoris ([facility] [address] (9/27/2017)      Type II diabetes mellitus, uncontrolled ([facility] [address] (9/27/2017)      CAD (coronary artery disease), native coronary artery (9/27/2017)      CAD (coronary artery disease) (9/29/2017)      S/P CABG x 3 (9/29/2017)      Overview: ON PUMP CABG X 3, LIMA to LAD, LRAG to OM, RSVG to PDA      Left radial artery harvest      Right greater saphenous vein harvest        Plan:     Stable. Continue IABP. Wean per CT surgery. Continue pressors, wean as tolerated. Discussed with CT surgery.       1700 Kevin Mcfadden MD

## 2017-09-30 NOTE — PROGRESS NOTES
1400:  Pt temp 101 initiated incentive spirometer for atelectasis; will continue to monitor. 1500:  Temp remaninig at 101, continuing to monitor. Will call Dr. Sheila Meza if temp reaches 101.5  1530:  Dr. Sheila Meza paged for pt temp and for update on pt condition: CI>2.5 on 5mcg/kg/min of dobutamine, no longer on pressors, 0.3mcg/kg/hr precedex, 1mg/hr cardene, 0.5mg/min amiodarone, UO daily total 223, chest tube output daily total 180ml, incentive spirometer level 750ml, no opioid pain medications needed, eating solids with no complications. 1600:  Contacted Dr. Sheila Meza about temp 101 and overall condition, no interventions required.    1800:  Temp dropped to 100.2

## 2017-09-30 NOTE — PROGRESS NOTES
[facility] Consult Service Progress NOTE  Pulmonary, Critical Care, and Sleep Medicine    Name: Preeti Mcgarry MRN: [id number]   : 1965 Hospital: [facility]   Date: 2017  Admission Date: 2017     IMPRESSION:   ashd s/p NSTEMI ->S/p CABG     s/p ON PUMP CABG X 3, LIMA to LAD, LRAG to OM, RSVG to PDA  On vaso, epi, dobutamine and amio gtt  IABP in place  Hx DM, htn, non smoker  1. Reported Latex Allergy? RECOMMENDATIONS/PLAN:   1. Doing well following extubation  2.  drips per CTS  3. Post op analgesia  4. Glycemic control  5. IABP per Cardiology/CTS  6. Gi/dvt/early mobilization     17 Pt was seen in the ICU post op from CABG. He is on vent, pressors, sedation. Chest tube in place. IABP is in place at 1:1. Chart and notes reviewed. Data reviewed. I have evaluated and examined the patient. Preeti Mcgarry is a 45 y.o. male admitted for NSTEMI (non-ST elevated myocardial infarction) ([facility] [address] No previous cardiac history. Significant family hx of mother and brother with MIs early 46s. Pt  had palpitations, mod substernal CP that started while he was at work. Lasted 40minutes. Associated chills, diaphoresis, n/v. Had similar episode 1 month ago, but did not follow up with MD.  ECG with flipped Twaves and slight ST depression laterally. Troponin 1.7. Patient describes one month history of exertional and rest occurring palpitations associated with nausea. Yesterday patient vomited twice. Now denies chest pain, SOB,  claudication,  PND or syncope. Symptoms normally relieved with rest until day of admission. Currently pain free. Cardiac risk factors: family history. Taken to cath lab by Thelma Wilson and shown to have significant lesions. CTs consulted. Moved to CCU for PA catheter. Echo pending. Pt has No PCP nor does his wife. . No longer checks sugars. Goes to urgent care prn. Moved to [place] years ago.  Very much against vaccinations. No recent chest or sinus infection. No GERD or GI complaints. 9/28 PA cathter in place but unable to get wedge. Poor waveform.  Pt in no distress except for neck pain            Risk of deterioration: medium and high    [x]      High complexity decision making was performed   [x]      See my orders for details    Hospital Day: 5    Allergies   Allergen Reactions    Latex Other (comments)     Allergy documented in admission data base    Influenza Virus Vaccine, Specific Other (comments)     Allergy found in admission data base        Current Facility-Administered Medications   Medication    sodium chloride (NS) flush 5-10 mL    sodium chloride (NS) flush 5-10 mL    0.45% sodium chloride infusion    0.9% sodium chloride infusion    acetaminophen (TYLENOL) tablet 650 mg    oxyCODONE-acetaminophen (PERCOCET) 5-325 mg per tablet 1 Tab    oxyCODONE-acetaminophen (PERCOCET) 5-325 mg per tablet 2 Tab    morphine injection 4 mg    naloxone (NARCAN) injection 0.4 mg    mupirocin (BACTROBAN) 2 % ointment    ceFAZolin (ANCEF) 2g in 100 ml 0.9% NS IVPB    amiodarone (CORDARONE) tablet 400 mg    ondansetron (ZOFRAN) injection 4 mg    albuterol (PROVENTIL VENTOLIN) nebulizer solution 2.5 mg    aspirin chewable tablet 81 mg    chlorhexidine (PERIDEX) 0.12 % mouthwash 10 mL    famotidine (PEPCID) tablet 20 mg    magnesium oxide (MAG-OX) tablet 400 mg    calcium chloride 1 g in 0.9% sodium chloride 50 mL IVPB    bisacodyl (DULCOLAX) suppository 10 mg    senna-docusate (PERICOLACE) 8.6-50 mg per tablet 1 Tab    polyethylene glycol (MIRALAX) packet 17 g    ELECTROLYTE REPLACEMENT PROTOCOL    magnesium sulfate 1 g/100 ml IVPB (premix or compounded)    insulin regular (NOVOLIN R, HUMULIN R) 100 Units in 0.9% sodium chloride 100 mL infusion    glucose chewable tablet 16 g    dextrose (D50W) injection syrg 12.5-25 g    glucagon (GLUCAGEN) injection 1 mg    insulin lispro (HUMALOG) injection  insulin lispro (HUMALOG) injection    insulin glargine (LANTUS) injection 1-50 Units    sodium chloride 0.9 % bolus infusion 250 mL    potassium chloride 20 mEq in 50 ml IVPB    potassium chloride 20 mEq in 50 ml IVPB    milrinone (PRIMACOR) 20 MG/100 ML D5W infusion    epoprostenol (VELETRI) 1,500,000 ng in 0.9% sodium chloride 50 mL inhalation solution    0.9% sodium chloride inhalation    propofol (DIPRIVAN) infusion    amiodarone (CORDARONE) 900 mg/250 ml D5W infusion    vasopressin (VASOSTRICT) 20 Units in 0.9% sodium chloride 100 mL infusion    chlorhexidine (PERIDEX) 0.12 % mouthwash 15 mL    EPINEPHrine (ADRENALIN) 5,000 mcg in 0.9% sodium chloride 250 mL infusion    0.9% sodium chloride infusion    DOBUTamine (DOBUTREX) 500 mg/250 mL (2,000 mcg/mL) infusion    dexmedeTOMidine (PRECEDEX) 400 mcg in 0.9% sodium chloride 100 mL infusion    PHENYLephrine (NEOSYNEPHRINE) 30,000 mcg in 0.9% sodium chloride 250 mL infusion    niCARdipine (CARDENE) 25 mg in 0.9% sodium chloride 250 mL infusion      Social History   Substance Use Topics    Smoking status: Never Smoker    Smokeless tobacco: Never Used    Alcohol use No      History reviewed. No pertinent family history.      Vital Signs: Intake/Output: Intake/Output:   Temp (24hrs), Av.9 °F (37.2 °C), Min:98.2 °F (36.8 °C), Max:100.2 °F (37.9 °C)      Visit Vitals    /59    Pulse (!) 105    Temp 100.2 °F (37.9 °C)    Resp 24    Ht 6' 2\" (1.88 m)    Wt 108.6 kg (239 lb 6.7 oz)    SpO2 96%    BMI 30.74 kg/m2         O2 Device: Nasal cannula  O2 Flow Rate (L/min): 4 l/min    Wt Readings from Last 4 Encounters:   17 108.6 kg (239 lb 6.7 oz)          Intake/Output Summary (Last 24 hours) at 17 0912  Last data filed at 17 0800   Gross per 24 hour   Intake          7998.91 ml   Output             4226 ml   Net          3772.91 ml       Last shift:      701 - 1900  In: 465.9 [I.V.:465.9]  Out: 250 [Urine:60; Drains:90]    Last 3 shifts: 09/28 1901 - 09/30 0700  In: 8439.3 [I.V.:8239.3]  Out: 6747 [Urine:2005; Drains:595]     Telemetry:normal sinus rhythm    Physical Exam:    General: WM intubated and sedated. HEENT: NCAT              Chest:  Sternal dressing is intact, chest tube intact. Lungs: decreased air exchange bibasilar but clear anteriorly. Heart: Pulses in 120s, has IABP in place at 1:1   Abdomen: soft, non-tender, without masses or organomegaly   : jesus in place. Extremity: negative, cyanosis, clubbing, warm. Skin: Skin color, texture, turgor normal. No rashes or lesions; Normal upper and lower extremity pulses    Tubes:   Noted  ET tube in place  Chest tubes in place. Jesus in place. DATA:  MAR reviewed and pertinent medications noted or modified as needed    Labs:    Recent Labs      09/30/17   0411 09/29/17 1955 09/29/17   1455  09/29/17   0220  09/28/17   1923   WBC  9.9  12.5*  13.4*  11.1   --    HGB  9.3*  10.4*  10.4*  13.4   --    PLT  64*  75*  74*  136*   --    INR   --   1.2*  1.3*  1.1   --    APTT   --    --   30.3  42.7*  29.4     Recent Labs      09/30/17   0411 09/29/17 1955 09/29/17   1455   NA  141  139  143   K  3.9  4.3  3.9   CL  111*  110*  110*   CO2  20*  21  23   GLU  169*  196*  204*   BUN  15  14  14   CREA  1.02  1.32*  1.17   CA  7.5*  7.4*  7.8*   MG  2.0  2.1  2.1   ALB  3.2*  3.4*  3.2*   SGOT  30  35  33   ALT  20  23  20     Recent Labs      09/30/17   0809  09/30/17   0558  09/29/17   1500   PH  7.45  7.46*  7.33*   PCO2  32*  30*  42   PO2  77*  76*  95   HCO3  22  21*  22   FIO2  50  40  100     No results for input(s): CPK, CKNDX, TROIQ in the last 72 hours.     No lab exists for component: CPKMB  No results found for: BNPP, BNP   Lab Results   Component Value Date/Time    Culture result: NO GROWTH 2 DAYS 09/27/2017 03:39 PM     Lab Results   Component Value Date/Time     09/26/2017 04:42 PM     Lab Results   Component Value Date/Time    Color YELLOW/STRAW 09/27/2017 03:39 PM    Appearance CLEAR 09/27/2017 03:39 PM    Specific gravity 1.020 09/26/2017 06:54 PM    pH (UA) 5.0 09/27/2017 03:39 PM    Protein NEGATIVE  09/27/2017 03:39 PM    Glucose 100 09/27/2017 03:39 PM    Ketone NEGATIVE  09/27/2017 03:39 PM    Bilirubin NEGATIVE  09/27/2017 03:39 PM    Blood NEGATIVE  09/27/2017 03:39 PM    Urobilinogen 0.2 09/27/2017 03:39 PM    Nitrites NEGATIVE  09/27/2017 03:39 PM    Leukocyte Esterase NEGATIVE  09/27/2017 03:39 PM    WBC 0-4 09/27/2017 03:39 PM    RBC 0-5 09/27/2017 03:39 PM    Bacteria NEGATIVE  09/27/2017 03:39 PM       No results found for: TOXA1, RPR, HBCM, HBSAG, HAAB, HCAB1, HAAT, G6PD, CRYAC, HIVGT, HIVR, HIV1, HIV12, HIVPC, HIVRPI  No results found for: IRON, FE, TIBC, IBCT, PSAT, FERR  Lab Results   Component Value Date/Time    TSH 1.15 09/26/2017 02:31 PM       Imaging:  []                           I have personally reviewed the patients radiographs and reports:      Results from [place] encounter on 09/26/17   XR CHEST PORT   Narrative INDICATION: . postop heart  COMPARISON: Previous chest xray, yesterday. LIMITATIONS: Portable technique. Katharine Barber FINDINGS: Single frontal view of the chest.   .  Lines/tubes/surgical: No significant change in the appearance of the right IJ  approach [place]-Kristen catheter which projects to terminate in the pulmonary outflow  tract, right IJ approach catheter, ET tube, gastric tube. Mediastinal/chest  drains. Epicardial pacing wires. Surgical clip versus intra-aortic balloon pump  overlying the proximal most ascending aorta. Heart/mediastinum: Status post median sternotomy. Lungs/pleura: Low lung volumes. No visible pneumothorax. Additional Comments: Surgical clips projecting over the right supraclavicular  fossa/axilla. .       Impression IMPRESSION:  1. No significant change in the appearance of the chest or support hardware.                No results found for this or any previous visit. 9-29-17: CXR: COMPARISON:  9/27/2017     FINDINGS: A portable AP radiograph of the chest was obtained at 1450 hours. The  patient is on a cardiac monitor. The endotracheal tube terminates about 4 cm  above the lazara. The [place]-Kristen catheter extends to the main pulmonary artery. A  right IJ line terminates over the cavoatrial junction. There are mediastinal and  left chest tubes in place without a definite pneumothorax. A small amount of  pneumomediastinum is noted. There is central congestion without overt CHF.   IMPRESSION:      Central congestion without overt CHF.   Small amount of pneumomediastinum can be seen in the immediate postoperative  setting      My assessment/plan was discussed with:  Nursing xx    respiratory therapy xx Mr. Roman Oquendo   family         Cheri Harman MD

## 2017-09-30 NOTE — PROGRESS NOTES
Chart reviewed. Attempted to see pt this AM for PT evaluation however IABP remains in place in addition to RN reporting that pt remains on multiple pressors and not medically appropriate for OOB mobility/participation with therapy. RN requesting that therapy hold for the day and check back tomorrow AM. Will defer per RN request and follow up tomorrow AM.    Ghulam Aceves.  Rehan Reynolds, DPT

## 2017-09-30 NOTE — PROGRESS NOTES
09/30/17 0657 09/30/17 0719   Weaning Parameters   Resp Rate Observed 21 19   Ve 11.7 12.8    836   RSBI 38 39     Patient tolerating SBT well. ABG below taken when patient on SIMV 6/600/PS10/40% 6+      Results for Chiquita Phelan (MRN [id number]) as of 9/30/2017    Ref. Range 9/30/2017 05:58   pH Latest Ref Range: 7.35 - 7.45   7.46 (H)   PCO2 Latest Ref Range: 35.0 - 45.0 mmHg 30 (L)   PO2 Latest Ref Range: 80 - 100 mmHg 76 (L)   BICARBONATE Latest Ref Range: 22 - 26 mmol/L 21 (L)   O2 SAT Latest Units: % PENDING   BASE DEFICIT Latest Units: mmol/L 2.2   Calcium, ionized Latest Ref Range: 1.13 - 1.32 mmol/L 1.12 (L)   Sample source Latest Units:   ARTERIAL   SITE Latest Units:   DRAWN FROM ARTERI. ..    YVES'S TEST Latest Units:   N/A   MODE Latest Units:   SIMV   FIO2 Latest Units: % 40   Tidal volume Latest Units:   600   PRESSURE SUPPORT Latest Units:   10.0   EPAP/CPAP/PEEP Latest Units:   6.0   O2 METHOD Latest Units:   VENTILATOR   SET RATE Latest Units:   12

## 2017-09-30 NOTE — PROGRESS NOTES
Chart reviewed. Attempted to see pt this AM for OT evaluation however IABP remains in place in addition to RN reporting that pt remains on multiple pressors and is not medically appropriate for participation with therapy. OT will follow back Monday for evaluation if patient is medically appropriate for participation.

## 2017-09-30 NOTE — PROGRESS NOTES
Shift Summery:  2127: FIO2 on vent weaned to 80%. 2132: One PRN albumin given to assist with weaning binh drip.  0020: Vent weaned to 60%. 5202: Another albumin given to assist with weaning binh drip. 7129: Binh drip weaned off.  9811: One PRN Percocet given with one schedule tylenol for pain. 0427:  Vent weaned to 40%. 3672: Precedex drip increased for patient comfort.  0601: Propofol drip stopped. 0606: Vent back to 50% due to ABG results. 7605: PRN 2 mg morphine given for pain. 0700: Placed on CPAP.  0805: Spoke with  re: possible extubation. Okay to extubate if CPAP ABG results WNL.

## 2017-10-01 ENCOUNTER — APPOINTMENT (OUTPATIENT)
Dept: GENERAL RADIOLOGY | Age: 52
DRG: 233 | End: 2017-10-01
Attending: PHYSICIAN ASSISTANT
Payer: COMMERCIAL

## 2017-10-01 LAB
ABO + RH BLD: NORMAL
ADMINISTERED INITIALS, ADMINIT: NORMAL
ALBUMIN SERPL-MCNC: 2.8 G/DL (ref 3.5–5)
ALBUMIN/GLOB SERPL: 1.3 {RATIO} (ref 1.1–2.2)
ALP SERPL-CCNC: 48 U/L (ref 45–117)
ALT SERPL-CCNC: 17 U/L (ref 12–78)
ANION GAP SERPL CALC-SCNC: 8 MMOL/L (ref 5–15)
AST SERPL-CCNC: 20 U/L (ref 15–37)
BILIRUB SERPL-MCNC: 0.5 MG/DL (ref 0.2–1)
BLD PROD TYP BPU: NORMAL
BLD PROD TYP BPU: NORMAL
BLOOD GROUP ANTIBODIES SERPL: NORMAL
BPU ID: NORMAL
BPU ID: NORMAL
BUN SERPL-MCNC: 18 MG/DL (ref 6–20)
BUN/CREAT SERPL: 20 (ref 12–20)
CALCIUM SERPL-MCNC: 7.6 MG/DL (ref 8.5–10.1)
CHLORIDE SERPL-SCNC: 111 MMOL/L (ref 97–108)
CO2 SERPL-SCNC: 20 MMOL/L (ref 21–32)
CREAT SERPL-MCNC: 0.92 MG/DL (ref 0.7–1.3)
CROSSMATCH RESULT,%XM: NORMAL
CROSSMATCH RESULT,%XM: NORMAL
D50 ADMINISTERED, D50ADM: 0 ML
D50 ORDER, D50ORD: 0 ML
ERYTHROCYTE [DISTWIDTH] IN BLOOD BY AUTOMATED COUNT: 14 % (ref 11.5–14.5)
GLOBULIN SER CALC-MCNC: 2.2 G/DL (ref 2–4)
GLSCOM COMMENTS: NORMAL
GLUCOSE BLD STRIP.AUTO-MCNC: 102 MG/DL (ref 65–100)
GLUCOSE BLD STRIP.AUTO-MCNC: 108 MG/DL (ref 65–100)
GLUCOSE BLD STRIP.AUTO-MCNC: 126 MG/DL (ref 65–100)
GLUCOSE BLD STRIP.AUTO-MCNC: 128 MG/DL (ref 65–100)
GLUCOSE BLD STRIP.AUTO-MCNC: 165 MG/DL (ref 65–100)
GLUCOSE BLD STRIP.AUTO-MCNC: 187 MG/DL (ref 65–100)
GLUCOSE BLD STRIP.AUTO-MCNC: 205 MG/DL (ref 65–100)
GLUCOSE BLD STRIP.AUTO-MCNC: 223 MG/DL (ref 65–100)
GLUCOSE BLD STRIP.AUTO-MCNC: 224 MG/DL (ref 65–100)
GLUCOSE BLD STRIP.AUTO-MCNC: 89 MG/DL (ref 65–100)
GLUCOSE BLD STRIP.AUTO-MCNC: 92 MG/DL (ref 65–100)
GLUCOSE BLD STRIP.AUTO-MCNC: 96 MG/DL (ref 65–100)
GLUCOSE BLD STRIP.AUTO-MCNC: 97 MG/DL (ref 65–100)
GLUCOSE BLD STRIP.AUTO-MCNC: 98 MG/DL (ref 65–100)
GLUCOSE BLD STRIP.AUTO-MCNC: 99 MG/DL (ref 65–100)
GLUCOSE SERPL-MCNC: 107 MG/DL (ref 65–100)
GLUCOSE, GLC: 102 MG/DL
GLUCOSE, GLC: 108 MG/DL
GLUCOSE, GLC: 126 MG/DL
GLUCOSE, GLC: 128 MG/DL
GLUCOSE, GLC: 165 MG/DL
GLUCOSE, GLC: 187 MG/DL
GLUCOSE, GLC: 223 MG/DL
GLUCOSE, GLC: 89 MG/DL
GLUCOSE, GLC: 92 MG/DL
GLUCOSE, GLC: 96 MG/DL
GLUCOSE, GLC: 97 MG/DL
GLUCOSE, GLC: 98 MG/DL
GLUCOSE, GLC: 99 MG/DL
HCT VFR BLD AUTO: 26.3 % (ref 36.6–50.3)
HGB BLD-MCNC: 9.2 G/DL (ref 12.1–17)
HIGH TARGET, HITG: 130 MG/DL
INSULIN ADMINSTERED, INSADM: 12.4 UNITS/HOUR
INSULIN ADMINSTERED, INSADM: 14.3 UNITS/HOUR
INSULIN ADMINSTERED, INSADM: 2 UNITS/HOUR
INSULIN ADMINSTERED, INSADM: 2.7 UNITS/HOUR
INSULIN ADMINSTERED, INSADM: 3.1 UNITS/HOUR
INSULIN ADMINSTERED, INSADM: 3.1 UNITS/HOUR
INSULIN ADMINSTERED, INSADM: 3.2 UNITS/HOUR
INSULIN ADMINSTERED, INSADM: 3.3 UNITS/HOUR
INSULIN ADMINSTERED, INSADM: 3.6 UNITS/HOUR
INSULIN ADMINSTERED, INSADM: 4.1 UNITS/HOUR
INSULIN ADMINSTERED, INSADM: 4.5 UNITS/HOUR
INSULIN ADMINSTERED, INSADM: 4.6 UNITS/HOUR
INSULIN ADMINSTERED, INSADM: 8.2 UNITS/HOUR
INSULIN ORDER, INSORD: 12.4 UNITS/HOUR
INSULIN ORDER, INSORD: 14.3 UNITS/HOUR
INSULIN ORDER, INSORD: 2 UNITS/HOUR
INSULIN ORDER, INSORD: 2.7 UNITS/HOUR
INSULIN ORDER, INSORD: 3.1 UNITS/HOUR
INSULIN ORDER, INSORD: 3.1 UNITS/HOUR
INSULIN ORDER, INSORD: 3.2 UNITS/HOUR
INSULIN ORDER, INSORD: 3.3 UNITS/HOUR
INSULIN ORDER, INSORD: 3.6 UNITS/HOUR
INSULIN ORDER, INSORD: 4.1 UNITS/HOUR
INSULIN ORDER, INSORD: 4.5 UNITS/HOUR
INSULIN ORDER, INSORD: 4.6 UNITS/HOUR
INSULIN ORDER, INSORD: 8.2 UNITS/HOUR
LOW TARGET, LOT: 95 MG/DL
MAGNESIUM SERPL-MCNC: 2.2 MG/DL (ref 1.6–2.4)
MCH RBC QN AUTO: 29.5 PG (ref 26–34)
MCHC RBC AUTO-ENTMCNC: 35 G/DL (ref 30–36.5)
MCV RBC AUTO: 84.3 FL (ref 80–99)
MINUTES UNTIL NEXT BG, NBG: 120 MIN
MINUTES UNTIL NEXT BG, NBG: 60 MIN
MULTIPLIER, MUL: 0.07
MULTIPLIER, MUL: 0.08
MULTIPLIER, MUL: 0.09
MULTIPLIER, MUL: 0.1
ORDER INITIALS, ORDINIT: NORMAL
PLATELET # BLD AUTO: 46 K/UL (ref 150–400)
POTASSIUM SERPL-SCNC: 3.9 MMOL/L (ref 3.5–5.1)
PROT SERPL-MCNC: 5 G/DL (ref 6.4–8.2)
RBC # BLD AUTO: 3.12 M/UL (ref 4.1–5.7)
SERVICE CMNT-IMP: ABNORMAL
SERVICE CMNT-IMP: NORMAL
SODIUM SERPL-SCNC: 139 MMOL/L (ref 136–145)
SPECIMEN EXP DATE BLD: NORMAL
STATUS OF UNIT,%ST: NORMAL
STATUS OF UNIT,%ST: NORMAL
UNIT DIVISION, %UDIV: 0
UNIT DIVISION, %UDIV: 0
WBC # BLD AUTO: 12.3 K/UL (ref 4.1–11.1)

## 2017-10-01 PROCEDURE — 74011250637 HC RX REV CODE- 250/637: Performed by: PHYSICIAN ASSISTANT

## 2017-10-01 PROCEDURE — 65620000000 HC RM CCU GENERAL

## 2017-10-01 PROCEDURE — 74011250636 HC RX REV CODE- 250/636: Performed by: THORACIC SURGERY (CARDIOTHORACIC VASCULAR SURGERY)

## 2017-10-01 PROCEDURE — 36430 TRANSFUSION BLD/BLD COMPNT: CPT | Performed by: NURSE ANESTHETIST, CERTIFIED REGISTERED

## 2017-10-01 PROCEDURE — 82962 GLUCOSE BLOOD TEST: CPT

## 2017-10-01 PROCEDURE — 85027 COMPLETE CBC AUTOMATED: CPT | Performed by: PHYSICIAN ASSISTANT

## 2017-10-01 PROCEDURE — 74011250637 HC RX REV CODE- 250/637: Performed by: INTERNAL MEDICINE

## 2017-10-01 PROCEDURE — 80053 COMPREHEN METABOLIC PANEL: CPT | Performed by: PHYSICIAN ASSISTANT

## 2017-10-01 PROCEDURE — 74011250636 HC RX REV CODE- 250/636: Performed by: PHYSICIAN ASSISTANT

## 2017-10-01 PROCEDURE — 83735 ASSAY OF MAGNESIUM: CPT | Performed by: PHYSICIAN ASSISTANT

## 2017-10-01 PROCEDURE — P9035 PLATELET PHERES LEUKOREDUCED: HCPCS | Performed by: THORACIC SURGERY (CARDIOTHORACIC VASCULAR SURGERY)

## 2017-10-01 PROCEDURE — 71010 XR CHEST PORT: CPT

## 2017-10-01 PROCEDURE — 74011000258 HC RX REV CODE- 258: Performed by: PHYSICIAN ASSISTANT

## 2017-10-01 PROCEDURE — 74011636637 HC RX REV CODE- 636/637: Performed by: PHYSICIAN ASSISTANT

## 2017-10-01 PROCEDURE — 36415 COLL VENOUS BLD VENIPUNCTURE: CPT | Performed by: PHYSICIAN ASSISTANT

## 2017-10-01 RX ORDER — SODIUM CHLORIDE 9 MG/ML
250 INJECTION, SOLUTION INTRAVENOUS AS NEEDED
Status: DISCONTINUED | OUTPATIENT
Start: 2017-10-01 | End: 2017-10-04 | Stop reason: HOSPADM

## 2017-10-01 RX ORDER — LISINOPRIL 5 MG/1
2.5 TABLET ORAL DAILY
Status: DISCONTINUED | OUTPATIENT
Start: 2017-10-01 | End: 2017-10-04 | Stop reason: HOSPADM

## 2017-10-01 RX ORDER — FUROSEMIDE 10 MG/ML
20 INJECTION INTRAMUSCULAR; INTRAVENOUS ONCE
Status: COMPLETED | OUTPATIENT
Start: 2017-10-01 | End: 2017-10-01

## 2017-10-01 RX ORDER — POTASSIUM CHLORIDE 29.8 MG/ML
20 INJECTION INTRAVENOUS ONCE
Status: COMPLETED | OUTPATIENT
Start: 2017-10-01 | End: 2017-10-04

## 2017-10-01 RX ADMIN — CEFAZOLIN 2 G: 10 INJECTION, POWDER, FOR SOLUTION INTRAVENOUS; PARENTERAL at 04:00

## 2017-10-01 RX ADMIN — POTASSIUM CHLORIDE 20 MEQ: 400 INJECTION, SOLUTION INTRAVENOUS at 06:58

## 2017-10-01 RX ADMIN — FAMOTIDINE 20 MG: 20 TABLET ORAL at 21:04

## 2017-10-01 RX ADMIN — DOCUSATE SODIUM AND SENNOSIDES 1 TABLET: 8.6; 5 TABLET, FILM COATED ORAL at 17:24

## 2017-10-01 RX ADMIN — SODIUM CHLORIDE 50 ML/HR: 900 INJECTION, SOLUTION INTRAVENOUS at 04:01

## 2017-10-01 RX ADMIN — INSULIN GLARGINE 31 UNITS: 100 INJECTION, SOLUTION SUBCUTANEOUS at 15:00

## 2017-10-01 RX ADMIN — Medication 400 MG: at 17:24

## 2017-10-01 RX ADMIN — Medication 10 ML: at 15:00

## 2017-10-01 RX ADMIN — MORPHINE SULFATE 4 MG: 10 INJECTION INTRAMUSCULAR; INTRAVENOUS; SUBCUTANEOUS at 09:11

## 2017-10-01 RX ADMIN — INSULIN LISPRO 2 UNITS: 100 INJECTION, SOLUTION INTRAVENOUS; SUBCUTANEOUS at 22:10

## 2017-10-01 RX ADMIN — MUPIROCIN: 20 OINTMENT TOPICAL at 08:14

## 2017-10-01 RX ADMIN — DOBUTAMINE IN DEXTROSE 5 MCG/KG/MIN: 200 INJECTION, SOLUTION INTRAVENOUS at 06:25

## 2017-10-01 RX ADMIN — Medication 10 ML: at 22:00

## 2017-10-01 RX ADMIN — FUROSEMIDE 20 MG: 10 INJECTION, SOLUTION INTRAMUSCULAR; INTRAVENOUS at 11:00

## 2017-10-01 RX ADMIN — FAMOTIDINE 20 MG: 20 TABLET ORAL at 08:07

## 2017-10-01 RX ADMIN — AMIODARONE HYDROCHLORIDE 400 MG: 200 TABLET ORAL at 21:04

## 2017-10-01 RX ADMIN — Medication 400 MG: at 08:07

## 2017-10-01 RX ADMIN — LISINOPRIL 2.5 MG: 5 TABLET ORAL at 12:58

## 2017-10-01 RX ADMIN — AMIODARONE HYDROCHLORIDE 400 MG: 200 TABLET ORAL at 08:07

## 2017-10-01 RX ADMIN — SODIUM CHLORIDE 14.3 UNITS/HR: 900 INJECTION, SOLUTION INTRAVENOUS at 13:45

## 2017-10-01 RX ADMIN — ONDANSETRON 4 MG: 2 INJECTION INTRAMUSCULAR; INTRAVENOUS at 12:23

## 2017-10-01 RX ADMIN — Medication 10 ML: at 06:13

## 2017-10-01 RX ADMIN — ACETAMINOPHEN 650 MG: 325 TABLET ORAL at 00:56

## 2017-10-01 RX ADMIN — CHLORHEXIDINE GLUCONATE 15 ML: 1.2 RINSE ORAL at 08:14

## 2017-10-01 RX ADMIN — ONDANSETRON 4 MG: 2 INJECTION INTRAMUSCULAR; INTRAVENOUS at 17:28

## 2017-10-01 RX ADMIN — CEFAZOLIN 2 G: 10 INJECTION, POWDER, FOR SOLUTION INTRAVENOUS; PARENTERAL at 11:01

## 2017-10-01 RX ADMIN — MUPIROCIN: 20 OINTMENT TOPICAL at 17:25

## 2017-10-01 RX ADMIN — CHLORHEXIDINE GLUCONATE 15 ML: 1.2 RINSE ORAL at 21:08

## 2017-10-01 RX ADMIN — POLYETHYLENE GLYCOL 3350 17 G: 17 POWDER, FOR SOLUTION ORAL at 08:07

## 2017-10-01 RX ADMIN — SODIUM CHLORIDE 50 ML/HR: 900 INJECTION, SOLUTION INTRAVENOUS at 23:45

## 2017-10-01 RX ADMIN — DOCUSATE SODIUM AND SENNOSIDES 1 TABLET: 8.6; 5 TABLET, FILM COATED ORAL at 08:07

## 2017-10-01 RX ADMIN — SODIUM CHLORIDE 8.2 UNITS/HR: 900 INJECTION, SOLUTION INTRAVENOUS at 12:39

## 2017-10-01 NOTE — PROGRESS NOTES
Chart reviewed and discussion with nurse. Pt remains inappropriate at this time for skilled PT eval./tx. Continue to follow.

## 2017-10-01 NOTE — PROGRESS NOTES
0930:  Balloon pump pulled, pt tolerated procedure well. Direct pressure applied for 10min followed by femstop at systolic BP for 51RWA then decreased to 60 for 45min. Femstop removed dressing applied, no bleeding or hematoma. 1030:  Femoral ART removed  1500:  [place], radial ART, and jesus removed. Pt tolerated procedures well. Voided post jesus removal 125ml. 0900:  Pt received one PRN does of 4mg morphine for pain, required a PRN dose of Zolfran for nausea prior to lunch.

## 2017-10-01 NOTE — PROGRESS NOTES
Doing well  Improved hemodynamics with reduction in pressor support  NSR  Exam unremarkable   IABP and CT removed  Continued progress

## 2017-10-01 NOTE — PROGRESS NOTES
[facility] [place] Consult Service Progress NOTE  Pulmonary, Critical Care, and Sleep Medicine    Name: Destiny Escobar MRN: [id number]   : 1965 Hospital: [facility]   Date: 10/1/2017  Admission Date: 2017     IMPRESSION:   ashd s/p NSTEMI ->S/p CABG     s/p ON PUMP CABG X 3, LIMA to LAD, LRAG to OM, RSVG to PDA  IABP in place  Hx DM, htn, non smoker      RECOMMENDATIONS/PLAN:   1. Doing well following extubation  2.  drips per CTS  3. Post op analgesia  4. Glycemic control  5. IABP and chest tubes prob out today  6. Gi/dvt/early mobilization     17 Pt was seen in the ICU post op from CABG. He is on vent, pressors, sedation. Chest tube in place. IABP is in place at 1:1. Chart and notes reviewed. Data reviewed. I have evaluated and examined the patient. Destiny Escobar is a 45 y.o. male admitted for NSTEMI (non-ST elevated myocardial infarction) ([facility] [address] No previous cardiac history. Significant family hx of mother and brother with MIs early 46s. Pt  had palpitations, mod substernal CP that started while he was at work. Lasted 40minutes. Associated chills, diaphoresis, n/v. Had similar episode 1 month ago, but did not follow up with MD.  ECG with flipped Twaves and slight ST depression laterally. Troponin 1.7.    . Taken to cath lab by Luke Greene and shown to have significant lesions. CTs consulted. Moved to CCU for PA catheter. Echo pending. Pt has No PCP nor does his wife. . No longer checks sugars. Goes to urgent care prn. Moved to [place] years ago. Very much against vaccinations. No recent chest or sinus infection. No GERD or GI complaints.  PA cathter in place but unable to get wedge. Poor waveform.  Pt in no distress except for neck pain            Risk of deterioration: medium and high    [x]      High complexity decision making was performed   [x]      See my orders for details    Hospital Day: 6    Allergies   Allergen Reactions    Latex Other (comments)     Allergy documented in admission data base    Influenza Virus Vaccine, Specific Other (comments)     Allergy found in admission data base        Current Facility-Administered Medications   Medication    0.9% sodium chloride infusion 250 mL    sodium chloride (NS) flush 5-10 mL    sodium chloride (NS) flush 5-10 mL    0.45% sodium chloride infusion    0.9% sodium chloride infusion    oxyCODONE-acetaminophen (PERCOCET) 5-325 mg per tablet 1 Tab    oxyCODONE-acetaminophen (PERCOCET) 5-325 mg per tablet 2 Tab    morphine injection 4 mg    naloxone (NARCAN) injection 0.4 mg    mupirocin (BACTROBAN) 2 % ointment    ceFAZolin (ANCEF) 2g in 100 ml 0.9% NS IVPB    amiodarone (CORDARONE) tablet 400 mg    ondansetron (ZOFRAN) injection 4 mg    albuterol (PROVENTIL VENTOLIN) nebulizer solution 2.5 mg    aspirin chewable tablet 81 mg    famotidine (PEPCID) tablet 20 mg    magnesium oxide (MAG-OX) tablet 400 mg    calcium chloride 1 g in 0.9% sodium chloride 50 mL IVPB    bisacodyl (DULCOLAX) suppository 10 mg    senna-docusate (PERICOLACE) 8.6-50 mg per tablet 1 Tab    polyethylene glycol (MIRALAX) packet 17 g    ELECTROLYTE REPLACEMENT PROTOCOL    magnesium sulfate 1 g/100 ml IVPB (premix or compounded)    insulin regular (NOVOLIN R, HUMULIN R) 100 Units in 0.9% sodium chloride 100 mL infusion    glucose chewable tablet 16 g    dextrose (D50W) injection syrg 12.5-25 g    glucagon (GLUCAGEN) injection 1 mg    insulin lispro (HUMALOG) injection    insulin lispro (HUMALOG) injection    insulin glargine (LANTUS) injection 1-50 Units    sodium chloride 0.9 % bolus infusion 250 mL    milrinone (PRIMACOR) 20 MG/100 ML D5W infusion    epoprostenol (VELETRI) 1,500,000 ng in 0.9% sodium chloride 50 mL inhalation solution    0.9% sodium chloride inhalation    propofol (DIPRIVAN) infusion    amiodarone (CORDARONE) 900 mg/250 ml D5W infusion    vasopressin (VASOSTRICT) 20 Units in 0.9% sodium chloride 100 mL infusion    chlorhexidine (PERIDEX) 0.12 % mouthwash 15 mL    EPINEPHrine (ADRENALIN) 5,000 mcg in 0.9% sodium chloride 250 mL infusion    0.9% sodium chloride infusion    DOBUTamine (DOBUTREX) 500 mg/250 mL (2,000 mcg/mL) infusion    dexmedeTOMidine (PRECEDEX) 400 mcg in 0.9% sodium chloride 100 mL infusion    PHENYLephrine (NEOSYNEPHRINE) 30,000 mcg in 0.9% sodium chloride 250 mL infusion    niCARdipine (CARDENE) 25 mg in 0.9% sodium chloride 250 mL infusion      Social History   Substance Use Topics    Smoking status: Never Smoker    Smokeless tobacco: Never Used    Alcohol use No      History reviewed. No pertinent family history. Vital Signs: Intake/Output: Intake/Output:   Temp (24hrs), Av.2 °F (37.9 °C), Min:99.5 °F (37.5 °C), Max:101 °F (38.3 °C)      Visit Vitals    /70 (BP 1 Location: Right arm, BP Patient Position: At rest)    Pulse 98    Temp 99.5 °F (37.5 °C)    Resp (!) 34    Ht 6' 2\" (1.88 m)    Wt 108.6 kg (239 lb 6.7 oz)    SpO2 98%    BMI 30.74 kg/m2         O2 Device: Nasal cannula  O2 Flow Rate (L/min): 3 l/min    Wt Readings from Last 4 Encounters:   17 108.6 kg (239 lb 6.7 oz)          Intake/Output Summary (Last 24 hours) at 10/01/17 0926  Last data filed at 10/01/17 0800   Gross per 24 hour   Intake          3214.46 ml   Output             1101 ml   Net          2113.46 ml       Last shift:      10/01 0701 - 10/01 1900  In: -   Out: 65 [Urine:65]    Last 3 shifts: 1901 - 10/01 0700  In: 6264 [P.O.:700; I.V.:6186]  Out: 1 [THPFI:5825; Drains:690]     Telemetry:normal sinus rhythm    Physical Exam:    General: WM intubated and sedated. HEENT: NCAT              Chest:  Sternal dressing is intact, chest tube intact. Lungs: decreased air exchange bibasilar but clear anteriorly.     Heart: Pulses in 120s, has IABP in place at 1:1   Abdomen: soft, non-tender, without masses or organomegaly   : jesus in place. Extremity: negative, cyanosis, clubbing, warm. Skin: Skin color, texture, turgor normal. No rashes or lesions; Normal upper and lower extremity pulses    Tubes:   Noted  ET tube in place  Chest tubes in place. Jesus in place. DATA:  MAR reviewed and pertinent medications noted or modified as needed    Labs:    Recent Labs      10/01/17   0414  09/30/17   0411  09/29/17   1955  09/29/17   1455  09/29/17   0220  09/28/17   1923   WBC  12.3*  9.9  12.5*  13.4*  11.1   --    HGB  9.2*  9.3*  10.4*  10.4*  13.4   --    PLT  46*  64*  75*  74*  136*   --    INR   --    --   1.2*  1.3*  1.1   --    APTT   --    --    --   30.3  42.7*  29.4     Recent Labs      10/01/17   0414  09/30/17   0411  09/29/17   1955   NA  139  141  139   K  3.9  3.9  4.3   CL  111*  111*  110*   CO2  20*  20*  21   GLU  107*  169*  196*   BUN  18  15  14   CREA  0.92  1.02  1.32*   CA  7.6*  7.5*  7.4*   MG  2.2  2.0  2.1   ALB  2.8*  3.2*  3.4*   SGOT  20  30  35   ALT  17  20  23     Recent Labs      09/30/17   0809  09/30/17   0558  09/29/17   1500   PH  7.45  7.46*  7.33*   PCO2  32*  30*  42   PO2  77*  76*  95   HCO3  22  21*  22   FIO2  50  40  100     No results for input(s): CPK, CKNDX, TROIQ in the last 72 hours.     No lab exists for component: CPKMB  No results found for: BNPP, BNP   Lab Results   Component Value Date/Time    Culture result: NO GROWTH 2 DAYS 09/27/2017 03:39 PM     Lab Results   Component Value Date/Time     09/26/2017 04:42 PM     Lab Results   Component Value Date/Time    Color YELLOW/STRAW 09/27/2017 03:39 PM    Appearance CLEAR 09/27/2017 03:39 PM    Specific gravity 1.020 09/26/2017 06:54 PM    pH (UA) 5.0 09/27/2017 03:39 PM    Protein NEGATIVE  09/27/2017 03:39 PM    Glucose 100 09/27/2017 03:39 PM    Ketone NEGATIVE  09/27/2017 03:39 PM    Bilirubin NEGATIVE  09/27/2017 03:39 PM    Blood NEGATIVE  09/27/2017 03:39 PM    Urobilinogen 0.2 09/27/2017 03:39 PM    Nitrites NEGATIVE  09/27/2017 03:39 PM    Leukocyte Esterase NEGATIVE  09/27/2017 03:39 PM    WBC 0-4 09/27/2017 03:39 PM    RBC 0-5 09/27/2017 03:39 PM    Bacteria NEGATIVE  09/27/2017 03:39 PM       No results found for: TOXA1, RPR, HBCM, HBSAG, HAAB, HCAB1, HAAT, G6PD, CRYAC, HIVGT, HIVR, HIV1, HIV12, HIVPC, HIVRPI  No results found for: IRON, FE, TIBC, IBCT, PSAT, FERR  Lab Results   Component Value Date/Time    TSH 1.15 09/26/2017 02:31 PM       Imaging:  []                           I have personally reviewed the patients radiographs and reports:      Results from [place] encounter on 09/26/17   XR CHEST PORT   Narrative Chest portable AP    History: Postop heart. Myocardial infarction. Comparison: 9/30/2017    Findings:  The patient is status post median sternotomy and CABG. Unchanged  subxiphoid be several drains are in place. The IABP marker is unchanged in the  AP window. Unchanged right IJ central venous catheter and [place]-Kristen catheter. Left chest tube unchanged. Multiple clips in the right axilla. The heart is  mildly enlarged, but unchanged. There is increased retrocardiac opacification. No significant pleural effusion or pneumothorax. There is unchanged mild edema. The visualized osseous structures appear unremarkable. Impression Impression:  Increased retrocardiac opacification which could related to atelectasis versus  developing airspace disease. Otherwise unchanged. No results found for this or any previous visit. 9-29-17: CXR: COMPARISON:  9/27/2017     FINDINGS: A portable AP radiograph of the chest was obtained at 1450 hours. The  patient is on a cardiac monitor. The endotracheal tube terminates about 4 cm  above the lazara. The [place]-Kristen catheter extends to the main pulmonary artery. A  right IJ line terminates over the cavoatrial junction. There are mediastinal and  left chest tubes in place without a definite pneumothorax.  A small amount of  pneumomediastinum is noted. There is central congestion without overt CHF.   IMPRESSION:      Central congestion without overt CHF.   Small amount of pneumomediastinum can be seen in the immediate postoperative  setting      My assessment/plan was discussed with:  Nursing xx    respiratory therapy xx Mr. Gordo Noble   family         Sonido Cleveland MD

## 2017-10-01 NOTE — PROGRESS NOTES
Cardiology Progress Note      10/1/2017 11:39 AM    Admit Date: 9/26/2017    Admit Diagnosis: NSTEMI (non-ST elevated myocardial infarction) (HCC);CAD ;C*      Subjective:     Lisset Desai is doing well. No complaints. IABP removed earlier today. Dobutamine being weaned. Tachycardic.     Visit Vitals    /79    Pulse (!) 103    Temp 99.9 °F (37.7 °C)    Resp (!) 35    Ht 6' 2\" (1.88 m)    Wt 239 lb 6.7 oz (108.6 kg)    SpO2 95%    BMI 30.74 kg/m2       Current Facility-Administered Medications   Medication Dose Route Frequency    0.9% sodium chloride infusion 250 mL  250 mL IntraVENous PRN    sodium chloride (NS) flush 5-10 mL  5-10 mL IntraVENous Q8H    sodium chloride (NS) flush 5-10 mL  5-10 mL IntraVENous PRN    0.45% sodium chloride infusion  10 mL/hr IntraVENous CONTINUOUS    0.9% sodium chloride infusion  9 mL/hr IntraVENous CONTINUOUS    oxyCODONE-acetaminophen (PERCOCET) 5-325 mg per tablet 1 Tab  1 Tab Oral Q4H PRN    oxyCODONE-acetaminophen (PERCOCET) 5-325 mg per tablet 2 Tab  2 Tab Oral Q4H PRN    morphine injection 4 mg  4 mg IntraVENous Q2H PRN    naloxone (NARCAN) injection 0.4 mg  0.4 mg IntraVENous PRN    mupirocin (BACTROBAN) 2 % ointment   Both Nostrils BID    amiodarone (CORDARONE) tablet 400 mg  400 mg Oral Q12H    ondansetron (ZOFRAN) injection 4 mg  4 mg IntraVENous Q4H PRN    albuterol (PROVENTIL VENTOLIN) nebulizer solution 2.5 mg  2.5 mg Nebulization Q4H PRN    aspirin chewable tablet 81 mg  81 mg Oral DAILY    famotidine (PEPCID) tablet 20 mg  20 mg Oral Q12H    magnesium oxide (MAG-OX) tablet 400 mg  400 mg Oral BID    calcium chloride 1 g in 0.9% sodium chloride 50 mL IVPB  1 g IntraVENous PRN    bisacodyl (DULCOLAX) suppository 10 mg  10 mg Rectal DAILY PRN    senna-docusate (PERICOLACE) 8.6-50 mg per tablet 1 Tab  1 Tab Oral BID    polyethylene glycol (MIRALAX) packet 17 g  17 g Oral DAILY    ELECTROLYTE REPLACEMENT PROTOCOL  1 Each Other PRN    magnesium sulfate 1 g/100 ml IVPB (premix or compounded)  1 g IntraVENous PRN    insulin regular (NOVOLIN R, HUMULIN R) 100 Units in 0.9% sodium chloride 100 mL infusion  1-50 Units/hr IntraVENous TITRATE    glucose chewable tablet 16 g  4 Tab Oral PRN    dextrose (D50W) injection syrg 12.5-25 g  12.5-25 g IntraVENous PRN    glucagon (GLUCAGEN) injection 1 mg  1 mg IntraMUSCular PRN    insulin lispro (HUMALOG) injection   SubCUTAneous TIDAC    insulin lispro (HUMALOG) injection   SubCUTAneous AC&HS    insulin glargine (LANTUS) injection 1-50 Units  1-50 Units SubCUTAneous ONCE PRN    sodium chloride 0.9 % bolus infusion 250 mL  250 mL IntraVENous ONCE PRN    milrinone (PRIMACOR) 20 MG/100 ML D5W infusion  0.375 mcg/kg/min IntraVENous CONTINUOUS    epoprostenol (VELETRI) 1,500,000 ng in 0.9% sodium chloride 50 mL inhalation solution  50 ng/kg/min (Ideal) Inhalation CONTINUOUS    0.9% sodium chloride inhalation  3.8-14 mL/hr Inhalation CONTINUOUS    propofol (DIPRIVAN) infusion  0-50 mcg/kg/min IntraVENous TITRATE    amiodarone (CORDARONE) 900 mg/250 ml D5W infusion  0.5 mg/min IntraVENous TITRATE    vasopressin (VASOSTRICT) 20 Units in 0.9% sodium chloride 100 mL infusion  0-0.04 Units/min IntraVENous TITRATE    chlorhexidine (PERIDEX) 0.12 % mouthwash 15 mL  15 mL Oral Q12H    EPINEPHrine (ADRENALIN) 5,000 mcg in 0.9% sodium chloride 250 mL infusion  1-10 mcg/min IntraVENous TITRATE    0.9% sodium chloride infusion  50 mL/hr IntraVENous CONTINUOUS    DOBUTamine (DOBUTREX) 500 mg/250 mL (2,000 mcg/mL) infusion  0-10 mcg/kg/min IntraVENous TITRATE    dexmedeTOMidine (PRECEDEX) 400 mcg in 0.9% sodium chloride 100 mL infusion  0.2-0.7 mcg/kg/hr IntraVENous TITRATE    PHENYLephrine (NEOSYNEPHRINE) 30,000 mcg in 0.9% sodium chloride 250 mL infusion   mcg/min IntraVENous TITRATE    niCARdipine (CARDENE) 25 mg in 0.9% sodium chloride 250 mL infusion  0-15 mg/hr IntraVENous TITRATE Objective:      Physical Exam:  Visit Vitals    /79    Pulse (!) 103    Temp 99.9 °F (37.7 °C)    Resp (!) 35    Ht 6' 2\" (1.88 m)    Wt 239 lb 6.7 oz (108.6 kg)    SpO2 95%    BMI 30.74 kg/m2     General Appearance:  Well developed, well nourished,alert and oriented x 3, and individual in no acute distress. Ears/Nose/Mouth/Throat:   Hearing grossly normal.         Neck: Supple. Chest:   Lungs clear to auscultation bilaterally. Cardiovascular:  Regular rate and rhythm, S1, S2 normal, no murmur. Abdomen:   Soft, non-tender, bowel sounds are active. Extremities: No edema bilaterally. Skin: Warm and dry.                  Data Review:   Labs:  Recent Results (from the past 24 hour(s))   GLUCOSE, POC    Collection Time: 09/30/17 12:03 PM   Result Value Ref Range    Glucose (POC) 91 65 - 100 mg/dL    Performed by Asim Bruno    Collection Time: 09/30/17 12:04 PM   Result Value Ref Range    Glucose 91 mg/dL    Insulin order 3.3 units/hour    Insulin adminstered 3.3 units/hour    Multiplier 0.106     Low target 95 mg/dL    High target 130 mg/dL    D50 order 0.0 ml    D50 administered 0.00 ml    Minutes until next BG 60 min    Order initials mds     Administered initials carlos     GLSCOM Comments     GLUCOSE, POC    Collection Time: 09/30/17  1:10 PM   Result Value Ref Range    Glucose (POC) 105 (H) 65 - 100 mg/dL    Performed by Asim Bruno    Collection Time: 09/30/17  1:11 PM   Result Value Ref Range    Glucose 105 mg/dL    Insulin order 4.8 units/hour    Insulin adminstered 4.8 units/hour    Multiplier 0.106     Low target 95 mg/dL    High target 130 mg/dL    D50 order 0.0 ml    D50 administered 0.00 ml    Minutes until next BG 60 min    Order initials mds     Administered initials carlos     GLSCOM Comments     GLUCOSE, POC    Collection Time: 09/30/17  2:17 PM   Result Value Ref Range    Glucose (POC) 96 65 - 100 mg/dL    Performed by Becky Contreras Reynaldo Tovar    Collection Time: 09/30/17  2:18 PM   Result Value Ref Range    Glucose 96 mg/dL    Insulin order 3.8 units/hour    Insulin adminstered 3.8 units/hour    Multiplier 0.106     Low target 95 mg/dL    High target 130 mg/dL    D50 order 0.0 ml    D50 administered 0.00 ml    Minutes until next BG 60 min    Order initials mds     Administered initials mds     GLSCOM Comments     GLUCOSE, POC    Collection Time: 09/30/17  3:22 PM   Result Value Ref Range    Glucose (POC) 109 (H) 65 - 100 mg/dL    Performed by Belgica Vargas    Collection Time: 09/30/17  3:23 PM   Result Value Ref Range    Glucose 109 mg/dL    Insulin order 5.2 units/hour    Insulin adminstered 5.2 units/hour    Multiplier 0.106     Low target 95 mg/dL    High target 130 mg/dL    D50 order 0.0 ml    D50 administered 0.00 ml    Minutes until next BG 60 min    Order initials mds     Administered initials mds     GLSCOM Comments     GLUCOSE, POC    Collection Time: 09/30/17  4:30 PM   Result Value Ref Range    Glucose (POC) 120 (H) 65 - 100 mg/dL    Performed by Adela Connell    Collection Time: 09/30/17  4:30 PM   Result Value Ref Range    Glucose 120 mg/dL    Insulin order 6.4 units/hour    Insulin adminstered 6.4 units/hour    Multiplier 0.106     Low target 95 mg/dL    High target 130 mg/dL    D50 order 0.0 ml    D50 administered 0.00 ml    Minutes until next BG 60 min    Order initials mds     Administered initials mds     GLSCOM Comments     GLUCOSE, POC    Collection Time: 09/30/17  5:35 PM   Result Value Ref Range    Glucose (POC) 100 65 - 100 mg/dL    Performed by Belgica Vargas    Collection Time: 09/30/17  5:36 PM   Result Value Ref Range    Glucose 100 mg/dL    Insulin order 4.2 units/hour    Insulin adminstered 4.2 units/hour    Multiplier 0.106     Low target 95 mg/dL    High target 130 mg/dL    D50 order 0.0 ml    D50 administered 0.00 ml    Minutes until next BG 60 min    Order initials mds     Administered initials mds     GLSCOM Comments     GLUCOSE, POC    Collection Time: 09/30/17  6:40 PM   Result Value Ref Range    Glucose (POC) 128 (H) 65 - 100 mg/dL    Performed by Daryn Prado    Collection Time: 09/30/17  6:41 PM   Result Value Ref Range    Glucose 128 mg/dL    Insulin order 7.2 units/hour    Insulin adminstered 7.2 units/hour    Multiplier 0.106     Low target 95 mg/dL    High target 130 mg/dL    D50 order 0.0 ml    D50 administered 0.00 ml    Minutes until next  min    Order initials mds     Administered initials mds     GLSCOM Comments     GLUCOSE, POC    Collection Time: 09/30/17  8:43 PM   Result Value Ref Range    Glucose (POC) 97 65 - 100 mg/dL    Performed by Yolanda Cheney    Collection Time: 09/30/17  8:43 PM   Result Value Ref Range    Glucose 97 mg/dL    Insulin order 3.9 units/hour    Insulin adminstered 3.9 units/hour    Multiplier 0.106     Low target 95 mg/dL    High target 130 mg/dL    D50 order 0.0 ml    D50 administered 0.00 ml    Minutes until next  min    Order initials nav     Administered initials nav     GLSCOM Comments     GLUCOSE, POC    Collection Time: 09/30/17 10:48 PM   Result Value Ref Range    Glucose (POC) 106 (H) 65 - 100 mg/dL    Performed by Yolanda Cheney    Collection Time: 09/30/17 10:48 PM   Result Value Ref Range    Glucose 106 mg/dL    Insulin order 4.9 units/hour    Insulin adminstered 4.9 units/hour    Multiplier 0.106     Low target 95 mg/dL    High target 130 mg/dL    D50 order 0.0 ml    D50 administered 0.00 ml    Minutes until next  min    Order initials nav     Administered initials nav     GLSCOM Comments     GLUCOSE, POC    Collection Time: 10/01/17 12:52 AM   Result Value Ref Range    Glucose (POC) 92 65 - 100 mg/dL    Performed by Yolanda Cheney    Collection Time: 10/01/17 12:52 AM   Result Value Ref Range Glucose 92 mg/dL    Insulin order 2.7 units/hour    Insulin adminstered 2.7 units/hour    Multiplier 0.085     Low target 95 mg/dL    High target 130 mg/dL    D50 order 0.0 ml    D50 administered 0.00 ml    Minutes until next BG 60 min    Order initials nav     Administered initials nav     GLSCOM Comments     GLUCOSE, POC    Collection Time: 10/01/17  1:54 AM   Result Value Ref Range    Glucose (POC) 96 65 - 100 mg/dL    Performed by Arleen Sessions    Collection Time: 10/01/17  1:54 AM   Result Value Ref Range    Glucose 96 mg/dL    Insulin order 3.1 units/hour    Insulin adminstered 3.1 units/hour    Multiplier 0.085     Low target 95 mg/dL    High target 130 mg/dL    D50 order 0.0 ml    D50 administered 0.00 ml    Minutes until next BG 60 min    Order initials nav     Administered initials nav BUNDY Comments     GLUCOSE, POC    Collection Time: 10/01/17  2:57 AM   Result Value Ref Range    Glucose (POC) 97 65 - 100 mg/dL    Performed by Arleen Sessions    Collection Time: 10/01/17  2:58 AM   Result Value Ref Range    Glucose 97 mg/dL    Insulin order 3.1 units/hour    Insulin adminstered 3.1 units/hour    Multiplier 0.085     Low target 95 mg/dL    High target 130 mg/dL    D50 order 0.0 ml    D50 administered 0.00 ml    Minutes until next BG 60 min    Order initials nav     Administered initials nav     GLBAO Comments     GLUCOSE, POC    Collection Time: 10/01/17  3:55 AM   Result Value Ref Range    Glucose (POC) 98 65 - 100 mg/dL    Performed by Arleen Sessions    Collection Time: 10/01/17  3:55 AM   Result Value Ref Range    Glucose 98 mg/dL    Insulin order 3.2 units/hour    Insulin adminstered 3.2 units/hour    Multiplier 0.085     Low target 95 mg/dL    High target 130 mg/dL    D50 order 0.0 ml    D50 administered 0.00 ml    Minutes until next BG 60 min    Order initials nav     Administered initials nav     GLSCOM Comments     METABOLIC PANEL, COMPREHENSIVE    Collection Time: 10/01/17  4:14 AM   Result Value Ref Range    Sodium 139 136 - 145 mmol/L    Potassium 3.9 3.5 - 5.1 mmol/L    Chloride 111 (H) 97 - 108 mmol/L    CO2 20 (L) 21 - 32 mmol/L    Anion gap 8 5 - 15 mmol/L    Glucose 107 (H) 65 - 100 mg/dL    BUN 18 6 - 20 MG/DL    Creatinine 0.92 0.70 - 1.30 MG/DL    BUN/Creatinine ratio 20 12 - 20      GFR est AA >60 >60 ml/min/1.73m2    GFR est non-AA >60 >60 ml/min/1.73m2    Calcium 7.6 (L) 8.5 - 10.1 MG/DL    Bilirubin, total 0.5 0.2 - 1.0 MG/DL    ALT (SGPT) 17 12 - 78 U/L    AST (SGOT) 20 15 - 37 U/L    Alk.  phosphatase 48 45 - 117 U/L    Protein, total 5.0 (L) 6.4 - 8.2 g/dL    Albumin 2.8 (L) 3.5 - 5.0 g/dL    Globulin 2.2 2.0 - 4.0 g/dL    A-G Ratio 1.3 1.1 - 2.2     MAGNESIUM    Collection Time: 10/01/17  4:14 AM   Result Value Ref Range    Magnesium 2.2 1.6 - 2.4 mg/dL   CBC W/O DIFF    Collection Time: 10/01/17  4:14 AM   Result Value Ref Range    WBC 12.3 (H) 4.1 - 11.1 K/uL    RBC 3.12 (L) 4.10 - 5.70 M/uL    HGB 9.2 (L) 12.1 - 17.0 g/dL    HCT 26.3 (L) 36.6 - 50.3 %    MCV 84.3 80.0 - 99.0 FL    MCH 29.5 26.0 - 34.0 PG    MCHC 35.0 30.0 - 36.5 g/dL    RDW 14.0 11.5 - 14.5 %    PLATELET 46 (LL) 969 - 400 K/uL   GLUCOSE, POC    Collection Time: 10/01/17  4:59 AM   Result Value Ref Range    Glucose (POC) 99 65 - 100 mg/dL    Performed by [facility]    Collection Time: 10/01/17  4:59 AM   Result Value Ref Range    Glucose 99 mg/dL    Insulin order 3.3 units/hour    Insulin adminstered 3.3 units/hour    Multiplier 0.085     Low target 95 mg/dL    High target 130 mg/dL    D50 order 0.0 ml    D50 administered 0.00 ml    Minutes until next BG 60 min    Order initials nav     Administered initials nav     GLSCOM Comments     GLUCOSE, POC    Collection Time: 10/01/17  6:07 AM   Result Value Ref Range    Glucose (POC) 108 (H) 65 - 100 mg/dL    Performed by [facility]    Collection Time: 10/01/17  6:08 AM Result Value Ref Range    Glucose 108 mg/dL    Insulin order 4.1 units/hour    Insulin adminstered 4.1 units/hour    Multiplier 0.085     Low target 95 mg/dL    High target 130 mg/dL    D50 order 0.0 ml    D50 administered 0.00 ml    Minutes until next BG 60 min    Order initials nav     Administered initials nav     GLSCOM Comments     PLATELETS, ALLOCATE    Collection Time: 10/01/17  7:00 AM   Result Value Ref Range    Unit number [id number]     Blood component type PLPH LR,PAS 1     Unit division 00     Status of unit ISSUED    GLUCOSE, POC    Collection Time: 10/01/17  7:11 AM   Result Value Ref Range    Glucose (POC) 102 (H) 65 - 100 mg/dL    Performed by Justo Partida    Collection Time: 10/01/17  7:11 AM   Result Value Ref Range    Glucose 102 mg/dL    Insulin order 3.6 units/hour    Insulin adminstered 3.6 units/hour    Multiplier 0.085     Low target 95 mg/dL    High target 130 mg/dL    D50 order 0.0 ml    D50 administered 0.00 ml    Minutes until next  min    Order initials nav     Administered initials nav     GLSCOM Comments     GLUCOSE, POC    Collection Time: 10/01/17  9:14 AM   Result Value Ref Range    Glucose (POC) 89 65 - 100 mg/dL    Performed by Clarita Eng    Collection Time: 10/01/17  9:14 AM   Result Value Ref Range    Glucose 89 mg/dL    Insulin order 2.0 units/hour    Insulin adminstered 2.0 units/hour    Multiplier 0.068     Low target 95 mg/dL    High target 130 mg/dL    D50 order 0.0 ml    D50 administered 0.00 ml    Minutes until next BG 60 min    Order initials mds     Administered initials mds     GLSCOM Comments     GLUCOSE, POC    Collection Time: 10/01/17 10:30 AM   Result Value Ref Range    Glucose (POC) 126 (H) 65 - 100 mg/dL    Performed by Narendra Paetl    Collection Time: 10/01/17 10:30 AM   Result Value Ref Range    Glucose 126 mg/dL    Insulin order 4.5 units/hour    Insulin adminstered 4.5 units/hour Multiplier 0.068     Low target 95 mg/dL    High target 130 mg/dL    D50 order 0.0 ml    D50 administered 0.00 ml    Minutes until next BG 60 min    Order initials mds     Administered initials mds     GLSCOM Comments     GLUCOSTABILIZER    Collection Time: 10/01/17 11:37 AM   Result Value Ref Range    Glucose 128 mg/dL    Insulin order 4.6 units/hour    Insulin adminstered 4.6 units/hour    Multiplier 0.068     Low target 95 mg/dL    High target 130 mg/dL    D50 order 0.0 ml    D50 administered 0.00 ml    Minutes until next BG 60 min    Order initials mds     Administered initials mds     GLSCOM Comments         Telemetry: sinus tachycardia      Assessment:     Principal Problem:    NSTEMI (non-ST elevated myocardial infarction) ([facility] [address] (9/26/2017)    Active Problems:    Family history of early CAD (9/26/2017)      Overview: Significant for mother, brother      Systolic heart failure ([facility] [address] (9/27/2017)      Coronary artery disease involving native coronary artery with unstable angina pectoris ([facility] [address] (9/27/2017)      Type II diabetes mellitus, uncontrolled ([facility] [address] (9/27/2017)      CAD (coronary artery disease), native coronary artery (9/27/2017)      CAD (coronary artery disease) (9/29/2017)      S/P CABG x 3 (9/29/2017)      Overview: ON PUMP CABG X 3, LIMA to LAD, LRAG to OM, RSVG to PDA      Left radial artery harvest      Right greater saphenous vein harvest        Plan:     Start coreg/ACEI once dobutamine off. Will repeat echo in 2-3 days. Will need lifevest if EF < 35 before discharge.     Diane Lebron MD

## 2017-10-01 NOTE — PROGRESS NOTES
1900 Bedside and Verbal shift change report given to [facility] (oncoming nurse) by Jefe Simon (offgoing nurse). Report included the following information SBAR, OR Summary, Intake/Output, MAR and Cardiac Rhythm ST   2022 Cardene gtt reduced to 0.5 mg/hr. .  2052 Cardene gtt stopped. . No spasms noted in left arm. Will continue to monitor. 1709 Critical platelet count of 46 this am. Dr. Biju Ortez paged,  2562 Dr. Biju Ortez updated on platelet count.  Six-pack of platelets ordered to be transfused as soon as possible,

## 2017-10-02 ENCOUNTER — APPOINTMENT (OUTPATIENT)
Dept: GENERAL RADIOLOGY | Age: 52
DRG: 233 | End: 2017-10-02
Attending: PHYSICIAN ASSISTANT
Payer: COMMERCIAL

## 2017-10-02 LAB
ALBUMIN SERPL-MCNC: 2.8 G/DL (ref 3.5–5)
ALBUMIN/GLOB SERPL: 0.9 {RATIO} (ref 1.1–2.2)
ALP SERPL-CCNC: 75 U/L (ref 45–117)
ALT SERPL-CCNC: 16 U/L (ref 12–78)
ANION GAP SERPL CALC-SCNC: 10 MMOL/L (ref 5–15)
AST SERPL-CCNC: 17 U/L (ref 15–37)
BILIRUB SERPL-MCNC: 0.5 MG/DL (ref 0.2–1)
BLD PROD TYP BPU: NORMAL
BPU ID: NORMAL
BUN SERPL-MCNC: 24 MG/DL (ref 6–20)
BUN/CREAT SERPL: 24 (ref 12–20)
CALCIUM SERPL-MCNC: 7.7 MG/DL (ref 8.5–10.1)
CHLORIDE SERPL-SCNC: 105 MMOL/L (ref 97–108)
CO2 SERPL-SCNC: 22 MMOL/L (ref 21–32)
CREAT SERPL-MCNC: 0.99 MG/DL (ref 0.7–1.3)
ERYTHROCYTE [DISTWIDTH] IN BLOOD BY AUTOMATED COUNT: 14.2 % (ref 11.5–14.5)
GLOBULIN SER CALC-MCNC: 3 G/DL (ref 2–4)
GLUCOSE BLD STRIP.AUTO-MCNC: 193 MG/DL (ref 65–100)
GLUCOSE BLD STRIP.AUTO-MCNC: 200 MG/DL (ref 65–100)
GLUCOSE BLD STRIP.AUTO-MCNC: 204 MG/DL (ref 65–100)
GLUCOSE BLD STRIP.AUTO-MCNC: 246 MG/DL (ref 65–100)
GLUCOSE SERPL-MCNC: 189 MG/DL (ref 65–100)
HCT VFR BLD AUTO: 28 % (ref 36.6–50.3)
HGB BLD-MCNC: 9.6 G/DL (ref 12.1–17)
MAGNESIUM SERPL-MCNC: 2.4 MG/DL (ref 1.6–2.4)
MCH RBC QN AUTO: 28.8 PG (ref 26–34)
MCHC RBC AUTO-ENTMCNC: 34.3 G/DL (ref 30–36.5)
MCV RBC AUTO: 84.1 FL (ref 80–99)
PLATELET # BLD AUTO: 137 K/UL (ref 150–400)
POTASSIUM SERPL-SCNC: 4.3 MMOL/L (ref 3.5–5.1)
PROT SERPL-MCNC: 5.8 G/DL (ref 6.4–8.2)
RBC # BLD AUTO: 3.33 M/UL (ref 4.1–5.7)
SERVICE CMNT-IMP: ABNORMAL
SODIUM SERPL-SCNC: 137 MMOL/L (ref 136–145)
STATUS OF UNIT,%ST: NORMAL
UNIT DIVISION, %UDIV: 0
WBC # BLD AUTO: 15.1 K/UL (ref 4.1–11.1)

## 2017-10-02 PROCEDURE — 97116 GAIT TRAINING THERAPY: CPT

## 2017-10-02 PROCEDURE — 97161 PT EVAL LOW COMPLEX 20 MIN: CPT

## 2017-10-02 PROCEDURE — 83735 ASSAY OF MAGNESIUM: CPT | Performed by: PHYSICIAN ASSISTANT

## 2017-10-02 PROCEDURE — 74011636637 HC RX REV CODE- 636/637: Performed by: INTERNAL MEDICINE

## 2017-10-02 PROCEDURE — 74011250636 HC RX REV CODE- 250/636: Performed by: PHYSICIAN ASSISTANT

## 2017-10-02 PROCEDURE — G8988 SELF CARE GOAL STATUS: HCPCS | Performed by: OCCUPATIONAL THERAPIST

## 2017-10-02 PROCEDURE — 85027 COMPLETE CBC AUTOMATED: CPT | Performed by: PHYSICIAN ASSISTANT

## 2017-10-02 PROCEDURE — 74011250637 HC RX REV CODE- 250/637: Performed by: PHYSICIAN ASSISTANT

## 2017-10-02 PROCEDURE — 97165 OT EVAL LOW COMPLEX 30 MIN: CPT | Performed by: OCCUPATIONAL THERAPIST

## 2017-10-02 PROCEDURE — 74011250637 HC RX REV CODE- 250/637: Performed by: INTERNAL MEDICINE

## 2017-10-02 PROCEDURE — 82962 GLUCOSE BLOOD TEST: CPT

## 2017-10-02 PROCEDURE — 74011250636 HC RX REV CODE- 250/636: Performed by: THORACIC SURGERY (CARDIOTHORACIC VASCULAR SURGERY)

## 2017-10-02 PROCEDURE — 97162 PT EVAL MOD COMPLEX 30 MIN: CPT

## 2017-10-02 PROCEDURE — 36415 COLL VENOUS BLD VENIPUNCTURE: CPT | Performed by: PHYSICIAN ASSISTANT

## 2017-10-02 PROCEDURE — 80053 COMPREHEN METABOLIC PANEL: CPT | Performed by: PHYSICIAN ASSISTANT

## 2017-10-02 PROCEDURE — G8987 SELF CARE CURRENT STATUS: HCPCS | Performed by: OCCUPATIONAL THERAPIST

## 2017-10-02 PROCEDURE — 77010033678 HC OXYGEN DAILY

## 2017-10-02 PROCEDURE — 65620000000 HC RM CCU GENERAL

## 2017-10-02 PROCEDURE — 71010 XR CHEST PORT: CPT

## 2017-10-02 PROCEDURE — 97535 SELF CARE MNGMENT TRAINING: CPT | Performed by: OCCUPATIONAL THERAPIST

## 2017-10-02 PROCEDURE — 74011636637 HC RX REV CODE- 636/637: Performed by: PHYSICIAN ASSISTANT

## 2017-10-02 PROCEDURE — 74011250637 HC RX REV CODE- 250/637: Performed by: NURSE PRACTITIONER

## 2017-10-02 RX ORDER — INSULIN GLARGINE 100 [IU]/ML
30 INJECTION, SOLUTION SUBCUTANEOUS DAILY
Status: DISCONTINUED | OUTPATIENT
Start: 2017-10-02 | End: 2017-10-04 | Stop reason: HOSPADM

## 2017-10-02 RX ORDER — POTASSIUM CHLORIDE 750 MG/1
20 TABLET, FILM COATED, EXTENDED RELEASE ORAL DAILY
Status: DISCONTINUED | OUTPATIENT
Start: 2017-10-02 | End: 2017-10-04 | Stop reason: HOSPADM

## 2017-10-02 RX ORDER — ATORVASTATIN CALCIUM 10 MG/1
10 TABLET, FILM COATED ORAL
Status: DISCONTINUED | OUTPATIENT
Start: 2017-10-02 | End: 2017-10-03

## 2017-10-02 RX ORDER — FUROSEMIDE 40 MG/1
40 TABLET ORAL DAILY
Status: DISCONTINUED | OUTPATIENT
Start: 2017-10-02 | End: 2017-10-04 | Stop reason: HOSPADM

## 2017-10-02 RX ORDER — CARVEDILOL 3.12 MG/1
3.12 TABLET ORAL 2 TIMES DAILY WITH MEALS
Status: DISCONTINUED | OUTPATIENT
Start: 2017-10-02 | End: 2017-10-03

## 2017-10-02 RX ORDER — ENOXAPARIN SODIUM 100 MG/ML
40 INJECTION SUBCUTANEOUS EVERY 24 HOURS
Status: DISCONTINUED | OUTPATIENT
Start: 2017-10-02 | End: 2017-10-04 | Stop reason: HOSPADM

## 2017-10-02 RX ORDER — ONDANSETRON 2 MG/ML
8 INJECTION INTRAMUSCULAR; INTRAVENOUS
Status: DISCONTINUED | OUTPATIENT
Start: 2017-10-02 | End: 2017-10-04 | Stop reason: HOSPADM

## 2017-10-02 RX ORDER — METOCLOPRAMIDE HYDROCHLORIDE 5 MG/ML
10 INJECTION INTRAMUSCULAR; INTRAVENOUS
Status: DISCONTINUED | OUTPATIENT
Start: 2017-10-02 | End: 2017-10-04 | Stop reason: HOSPADM

## 2017-10-02 RX ORDER — GLIPIZIDE 5 MG/1
5 TABLET ORAL
Status: DISCONTINUED | OUTPATIENT
Start: 2017-10-02 | End: 2017-10-04 | Stop reason: HOSPADM

## 2017-10-02 RX ORDER — POLYETHYLENE GLYCOL 3350 17 G/17G
34 POWDER, FOR SOLUTION ORAL DAILY
Status: DISCONTINUED | OUTPATIENT
Start: 2017-10-03 | End: 2017-10-04 | Stop reason: HOSPADM

## 2017-10-02 RX ADMIN — DOCUSATE SODIUM AND SENNOSIDES 1 TABLET: 8.6; 5 TABLET, FILM COATED ORAL at 18:04

## 2017-10-02 RX ADMIN — METOCLOPRAMIDE 10 MG: 5 INJECTION, SOLUTION INTRAMUSCULAR; INTRAVENOUS at 12:49

## 2017-10-02 RX ADMIN — ASPIRIN 81 MG 81 MG: 81 TABLET ORAL at 08:27

## 2017-10-02 RX ADMIN — MUPIROCIN: 20 OINTMENT TOPICAL at 18:10

## 2017-10-02 RX ADMIN — ONDANSETRON 8 MG: 2 INJECTION INTRAMUSCULAR; INTRAVENOUS at 08:41

## 2017-10-02 RX ADMIN — ENOXAPARIN SODIUM 40 MG: 40 INJECTION SUBCUTANEOUS at 11:00

## 2017-10-02 RX ADMIN — POLYETHYLENE GLYCOL 3350 17 G: 17 POWDER, FOR SOLUTION ORAL at 08:28

## 2017-10-02 RX ADMIN — CARVEDILOL 3.12 MG: 3.12 TABLET, FILM COATED ORAL at 11:00

## 2017-10-02 RX ADMIN — Medication 400 MG: at 18:04

## 2017-10-02 RX ADMIN — Medication 10 ML: at 13:20

## 2017-10-02 RX ADMIN — GLIPIZIDE 5 MG: 5 TABLET ORAL at 16:36

## 2017-10-02 RX ADMIN — Medication 10 ML: at 06:46

## 2017-10-02 RX ADMIN — ONDANSETRON 4 MG: 2 INJECTION INTRAMUSCULAR; INTRAVENOUS at 02:11

## 2017-10-02 RX ADMIN — CARVEDILOL 3.12 MG: 3.12 TABLET, FILM COATED ORAL at 16:36

## 2017-10-02 RX ADMIN — ATORVASTATIN CALCIUM 10 MG: 10 TABLET, FILM COATED ORAL at 21:15

## 2017-10-02 RX ADMIN — CHLORHEXIDINE GLUCONATE 15 ML: 1.2 RINSE ORAL at 08:27

## 2017-10-02 RX ADMIN — FAMOTIDINE 20 MG: 20 TABLET ORAL at 21:15

## 2017-10-02 RX ADMIN — CHLORHEXIDINE GLUCONATE 15 ML: 1.2 RINSE ORAL at 21:00

## 2017-10-02 RX ADMIN — INSULIN LISPRO 3 UNITS: 100 INJECTION, SOLUTION INTRAVENOUS; SUBCUTANEOUS at 16:35

## 2017-10-02 RX ADMIN — Medication 10 ML: at 21:15

## 2017-10-02 RX ADMIN — INSULIN GLARGINE 30 UNITS: 100 INJECTION, SOLUTION SUBCUTANEOUS at 11:00

## 2017-10-02 RX ADMIN — INSULIN LISPRO 3 UNITS: 100 INJECTION, SOLUTION INTRAVENOUS; SUBCUTANEOUS at 08:27

## 2017-10-02 RX ADMIN — FAMOTIDINE 20 MG: 20 TABLET ORAL at 08:28

## 2017-10-02 RX ADMIN — LISINOPRIL 2.5 MG: 5 TABLET ORAL at 08:28

## 2017-10-02 RX ADMIN — FUROSEMIDE 40 MG: 40 TABLET ORAL at 11:00

## 2017-10-02 RX ADMIN — MUPIROCIN: 20 OINTMENT TOPICAL at 08:36

## 2017-10-02 RX ADMIN — POTASSIUM CHLORIDE 20 MEQ: 750 TABLET, FILM COATED, EXTENDED RELEASE ORAL at 11:00

## 2017-10-02 RX ADMIN — DOCUSATE SODIUM AND SENNOSIDES 1 TABLET: 8.6; 5 TABLET, FILM COATED ORAL at 08:28

## 2017-10-02 RX ADMIN — SODIUM CHLORIDE 50 ML/HR: 900 INJECTION, SOLUTION INTRAVENOUS at 20:20

## 2017-10-02 RX ADMIN — INSULIN LISPRO 3 UNITS: 100 INJECTION, SOLUTION INTRAVENOUS; SUBCUTANEOUS at 11:09

## 2017-10-02 RX ADMIN — Medication 400 MG: at 08:28

## 2017-10-02 NOTE — INTERDISCIPLINARY ROUNDS
Interdisciplinary team rounds were held 10/2/2017 with the following team members:Care Management, Diabetes Treatment Specialist, Nursing, Nutrition, Pharmacy, Physical Therapy, Physician and Respiratory Therapy. Plan of care discussed. See clinical pathway and/or care plan for interventions and desired outcomes.

## 2017-10-02 NOTE — DIABETES MGMT
[facility] Cardiac Surgery Education Note    Recommendations/ Comments:  Patient will likely need the following prescriptions at discharge:    Lantus Solostar insulin pens  Insulin pen needles  AccuChek Guide test strips  AccuChek Fastclix lancets        Chart reviewed and initial evaluation complete on Emily Duran. Met with patient and his wife. Patient is agreeable to insulin at discharge. Patient deferred education to his wife. Patient is a 45 y.o. male with new diagnosis of diabetes. A1c:   Lab Results   Component Value Date/Time    Hemoglobin A1c 9.4 09/27/2017 05:35 AM          Assessed and instructed patient on the following:   · risk of sternal wound infection/ delayed healing   · interpretation of lab results, blood sugar goals, complications of diabetes mellitus, hypoglycemia prevention and treatment, SMBG skills, nutrition, referred to Diabetes Educator, site rotation and use of insulin pen. Encouraged the following: increased exercise, dietary modifications: avoid skipping meals, eat balanced meals using the balanced plate technique, decrease the amount of saturated fats    Provided patient with the following: [x]         Survival skills education materials               [x]         Insulin education materials               []         CHO counting education materials               [x]         BG guidelines for post-op patients                  [x]         Outpatient [facility] contact number               [x]         Glucometer                               Wife was able to give return demonstration of:     [x]         Glucometer     [x]         saline injection          no/ [x]         minimal assistance needed. [x]         Nurse to have patient self inject prior to discharge.     Recent Glucose Results:   Lab Results   Component Value Date/Time     (H) 10/02/2017 03:52 AM    GLUCPOC 246 (H) 10/02/2017 11:06 AM    GLUCPOC 204 (H) 10/02/2017 08:20 AM    GLUCPOC 224 (H) 10/01/2017 10:06 PM        Lab Results   Component Value Date/Time    Creatinine 0.99 10/02/2017 03:52 AM     Estimated Creatinine Clearance: 114.6 mL/min (based on Cr of 0.99). Active Orders   Diet    DIET DIABETIC WITH OPTIONS Consistent Carb 2200kcal; Regular; 2 GM NA (House Low NA)        PO intake: No data found. Will continue to follow as needed. Thank you.   Leighann Granados, [address] [place] [address], [address]  Office:  [phone]

## 2017-10-02 NOTE — PROGRESS NOTES
Cardiac Surgery ICU Progress Note    Admit Date: 9/26/2017    POD: 3 Days Post-Op      Problems:  Principal Problem:    NSTEMI (non-ST elevated myocardial infarction) ([facility] (9/26/2017)    Active Problems:    Family history of early CAD (9/26/2017)      Overview: Significant for mother, brother      Systolic heart failure ([address] (9/27/2017)      Coronary artery disease involving native coronary artery with unstable angina pectoris ([facility] (9/27/2017)      Type II diabetes mellitus, uncontrolled ([address] (9/27/2017)      CAD (coronary artery disease), native coronary artery (9/27/2017)      CAD (coronary artery disease) (9/29/2017)      S/P CABG x 3 (9/29/2017)      Overview: ON PUMP CABG X 3, LIMA to LAD, LRAG to OM, RSVG to PDA      Left radial artery harvest      Right greater saphenous vein harvest        Procedure:  Procedure(s):  ON PUMP CABG X 3 USING RIGHT SAPHENOUS VEIN AND LEFT RADIAL ARTERY VIA EVH WITH INTRAOPERATIVE ALFREDO      Summary:     Stable hemodynamics in sinus rhythm without ectopy on no support. CXR:   There is no apparent pneumothorax. Lungs show persistent left   greater than right bibasilar haziness favoring effusions and atelectasis. Cr 0.9 UOP 1900/24 hrs  Worked well with PT/OT     Plan/Recommendations/Medical Decision Making:     Repeat Echo prior to discharge  Disabling nausea: stop amio  Coreg 3.125  Lovenox  Mild diuresis  Anticipated acute blood loss anemia  Increase activity  Increase I/S effort and frequency  Sternal precautions  Stimulate bowel movement  Patient seen and reviewed with  Dr. Rebel Sommer MD       Objective:     CXR Results  (Last 48 hours)               10/02/17 0503  XR CHEST PORT Final result    Impression:  IMPRESSION: No significant change. Narrative:  INDICATION:  postop heart 9/29/2017 coronary artery bypass graft for coronary   artery disease. EXAM: CXR Portable. FINDINGS: Portable chest shows [place]-Kristen removal with stable CVL since   yesterday. There is no apparent pneumothorax. Lungs show persistent left   greater than right bibasilar haziness favoring effusions and atelectasis. Heart   size is large, stable. There is no overt pulmonary edema. 10/01/17 0437  XR CHEST PORT Final result    Impression:  Impression:   Increased retrocardiac opacification which could related to atelectasis versus   developing airspace disease. Otherwise unchanged. Narrative:  Chest portable AP       History: Postop heart. Myocardial infarction. Comparison: 2017       Findings:  The patient is status post median sternotomy and CABG. Unchanged   subxiphoid be several drains are in place. The IABP marker is unchanged in the   AP window. Unchanged right IJ central venous catheter and [place]-Kristen catheter. Left chest tube unchanged. Multiple clips in the right axilla. The heart is   mildly enlarged, but unchanged. There is increased retrocardiac opacification. No significant pleural effusion or pneumothorax. There is unchanged mild edema. The visualized osseous structures appear unremarkable. Labs: Recent Labs      10/02/17   0820  10/02/17   0352   17   WBC   --   15.1*   < >  12.5*   HGB   --   9.6*   < >  10.4*   HCT   --   28.0*   < >  29.3*   PLT   --   137*   < >  75*   NA   --   137   < >  139   K   --   4.3   < >  4.3   BUN   --   24*   < >  14   CREA   --   0.99   < >  1.32*   GLU   --   189*   < >  196*   GLUCPOC  204*   --    < >   --    INR   --    --    --   1.2*    < > = values in this interval not displayed.        Vitals:    Visit Vitals    /88    Pulse 94    Temp 98.8 °F (37.1 °C)    Resp (!) 38    Ht 6' 2\" (1.88 m)    Wt 239 lb 6.7 oz (108.6 kg)    SpO2 100%    BMI 30.74 kg/m2       Temp (24hrs), Av.7 °F (37.6 °C), Min:98.8 °F (37.1 °C), Max:100.1 °F (37.8 °C)      Last 3 Recorded Weights in this Encounter    17 0623 17 1210 09/30/17 0200   Weight: 218 lb 4.1 oz (99 kg) 223 lb 5.2 oz (101.3 kg) 239 lb 6.7 oz (108.6 kg)       Oxygen Therapy:  Oxygen Therapy  O2 Sat (%): 100 % (10/02/17 0700)  Pulse via Oximetry: 93 beats per minute (10/02/17 0700)  O2 Device: Room air (10/02/17 0700)  O2 Flow Rate (L/min): 2 l/min (10/02/17 0400)  FIO2 (%): 50 % (09/30/17 0800)    Admission Weight: Last Weight   Weight: 233 lb 7.5 oz (105.9 kg) Weight: 239 lb 6.7 oz (108.6 kg)     Intake / Output / Drain:  Current Shift:    Last 24 hrs.:   Intake/Output Summary (Last 24 hours) at 10/02/17 0859  Last data filed at 10/02/17 0700   Gross per 24 hour   Intake           1944.9 ml   Output             1900 ml   Net             44.9 ml         EXAM:      General: OOB to chair      Chest:  Stable sternum      Incisions: dry and intact    Lungs:    Rhonchi bilaterally. Heart:  Regular rate and rhythm, S1, S2 normal, no murmur, no click,      Abdomen:   Soft, non-tender. Bowel sounds present. Extremities:  mild edema     Neurologic:  Gross motor and sensory apparatus intact.        Signed By: ITZ Jacobs

## 2017-10-02 NOTE — PROGRESS NOTES
[facility] Consult Service Progress NOTE  Pulmonary, Critical Care, and Sleep Medicine    Name: Mando Helms MRN: [id number]   : 1965 Hospital: [address]   Date: 10/2/2017  Admission Date: 2017     IMPRESSION:   CAD s/p NSTEMI ->S/p CABG     s/p ON PUMP CABG X 3, LIMA to LAD, LRAG to OM, RSVG to PDA  Hx DM, htn, non smoker      RECOMMENDATIONS/PLAN:   1. Incentive Spirometry. 2.  drips per CTS  3. Post op analgesia  4. Glycemic control  5. Gi/dvt/early mobilization     10-2-17: Pt is seen sitting up in chair this am. Has mild sternal pain, Was feeling hot and felt like he had an increased temp. No headache. No back pain, no leg pain. 17 Pt was seen in the ICU post op from CABG. He is on vent, pressors, sedation. Chest tube in place. IABP is in place at 1:1. Chart and notes reviewed. Data reviewed. I have evaluated and examined the patient. Mando Helms is a 45 y.o. male admitted for NSTEMI (non-ST elevated myocardial infarction) ([facility] No previous cardiac history. Significant family hx of mother and brother with MIs early 46s. Pt  had palpitations, mod substernal CP that started while he was at work. Lasted 40minutes. Associated chills, diaphoresis, n/v. Had similar episode 1 month ago, but did not follow up with MD.  ECG with flipped [place] and slight ST depression laterally. Troponin 1.7.    . Taken to cath lab by Yanni Aguero and shown to have significant lesions. CTs consulted. Moved to CCU for PA catheter. Echo pending. Pt has No PCP nor does his wife. . No longer checks sugars. Goes to urgent care prn. Moved to [place] years ago. Very much against vaccinations. No recent chest or sinus infection. No GERD or GI complaints.  PA cathter in place but unable to get wedge. Poor waveform.  Pt in no distress except for neck pain            Risk of deterioration: medium and high    [x]      High complexity decision making was performed   [x]      See my orders for details    Hospital Day: 7    Allergies   Allergen Reactions    Latex Other (comments)     Allergy documented in admission data base    Influenza Virus Vaccine, Specific Other (comments)     Allergy found in admission data base        Current Facility-Administered Medications   Medication    0.9% sodium chloride infusion 250 mL    lisinopril (PRINIVIL, ZESTRIL) tablet 2.5 mg    sodium chloride (NS) flush 5-10 mL    sodium chloride (NS) flush 5-10 mL    0.45% sodium chloride infusion    0.9% sodium chloride infusion    oxyCODONE-acetaminophen (PERCOCET) 5-325 mg per tablet 1 Tab    oxyCODONE-acetaminophen (PERCOCET) 5-325 mg per tablet 2 Tab    morphine injection 4 mg    naloxone (NARCAN) injection 0.4 mg    mupirocin (BACTROBAN) 2 % ointment    amiodarone (CORDARONE) tablet 400 mg    ondansetron (ZOFRAN) injection 4 mg    albuterol (PROVENTIL VENTOLIN) nebulizer solution 2.5 mg    aspirin chewable tablet 81 mg    famotidine (PEPCID) tablet 20 mg    magnesium oxide (MAG-OX) tablet 400 mg    calcium chloride 1 g in 0.9% sodium chloride 50 mL IVPB    bisacodyl (DULCOLAX) suppository 10 mg    senna-docusate (PERICOLACE) 8.6-50 mg per tablet 1 Tab    polyethylene glycol (MIRALAX) packet 17 g    ELECTROLYTE REPLACEMENT PROTOCOL    magnesium sulfate 1 g/100 ml IVPB (premix or compounded)    insulin regular (NOVOLIN R, HUMULIN R) 100 Units in 0.9% sodium chloride 100 mL infusion    glucose chewable tablet 16 g    dextrose (D50W) injection syrg 12.5-25 g    glucagon (GLUCAGEN) injection 1 mg    insulin lispro (HUMALOG) injection    insulin lispro (HUMALOG) injection    sodium chloride 0.9 % bolus infusion 250 mL    milrinone (PRIMACOR) 20 MG/100 ML D5W infusion    epoprostenol (VELETRI) 1,500,000 ng in 0.9% sodium chloride 50 mL inhalation solution    0.9% sodium chloride inhalation    propofol (DIPRIVAN) infusion    amiodarone (CORDARONE) 900 mg/250 ml D5W infusion    vasopressin (VASOSTRICT) 20 Units in 0.9% sodium chloride 100 mL infusion    chlorhexidine (PERIDEX) 0.12 % mouthwash 15 mL    EPINEPHrine (ADRENALIN) 5,000 mcg in 0.9% sodium chloride 250 mL infusion    0.9% sodium chloride infusion    DOBUTamine (DOBUTREX) 500 mg/250 mL (2,000 mcg/mL) infusion    dexmedeTOMidine (PRECEDEX) 400 mcg in 0.9% sodium chloride 100 mL infusion    PHENYLephrine (NEOSYNEPHRINE) 30,000 mcg in 0.9% sodium chloride 250 mL infusion    niCARdipine (CARDENE) 25 mg in 0.9% sodium chloride 250 mL infusion      Social History   Substance Use Topics    Smoking status: Never Smoker    Smokeless tobacco: Never Used    Alcohol use No      History reviewed. No pertinent family history. Vital Signs: Intake/Output: Intake/Output:   Temp (24hrs), Av.6 °F (37.6 °C), Min:98.8 °F (37.1 °C), Max:100.1 °F (37.8 °C)      Visit Vitals    /76    Pulse 91    Temp 98.8 °F (37.1 °C)    Resp 30    Ht 6' 2\" (1.88 m)    Wt 108.6 kg (239 lb 6.7 oz)    SpO2 98%    BMI 30.74 kg/m2         O2 Device: Nasal cannula  O2 Flow Rate (L/min): 2 l/min    Wt Readings from Last 4 Encounters:   17 108.6 kg (239 lb 6.7 oz)          Intake/Output Summary (Last 24 hours) at 10/02/17 0731  Last data filed at 10/02/17 0600   Gross per 24 hour   Intake          2115.85 ml   Output             1690 ml   Net           425.85 ml       Last shift:           Last 3 shifts:  190 - 10/02 07  In: 3400.5 [P.O.:340; I.V.:2810.7]  Out: 2380 [Urine:2250; Drains:130]     Telemetry:normal sinus rhythm    Physical Exam:    General: WM intubated and sedated. HEENT: NCAT              Chest:  Sternal dressing is intact, chest tube intact. Lungs: decreased air exchange bibasilar but clear anteriorly. Heart: Pulses in 120s, has IABP in place at 1:1   Abdomen: soft, non-tender, without masses or organomegaly   : jesus in place.     Extremity: negative, cyanosis, clubbing, warm. Skin: Skin color, texture, turgor normal. No rashes or lesions; Normal upper and lower extremity pulses    Tubes:   Noted  ET tube in place  Chest tubes in place. Lucas in place. DATA:  MAR reviewed and pertinent medications noted or modified as needed    Labs:    Recent Labs      10/02/17   0352  10/01/17   0414  09/30/17   0411  09/29/17   1955  09/29/17   1455   WBC  15.1*  12.3*  9.9  12.5*  13.4*   HGB  9.6*  9.2*  9.3*  10.4*  10.4*   PLT  137*  46*  64*  75*  74*   INR   --    --    --   1.2*  1.3*   APTT   --    --    --    --   30.3     Recent Labs      10/02/17   0352  10/01/17   0414  09/30/17   0411   NA  137  139  141   K  4.3  3.9  3.9   CL  105  111*  111*   CO2  22  20*  20*   GLU  189*  107*  169*   BUN  24*  18  15   CREA  0.99  0.92  1.02   CA  7.7*  7.6*  7.5*   MG  2.4  2.2  2.0   ALB  2.8*  2.8*  3.2*   SGOT  17  20  30   ALT  16  17  20     Recent Labs      09/30/17   0809  09/30/17   0558  09/29/17   1500   PH  7.45  7.46*  7.33*   PCO2  32*  30*  42   PO2  77*  76*  95   HCO3  22  21*  22   FIO2  50  40  100     No results for input(s): CPK, CKNDX, TROIQ in the last 72 hours.     No lab exists for component: CPKMB  No results found for: BNPP, BNP   Lab Results   Component Value Date/Time    Culture result: NO GROWTH 2 DAYS 09/27/2017 03:39 PM     Lab Results   Component Value Date/Time     09/26/2017 04:42 PM     Lab Results   Component Value Date/Time    Color YELLOW/STRAW 09/27/2017 03:39 PM    Appearance CLEAR 09/27/2017 03:39 PM    Specific gravity 1.020 09/26/2017 06:54 PM    pH (UA) 5.0 09/27/2017 03:39 PM    Protein NEGATIVE  09/27/2017 03:39 PM    Glucose 100 09/27/2017 03:39 PM    Ketone NEGATIVE  09/27/2017 03:39 PM    Bilirubin NEGATIVE  09/27/2017 03:39 PM    Blood NEGATIVE  09/27/2017 03:39 PM    Urobilinogen 0.2 09/27/2017 03:39 PM    Nitrites NEGATIVE  09/27/2017 03:39 PM    Leukocyte Esterase NEGATIVE  09/27/2017 03:39 PM    WBC 0-4 09/27/2017 03:39 PM    RBC 0-5 09/27/2017 03:39 PM    Bacteria NEGATIVE  09/27/2017 03:39 PM       No results found for: TOXA1, RPR, HBCM, HBSAG, HAAB, HCAB1, HAAT, G6PD, CRYAC, HIVGT, HIVR, HIV1, HIV12, HIVPC, HIVRPI  No results found for: IRON, FE, TIBC, IBCT, PSAT, FERR  Lab Results   Component Value Date/Time    TSH 1.15 09/26/2017 02:31 PM       Imaging:  []                           I have personally reviewed the patients radiographs and reports:      Results from [place] encounter on 09/26/17   XR CHEST PORT   Narrative Chest portable AP    History: Postop heart. Myocardial infarction. Comparison: 9/30/2017    Findings:  The patient is status post median sternotomy and CABG. Unchanged  subxiphoid be several drains are in place. The IABP marker is unchanged in the  AP window. Unchanged right IJ central venous catheter and [place]-Kristen catheter. Left chest tube unchanged. Multiple clips in the right axilla. The heart is  mildly enlarged, but unchanged. There is increased retrocardiac opacification. No significant pleural effusion or pneumothorax. There is unchanged mild edema. The visualized osseous structures appear unremarkable. Impression Impression:  Increased retrocardiac opacification which could related to atelectasis versus  developing airspace disease. Otherwise unchanged. No results found for this or any previous visit. 10-2-17: EXAM: CXR Portable.     FINDINGS: Portable chest shows [place]-Kristen removal with stable CVL since  yesterday. There is no apparent pneumothorax. Lungs show persistent left  greater than right bibasilar haziness favoring effusions and atelectasis. Heart  size is large, stable. There is no overt pulmonary edema.     IMPRESSION: No significant change. 9-29-17: CXR: COMPARISON:  9/27/2017     FINDINGS: A portable AP radiograph of the chest was obtained at 1450 hours. The  patient is on a cardiac monitor.   The endotracheal tube terminates about 4 cm  above the lazara. The [place]-Kristen catheter extends to the main pulmonary artery. A  right IJ line terminates over the cavoatrial junction. There are mediastinal and  left chest tubes in place without a definite pneumothorax. A small amount of  pneumomediastinum is noted. There is central congestion without overt CHF.   IMPRESSION:      Central congestion without overt CHF.   Small amount of pneumomediastinum can be seen in the immediate postoperative  setting      My assessment/plan was discussed with:  Nursing xx    respiratory therapy xx Mr. Sd River MD

## 2017-10-02 NOTE — PROGRESS NOTES
Pressure Ulcer Prevention Alert Received for Jareth < 14 (moderate risk).        Care Plan/Interventions for Nursin. Complete Jareth Pressure Ulcer Risk Scale and use sub scores to identify appropriate interventions. 2. Perform Assessment: skin, changes in LOC, visual cues for pain, monitor skin under medical devices  3. Respond to Reduced Sensory Perception: changes in LOC, check visual cues for pain, float heels, suspension boots, pressure redistribution bed/mattress/chair cushion, turning and reposition approximately every 2 hours (pillows & wedges), pad between skin to skin, turn & reposition  4. Manage Moisture: absorbent under pads, internal / external urinary device, internal /  external fecal device, minimize layers, contain wound drainage, access need for specialty bed, limit adult briefs, maintain skin hydration (lotion/cream), moisture barrier, offer toileting every hour  5. Promote Activity: increase time out of bed, chair cushion, PT/OT evaluation, trapeze to reposition, pressure redistribution bed/mattress/chair  6. Address Reduced Mobility: float heels / suspension boot, HOB 30 degrees or less, pressure redistribution bed/mattress/cushion, PT / OT evaluation, turn and reposition approximately every 2 hours (pillows & wedges)  7. Promote Nutrition: document food / fluid / supplement intake, encourage/assist with meals as needed  8. Reduce Friction and Shear: transferring/repositioning devices (lift/draw sheet), lift team/ patient mobility team, feet elevated on foot rest, minimize layers, foam dressing / transparent film / skin sealants, protective barrier creams and emollients, transfer aides (board, Bob lift, ceiling lift, stand assist), HOB 30 degrees or less, trapeze to reposition.   Wound Care Team

## 2017-10-02 NOTE — PROGRESS NOTES
Pt is a 44 yo male admitted from home for treatment of STEMI - pt underwent CABG x 3 with Dr. Jun Cardoso on 9/30/17. Pt has had usual post-op progression including new diagnosis of type 2 DM. Diabetes treatment team is doing teaching. Pt lives w/ his wife in [facility], [address] [place] [address] and was independent, driving, working as  pta. PT/OT have evaluated and expect pt will continue with good mobility progress and attend O/P Cardiac Rehab when cleared. Awaiting to see if [facility] orders will be written for Nursing. Pt does not have a PCP - CM will provide list of BS PCP and once choice is made, appmt will be made prior to dc. Care Management Interventions  PCP Verified by CM: No (Does not currently have PCP)  Mode of Transport at Discharge:  Other (see comment) (family)  Transition of Care Consult (CM Consult): Discharge Planning (Pt was assessed for possible dc needs.)  Discharge Durable Medical Equipment: No  Physical Therapy Consult: Yes  Occupational Therapy Consult: Yes  Speech Therapy Consult: No  Current Support Network: Lives with Spouse  Confirm Follow Up Transport: Family  Discharge Location  Discharge Placement: Home with home health ([address] to be offered if [facility] orders received.)     Negin Ross, MSW

## 2017-10-02 NOTE — PROGRESS NOTES
Spiritual Care Assessment/Progress Notes    Destiny Escobar [id number]  [id number]    1965  45 y.o.  male    Patient Telephone Number: [phone] (home)   [employer] Affiliation: No preference   Language: English   Extended Emergency Contact Information  Primary Emergency Contact: [address] [place] [address]  Mobile Phone: [phone]  Relation: Spouse   Patient Active Problem List    Diagnosis Date Noted    CAD (coronary artery disease) 09/29/2017    S/P CABG x 3 31/97/1507    Systolic heart failure ([facility] 09/27/2017    Coronary artery disease involving native coronary artery with unstable angina pectoris ([facility] 09/27/2017    Type II diabetes mellitus, uncontrolled ([facility] 09/27/2017    CAD (coronary artery disease), native coronary artery 09/27/2017    NSTEMI (non-ST elevated myocardial infarction) ([facility] 09/26/2017    Family history of early CAD 09/26/2017        Date: 10/2/2017       Level of Methodist/Spiritual Activity:  []         Involved in ekaterina tradition/spiritual practice    []         Not involved in ekaterina tradition/spiritual practice  [x]         Spiritually oriented    []         Claims no spiritual orientation    []         seeking spiritual identity  []         Feels alienated from Yazdanism practice/tradition  []         Feels angry about Yazdanism practice/tradition  [x]         Spirituality/Yazdanism tradition is not a resource for coping at this time.   []         Not able to assess due to medical condition    Services Provided Today:  []         crisis intervention    []         reading Scriptures  [x]         spiritual assessment    []         prayer  [x]         empathic listening/emotional support  []         rites and rituals (cite in comments)  []         life review     []         Yazdanism support  []         theological development   []         advocacy  []         ethical dialog     []         blessing  []         bereavement support    [x]         support to family  []         anticipatory grief support   []         help with AMD  []         spiritual guidance    []         meditation      Spiritual Care Needs  [x]         Emotional Support  []         Spiritual/Tenriism Care  []         Loss/Adjustment  []         Advocacy/Referral                /Ethics  []         No needs expressed at               this time  []         Other: (note in               comments)  [address]  []         Follow up visits with               pt/family  []         Provide materials  []         Schedule sacraments  []         Contact Community               Clergy  [x]         Follow up as needed  []         Other: (note in               comments)   Initial Spiritual Assessment in 2548. Pt awake and alert, accompanied by his wife. Led pt and spouse in processing, pt not too talkative and apparently had just recently woken up after getting good rest. Spouse identified pt and herself as soul mates and shared medical events leading up to hospitalization. Provided active listening and explored how couple was coping. Couple pleased with care received thus far, shared some suggestions for a more comfortable waiting area but other than that shared positive outlook and appeared grateful. Spouse self-reported to be Luis Schimke but not very spiritual. Did not express any specific further needs. Pronounced continued blessings advising of availability as needed. VANDANA Lees. Div

## 2017-10-02 NOTE — DIABETES MGMT
DTC Cardiac Surgery Progress Note    Recommendations/ Comments:  Pt reviewed in CCU rounds and discussed with Giancarlo Mc. Pt off insulin gtt yesterday. BG elevated this am.  Plan to begin lantus 30units daily and glipizide 5mg ac b/d. DTC will f/u to educate pt regarding new dx DM. Chart reviewed on Shaunna Olivera. Patient is 45 y.o. male s/p Cardiac Surgery. POD 3. Newly diagnosed Type 2 Diabetes. A1c:   Lab Results   Component Value Date/Time    Hemoglobin A1c 9.4 09/27/2017 05:35 AM         Recent Glucose Results:   Lab Results   Component Value Date/Time     (H) 10/02/2017 03:52 AM    GLUCPOC 204 (H) 10/02/2017 08:20 AM    GLUCPOC 224 (H) 10/01/2017 10:06 PM    GLUCPOC 205 (H) 10/01/2017 08:21 PM        Lab Results   Component Value Date/Time    Creatinine 0.99 10/02/2017 03:52 AM     Estimated Creatinine Clearance: 114.6 mL/min (based on Cr of 0.99). Active Orders   Diet    DIET DIABETIC WITH OPTIONS Consistent Carb 1200kcal; Regular; 2 GM NA (House Low NA)        PO intake: No data found. Current DM medications: humalog correction    Will continue to follow as needed. Thank you.   SKYE MeansN, RN, [address]

## 2017-10-02 NOTE — PROGRESS NOTES
[address] [place] [address]  [phone]      Cardiology Progress Note      10/2/2017 3:48 PM    Admit Date: 9/26/2017    Admit Diagnosis:   NSTEMI (non-ST elevated myocardial infarction) [facility])  CAD   CAD (coronary artery disease), native coronary artery  CAD (coronary artery disease)    Subjective:     Amalia Miller has no complaints of SOB, still with slight nausea eating some.   Long discussion with he and his wife reference systolic HF, lifevest.    Visit Vitals    /81    Pulse 97    Temp 99 °F (37.2 °C)    Resp (!) 33    Ht 6' 2\" (1.88 m)    Wt 108.6 kg (239 lb 6.7 oz)    SpO2 96%    BMI 30.74 kg/m2       Current Facility-Administered Medications   Medication Dose Route Frequency    ondansetron (ZOFRAN) injection 8 mg  8 mg IntraVENous Q4H PRN    carvedilol (COREG) tablet 3.125 mg  3.125 mg Oral BID WITH MEALS    furosemide (LASIX) tablet 40 mg  40 mg Oral DAILY    potassium chloride SR (KLOR-CON 10) tablet 20 mEq  20 mEq Oral DAILY    [START ON 10/3/2017] polyethylene glycol (MIRALAX) packet 34 g  34 g Oral DAILY    enoxaparin (LOVENOX) injection 40 mg  40 mg SubCUTAneous Q24H    metoclopramide HCl (REGLAN) injection 10 mg  10 mg IntraVENous Q6H PRN    insulin glargine (LANTUS) injection 30 Units  30 Units SubCUTAneous DAILY    glipiZIDE (GLUCOTROL) tablet 5 mg  5 mg Oral ACB&D    0.9% sodium chloride infusion 250 mL  250 mL IntraVENous PRN    lisinopril (PRINIVIL, ZESTRIL) tablet 2.5 mg  2.5 mg Oral DAILY    sodium chloride (NS) flush 5-10 mL  5-10 mL IntraVENous Q8H    sodium chloride (NS) flush 5-10 mL  5-10 mL IntraVENous PRN    0.45% sodium chloride infusion  10 mL/hr IntraVENous CONTINUOUS    0.9% sodium chloride infusion  9 mL/hr IntraVENous CONTINUOUS    oxyCODONE-acetaminophen (PERCOCET) 5-325 mg per tablet 1 Tab  1 Tab Oral Q4H PRN    oxyCODONE-acetaminophen (PERCOCET) 5-325 mg per tablet 2 Tab  2 Tab Oral Q4H PRN    morphine injection 4 mg 4 mg IntraVENous Q2H PRN    naloxone (NARCAN) injection 0.4 mg  0.4 mg IntraVENous PRN    mupirocin (BACTROBAN) 2 % ointment   Both Nostrils BID    albuterol (PROVENTIL VENTOLIN) nebulizer solution 2.5 mg  2.5 mg Nebulization Q4H PRN    aspirin chewable tablet 81 mg  81 mg Oral DAILY    famotidine (PEPCID) tablet 20 mg  20 mg Oral Q12H    magnesium oxide (MAG-OX) tablet 400 mg  400 mg Oral BID    calcium chloride 1 g in 0.9% sodium chloride 50 mL IVPB  1 g IntraVENous PRN    bisacodyl (DULCOLAX) suppository 10 mg  10 mg Rectal DAILY PRN    senna-docusate (PERICOLACE) 8.6-50 mg per tablet 1 Tab  1 Tab Oral BID    ELECTROLYTE REPLACEMENT PROTOCOL  1 Each Other PRN    magnesium sulfate 1 g/100 ml IVPB (premix or compounded)  1 g IntraVENous PRN    glucose chewable tablet 16 g  4 Tab Oral PRN    dextrose (D50W) injection syrg 12.5-25 g  12.5-25 g IntraVENous PRN    glucagon (GLUCAGEN) injection 1 mg  1 mg IntraMUSCular PRN    insulin lispro (HUMALOG) injection   SubCUTAneous AC&HS    sodium chloride 0.9 % bolus infusion 250 mL  250 mL IntraVENous ONCE PRN    0.9% sodium chloride inhalation  3.8-14 mL/hr Inhalation CONTINUOUS    chlorhexidine (PERIDEX) 0.12 % mouthwash 15 mL  15 mL Oral Q12H    0.9% sodium chloride infusion  50 mL/hr IntraVENous CONTINUOUS       Objective:      Physical Exam:  General Appearance:  WNWD  male in no acute distress  Chest:   Clear  Cardiovascular:  Regular rate and rhythm, no murmur.   Abdomen:   Soft, non-tender, bowel sounds are active.   Extremities: brian LE +1-2 edema  Skin:  Warm and dry.     Data Review:   Recent Labs      10/02/17   0352  10/01/17   0414  09/30/17   0411   WBC  15.1*  12.3*  9.9   HGB  9.6*  9.2*  9.3*   HCT  28.0*  26.3*  26.1*   PLT  137*  46*  64*     Recent Labs      10/02/17   0352  10/01/17   0414  09/30/17   0411  09/29/17   1955   NA  137  139  141  139   K  4.3  3.9  3.9  4.3   CL  105  111*  111*  110*   CO2  22  20*  20*  21 GLU  189*  107*  169*  196*   BUN  24*  18  15  14   CREA  0.99  0.92  1.02  1.32*   CA  7.7*  7.6*  7.5*  7.4*   MG  2.4  2.2  2.0  2.1   ALB  2.8*  2.8*  3.2*  3.4*   TBILI  0.5  0.5  0.8  0.9   SGOT  17  20  30  35   ALT  16  17  20  23   INR   --    --    --   1.2*       No results for input(s): TROIQ, CPK, CKMB in the last 72 hours. Intake/Output Summary (Last 24 hours) at 10/02/17 1548  Last data filed at 10/02/17 1500   Gross per 24 hour   Intake          1523.08 ml   Output             1775 ml   Net          -251.92 ml        Telemetry: SR    CXray:  Start coreg/ACEI once dobutamine off.     Will repeat echo in 2-3 days.     Will need lifevest if EF < 35 before discharge. Assessment:     Principal Problem:    NSTEMI (non-ST elevated myocardial infarction) ([facility] [address] (9/26/2017)    Active Problems:    Family history of early CAD (9/26/2017)      Overview: Significant for mother, brother      Systolic heart failure ([facility] [address] (9/27/2017)      Coronary artery disease involving native coronary artery with unstable angina pectoris ([facility] [address] (9/27/2017)      Type II diabetes mellitus, uncontrolled ([facility] [address] (9/27/2017)      CAD (coronary artery disease), native coronary artery (9/27/2017)      CAD (coronary artery disease) (9/29/2017)      S/P CABG x 3 (9/29/2017)      Overview: ON PUMP CABG X 3, LIMA to LAD, LRAG to OM, RSVG to PDA      Left radial artery harvest      Right greater saphenous vein harvest        Plan:     Acute systolic HF:  Tolerating low dose ACE, BB and diuretic  Daily weights, monitor I/O, labs  Limited echo in AM to evaluate EF. If EF remains < 35%, Lifevest at discharge    CAD:  S/p CABG  Continue on ASA, BB, statin starting tonight    DM: Better controlled    [address] Cardiology             Agree with note as outlined by  NP. I confirm findings in history and physical exam. No additional findings noted. Agree with plan as outlined above.      [place] Aliyah Swanson MD

## 2017-10-02 NOTE — CARDIO/PULMONARY
C/P Rehab Note:      Chart Reviewed. Admitting diagnosis of NSTEMI   S/p PCI,(9/26/17),  S/p CABG x3 (9/29/2017)   History significant for:  - Family history of early CAD  LV EF 30-35%, Echo 9/27/2017  Non Smoker    Met with patient's wife who is at bedside. Patient sleeping at current time. Reviewed the importance of use of the incentive spirometer, cough and deep breathing while splinting the incision and leg exercises. She states he has been doing these things as tolerated. Will continue to follow and offer Outpatient Rehab when patient is awake and can participate.

## 2017-10-02 NOTE — PROGRESS NOTES
Problem: Self Care Deficits Care Plan (Adult)  Goal: *Acute Goals and Plan of Care (Insert Text)  Occupational Therapy Goals:  Initiated 10/2/2017  1. Patient will perform grooming standing with independence within 7 days. 2. Patient will perform toileting with independence within 7 days. 3. Patient will perform upper body dressing and lower body dressing with independence within 7 days. 4. Patient will be independent with cardiac home exercise program within 7 days. 5. Patient will transfer from toilet with independence within 7 days. OCCUPATIONAL THERAPY EVALUATION  Patient: Ofelia Bradley (71 y.o. male)  Date: 10/2/2017  Primary Diagnosis: NSTEMI (non-ST elevated myocardial infarction) ([facility] [address]  CAD   CAD (coronary artery disease), native coronary artery  CAD (coronary artery disease)  Procedure(s) (LRB):  ON PUMP CABG X 3 USING RIGHT SAPHENOUS VEIN AND LEFT RADIAL ARTERY VIA EVH WITH INTRAOPERATIVE ALFREDO (N/A) 3 Days Post-Op   Precautions:   Sternal      ASSESSMENT :  Based on the objective data described below, the patient presents with nausea but stable vitals and was able to maintain O2 sats above 90% on room air today. Wife was present this session and both pt and his wife were educated on sternal precautions, cardiac exercises and general tips for dressing/toileting. CGA for sit to stand and for standing balance this session. He was able to perform crossed leg technique to pull up socks with SBA and increased effort. Overall pt is performing UB ADLs at a independent (feeding) to moderate assist level (UB dressing. Limited ROM in shoulders noted today with attempt of [facility] gown like robe in standing due to swelling. Min to moderate assist for lower body ADLs at this time. Pt is expected to make fast progress and will most likely not need OT services at discharge. Handout issued on sternal precautions, cardiac exercises and adaptive strategies for ADLS.      Patient will benefit from skilled intervention to address the above impairments. Patients rehabilitation potential is considered to be Excellent  Factors which may influence rehabilitation potential include:   [X]             None noted  [ ]             Mental ability/status  [ ]             Medical condition  [ ]             Home/family situation and support systems  [ ]             Safety awareness  [ ]             Pain tolerance/management  [ ]             Other:        PLAN :  Recommendations and Planned Interventions:  [X]               Self Care Training                  [X]        Therapeutic Activities  [X]               Functional Mobility Training    [ ]        Cognitive Retraining  [X]               Therapeutic Exercises           [ ]        Endurance Activities  [X]               Balance Training                   [ ]        Neuromuscular Re-Education  [ ]               Visual/Perceptual Training     [X]   Home Safety Training  [X]               Patient Education                 [X]        Family Training/Education  [ ]               Other (comment):     Frequency/Duration: Patient will be followed by occupational therapy 5 times a week to address goals. Discharge Recommendations: home without services and wife's assist  Further Equipment Recommendations for Discharge: none       SUBJECTIVE:   Patient stated I would be ok if I was not so nauseous.       OBJECTIVE DATA SUMMARY:   HISTORY:   Past Medical History:   Diagnosis Date    Coronary artery disease involving native coronary artery with unstable angina pectoris ([facility] [address] 9/27/2017    Family history of early CAD 9/26/2017     Significant for mother, brother    Type II diabetes mellitus, uncontrolled ([facility] [address] 9/27/2017     Past Surgical History:   Procedure Laterality Date    HX ORTHOPAEDIC         left side face metal         Prior Level of Function/Home Situation: works for [employer] for [employer]; independent  Expanded or extensive additional review of patient history:      Home Situation  Home Environment: Private residence  # Steps to Enter: 1  One/Two Story Residence: [facility] level  # of Interior Steps: 7  Height of Each Step (in): 3 inches  Interior Rails: Right  Lift Chair Available: No  Living Alone: No  Support Systems: Friends \ neighbors, Family member(s)  Patient Expects to be Discharged to[de-identified] Private residence  Current DME Used/Available at Home: Grab bars  Tub or Shower Type: Shower  [X]  Right hand dominant             [ ]  Left hand dominant     EXAMINATION OF PERFORMANCE DEFICITS:  Cognitive/Behavioral Status:  Neurologic State: Alert  Orientation Level: Oriented X4  Cognition: Appropriate decision making; Appropriate for age attention/concentration; Appropriate safety awareness  Perception: Appears intact  Perseveration: No perseveration noted  Safety/Judgement: Awareness of environment; Fall prevention;Home safety           Hearing: Auditory  Auditory Impairment: None     Vision/Perceptual:    Tracking: Able to track stimulus in all quadrants w/o difficulty                      Acuity: Within Defined Limits          Range of Motion:     AROM: Generally decreased, functional (bilateral shoulders due to swelling/all other WFL)  PROM: Within functional limits                       Strength:     Strength: Generally decreased, functional                 Coordination:  Coordination: Within functional limits  Fine Motor Skills-Upper: Left Intact; Right Intact    Gross Motor Skills-Upper: Left Intact; Right Intact     Tone & Sensation:     Tone: Normal  Sensation: Intact                       Balance:  Sitting: Intact  Standing: Impaired  Standing - Static: Good  Standing - Dynamic : Fair     Functional Mobility and Transfers for ADLs:  Bed Mobility:  Rolling:  (seated at bedside chair upon arrival)  Scooting: Supervision     Transfers:  Sit to Stand: Contact guard assistance  Stand to Sit: Contact guard assistance  Bed to Chair: Contact guard assistance  Toilet Transfer : Contact guard assistance     ADL Assessment:  Feeding: Independent (once provided)     Oral Facial Hygiene/Grooming: Contact guard assistance (standing)     Bathing: Minimum assistance (LB)     Upper Body Dressing: Moderate assistance     Lower Body Dressing: Moderate assistance     Toileting: Minimum assistance                 ADL Intervention and task modifications:  Patient instructed no asymmetrical reaching over head to ensure bilateral UEs when shoulders >90* and to prevent deep bending and valsalva maneuvers. Pt was educated that they may have to adjust home setup to increase ease with items closer to waist height, don clothing tailor sitting and don all clothing while sitting prior to standing.   Toileting  Educated to rotate at the waist having shoulders rotate with waist with fair understanding        Therapeutic Exercises: demo'd and reviewed with pt and wife; deferred pt performing due to nausea  CARDIAC  EXERCISE   Sets   Reps   Active Active Assist   Passive Self ROM   Comments   Shoulder flexion 1 5 [ ]                                            [ ]                                            [ ]                                            [ ]                                                Shoulder abduction 1 5 [ ]                                            [ ]                                            [ ]                                            [ ]                                                Scapular elevation 1 5 [ ]                                            [ ]                                            [ ]                                            [ ]                                                Scapular retraction 1 5 [ ]                                            [ ]                                            [ ]                                            [ ]                                                Trunk rotation 1 5 [ ]                                            [ ] [ ]                                            [ ]                                                Trunk sidebending 1 5 [ ]                                            [ ]                                            [ ]                                            [ ]                                                         Cognitive Retraining  Safety/Judgement: Awareness of environment; Fall prevention;Home safety        Functional Measure:  Barthel Index:      Bathin  Bladder: 10  Bowels: 10  Groomin  Dressin  Feeding: 10  Mobility: 0  Stairs: 0  Toilet Use: 5  Transfer (Bed to Chair and Back): 10  Total: 50         Barthel and G-code impairment scale:  Percentage of impairment CH  0% CI  1-19% CJ  20-39% CK  40-59% CL  60-79% CM  80-99% CN  100%   Barthel Score 0-100 100 99-80 79-60 59-40 20-39 1-19    0   Barthel Score 0-20 20 17-19 13-16 9-12 5-8 1-4 0      The Barthel ADL Index: Guidelines  1. The index should be used as a record of what a patient does, not as a record of what a patient could do. 2. The main aim is to establish degree of independence from any help, physical or verbal, however minor and for whatever reason. 3. The need for supervision renders the patient not independent. 4. A patient's performance should be established using the best available evidence. Asking the patient, friends/relatives and nurses are the usual sources, but direct observation and common sense are also important. However direct testing is not needed. 5. Usually the patient's performance over the preceding 24-48 hours is important, but occasionally longer periods will be relevant. 6. Middle categories imply that the patient supplies over 50 per cent of the effort. 7. Use of aids to be independent is allowed. Kristie Peña., Barthel, D.W. (1125). Functional evaluation: the Barthel Index. [address] [facility] (14)2.   Patricia Burt, THEOF, Lema Art., [place]., Sharona Adhikari. (1999). Measuring the change indisability after inpatient rehabilitation; comparison of the responsiveness of the Barthel Index and Functional Glynn Measure. Journal of Neurology, Neurosurgery, and Psychiatry, 66(4), 827-859. INDIGO De La Paz, ENRIQUE Wesley, & Heidi Choudhury M.A. (2004.) Assessment of post-stroke quality of life in cost-effectiveness studies: The usefulness of the Barthel Index and the EuroQoL-5D. Quality of Life Research, 13, 741-46            G codes: In compliance with CMSs Claims Based Outcome Reporting, the following G-code set was chosen for this patient based on their primary functional limitation being treated: The outcome measure chosen to determine the severity of the functional limitation was the barthel with a score of 50/100 which was correlated with the impairment scale.       · Self Care:               - CURRENT STATUS:    CK - 40%-59% impaired, limited or restricted               - GOAL STATUS:           CJ - 20%-39% impaired, limited or restricted               - D/C STATUS:                       ---------------To be determined---------------      Occupational Therapy Evaluation Charge Determination   History Examination Decision-Making   LOW Complexity : Brief history review  LOW Complexity : 1-3 performance deficits relating to physical, cognitive , or psychosocial skils that result in activity limitations and / or participation restrictions  LOW Complexity : No comorbidities that affect functional and no verbal or physical assistance needed to complete eval tasks       Based on the above components, the patient evaluation is determined to be of the following complexity level: LOW   Pain:  Pain Scale 1: Numeric (0 - 10)  Pain Intensity 1: 0  Pain Location 1: Wrist  Pain Orientation 1: Left     Pain Intervention(s) 1: Declines  Activity Tolerance:      Please refer to the flowsheet for vital signs taken during this treatment. After treatment:   [X] Patient left in no apparent distress sitting up in chair  [ ] Patient left in no apparent distress in bed  [X] Call bell left within reach  [X] Nursing notified  [X] Caregiver present  [ ] Bed alarm activated      COMMUNICATION/EDUCATION:   The patients plan of care was discussed with: Physical Therapist, Registered Nurse and patient. [X] Home safety education was provided and the patient/caregiver indicated understanding. [X] Patient/family have participated as able in goal setting and plan of care. [X] Patient/family agree to work toward stated goals and plan of care. [ ] Patient understands intent and goals of therapy, but is neutral about his/her participation. [ ] Patient is unable to participate in goal setting and plan of care. This patients plan of care is appropriate for delegation to [facility].      Thank you for this referral.  Megan Rea OTR/L  Time Calculation: 23 mins

## 2017-10-02 NOTE — PROGRESS NOTES
Problem: Mobility Impaired (Adult and Pediatric)  Goal: *Acute Goals and Plan of Care (Insert Text)  Physical Therapy Goals  Initiated 10/2/2017  1. Patient will move from supine to sit and sit to supine in bed with independence within 5 days. 2. Patient will perform sit to/from stand with independence within 5 days. 3. Patient will ambulate 300 feet with least restrictive assistive device and independence within 5 days. 4. Patient will ascend/descend 7 stairs with 1 handrail(s) with independence within 5 days. 5. Patient will perform cardiac exercises per protocol with independence within 5 days. 6. Patient will verbally and functionally recall 3/3 sternal precautions within 5 days. PHYSICAL THERAPY EVALUATION  Patient: Shaunna Olivera (89 y.o. male)  Date: 10/2/2017  Primary Diagnosis: NSTEMI (non-ST elevated myocardial infarction) ([facility] [address]  CAD   CAD (coronary artery disease), native coronary artery  CAD (coronary artery disease)  Procedure(s) (LRB):  ON PUMP CABG X 3 USING RIGHT SAPHENOUS VEIN AND LEFT RADIAL ARTERY VIA EVH WITH INTRAOPERATIVE ALFREDO (N/A) 3 Days Post-Op   Precautions: fall  Sternal      ASSESSMENT :  Based on the objective data described below, the patient presents with generalized weakness, decreased activity tolerance, increased nausea, impaired balance and altered gait. Pt was active and independent prior to admission and living with wife. He was received sitting up in the chair on RA, VSS and listed in flow sheet. Educated on sternal precautions. Sit<>stand was performed with CGA. Ambulated into the henry with cardiac cart for 40ft with CGA/SBA, noted slowed steps. Mainly limited by nausea during session. He was returned to the chair and educated on cardiac exercises. Instructed on 5 reps to start. Expect pt progress well with therapy. Recommending OP cardiac when cleared by MD.      Patient will benefit from skilled intervention to address the above impairments.   Patients rehabilitation potential is considered to be Good  Factors which may influence rehabilitation potential include:   [X]         None noted  [ ]         Mental ability/status  [ ]         Medical condition  [ ]         Home/family situation and support systems  [ ]         Safety awareness  [ ]         Pain tolerance/management  [ ]         Other:        PLAN :  Recommendations and Planned Interventions:  [X]           Bed Mobility Training             [ ]    Neuromuscular Re-Education  [X]           Transfer Training                   [ ]    Orthotic/Prosthetic Training  [X]           Gait Training                         [ ]    Modalities  [X]           Therapeutic Exercises           [ ]    Edema Management/Control  [X]           Therapeutic Activities            [X]    Patient and Family Training/Education  [ ]           Other (comment):     Frequency/Duration: Patient will be followed by physical therapy  daily to address goals. Discharge Recommendations: Outpatient cardiac when cleared by MD  Further Equipment Recommendations for Discharge: none       SUBJECTIVE:   Patient stated \" my stomach just feel bad, they gave me a double dose of nausea medicine.       OBJECTIVE DATA SUMMARY:   HISTORY:    Past Medical History:   Diagnosis Date    Coronary artery disease involving native coronary artery with unstable angina pectoris ([facility] [address] 9/27/2017    Family history of early CAD 9/26/2017     Significant for mother, brother    Type II diabetes mellitus, uncontrolled ([facility] [address] 9/27/2017     Past Surgical History:   Procedure Laterality Date    HX ORTHOPAEDIC         left side face metal      Prior Level of Function/Home Situation: very active and independent. 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    Personal factors and/or comorbidities impacting plan of care:      Home Situation  Home Environment: Private residence  # Steps to Enter: 1  One/Two Story Residence: [facility] level  # of Interior Steps: 7  Height of Each Step (in): 3 inches  Interior Rails: Right  Lift Chair Available: No  Living Alone: No  Support Systems: Friends \ neighbors, Family member(s)  Patient Expects to be Discharged to[de-identified] Private residence  Current DME Used/Available at Home: Grab bars  Tub or Shower Type: Shower     EXAMINATION/PRESENTATION/DECISION MAKING:   Critical Behavior:  Neurologic State: Alert  Orientation Level: Oriented X4  Cognition: Appropriate decision making, Appropriate for age attention/concentration, Appropriate safety awareness  Safety/Judgement: Awareness of environment, Fall prevention, Home safety  Hearing:   Auditory  Auditory Impairment: None  Skin:  Dressing intact  Edema: WDL  Range Of Motion:  AROM: Generally decreased, functional (bilateral shoulders due to swelling/all other WFL)           PROM: Within functional limits           Strength:    Strength: Generally decreased, functional                    Tone & Sensation:   Tone: Normal              Sensation: Intact               Coordination:  Coordination: Within functional limits  Vision:   Tracking: Able to track stimulus in all quadrants w/o difficulty  Acuity: Within Defined Limits  Functional Mobility:  Bed Mobility:  Rolling:  (seated at bedside chair upon arrival)        Scooting: Supervision  Transfers:  Sit to Stand: Contact guard assistance  Stand to Sit: Contact guard assistance        Bed to Chair: Contact guard assistance              Balance:   Sitting: Intact  Standing: Impaired  Standing - Static: Good  Standing - Dynamic : Fair  Ambulation/Gait Training:  Distance (ft): 40 Feet (ft)  Assistive Device:  (cardiac cart)  Ambulation - Level of Assistance: Contact guard assistance        Gait Abnormalities: Decreased step clearance;Shuffling gait        Base of Support: Widened     Speed/Mere: Pace decreased (<100 feet/min)  Step Length: Left shortened;Right shortened                          Therapeutic Exercises:   Educated on cardiac exercises, shown in booklet     Functional Measure:  Barthel Index:      Bathin  Bladder: 10  Bowels: 10  Groomin  Dressin  Feeding: 10  Mobility: 0  Stairs: 0  Toilet Use: 5  Transfer (Bed to Chair and Back): 10  Total: 50         Barthel and G-code impairment scale:  Percentage of impairment CH  0% CI  1-19% CJ  20-39% CK  40-59% CL  60-79% CM  80-99% CN  100%   Barthel Score 0-100 100 99-80 79-60 59-40 20-39 1-19    0   Barthel Score 0-20 20 17-19 13-16 9-12 5-8 1-4 0      The Barthel ADL Index: Guidelines  1. The index should be used as a record of what a patient does, not as a record of what a patient could do. 2. The main aim is to establish degree of independence from any help, physical or verbal, however minor and for whatever reason. 3. The need for supervision renders the patient not independent. 4. A patient's performance should be established using the best available evidence. Asking the patient, friends/relatives and nurses are the usual sources, but direct observation and common sense are also important. However direct testing is not needed. 5. Usually the patient's performance over the preceding 24-48 hours is important, but occasionally longer periods will be relevant. 6. Middle categories imply that the patient supplies over 50 per cent of the effort. 7. Use of aids to be independent is allowed. Nas Giles., Barthel, D.W. (1555). Functional evaluation: the Barthel Index. [address] [facility] (14)2. Agnieszka Burt, AMBAR.J.MJUSTYN, Pam Raines., [facility], [address] [place] [address] (). Measuring the change indisability after inpatient rehabilitation; comparison of the responsiveness of the Barthel Index and Functional B[place]the Measure. Journal of Neurology, Neurosurgery, and Psychiatry, 66(4), 257-568. Julio Moss, N.J.A, ENRIQUE Wesley, & Christie Machado MMaríaA. (2004.) Assessment of post-stroke quality of life in cost-effectiveness studies: The usefulness of the Barthel Index and the EuroQoL-5D. Quality of Life Research, 13, 926-43            G codes: In compliance with CMSs Claims Based Outcome Reporting, the following G-code set was chosen for this patient based on their primary functional limitation being treated: The outcome measure chosen to determine the severity of the functional limitation was the barthel with a score of 50/100 which was correlated with the impairment scale. · Mobility - Walking and Moving Around:               - CURRENT STATUS:    CK - 40%-59% impaired, limited or restricted               - GOAL STATUS:           CI - 1%-19% impaired, limited or restricted               - D/C STATUS:                       ---------------To be determined---------------      Physical Therapy Evaluation Charge Determination   History Examination Presentation Decision-Making   HIGH Complexity :3+ comorbidities / personal factors will impact the outcome/ POC  MEDIUM Complexity : 3 Standardized tests and measures addressing body structure, function, activity limitation and / or participation in recreation  LOW Complexity : Stable, uncomplicated  MEDIUM Complexity : FOTO score of 26-74      Based on the above components, the patient evaluation is determined to be of the following complexity level: LOW      Pain:  Pain Scale 1: Numeric (0 - 10)  Pain Intensity 1: 0  Pain Location 1: Wrist  Pain Orientation 1: Left     Pain Intervention(s) 1: Declines  Activity Tolerance:   VSS, WFL  Please refer to the flowsheet for vital signs taken during this treatment.   After treatment:   [X]         Patient left in no apparent distress sitting up in chair  [ ]         Patient left in no apparent distress in bed  [X]         Call bell left within reach  [X]         Nursing notified  [ ]         Caregiver present  [ ]         Bed alarm activated      COMMUNICATION/EDUCATION:   The patients plan of care was discussed with: Occupational Therapist and Registered Nurse.  [X]         Fall prevention education was provided and the patient/caregiver indicated understanding. [ ]         Patient/family have participated as able in goal setting and plan of care. [X]         Patient/family agree to work toward stated goals and plan of care. [ ]         Patient understands intent and goals of therapy, but is neutral about his/her participation. [ ]         Patient is unable to participate in goal setting and plan of care.      Thank you for this referral.  Sha Phoenix, PT, DPT   Time Calculation: 23 mins

## 2017-10-02 NOTE — PROGRESS NOTES
2030 Back to bed with two nurses in assistance. Tolerated activity well.  2200 Complained of feeling \"hot\". Fan provided and cool washcloth to forehead. 0211 Complained about feeling nauseated. Medicated with Zofran 4 mg IV.  0330 Stated he was still feeling nauseated. Informed that the doctor could be called to request another antiemetic medication. Stated he could wait until morning. 0645 Up to chair at bedside. Tolerated activity well. Still complains of nausea. 0700 Bedside and Verbal shift change report given to Olivia Torres (oncoming nurse) by Sahra Franco (offgoing nurse). Report included the following information SBAR, Kardex, OR Summary, Procedure Summary, Intake/Output, MAR, Recent Results and Cardiac Rhythm NSR.

## 2017-10-03 LAB
GLUCOSE BLD STRIP.AUTO-MCNC: 111 MG/DL (ref 65–100)
GLUCOSE BLD STRIP.AUTO-MCNC: 139 MG/DL (ref 65–100)
GLUCOSE BLD STRIP.AUTO-MCNC: 146 MG/DL (ref 65–100)
GLUCOSE BLD STRIP.AUTO-MCNC: 152 MG/DL (ref 65–100)
SERVICE CMNT-IMP: ABNORMAL

## 2017-10-03 PROCEDURE — 74011250637 HC RX REV CODE- 250/637: Performed by: PHYSICIAN ASSISTANT

## 2017-10-03 PROCEDURE — 74011250637 HC RX REV CODE- 250/637: Performed by: INTERNAL MEDICINE

## 2017-10-03 PROCEDURE — 93308 TTE F-UP OR LMTD: CPT

## 2017-10-03 PROCEDURE — 65620000000 HC RM CCU GENERAL

## 2017-10-03 PROCEDURE — 97116 GAIT TRAINING THERAPY: CPT

## 2017-10-03 PROCEDURE — 74011636637 HC RX REV CODE- 636/637: Performed by: INTERNAL MEDICINE

## 2017-10-03 PROCEDURE — 97530 THERAPEUTIC ACTIVITIES: CPT | Performed by: OCCUPATIONAL THERAPIST

## 2017-10-03 PROCEDURE — 82962 GLUCOSE BLOOD TEST: CPT

## 2017-10-03 PROCEDURE — 74011636637 HC RX REV CODE- 636/637: Performed by: PHYSICIAN ASSISTANT

## 2017-10-03 PROCEDURE — 74011250636 HC RX REV CODE- 250/636: Performed by: PHYSICIAN ASSISTANT

## 2017-10-03 PROCEDURE — 97535 SELF CARE MNGMENT TRAINING: CPT | Performed by: OCCUPATIONAL THERAPIST

## 2017-10-03 RX ORDER — ATORVASTATIN CALCIUM 20 MG/1
20 TABLET, FILM COATED ORAL
Status: DISCONTINUED | OUTPATIENT
Start: 2017-10-03 | End: 2017-10-04 | Stop reason: HOSPADM

## 2017-10-03 RX ORDER — CARVEDILOL 6.25 MG/1
6.25 TABLET ORAL 2 TIMES DAILY WITH MEALS
Status: DISCONTINUED | OUTPATIENT
Start: 2017-10-03 | End: 2017-10-04

## 2017-10-03 RX ORDER — POTASSIUM CHLORIDE 20 MEQ/1
20 TABLET, EXTENDED RELEASE ORAL DAILY
Status: DISCONTINUED | OUTPATIENT
Start: 2017-10-03 | End: 2017-10-03 | Stop reason: SDUPTHER

## 2017-10-03 RX ORDER — FUROSEMIDE 40 MG/1
40 TABLET ORAL DAILY
Status: DISCONTINUED | OUTPATIENT
Start: 2017-10-03 | End: 2017-10-03 | Stop reason: SDUPTHER

## 2017-10-03 RX ADMIN — ENOXAPARIN SODIUM 40 MG: 40 INJECTION SUBCUTANEOUS at 10:27

## 2017-10-03 RX ADMIN — Medication 400 MG: at 17:22

## 2017-10-03 RX ADMIN — FUROSEMIDE 40 MG: 40 TABLET ORAL at 08:19

## 2017-10-03 RX ADMIN — INSULIN LISPRO 2 UNITS: 100 INJECTION, SOLUTION INTRAVENOUS; SUBCUTANEOUS at 08:15

## 2017-10-03 RX ADMIN — LISINOPRIL 2.5 MG: 5 TABLET ORAL at 08:18

## 2017-10-03 RX ADMIN — Medication 10 ML: at 06:12

## 2017-10-03 RX ADMIN — ASPIRIN 81 MG 81 MG: 81 TABLET ORAL at 08:19

## 2017-10-03 RX ADMIN — CHLORHEXIDINE GLUCONATE 15 ML: 1.2 RINSE ORAL at 08:23

## 2017-10-03 RX ADMIN — Medication 400 MG: at 08:19

## 2017-10-03 RX ADMIN — CHLORHEXIDINE GLUCONATE 15 ML: 1.2 RINSE ORAL at 22:07

## 2017-10-03 RX ADMIN — MUPIROCIN: 20 OINTMENT TOPICAL at 18:00

## 2017-10-03 RX ADMIN — INSULIN LISPRO 2 UNITS: 100 INJECTION, SOLUTION INTRAVENOUS; SUBCUTANEOUS at 17:16

## 2017-10-03 RX ADMIN — CARVEDILOL 3.12 MG: 3.12 TABLET, FILM COATED ORAL at 08:19

## 2017-10-03 RX ADMIN — Medication 10 ML: at 17:25

## 2017-10-03 RX ADMIN — FAMOTIDINE 20 MG: 20 TABLET ORAL at 22:06

## 2017-10-03 RX ADMIN — CARVEDILOL 6.25 MG: 6.25 TABLET, FILM COATED ORAL at 17:22

## 2017-10-03 RX ADMIN — POTASSIUM CHLORIDE 20 MEQ: 750 TABLET, FILM COATED, EXTENDED RELEASE ORAL at 08:19

## 2017-10-03 RX ADMIN — MUPIROCIN: 20 OINTMENT TOPICAL at 08:23

## 2017-10-03 RX ADMIN — INSULIN GLARGINE 30 UNITS: 100 INJECTION, SOLUTION SUBCUTANEOUS at 08:15

## 2017-10-03 RX ADMIN — FAMOTIDINE 20 MG: 20 TABLET ORAL at 08:19

## 2017-10-03 RX ADMIN — ATORVASTATIN CALCIUM 20 MG: 20 TABLET, FILM COATED ORAL at 22:07

## 2017-10-03 RX ADMIN — GLIPIZIDE 5 MG: 5 TABLET ORAL at 08:19

## 2017-10-03 RX ADMIN — GLIPIZIDE 5 MG: 5 TABLET ORAL at 17:22

## 2017-10-03 RX ADMIN — Medication 10 ML: at 22:08

## 2017-10-03 NOTE — PROGRESS NOTES
1900 Report received. 2000 Patient up to [facility] with assist of 1 void and BM  2051 Patient given sugar free chocolate milkshake. 2100 Dressing to sternotomy open to air, chest tube site dressing changed, pacemaker wire care done. [id number] [facility] about cardiac follow-up, cardiac vest and meds performed. 0600 Patient up to [facility] with assist of 1.  0700 Report to SHANA Hsu.

## 2017-10-03 NOTE — PROGRESS NOTES
Problem: Mobility Impaired (Adult and Pediatric)  Goal: *Acute Goals and Plan of Care (Insert Text)  Physical Therapy Goals  Initiated 10/2/2017  1. Patient will move from supine to sit and sit to supine in bed with independence within 5 days. 2. Patient will perform sit to/from stand with independence within 5 days. 3. Patient will ambulate 300 feet with least restrictive assistive device and independence within 5 days. 4. Patient will ascend/descend 7 stairs with 1 handrail(s) with independence within 5 days. 5. Patient will perform cardiac exercises per protocol with independence within 5 days. 6. Patient will verbally and functionally recall 3/3 sternal precautions within 5 days. PHYSICAL THERAPY TREATMENT     Patient: Amalia Miller (70 y.o. male)  Date: 10/3/2017  Diagnosis: NSTEMI (non-ST elevated myocardial infarction) ([facility] [address]  CAD   CAD (coronary artery disease), native coronary artery  CAD (coronary artery disease) NSTEMI (non-ST elevated myocardial infarction) ([facility])  Procedure(s) (LRB):  ON PUMP CABG X 3 USING RIGHT SAPHENOUS VEIN AND LEFT RADIAL ARTERY VIA EVH WITH INTRAOPERATIVE ALFREDO (N/A) 4 Days Post-Op  Precautions: Sternal  Chart, physical therapy assessment, plan of care and goals were reviewed. ASSESSMENT:  Pt cleared to mobilize by nurse. Pt received up in chair. Pt reported feeling better than yesterday. Pts HR at 94 and BP stable. Pt performed sit to stand at SBA. Pt with no complaints of dizziness with standing. Pt ambulated 100ft with cart at SBA. Pt reported feeling weak but good with walking. Pt ascended at descended x9 stairs at [facility] step over step with right rail. Pt with SOB after HR at 114 and o2 stats at 92%. Pt ambulated 250ft with no device at [facility]. Pt reported feeling a little fatigued but still okay. Pts HR decreased to 90's quickly after sitting. Reviewed sternal precautions with pt as he was unable to recall any of them.  Pt will benefit  from cardiac op rehab.  Progression toward goals:  [X]      Improving appropriately and progressing toward goals  [ ]      Improving slowly and progressing toward goals  [ ]      Not making progress toward goals and plan of care will be adjusted       PLAN:  Patient continues to benefit from skilled intervention to address the above impairments. Continue treatment per established plan of care. Discharge Recommendations:  Outpatient Cardiac Rehab  Further Equipment Recommendations for Discharge:  none       SUBJECTIVE:   Patient stated I don't remember them .    The patient stated 0/3 sternal precautions. Reviewed all 3 with patient. OBJECTIVE DATA SUMMARY:   Patient mobilized on continuous portable monitor/telemetry.      Critical Behavior:  Neurologic State: Alert  Orientation Level: Oriented X4  Cognition: Follows commands, Appropriate safety awareness, Appropriate for age attention/concentration, Appropriate decision making  Safety/Judgement: Awareness of environment, Fall prevention, Home safety  Functional Mobility Training:                                   Transfers:  Sit to Stand: Stand-by asssistance  Stand to Sit: Stand-by asssistance                             Balance:  Sitting: Intact  Standing: Intact  Standing - Static: Good  Standing - Dynamic : Good  Ambulation/Gait Training:  Distance (ft): 350 Feet (ft)  Assistive Device: Gait belt (cart for 100ft ND 200ft)  Ambulation - Level of Assistance: Stand-by asssistance;Contact guard assistance (SBA before stairs CGA afer due to fatigue)                 Base of Support: Widened     Speed/Mere: Slow  Step Length: Left shortened;Right shortened                               Stairs:  Number of Stairs Trained: 9  Stairs - Level of Assistance: Contact guard assistance  Rail Use: Right      Therapeutic Exercises:   CARDIAC  EXERCISE   Sets   Reps   Active Active Assist   Passive Self ROM   Comments   Shoulder flexion     [ ]                         [ ] [ ]                                  [ ]                Shoulder abduction     [ ]                                  [ ]                                  [ ]                                  [ ]                                      Scapular elevation     [ ]                                  [ ]                                  [ ]                                  [ ]                                      Scapular retraction     [ ]                                  [ ]                                  [ ]                                  [ ]                                      Trunk rotation     [ ]                                  [ ]                                  [ ]                                  [ ]                                      Madison Pradhan     [ ]                                  [ ]                                  [ ]                                  [ ]                                            [ ]                                  [ ]                                  [ ]                                  [ ]                                            Pain:  Pain Scale 1: Numeric (0 - 10)  Pain Intensity 1: 0              Activity Tolerance:   Pt with improved balance and activity tolerance. Pt with SOB after stairs but recovered quickly. Please refer to the flowsheet for vital signs taken during this treatment.   After treatment:   [X]  Patient left in no apparent distress in chair  [ ]  Patient left in no apparent distress in bed  [X]  Call bell left within reach  [X]  Nursing notified  [ ]  Caregiver present  [X]  Bed alarm activated      COMMUNICATION/COLLABORATION:   The patients plan of care was discussed with: Registered Nurse     Courtney Andersen   Time Calculation: 15 mins

## 2017-10-03 NOTE — PROGRESS NOTES
[facility] Consult Service Progress NOTE  Pulmonary, Critical Care, and Sleep Medicine    Name: Eugene Carreon MRN: [id number]   : 1965 Hospital: [address] [facility]   Date: 10/3/2017  Admission Date: 2017     IMPRESSION:   CAD s/p NSTEMI ->S/p CABG     s/p ON PUMP CABG X 3, LIMA to LAD, LRAG to OM, RSVG to PDA  Hx DM, htn, non smoker      RECOMMENDATIONS/PLAN:   1. Incentive Spirometry. 2. Off pressors  3. Pain controlled  4. Glycemic control  5. Gi/dvt  6. Mobilize   7.  Can be transferred to floor     No acute complaints  No distress      Risk of deterioration:    [x]      High complexity decision making was performed   [x]      See my orders for details    Hospital Day: 8    Allergies   Allergen Reactions    Latex Other (comments)     Allergy documented in admission data base    Influenza Virus Vaccine, Specific Other (comments)     Allergy found in admission data base        Current Facility-Administered Medications   Medication    ondansetron (ZOFRAN) injection 8 mg    carvedilol (COREG) tablet 3.125 mg    furosemide (LASIX) tablet 40 mg    potassium chloride SR (KLOR-CON 10) tablet 20 mEq    polyethylene glycol (MIRALAX) packet 34 g    enoxaparin (LOVENOX) injection 40 mg    metoclopramide HCl (REGLAN) injection 10 mg    insulin glargine (LANTUS) injection 30 Units    glipiZIDE (GLUCOTROL) tablet 5 mg    atorvastatin (LIPITOR) tablet 10 mg    0.9% sodium chloride infusion 250 mL    lisinopril (PRINIVIL, ZESTRIL) tablet 2.5 mg    sodium chloride (NS) flush 5-10 mL    sodium chloride (NS) flush 5-10 mL    0.45% sodium chloride infusion    0.9% sodium chloride infusion    oxyCODONE-acetaminophen (PERCOCET) 5-325 mg per tablet 1 Tab    oxyCODONE-acetaminophen (PERCOCET) 5-325 mg per tablet 2 Tab    morphine injection 4 mg    naloxone (NARCAN) injection 0.4 mg    mupirocin (BACTROBAN) 2 % ointment    albuterol (PROVENTIL VENTOLIN) nebulizer solution 2.5 mg    aspirin chewable tablet 81 mg    famotidine (PEPCID) tablet 20 mg    magnesium oxide (MAG-OX) tablet 400 mg    calcium chloride 1 g in 0.9% sodium chloride 50 mL IVPB    bisacodyl (DULCOLAX) suppository 10 mg    senna-docusate (PERICOLACE) 8.6-50 mg per tablet 1 Tab    ELECTROLYTE REPLACEMENT PROTOCOL    magnesium sulfate 1 g/100 ml IVPB (premix or compounded)    glucose chewable tablet 16 g    dextrose (D50W) injection syrg 12.5-25 g    glucagon (GLUCAGEN) injection 1 mg    insulin lispro (HUMALOG) injection    sodium chloride 0.9 % bolus infusion 250 mL    0.9% sodium chloride inhalation    chlorhexidine (PERIDEX) 0.12 % mouthwash 15 mL    0.9% sodium chloride infusion         History reviewed. No pertinent family history. Vital Signs: Intake/Output: Intake/Output:   Temp (24hrs), Av.8 °F (37.1 °C), Min:97.7 °F (36.5 °C), Max:99.7 °F (37.6 °C)      Visit Vitals    /72    Pulse 88    Temp 98.2 °F (36.8 °C)    Resp (!) 32    Ht 6' 2\" (1.88 m)    Wt 108.6 kg (239 lb 6.7 oz)    SpO2 94%    BMI 30.74 kg/m2         O2 Device: Room air  O2 Flow Rate (L/min): 2 l/min    Wt Readings from Last 4 Encounters:   17 108.6 kg (239 lb 6.7 oz)          Intake/Output Summary (Last 24 hours) at 10/03/17 0738  Last data filed at 10/02/17 2000   Gross per 24 hour   Intake           1317.1 ml   Output              750 ml   Net            567.1 ml       Last shift:           Last 3 shifts: 10/01 1901 - 10/03 0700  In: 2207.1 [P.O.:1040; I.V.:1167.1]  Out: 1408 [Urine:1650]     Telemetry:normal sinus rhythm    Physical Exam:    General: no distress. HEENT: NCAT              Chest:  Sternal wound healing. Lungs: clear anteriorly. Heart: RRR   Abdomen: soft, non-tender, without masses or organomegaly   : no flank tenderness    Extremity: negative cyanosis, clubbing, warm. Skin: Skin color, texture, turgor normal. No rashes or lesions;    Normal upper and lower extremity pulses    Tubes:   Noted       DATA:  MAR reviewed and pertinent medications noted or modified as needed    Labs:    Recent Labs      10/02/17   0352  10/01/17   0414   WBC  15.1*  12.3*   HGB  9.6*  9.2*   PLT  137*  46*     Recent Labs      10/02/17   0352  10/01/17   0414   NA  137  139   K  4.3  3.9   CL  105  111*   CO2  22  20*   GLU  189*  107*   BUN  24*  18   CREA  0.99  0.92   CA  7.7*  7.6*   MG  2.4  2.2   ALB  2.8*  2.8*   SGOT  17  20   ALT  16  17     Recent Labs      09/30/17   0809   PH  7.45   PCO2  32*   PO2  77*   HCO3  22   FIO2  50     No results for input(s): CPK, CKNDX, TROIQ in the last 72 hours.     No lab exists for component: CPKMB  No results found for: BNPP, BNP   Lab Results   Component Value Date/Time    Culture result: NO GROWTH 2 DAYS 09/27/2017 03:39 PM     Lab Results   Component Value Date/Time     09/26/2017 04:42 PM     Lab Results   Component Value Date/Time    Color YELLOW/STRAW 09/27/2017 03:39 PM    Appearance CLEAR 09/27/2017 03:39 PM    Specific gravity 1.020 09/26/2017 06:54 PM    pH (UA) 5.0 09/27/2017 03:39 PM    Protein NEGATIVE  09/27/2017 03:39 PM    Glucose 100 09/27/2017 03:39 PM    Ketone NEGATIVE  09/27/2017 03:39 PM    Bilirubin NEGATIVE  09/27/2017 03:39 PM    Blood NEGATIVE  09/27/2017 03:39 PM    Urobilinogen 0.2 09/27/2017 03:39 PM    Nitrites NEGATIVE  09/27/2017 03:39 PM    Leukocyte Esterase NEGATIVE  09/27/2017 03:39 PM    WBC 0-4 09/27/2017 03:39 PM    RBC 0-5 09/27/2017 03:39 PM    Bacteria NEGATIVE  09/27/2017 03:39 PM       No results found for: TOXA1, RPR, HBCM, HBSAG, HAAB, HCAB1, HAAT, G6PD, CRYAC, HIVGT, HIVR, HIV1, HIV12, HIVPC, HIVRPI  No results found for: IRON, FE, TIBC, IBCT, PSAT, FERR  Lab Results   Component Value Date/Time    TSH 1.15 09/26/2017 02:31 PM       Imaging:  []                           I have personally reviewed the patients radiographs and reports:      My assessment/plan was discussed with:  Nursing xx respiratory therapy xx  Makayla Nelia   family         Gadiel Cooper MD

## 2017-10-03 NOTE — PROGRESS NOTES
0700  Patient sitting in chair, at bedside, denies complaints. 0730  Bedside and verbal report from Rubina Noel RN.  4508  Dr. Persaud Able here to see patient. 0800  Assessment completed; patient denies complaints of pain / discomfort at this time. 7759  Dr. Valencia Girard and ITZ Dobbs here to see patient. 0830  Dr. Jean-Pierre Davis here to see patient. 0900  Took breakfast well. Patient for Echo today, increase activity; plan for discharge in morning per M[place]ía [place], [place].  0930  Walking in hallway with PT and OT. 1000  Continues up in the chair; assisted to room toilet to void. Gait is steady. Denies complaints. 1050  Echo tech at bedside. 1120  Bedside and verbal report to Merritt Miller RN, and Emanuel Gonzalez RN.

## 2017-10-03 NOTE — PROGRESS NOTES
Participated in [address] [place] [address] CCU where patient was discussed. Ambrose Officer, BERE Davies

## 2017-10-03 NOTE — INTERDISCIPLINARY ROUNDS
Interdisciplinary team rounds were held 10/3/17 with the following team members:Care Management, Diabetes Treatment Specialist, Nursing, Nutrition, Pharmacy, Physical Therapy, Physician, Respiratory Therapy and Clinical Coordinator. Plan of care discussed. Goal: See MD orders and progress notes for further  interventions and desired outcomes.

## 2017-10-03 NOTE — PROGRESS NOTES
Limited echo today:  LV remains mod dil with EF 25-30%, PARAG, mild MR  Patient will require Lifevest before discharge - in process

## 2017-10-03 NOTE — PROGRESS NOTES
Problem: Self Care Deficits Care Plan (Adult)  Goal: *Acute Goals and Plan of Care (Insert Text)  Occupational Therapy Goals: All goals met 10/3/2017  Initiated 10/2/2017  1. Patient will perform grooming standing with independence within 7 days. 2. Patient will perform toileting with independence within 7 days. 3. Patient will perform upper body dressing and lower body dressing with independence within 7 days. 4. Patient will be independent with cardiac home exercise program within 7 days. 5. Patient will transfer from toilet with independence within 7 days. OCCUPATIONAL THERAPY TREATMENT/DISCHARGE  Patient: Sandy Galvan (27 y.o. male)  Date: 10/3/2017  Diagnosis: NSTEMI (non-ST elevated myocardial infarction) ([facility] [address]  CAD   CAD (coronary artery disease), native coronary artery  CAD (coronary artery disease) NSTEMI (non-ST elevated myocardial infarction) ([facility])  Procedure(s) (LRB):  ON PUMP CABG X 3 USING RIGHT SAPHENOUS VEIN AND LEFT RADIAL ARTERY VIA EVH WITH INTRAOPERATIVE ALFREDO (N/A) 4 Days Post-Op  Precautions: Sternal      ASSESSMENT:  Wife was present this session and both pt and his wife were educated on pacing, home safety, shower/tub transfers and adaptive stratgies for ADLs. Pt was able to recite 3:3 sternal precautions and adheres to precautions during mobility and ADLS. After demo and education pt was able to don pull over shirt, button up shirt, socks and demo pants without assist.  He was able to transfer on and off of commode without assist and reports the correct technique and has performed BM clean up without assist.  Good balance overall during standing tasks. Performed bed mobility with bed flat without assist.  Pt and his wife are in agreement that all concerns have been addressed in regards to ADLS/IADLS and OT services are no longer warranted. All goals met.   Progression toward goals:  [X]            Improving appropriately and progressing toward goals  [ ]            Improving slowly and progressing toward goals  [ ]            Not making progress toward goals and plan of care will be adjusted       PLAN:  Patient will be discharged from occupational therapy at this time. Rationale for discharge:  [X]   Goals Achieved  [ ]   [address]  [ ]   Patient not participating in therapy  [ ]   Other:  Discharge Recommendations:  Home with family support  Further Equipment Recommendations for Discharge:  None needed       SUBJECTIVE:   Patient stated I think I've got it.       OBJECTIVE DATA SUMMARY:   Cognitive/Behavioral Status:  Neurologic State: Alert  Orientation Level: Oriented X4  Cognition: Appropriate decision making; Appropriate for age attention/concentration; Appropriate safety awareness; Follows commands              Functional Mobility and Transfers for ADLs:  Bed Mobility:  Scooting: Independent     Transfers:  Sit to Stand: Independent  Functional Transfers  Toilet Transfer : Independent     Balance:  Sitting: Intact  Standing: Intact  Standing - Static: Good  Standing - Dynamic : Good     ADL Intervention:                          Upper Body Dressing Assistance  Pullover Shirt: Independent  Front Opened Shirt: Modified independent (increased time)     Lower Body Dressing Assistance  Pants With Elastic Waist: Independent  Socks: Independent     Toileting  Bowel Hygiene: Modified indpendent  Clothing Management: Modified independent           Patient instructed no asymmetrical reaching over head to ensure bilateral UEs when shoulders >90* and to prevent deep bending and valsalva maneuvers. Pt was educated that they may have to adjust home setup to increase ease with items closer to waist height, don clothing tailor sitting and don all clothing while sitting prior to standing.   Upper Body Dressing Assistance  Pullover Shirt:  educated to place pull over shirt face down, tread bilateral UE through sleeves and pull up to mid humerus prior to grasping shirt with bilateral hands at neck and bottom of shirt. Educated pt to hold shirt with bilateral hands and bring over head with both arms going up at the same time. Educated to rotate at waist with shoulder following hip motion to pull shirt tail down. Pt was able to don pull over shirt with independence. Open front shirt: demo and had pt perform pulling shirt over one arm first then reaching symmetrically with both UE over head to grasp collar of shirt and then threading shirt over other arm. Pt was educated to doff by grabbing at collar with bilateral hands over head and pulling shirt over head. Performed with independence     Toileting  Educated to rotate at the waist having shoulders rotate with waist with good understanding     Educated on stepping over tub from front or back with gentle resting of hand on wall of shower for balance but not pushing through UE. Pt reports he will be using walk in shower.      Therapeutic Exercises: verbalized understanding and reports performing on his own  CARDIAC  EXERCISE   Sets   Reps   Active Active Assist   Passive Self ROM   Comments   Shoulder flexion 1 5 [ ]                                            [ ]                                            [ ]                                            [ ]                                                Shoulder abduction 1 5 [ ]                                            [ ]                                            [ ]                                            [ ]                                                Scapular elevation 1 5 [ ]                                            [ ]                                            [ ]                                            [ ]                                                Scapular retraction 1 5 [ ]                                            [ ]                                            [ ]                                            [ ]                                                Trunk rotation 1 5 [ ]                                            [ ]                                            [ ]                                            [ ]                                                Trunk sidebending 1 5 [ ]                                            [ ]                                            [ ]                                            [ ]                                                         Pain:  Pain Scale 1: Numeric (0 - 10)  Pain Intensity 1: 0              Activity Tolerance:      Please refer to the flowsheet for vital signs taken during this treatment.   After treatment:   [X] Patient left in no apparent distress sitting up in chair  [ ] Patient left in no apparent distress in bed  [X] Call bell left within reach  [X] Nursing notified  [X] Caregiver present  [ ] Bed alarm activated      COMMUNICATION/COLLABORATION:   The patients plan of care was discussed with: Physical Therapist, Registered Nurse and patient and his wife     Glenroylaura Londonner, OTR/L  Time Calculation: 26 mins

## 2017-10-03 NOTE — PROGRESS NOTES
[facility] FLOOR Progress Note    Admit Date: 2017    POD: 4 Days Post-Op      Assessment:     Principal Problem:    NSTEMI (non-ST elevated myocardial infarction) ([facility] [address] (2017)    Active Problems:    Family history of early CAD (2017)      Overview: Significant for mother, brother      Systolic heart failure ([facility] [address] (2017)      Coronary artery disease involving native coronary artery with unstable angina pectoris ([facility] [address] (2017)      Type II diabetes mellitus, uncontrolled ([facility] [address] (2017)      CAD (coronary artery disease), native coronary artery (2017)      CAD (coronary artery disease) (2017)      S/P CABG x 3 (2017)      Overview: ON PUMP CABG X 3, LIMA to LAD, LRAG to OM, RSVG to PDA      Left radial artery harvest      Right greater saphenous vein harvest             Procedure(s):  ON PUMP CABG X 3 USING RIGHT SAPHENOUS VEIN AND LEFT RADIAL ARTERY VIA EVH WITH INTRAOPERATIVE ALFREDO         Summary     Stable hemodynamics in ST without ectopy on no support. OOB to chair. Has not walked in henry  Clear BS  Mild edema, weight up   I/O +600    Plan/Recommendations/Medical Decision Making:     Increase BB  Adjust DM meds  Echo  Diuresis  Home tomorrow  Increase activity  Increase I/S effort and frequency  Sternal precautions  Stimulate bowel movement  Patient seen and reviewed with Dr. Liang Almanza MD      Objective:       Visit Vitals    /88    Pulse (!) 103    Temp 98.3 °F (36.8 °C)    Resp 25    Ht 6' 2\" (1.88 m)    Wt 239 lb 6.7 oz (108.6 kg)    SpO2 96%    BMI 30.74 kg/m2       Temp (24hrs), Av.9 °F (37.2 °C), Min:98.2 °F (36.8 °C), Max:99.7 °F (37.6 °C)      Last 3 Recorded Weights in this Encounter    17 0623 17 1210 17 0200   Weight: 218 lb 4.1 oz (99 kg) 223 lb 5.2 oz (101.3 kg) 239 lb 6.7 oz (108.6 kg)       CXR Results  (Last 48 hours)               10/02/17 8258  XR CHEST PORT Final result    Impression:  IMPRESSION: No significant change. Narrative:  INDICATION:  postop heart 9/29/2017 coronary artery bypass graft for coronary   artery disease. EXAM: CXR Portable. FINDINGS: Portable chest shows Montgomery-Kristen removal with stable CVL since   yesterday. There is no apparent pneumothorax. Lungs show persistent left   greater than right bibasilar haziness favoring effusions and atelectasis. Heart   size is large, stable. There is no overt pulmonary edema. Activity:    Diet/ BM:     Lab Data Reviewed: Recent Labs      10/03/17   0800   10/02/17   0352   WBC   --    --   15.1*   HGB   --    --   9.6*   HCT   --    --   28.0*   PLT   --    --   137*   NA   --    --   137   K   --    --   4.3   BUN   --    --   24*   CREA   --    --   0.99   GLU   --    --   189*   GLUCPOC  146*   < >   --     < > = values in this interval not displayed. Last 24hr Input/Output:    Intake/Output Summary (Last 24 hours) at 10/03/17 0835  Last data filed at 10/03/17 0600   Gross per 24 hour   Intake             1809 ml   Output             1150 ml   Net              659 ml        Admission Weight: Last Weight   Weight: 233 lb 7.5 oz (105.9 kg) Weight: 239 lb 6.7 oz (108.6 kg)       EXAM:  General: gaining strength      Chest: stable sternum      Incisions: dry and intact      Lungs:   Clear to auscultation bilaterally. Heart:  Regular rate and rhythm, S1, S2 normal, no murmur, no click, no rub      Abdomen:   Soft, non-tender. Bowel sounds present. Extremities:  mild edema     Neurologic:  Gross motor and sensory apparatus intact.        Signed By: ITZ Carreon

## 2017-10-03 NOTE — PROGRESS NOTES
[address] [place] [address]  [phone]      Cardiology Progress Note      10/3/2017 9:00AM    Admit Date: 9/26/2017    Admit Diagnosis:   NSTEMI (non-ST elevated myocardial infarction) [facility])  CAD   CAD (coronary artery disease), native coronary artery  CAD (coronary artery disease)    Subjective:     Osborne August feeling well today, no further nausea, ambulating in room without difficulty. HR elevated, ST with PACs. Coreg started yesterday, increasing today as tolerated.       Visit Vitals    /76 (BP 1 Location: Right arm, BP Patient Position: At rest)    Pulse 95    Temp 98.3 °F (36.8 °C)    Resp 25    Ht 6' 2\" (1.88 m)    Wt 108.6 kg (239 lb 6.7 oz)    SpO2 94%    BMI 30.74 kg/m2       Current Facility-Administered Medications   Medication Dose Route Frequency    carvedilol (COREG) tablet 6.25 mg  6.25 mg Oral BID WITH MEALS    atorvastatin (LIPITOR) tablet 20 mg  20 mg Oral QHS    ondansetron (ZOFRAN) injection 8 mg  8 mg IntraVENous Q4H PRN    furosemide (LASIX) tablet 40 mg  40 mg Oral DAILY    potassium chloride SR (KLOR-CON 10) tablet 20 mEq  20 mEq Oral DAILY    polyethylene glycol (MIRALAX) packet 34 g  34 g Oral DAILY    enoxaparin (LOVENOX) injection 40 mg  40 mg SubCUTAneous Q24H    metoclopramide HCl (REGLAN) injection 10 mg  10 mg IntraVENous Q6H PRN    insulin glargine (LANTUS) injection 30 Units  30 Units SubCUTAneous DAILY    glipiZIDE (GLUCOTROL) tablet 5 mg  5 mg Oral ACB&D    0.9% sodium chloride infusion 250 mL  250 mL IntraVENous PRN    lisinopril (PRINIVIL, ZESTRIL) tablet 2.5 mg  2.5 mg Oral DAILY    sodium chloride (NS) flush 5-10 mL  5-10 mL IntraVENous Q8H    sodium chloride (NS) flush 5-10 mL  5-10 mL IntraVENous PRN    oxyCODONE-acetaminophen (PERCOCET) 5-325 mg per tablet 1 Tab  1 Tab Oral Q4H PRN    oxyCODONE-acetaminophen (PERCOCET) 5-325 mg per tablet 2 Tab  2 Tab Oral Q4H PRN    morphine injection 4 mg  4 mg IntraVENous Q2H PRN    naloxone (NARCAN) injection 0.4 mg  0.4 mg IntraVENous PRN    mupirocin (BACTROBAN) 2 % ointment   Both Nostrils BID    albuterol (PROVENTIL VENTOLIN) nebulizer solution 2.5 mg  2.5 mg Nebulization Q4H PRN    aspirin chewable tablet 81 mg  81 mg Oral DAILY    famotidine (PEPCID) tablet 20 mg  20 mg Oral Q12H    magnesium oxide (MAG-OX) tablet 400 mg  400 mg Oral BID    calcium chloride 1 g in 0.9% sodium chloride 50 mL IVPB  1 g IntraVENous PRN    bisacodyl (DULCOLAX) suppository 10 mg  10 mg Rectal DAILY PRN    senna-docusate (PERICOLACE) 8.6-50 mg per tablet 1 Tab  1 Tab Oral BID    ELECTROLYTE REPLACEMENT PROTOCOL  1 Each Other PRN    magnesium sulfate 1 g/100 ml IVPB (premix or compounded)  1 g IntraVENous PRN    glucose chewable tablet 16 g  4 Tab Oral PRN    dextrose (D50W) injection syrg 12.5-25 g  12.5-25 g IntraVENous PRN    glucagon (GLUCAGEN) injection 1 mg  1 mg IntraMUSCular PRN    insulin lispro (HUMALOG) injection   SubCUTAneous AC&HS    sodium chloride 0.9 % bolus infusion 250 mL  250 mL IntraVENous ONCE PRN    chlorhexidine (PERIDEX) 0.12 % mouthwash 15 mL  15 mL Oral Q12H       Objective:      Physical Exam:  General Appearance:  WNWD  male in no acute distress  Chest:   Clearslight rales at LLL  Cardiovascular:  tachy no murmur.   Abdomen:   Soft, non-tender, bowel sounds are active.   Extremities: brian LE edema +1-2 , R>L  Skin:  Warm and dry.     Data Review:   Recent Labs      10/02/17   0352  10/01/17   0414   WBC  15.1*  12.3*   HGB  9.6*  9.2*   HCT  28.0*  26.3*   PLT  137*  46*     Recent Labs      10/02/17   0352  10/01/17   0414   NA  137  139   K  4.3  3.9   CL  105  111*   CO2  22  20*   GLU  189*  107*   BUN  24*  18   CREA  0.99  0.92   CA  7.7*  7.6*   MG  2.4  2.2   ALB  2.8*  2.8*   TBILI  0.5  0.5   SGOT  17  20   ALT  16  17       No results for input(s): TROIQ, CPK, CKMB in the last 72 hours.       Intake/Output Summary (Last 24 hours) at 10/03/17 1208  Last data filed at 10/03/17 1029   Gross per 24 hour   Intake             1809 ml   Output             1375 ml   Net              434 ml        Telemetry: ST with PACs and PVCs    Assessment:     Principal Problem:    NSTEMI (non-ST elevated myocardial infarction) ([facility] [address] (9/26/2017)    Active Problems:    Family history of early CAD (9/26/2017)      Overview: Significant for mother, brother      Systolic heart failure ([facility] [address] (9/27/2017)      Coronary artery disease involving native coronary artery with unstable angina pectoris ([facility] [address] (9/27/2017)      [facility] (9/27/2017)      CAD (coronary artery disease), native coronary artery (9/27/2017)      CAD (coronary artery disease) (9/29/2017)      S/P CABG x 3 (9/29/2017)      Overview: ON PUMP CABG X 3, LIMA to LAD, LRAG to OM, RSVG to PDA      Left radial artery harvest      Right greater saphenous vein harvest        Plan:     ICM with Acute systolic HF:  Tolerating low dose ACE, BB and diuretic, increasing each as tolerated  Daily weights, monitor I/O, labs  Limited echo intoday to evaluate EF. If EF remains < 35%, Lifevest at discharge  Continues with slight atelectasis at lung bases - encouraging incentive spirometer use, ambulation     CAD:  S/p CABG  Continue on ASA, BB, statin      DM: Started on 30 units insulin, glipizide, BS better controlled         [address] Cardiology    10/3/2017         Agree with note as outlined by  NP. I confirm findings in history and physical exam. No additional findings noted. Agree with plan as outlined above. Repeat echo shows slightly better EF.still low.  Will arrange for Marichuy Kim MD

## 2017-10-03 NOTE — PROGRESS NOTES
Initial Nutrition Assessment:    INTERVENTIONS/RECOMMENDATIONS:   · Meals/Snacks: General/healthful diet: Continue CCD. · Initial/Brief Nutrition Education: Survival information: Diabetes education, carbohydrate counting information. ASSESSMENT:   Chart reviewed, medically noted for NSTEMI, CAD, s/p CABG x 3 (POD 4). Case discussed during CCU rounds, patient to go home soon. Patient has newly diagnosed DM. Noted DTC visit and education on insulin/BG guidelines. Visited patient's room, patient sitting up in chair with wife present. Provided verbal education regarding nutrition with diabetes to both patient and wife. Answered questions and provided handout for patient. Compliance to diet seems likely. Past Medical History:   Diagnosis Date    Coronary artery disease involving native coronary artery with unstable angina pectoris ([facility] [address] 9/27/2017    Family history of early CAD 9/26/2017    Significant for mother, brother    Type II diabetes mellitus, uncontrolled ([facility] [address] 9/27/2017       Diet Order: Consistent carb  % Eaten:  Patient Vitals for the past 72 hrs:   % Diet Eaten   10/03/17 1200 100 %   10/03/17 0800 100 %   10/02/17 1800 20 %   10/02/17 1300 20 %   10/02/17 0900 20 %     Pertinent Medications: [x]Reviewed []Other: pepcid, lasix, glipizide, lantus, humalog, miralax, pericolace  Pertinent Labs: [x]Reviewed []Other: -146-193, A1c 9.4  Food Allergies: [x]None []Other   Last BM: 10/3   [x]Active     []Hyperactive  []Hypoactive       [] Absent BS  Skin:    [] Intact   [x] Incision  [] Breakdown  [] Other:    Anthropometrics:   Height: 6' 2\" (188 cm) Weight: 108.6 kg (239 lb 6.7 oz)   IBW (%IBW):   ( ) UBW (%UBW):   (  %)   Last Weight Metrics:  Weight Loss Metrics 10/3/2017 9/26/2017   Today's Wt 239 lb 6.7 oz -   BMI - 30.74 kg/m2       BMI: Body mass index is 30.74 kg/(m^2).     This BMI is indicative of:   []Underweight    []Normal    []Overweight    [x] Obesity   [] Extreme Obesity (BMI>40) Estimated Nutrition Needs (Based on):   2652 Kcals/day (BMR 2006x1. 3(AF)-250) , 87 g (0.8 g/kg bw) Protein  Carbohydrate: At Least 130 g/day  Fluids: 2000 mL/day (1ml/kcal)    NUTRITION DIAGNOSES:   Problem:  Altered nutrition-related lab values      Etiology: related to DM     Signs/Symptoms: as evidenced by HbA1c of 9.4 and -246      NUTRITION INTERVENTIONS:  Meals/Snacks: General/healthful diet         Initial/Brief Nutrition Education: Survival information        GOAL:   Consume >50% of meals and BG trend <180 mg/dL    LEARNING NEEDS (Diet, Food/Nutrient-Drug Interaction):    [] None Identified   [x] Identified and Education Provided/Documented   [] Identified and Pt declined/was not appropriate     Cultureal, Yarsanism, OR Ethnic Dietary Needs:    [x] None Identified   [] Identified and Addressed     [x] Interdisciplinary Care Plan Reviewed/Documented    [x] Discharge Planning:   CCD for blood glucose management. MONITORING /EVALUATION:      Food/Nutrient Intake Outcomes:  Total energy intake  Physical Signs/Symptoms Outcomes: Weight/weight change, Electrolyte and renal profile, GI profile, Glucose profile, GI    NUTRITION RISK:    [] High              [] Moderate           [x]  Low  []  Minimal/Uncompromised    PT SEEN FOR:    []  MD Consult: []Calorie Count      [x]Diabetic Diet Education        []Diet Education     []Electrolyte Management     []General Nutrition Management and Supplements     []Management of Tube Feeding     []TPN Recommendations    []  RN Referral:  []MST score >=2     []Enteral/Parenteral Nutrition PTA     []Pregnant: Gestational DM or Multigestation     []Pressure Ulcer/Wound Care needs        []  Low BMI  []  DTR Referral   [x] LOS >= 5 days    Cara Fitzgerald V  Dietetic Intern

## 2017-10-03 NOTE — DIABETES MGMT
DTC Cardiac Surgery Progress Note    Recommendations/ Comments:  Pt reviewed in CCU rounds . Plan to continue current regimen at this time as this is the first morning pt will have received glipizide. Monitor BG. Chart reviewed on Rajwinder Vera. Patient is 45 y.o. male s/p Cardiac Surgery. POD 4. Newly diagnosed Type 2 Diabetes. A1c:   Lab Results   Component Value Date/Time    Hemoglobin A1c 9.4 09/27/2017 05:35 AM         Recent Glucose Results:   Lab Results   Component Value Date/Time    GLUCPOC 146 (H) 10/03/2017 08:00 AM    GLUCPOC 193 (H) 10/02/2017 09:14 PM    GLUCPOC 200 (H) 10/02/2017 04:26 PM        Lab Results   Component Value Date/Time    Creatinine 0.99 10/02/2017 03:52 AM     Estimated Creatinine Clearance: 114.6 mL/min (based on Cr of 0.99). Active Orders   Diet    DIET DIABETIC WITH OPTIONS Consistent Carb 2200kcal; Regular; 2 GM NA (House Low NA)        PO intake:   Patient Vitals for the past 72 hrs:   % Diet Eaten   10/03/17 0800 100 %   10/02/17 1800 20 %   10/02/17 1300 20 %   10/02/17 0900 20 %       Current DM medications: humalog correction, lantus 30units daily and glipizide 5mg ac b/d    Will continue to follow as needed. Thank you.   YONI Flores, RN, [address]

## 2017-10-04 VITALS
HEART RATE: 80 BPM | OXYGEN SATURATION: 98 % | TEMPERATURE: 99.1 F | DIASTOLIC BLOOD PRESSURE: 83 MMHG | WEIGHT: 244.27 LBS | SYSTOLIC BLOOD PRESSURE: 131 MMHG | HEIGHT: 74 IN | RESPIRATION RATE: 16 BRPM | BODY MASS INDEX: 31.35 KG/M2

## 2017-10-04 LAB
GLUCOSE BLD STRIP.AUTO-MCNC: 82 MG/DL (ref 65–100)
GLUCOSE BLD STRIP.AUTO-MCNC: 87 MG/DL (ref 65–100)
SERVICE CMNT-IMP: NORMAL
SERVICE CMNT-IMP: NORMAL

## 2017-10-04 PROCEDURE — 74011250637 HC RX REV CODE- 250/637: Performed by: PHYSICIAN ASSISTANT

## 2017-10-04 PROCEDURE — 74011250637 HC RX REV CODE- 250/637: Performed by: INTERNAL MEDICINE

## 2017-10-04 PROCEDURE — 74011250636 HC RX REV CODE- 250/636: Performed by: PHYSICIAN ASSISTANT

## 2017-10-04 PROCEDURE — 97110 THERAPEUTIC EXERCISES: CPT

## 2017-10-04 PROCEDURE — 97116 GAIT TRAINING THERAPY: CPT

## 2017-10-04 PROCEDURE — 74011636637 HC RX REV CODE- 636/637: Performed by: INTERNAL MEDICINE

## 2017-10-04 PROCEDURE — 82962 GLUCOSE BLOOD TEST: CPT

## 2017-10-04 PROCEDURE — 74011250637 HC RX REV CODE- 250/637: Performed by: NURSE PRACTITIONER

## 2017-10-04 RX ORDER — LANCETS
EACH MISCELLANEOUS 2 TIMES DAILY
Qty: 1 EACH | Refills: 2 | Status: SHIPPED | OUTPATIENT
Start: 2017-10-04 | End: 2021-04-08

## 2017-10-04 RX ORDER — GUAIFENESIN 100 MG/5ML
81 LIQUID (ML) ORAL DAILY
Qty: 30 TAB | Refills: 11 | Status: SHIPPED | OUTPATIENT
Start: 2017-10-04 | End: 2018-09-29

## 2017-10-04 RX ORDER — PEN NEEDLE, DIABETIC 31 GX3/16"
NEEDLE, DISPOSABLE MISCELLANEOUS
Qty: 100 PEN NEEDLE | Refills: 1 | Status: SHIPPED | OUTPATIENT
Start: 2017-10-04 | End: 2021-04-08

## 2017-10-04 RX ORDER — GLIPIZIDE 5 MG/1
5 TABLET ORAL
Qty: 60 TAB | Refills: 2 | Status: SHIPPED | OUTPATIENT
Start: 2017-10-04 | End: 2017-11-21 | Stop reason: ALTCHOICE

## 2017-10-04 RX ORDER — INSULIN GLARGINE 100 [IU]/ML
25 INJECTION, SOLUTION SUBCUTANEOUS DAILY
Qty: 2 VIAL | Refills: 2 | Status: SHIPPED | OUTPATIENT
Start: 2017-10-04 | End: 2017-11-07 | Stop reason: SDUPTHER

## 2017-10-04 RX ORDER — CARVEDILOL 12.5 MG/1
12.5 TABLET ORAL 2 TIMES DAILY WITH MEALS
Qty: 60 TAB | Refills: 2 | Status: SHIPPED | OUTPATIENT
Start: 2017-10-04 | End: 2017-12-01 | Stop reason: SDUPTHER

## 2017-10-04 RX ORDER — OXYCODONE AND ACETAMINOPHEN 5; 325 MG/1; MG/1
1-2 TABLET ORAL
Qty: 40 TAB | Refills: 0 | Status: SHIPPED | OUTPATIENT
Start: 2017-10-04 | End: 2017-11-10

## 2017-10-04 RX ORDER — AMOXICILLIN 250 MG
1 CAPSULE ORAL 2 TIMES DAILY
Qty: 60 TAB | Refills: 0 | Status: SHIPPED | OUTPATIENT
Start: 2017-10-04 | End: 2017-11-10

## 2017-10-04 RX ORDER — LISINOPRIL 2.5 MG/1
2.5 TABLET ORAL DAILY
Qty: 30 TAB | Refills: 2 | Status: SHIPPED | OUTPATIENT
Start: 2017-10-04 | End: 2017-11-07 | Stop reason: SDUPTHER

## 2017-10-04 RX ORDER — CARVEDILOL 6.25 MG/1
6.25 TABLET ORAL ONCE
Status: COMPLETED | OUTPATIENT
Start: 2017-10-04 | End: 2017-10-04

## 2017-10-04 RX ORDER — CARVEDILOL 12.5 MG/1
12.5 TABLET ORAL 2 TIMES DAILY WITH MEALS
Status: DISCONTINUED | OUTPATIENT
Start: 2017-10-04 | End: 2017-10-04 | Stop reason: HOSPADM

## 2017-10-04 RX ORDER — POTASSIUM CHLORIDE 1500 MG/1
20 TABLET, FILM COATED, EXTENDED RELEASE ORAL DAILY
Qty: 7 TAB | Refills: 1 | Status: SHIPPED | OUTPATIENT
Start: 2017-10-04 | End: 2017-10-11

## 2017-10-04 RX ORDER — INSULIN GLARGINE 100 [IU]/ML
30 INJECTION, SOLUTION SUBCUTANEOUS DAILY
Qty: 2 VIAL | Refills: 2 | Status: SHIPPED | OUTPATIENT
Start: 2017-10-04 | End: 2017-10-04

## 2017-10-04 RX ORDER — FUROSEMIDE 40 MG/1
40 TABLET ORAL DAILY
Qty: 7 TAB | Refills: 1 | Status: SHIPPED | OUTPATIENT
Start: 2017-10-04 | End: 2017-10-11

## 2017-10-04 RX ORDER — ATORVASTATIN CALCIUM 20 MG/1
20 TABLET, FILM COATED ORAL
Qty: 30 TAB | Refills: 2 | Status: SHIPPED | OUTPATIENT
Start: 2017-10-04 | End: 2017-12-01 | Stop reason: SDUPTHER

## 2017-10-04 RX ORDER — INSULIN GLARGINE 100 [IU]/ML
25 INJECTION, SOLUTION SUBCUTANEOUS DAILY
Qty: 15 ML | Refills: 1 | Status: SHIPPED | OUTPATIENT
Start: 2017-10-04 | End: 2017-11-21 | Stop reason: ALTCHOICE

## 2017-10-04 RX ADMIN — FUROSEMIDE 40 MG: 40 TABLET ORAL at 08:22

## 2017-10-04 RX ADMIN — ASPIRIN 81 MG 81 MG: 81 TABLET ORAL at 08:22

## 2017-10-04 RX ADMIN — CARVEDILOL 6.25 MG: 6.25 TABLET, FILM COATED ORAL at 09:35

## 2017-10-04 RX ADMIN — ENOXAPARIN SODIUM 40 MG: 40 INJECTION SUBCUTANEOUS at 09:37

## 2017-10-04 RX ADMIN — FAMOTIDINE 20 MG: 20 TABLET ORAL at 08:22

## 2017-10-04 RX ADMIN — GLIPIZIDE 5 MG: 5 TABLET ORAL at 08:22

## 2017-10-04 RX ADMIN — CHLORHEXIDINE GLUCONATE 15 ML: 1.2 RINSE ORAL at 09:38

## 2017-10-04 RX ADMIN — Medication 400 MG: at 09:33

## 2017-10-04 RX ADMIN — LISINOPRIL 2.5 MG: 5 TABLET ORAL at 09:32

## 2017-10-04 RX ADMIN — Medication 10 ML: at 06:55

## 2017-10-04 RX ADMIN — POTASSIUM CHLORIDE 20 MEQ: 750 TABLET, FILM COATED, EXTENDED RELEASE ORAL at 09:33

## 2017-10-04 RX ADMIN — INSULIN GLARGINE 30 UNITS: 100 INJECTION, SOLUTION SUBCUTANEOUS at 11:55

## 2017-10-04 RX ADMIN — MUPIROCIN: 20 OINTMENT TOPICAL at 09:39

## 2017-10-04 RX ADMIN — CARVEDILOL 6.25 MG: 6.25 TABLET, FILM COATED ORAL at 08:21

## 2017-10-04 NOTE — DISCHARGE INSTRUCTIONS
Cardiac Surgery Specialist    [address] [facility] [address] SinclairCar[facility] Franklin Memorial Hospitallexis                                        [address], [address] [facility]  Office- [phone]  Fax- [phone]       Office- [phone]  Fax- [phone]  _____________________________________________________________  Dr. Shon Meadows , ACNP       Bob Mercer  ACNP                                           Jacoby Jade CSA         BJ Sindy Blake PA-C     Name:[facility]     Surgery & Date:  CABG 9/29/17    Discharge Date: 10/4/2017     Discharge to:  Home    INSTRUCTIONS:  NO SMOKING OR TOBACCO PRODUCTS  Follow all the instructions in your discharge book  You may shower. Wash all incisions twice daily with mild soap and water. No lotions, ointments or powder. Call the office immediately for any redness, swelling, or drainage from your incision. Take your temperature daily and call for a temperature of 101 degrees or higher or for any symptoms that make you think you have and infection. Weigh yourself each morning. Call if you gain more than 5 pounds in 48 hours. Use the incentive spirometer 6-8 times a day-10 breaths each time. Use a pillow or your bear to splint your breastbone when coughing or sneezing. If you feel your breast bone clicking or popping, notify the office immediately. Walk several hundred feet several times daily. DIET  Eat an American Heart Association diet. If you are having trouble with your appetite, eat what you can. Try eating small, frequent meals throughout the day. Kcal ADA diet. Check your blood sugars  And keep a log of your results. ACTIVITY  NO DRIVING--you will be evaluated to drive at your follow up visit.   Increase your activity by walking several times a day. Stay out of bed most of the day. When sitting, keep your legs elevated. You may ride in a car, but you must get out every hour and walk around. If you ride in a car with an airbag that can not be switched off, put the seat ALL the way back or ride in the back seat. NO LIFTING MORE THAN 5 POUND FOR 1 MONTH, THEN YOU CAN INCREASE TO NO MORE THAN 10 LBS FOR THE NEXT 2 MONTHS. AFTER 3 MONTHS, YOU WILL NO LONGER HAVE ANY LIFTING RESTRICTIONS. FOLLOW UP           1. Your first follow up appointment will be on 10/11 at 2pm         2. Your second follow up appointment will be on 11/6 at 1:15pm         3. Please call our office at [phone] if you are unable to make either one of these appointments. 4.  Call your cardiologist to make an appointment to see them in 4-5 weeks. 5.  Consult you primary care physician regarding your influenza &   pneumovax vaccines. 6.   PLEASE bring your medication bottles to each appointment. 7. Please call Cardiac Rehab at [phone] if you do not already have an appointment. CALL [phone] FOR ANY QUESTIONS OR PROBLEMS. Signature:___________________________________________________          MyChart Activation    Thank you for requesting access to [employer]. Please follow the instructions below to securely access and download your online medical record. Sc[employer]is allows you to send messages to your doctor, view your test results, renew your prescriptions, schedule appointments, and more. How Do I Sign Up? 1. In your internet browser, go to [email or web address]  2. Click on the First Time User? Click Here link in the Sign In box. You will be redirect to the New Member Sign Up page. 3. Enter your Scopis Access Code exactly as it appears below. You will not need to use this code after youve completed the sign-up process. If you do not sign up before the expiration date, you must request a new code.     Scopis Access Code: [email or web address]  Expires: 2017  1:40 PM (This is the date your nooked access code will )    4. Enter the last four digits of your Social Security Number (xxxx) and Date of Birth (mm/dd/yyyy) as indicated and click Submit. You will be taken to the next sign-up page. 5. Create a nooked ID. This will be your nooked login ID and cannot be changed, so think of one that is secure and easy to remember. 6. Create a nooked password. You can change your password at any time. 7. Enter your Password Reset Question and Answer. This can be used at a later time if you forget your password. 8. Enter your e-mail address. You will receive e-mail notification when new information is available in [address] 9. Click Sign Up. You can now view and download portions of your medical record. 10. Click the Download Summary menu link to download a portable copy of your medical information. Additional Information    If you have questions, please visit the Frequently Asked Questions section of the nooked website at [email or web address] Remember, nooked is NOT to be used for urgent needs. For medical emergencies, dial 911.

## 2017-10-04 NOTE — PROGRESS NOTES
[facility] Consult Service Progress NOTE  Pulmonary, Critical Care, and Sleep Medicine    Name: Mando Helms MRN: [id number]   : 1965 Hospital: [address]   Date: 10/4/2017  Admission Date: 2017     IMPRESSION:   CAD s/p NSTEMI ->S/p CABG     s/p ON PUMP CABG X 3, LIMA to LAD, LRAG to OM, RSVG to PDA  Hx DM, htn, non smoker      RECOMMENDATIONS/PLAN:   1. Incentive Spirometry. 2. Off pressors  3. Pain controlled  4. Glycemic control  5. Gi/dvt  6. Mobilize   7.  Can be transferred to floor or home today     No acute complaints  No distress      Risk of deterioration:    [x]      High complexity decision making was performed   [x]      See my orders for details    Hospital Day: 9         Current Facility-Administered Medications   Medication    carvedilol (COREG) tablet 6.25 mg    atorvastatin (LIPITOR) tablet 20 mg    ondansetron (ZOFRAN) injection 8 mg    furosemide (LASIX) tablet 40 mg    potassium chloride SR (KLOR-CON 10) tablet 20 mEq    polyethylene glycol (MIRALAX) packet 34 g    enoxaparin (LOVENOX) injection 40 mg    metoclopramide HCl (REGLAN) injection 10 mg    insulin glargine (LANTUS) injection 30 Units    glipiZIDE (GLUCOTROL) tablet 5 mg    0.9% sodium chloride infusion 250 mL    lisinopril (PRINIVIL, ZESTRIL) tablet 2.5 mg    sodium chloride (NS) flush 5-10 mL    sodium chloride (NS) flush 5-10 mL    oxyCODONE-acetaminophen (PERCOCET) 5-325 mg per tablet 1 Tab    oxyCODONE-acetaminophen (PERCOCET) 5-325 mg per tablet 2 Tab    morphine injection 4 mg    naloxone (NARCAN) injection 0.4 mg    mupirocin (BACTROBAN) 2 % ointment    albuterol (PROVENTIL VENTOLIN) nebulizer solution 2.5 mg    aspirin chewable tablet 81 mg    famotidine (PEPCID) tablet 20 mg    magnesium oxide (MAG-OX) tablet 400 mg    calcium chloride 1 g in 0.9% sodium chloride 50 mL IVPB    bisacodyl (DULCOLAX) suppository 10 mg    senna-docusate (PERICOLACE) 8.6-50 mg per tablet 1 Tab    ELECTROLYTE REPLACEMENT PROTOCOL    magnesium sulfate 1 g/100 ml IVPB (premix or compounded)    glucose chewable tablet 16 g    dextrose (D50W) injection syrg 12.5-25 g    glucagon (GLUCAGEN) injection 1 mg    insulin lispro (HUMALOG) injection    sodium chloride 0.9 % bolus infusion 250 mL    chlorhexidine (PERIDEX) 0.12 % mouthwash 15 mL         History reviewed. No pertinent family history. Vital Signs: Intake/Output: Intake/Output:   Temp (24hrs), Av.7 °F (37.1 °C), Min:98 °F (36.7 °C), Max:100 °F (37.8 °C)      Visit Vitals    /69 (BP 1 Location: Right arm, BP Patient Position: At rest)    Pulse 90    Temp 98.8 °F (37.1 °C)    Resp 21    Ht 6' 2\" (1.88 m)    Wt 108.6 kg (239 lb 6.7 oz)    SpO2 94%    BMI 30.74 kg/m2         O2 Device: Room air  O2 Flow Rate (L/min): 2 l/min    Wt Readings from Last 4 Encounters:   10/03/17 108.6 kg (239 lb 6.7 oz)          Intake/Output Summary (Last 24 hours) at 10/04/17 0715  Last data filed at 10/04/17 0400   Gross per 24 hour   Intake              860 ml   Output             1900 ml   Net            -1040 ml       Last shift:           Last 3 shifts: 10/02 1901 - 10/04 0700  In: 1369 [P.O.:1310; I.V.:59]  Out: 2300 [Urine:2300]     Telemetry:normal sinus rhythm    Physical Exam:    General: no distress. HEENT: NCAT              Chest:  Sternal wound healing. Lungs: clear anteriorly. Heart: RRR   Abdomen: soft, non-tender, without masses or organomegaly   : no flank tenderness    Extremity: negative cyanosis, clubbing, warm. Skin: Skin color, texture, turgor normal. No rashes or lesions;    Normal upper and lower extremity pulses    Tubes:   Noted       DATA:  MAR reviewed and pertinent medications noted or modified as needed    Labs:    Recent Labs      10/02/17   0352   WBC  15.1*   HGB  9.6*   PLT  137*     Recent Labs      10/02/17   0352   NA  137   K  4.3   CL  105   CO2  22   GLU 189*   BUN  24*   CREA  0.99   CA  7.7*   MG  2.4   ALB  2.8*   SGOT  17   ALT  16     No results for input(s): PH, PCO2, PO2, HCO3, FIO2 in the last 72 hours. No results for input(s): CPK, CKNDX, TROIQ in the last 72 hours.     No lab exists for component: CPKMB  No results found for: BNPP, BNP   Lab Results   Component Value Date/Time    Culture result: NO GROWTH 2 DAYS 09/27/2017 03:39 PM     Lab Results   Component Value Date/Time     09/26/2017 04:42 PM     Lab Results   Component Value Date/Time    Color YELLOW/STRAW 09/27/2017 03:39 PM    Appearance CLEAR 09/27/2017 03:39 PM    Specific gravity 1.020 09/26/2017 06:54 PM    pH (UA) 5.0 09/27/2017 03:39 PM    Protein NEGATIVE  09/27/2017 03:39 PM    Glucose 100 09/27/2017 03:39 PM    Ketone NEGATIVE  09/27/2017 03:39 PM    Bilirubin NEGATIVE  09/27/2017 03:39 PM    Blood NEGATIVE  09/27/2017 03:39 PM    Urobilinogen 0.2 09/27/2017 03:39 PM    Nitrites NEGATIVE  09/27/2017 03:39 PM    Leukocyte Esterase NEGATIVE  09/27/2017 03:39 PM    WBC 0-4 09/27/2017 03:39 PM    RBC 0-5 09/27/2017 03:39 PM    Bacteria NEGATIVE  09/27/2017 03:39 PM       No results found for: TOXA1, RPR, HBCM, HBSAG, HAAB, HCAB1, HAAT, G6PD, CRYAC, HIVGT, HIVR, HIV1, HIV12, HIVPC, HIVRPI  No results found for: IRON, FE, TIBC, IBCT, PSAT, FERR  Lab Results   Component Value Date/Time    TSH 1.15 09/26/2017 02:31 PM       Imaging:  []                           I have personally reviewed the patients radiographs and reports:      My assessment/plan was discussed with:  Nursing xx    respiratory therapy xx María Gordo Aide   family         Mita Havrey MD

## 2017-10-04 NOTE — DISCHARGE SUMMARY
[facility] Discharge Summary     Patient ID:  Rajwinder Vera  [id number]  65 y.o.  1965    Admit date: 9/26/2017    Discharge date: 10/4/2017     Admitting Physician: Catrachita Redd MD     Referring Cardiologist:  Dr Yuliana Newton    PCP:  none    Admitting Diagnoses: CAD    Discharge Diagnoses: CAD    Discharge Condition: stable    Hospital Problems  Date Reviewed: 9/27/2017          Codes Class Noted POA    CAD (coronary artery disease) ICD-10-CM: I25.10  ICD-9-CM: 414.00  9/29/2017 Unknown        S/P CABG x 3 ICD-10-CM: Z95.1  ICD-9-CM: V45.81  9/29/2017 Unknown    Overview Signed 9/29/2017  2:00 PM by ITZ Brown     ON PUMP CABG X 3, LIMA to LAD, LRAG to OM, RSVG to PDA  Left radial artery harvest  Right greater saphenous vein harvest             Systolic heart failure ([facility] ICD-10-CM: I50.20  ICD-9-CM: 428.20  9/27/2017 Unknown        Coronary artery disease involving native coronary artery with unstable angina pectoris ([facility] ICD-10-CM: I25.110  ICD-9-CM: 414.01, 411.1  9/27/2017 Unknown        Type II diabetes mellitus, uncontrolled ([facility] ICD-10-CM: E11.65  ICD-9-CM: 250.02  9/27/2017 Unknown        CAD (coronary artery disease), native coronary artery ICD-10-CM: I25.10  ICD-9-CM: 414.01  9/27/2017 Unknown        * (Principal)NSTEMI (non-ST elevated myocardial infarction) [facility]) ICD-10-CM: I21.4  ICD-9-CM: 410.70  9/26/2017 Yes        Family history of early CAD ICD-10-CM: Z82.49  ICD-9-CM: V17.3  9/26/2017 Unknown    Overview Signed 9/26/2017  3:23 PM by Irma Salas NP     Significant for mother, brother                   Procedures for this admission:  Procedure(s):  ON PUMP CABG X 3 USING RIGHT SAPHENOUS VEIN AND LEFT RADIAL ARTERY VIA EVH WITH INTRAOPERATIVE ALRFEDO    Condition on Discharge: stable    Followup Care:   Activity as tolerated  Diabetic Diet  Keep wound clean and dry    Follow-up Information     Follow up With Details Comments [facility]  This is your home health provider. They will contact you after discharge to arrange the first visit. [phone]    Gavin Sen MD Go on 11/7/2017 Cardiology follow up at 11:15 AM  [phone] [facility]  [phone]      Diego Bolden DO Go on 11/21/2017 New PCP appointment at 11:00 AM. Please arrive at 10:30 AM with insurance card, photo ID, a list of medications, and copay if needed. [facility]  [phone]      None   None (395) Patient stated that they have no PCP            Patient Disposition:  Home    DC med list:     Medication List      START taking these medications          aspirin 81 mg chewable tablet   Dose:  81 mg   Instructions: Take 1 Tab by mouth daily for 360 days. atorvastatin 20 mg tablet   Dose:  20 mg   Instructions: Take 1 Tab by mouth nightly. Commonly known as:  LIPITOR       carvedilol 12.5 mg tablet   Dose:  12.5 mg   Instructions: Take 1 Tab by mouth two (2) times daily (with meals). Commonly known as:  COREG       furosemide 40 mg tablet   Dose:  40 mg   Instructions: Take 1 Tab by mouth daily for 7 days. Commonly known as:  LASIX       glipiZIDE 5 mg tablet   Dose:  5 mg   Instructions: Take 1 Tab by mouth Before breakfast and dinner. Commonly known as:  GLUCOTROL       glucose blood VI test strips strip   Dose:  1 Each   Instructions:  1 Each by Does Not Apply route See Admin Instructions. Commonly known as:  ACCU-CHEK GUIDE       * insulin glargine 100 unit/mL injection   Dose:  25 Units   Instructions:  25 Units by SubCUTAneous route daily. Commonly known as:  LANTUS       * insulin glargine 100 unit/mL (3 mL) Inpn   Dose:  25 Units   Instructions:  25 Units by SubCUTAneous route daily for 60 days.    Commonly known as:  LANTUS,BASAGLAR       Insulin Needles (Disposable) 32 gauge x 5/32\" Ndle   Instructions:  Use as directed   Commonly known as:  Cinthia Pen Needle Lancets Misc   Instructions:  by Does Not Apply route two (2) times a day. Commonly known as:  ACCU-CHEK FASTCLIX       lisinopril 2.5 mg tablet   Dose:  2.5 mg   Instructions: Take 1 Tab by mouth daily. Commonly known as:  PRINIVIL, ZESTRIL       oxyCODONE-acetaminophen 5-325 mg per tablet   Dose:  1-2 Tab   Instructions: Take 1-2 Tabs by mouth every six (6) hours as needed. Max Daily Amount: 8 Tabs. Indications: Pain   Commonly known as:  PERCOCET       potassium chloride SR 20 mEq tablet   Dose:  20 mEq   Instructions: Take 1 Tab by mouth daily for 7 days. Commonly known as:  K-TAB       senna-docusate 8.6-50 mg per tablet   Dose:  1 Tab   Instructions: Take 1 Tab by mouth two (2) times a day. Commonly known as:  PERICOLACE       * Notice: This list has 2 medication(s) that are the same as other medications prescribed for you. Read the directions carefully, and ask your doctor or other care provider to review them with you. Where to Get Your Medications      Information about where to get these medications is not yet available     ! Ask your nurse or doctor about these medications     aspirin 81 mg chewable tablet    atorvastatin 20 mg tablet    carvedilol 12.5 mg tablet    furosemide 40 mg tablet    glipiZIDE 5 mg tablet    glucose blood VI test strips strip    insulin glargine 100 unit/mL (3 mL) Inpn    insulin glargine 100 unit/mL injection    Insulin Needles (Disposable) 32 gauge x 5/32\" Ndle    Lancets Misc    lisinopril 2.5 mg tablet    oxyCODONE-acetaminophen 5-325 mg per tablet    potassium chloride SR 20 mEq tablet    senna-docusate 8.6-50 mg per tablet               HPI:  Pallavi Howell is a 45 y.o. no past medical history, non smoking, cau male who was referred for opinion and advise regarding coronary revascularization with NSTEMI by Dr. Otto Todd / None. Patient describes one month history of exertional and rest occurring palpitations associated with nausea.  Four episodes total. Yesterday patient also vomited twice. Denies chest pain, SOB,  claudication,  PND or syncope. Symptoms relieved with rest until yesterday. Hospital Course:     POD#1:  Doing well  Improved hemodynamics with reduction in pressor support  NSR  POD#2: Doing well  Improved hemodynamics with reduction in pressor support  NSR  Exam unremarkable   IABP and CT removed  Continued progress   POD#3: Stable hemodynamics in sinus rhythm without ectopy on no support. CXR:   There is no apparent pneumothorax. Lungs show persistent left   greater than right bibasilar haziness favoring effusions and atelectasis. Cr 0.9 UOP 1900/24 hrs  Worked well with PT/OT    POD#4: Stable hemodynamics in ST without ectopy on no support. OOB to chair. Has not walked in henry  Clear BS  Mild edema, weight up   I/O +600  POD#5: Walks about unit. No complaints. Wants to go home. Sinus rhythm. VS stable. Lungs clear to auscultation. Wound quiet. Periphery is warm. LVEF by echo yesterday 25-30. Temporary pacing wires removed. Right IJ line removed. Arrange LifeVest.  Ready for discharge.          Discharge Vital Signs:   Visit Vitals    /69    Pulse 87    Temp 97.9 °F (36.6 °C)    Resp 15    Ht 6' 2\" (1.88 m)    Wt 244 lb 4.3 oz (110.8 kg)    SpO2 98%    BMI 31.36 kg/m2       Labs:   Recent Labs      10/04/17   1158   10/02/17   0352   WBC   --    --   15.1*   HGB   --    --   9.6*   HCT   --    --   28.0*   PLT   --    --   137*   NA   --    --   137   K   --    --   4.3   BUN   --    --   24*   CREA   --    --   0.99   GLU   --    --   189*   GLUCPOC  87   < >   --     < > = values in this interval not displayed. Diagnostics: Portable chest shows [place]-Kristen removal with stable CVL since  yesterday. There is no apparent pneumothorax. Lungs show persistent left  greater than right bibasilar haziness favoring effusions and atelectasis. Heart  size is large, stable.  There is no overt pulmonary edema. Patient Instructions:  Discharge instructions were reviewed with the patient and family present. Questions were also answered at this time. Prescriptions and medications were reviewed. The patient has a follow up appointment on 10/11 at 2pm and 11/6 at 1:15pm. The patient and family were encouraged to call with any questions or concerns.        Signed:  Desean Ac PA-C  10/4/2017  2:29 PM

## 2017-10-04 NOTE — INTERDISCIPLINARY ROUNDS
Interdisciplinary team rounds were held with the following team members:Case Management, Diabetes Treatment Specialist, Nursing, Nutrition, Occupational Therapy, Physical Therapy,Pharmacy, Physician and Respiratory Therapy. Plan of care discussed. See clinical pathway and/or care plan for interventions and desired outcomes.

## 2017-10-04 NOTE — PROGRESS NOTES
Visit attempted per nurse unable to see pt till after 1030 due to wires being pulled . Will continue to follow.

## 2017-10-04 NOTE — PROGRESS NOTES
1900: Received report from oncoming shift. Life vest representative at bedside with patient. 2000: Denies discomforts. Ambulates well. Bed alarm on bed for patient safety. 0400: Resting well this shift. VSS. NSR on CM. Denies discomforts. 0730: Right IJ central line and pacer wires discontinued by .

## 2017-10-04 NOTE — PROGRESS NOTES
Expect that pt will dc home today. [facility] orders written for Nursing. Pt/wife provided FOC - they selected [facility] which is affiliated with [facility], [facility], [facility] and in network with pt's insur. Pt has been accepted by [facility] - orders sent via [facility]. Pt needs a PCP - was given BS list - wife is looking over and will let CM know prior to dc so 1st appmt can be made. Care Management Interventions  PCP Verified by CM: Yes (Dr. Pallavi Solano - new)  Mode of Transport at Discharge:  Other (see comment) (family)  Transition of Care Consult (CM Consult): [address]: No  Reason Outside [place]: Out of service area  Discharge Durable Medical Equipment: No  Physical Therapy Consult: Yes  Occupational Therapy Consult: Yes  Speech Therapy Consult: No  Current Support Network: Lives with Spouse  Confirm Follow Up Transport: Family  Plan discussed with Pt/Family/Caregiver: Yes  Freedom of Choice Offered: Yes  Discharge Location  Discharge Placement: Home with home health ([facility])    JOSE Mckeon

## 2017-10-04 NOTE — PROGRESS NOTES
Walks about unit. No complaints. Wants to go home. Sinus rhythm. VS stable. Lungs clear to auscultation. Wound quiet. Periphery is warm. LVEF by echo yesterday 25-30. Temporary pacing wires removed. Right IJ line removed. Arrange LifeVest.  Ready for discharge.

## 2017-10-04 NOTE — ADT AUTH CERT NOTES
Utilization Review           Cardiovascular Surgery or Procedure GRG - Care Day 5 (10/4/2017) by Gila Ramos RN        Review Status Review Entered       Completed 10/4/2017       Details              Care Day: 5 Care Date: 10/4/2017 Level of Care: ICU       Guideline Day 3        Level Of Care       (X) * Activity level acceptable              Clinical Status       (X) * Hemodynamic stability       10/4/2017 1:27 PM EDT by Mckay Mullins         97.9,95,15,104/69,95% ra              (X) * Cardiovascular status acceptable       10/4/2017 1:27 PM EDT by Mckay Mullins         home with lifevest              (X) * Cardiac inflammation absent or controlled (eg, pericarditis)       (X) * Operative site and other wounds acceptable       (X) * Vascular, soft tissue, and wound status acceptable       (X) * No blood loss, or problem resolved       (X) * No infection, or status acceptable       (X) * Pain and nausea absent or adequately managed       ( ) * General Discharge Criteria met              Interventions       (X) * Vascular access established or not needed       10/4/2017 1:27 PM EDT by Mckay Mullins         na              (X) * AV fistula absent or functioning adequately       10/4/2017 1:27 PM EDT by Mckay Mullins         na              (X) * Intake acceptable       (X) * No inpatient interventions needed                                   * Milestone              Additional Notes       Asa 81 mg po qd, lovenox 40 mg sq qd, Lasix 40 mg po qd, Lisinopril 2.5 mg po qd, kcl 20 meq po qd, Gluc 82,87     Can be transferred to floor or home today. Walks about unit.  No complaints.  Wants to go home. LVEF by echo yesterday 25-30. Lifevest placed last evening.  Temporary pacing wires removed.  Right IJ line removed.  Ready for discharge.           Cardiovascular Surgery or Procedure GRG - Care Day 4 (10/3/2017) by Gila Ramos RN        Review Status Review Entered       Completed 10/4/2017       Details              Care Day: 4 Care Date: 10/3/2017 Level of Care: ICU       Guideline Day 2        Level Of Care       ( ) Floor       10/4/2017 1:19 PM EDT by Karen Ochoa         remains in ICU                     Clinical Status       (X) * No ICU or intermediate care needs              Interventions       (X) Inpatient interventions continue       (X) Taper off IV medications       (X) Vascular and neurologic checks as appropriate                                   * Milestone              Additional Notes       100,90,24-34,127/97,96% ra       Asa 81 mg po qd, Lisinopril 2.5 mg po qd, mg ox 400 mg po bid, kcl 20 meq po qd, coreg 6.25 mg po bid, gluc , Home health consult, Echo = Left ventricle: The ventricle was moderately dilated. Ejection fraction was estimated in the range of 25 % to 30 %. There was moderate diffuse hypokinesis. Left atrium: The atrium was mildly dilated. Right atrium: The atrium was mildly dilated. Mitral valve: There was mild  regurgitation.       no complaints of SOB, still with slight nausea eating some.  Long discussion with he and his wife reference systolic HF, lifevest. Extremities: brian LE +1-2 edema Acute systolic HF: Tolerating low dose ACE, BB and diuretic Can be transferred to floor Stable hemodynamics in ST without ectopy on no support. OOB to chair.  Has not walked in henry Clear BS Mild edema, weight up I/O +600, Home tomorrow, increase BB           Cardiovascular Surgery or Procedure GRG - Care Day 3 (10/2/2017) by Jeneane Curling, RN        Review Status Review Entered       Completed 10/4/2017       Details              Care Day: 3 Care Date: 10/2/2017 Level of Care: ICU       Guideline Day 2        Clinical Status       ( ) * No ICU or intermediate care needs       10/4/2017 12:59 PM EDT by Sridevi Estrella remains in ICU                     Interventions       (X) Inpatient interventions continue       (X) Taper off IV medications                                   * Milestone              Additional Notes       99.5,,29-37,129/81,97% 2 L NC       NS @ 50, Zofran 8 mg IV q4h prn - x1, reglan 10 mg iv q6h prn - x1, lovenox 40 sq qd, asa 81 mg po qd       Wbc 15.4, hgb 9.6 hct 28.0, bun 24  crt 0.99, bun/crt ratio 24, ca 7.7, total prot 5.8, alb 2.8, a-g ratio 0.9, cxr-  Lungs show persistent left greater than right bibasilar haziness favoring effusions and atelectasis. Heart size is large, stable. There is no overt pulmonary edema.        10-2-17: 3 Days Post-Op   Pt is seen sitting up in chair this am. Has mild sternal pain, Was feeling hot and felt like he had an increased temp Stable hemodynamics in sinus rhythm without ectopy on no support UOP 1900/24 hrs Worked well with PT/OT       Dobutamine being weaned. Tachycardic. Start coreg/ACEI once dobutamine off. Will repeat echo in 2-3 days. Will need lifevest if EF < 35 before discharge           Cardiovascular Surgery or Procedure GRG - Care Day 1 (9/30/2017) by Elliott Belle        Review Status Review Entered       Completed 10/2/2017       Details              Care Day: 1 Care Date: 9/30/2017 Level of Care: ICU       Guideline Day 1        Level Of Care       (X) OR to ICU or intermediate care [H]       10/2/2017 2:46 PM EDT by Huy Bah         ICU                     Clinical Status       (X) * Clinical Indications met [I]       10/2/2017 2:46 PM EDT by Huy Bah         1.  Native vessel coronary artery disease status post non-ST elevated  myocardial infarction.  2. Ischemic cardiomyopathy.              (X) * Procedure completed       10/2/2017 2:46 PM EDT by Huy Bah         . On pump coronary artery bypass grafting                     Interventions       (X) Inpatient interventions as needed       10/2/2017 2:46 PM EDT by Huy Bah         consult cardiac rehab incentive spirometry chest tube care hemodynamic monitoring daily weight                                          * Milestone              Additional Notes       100.5-116/63-99-33-96% 3L NC              Surgery Date:  09/29/2017        Date of Adm:  09/26/2017                        PREOPERATIVE DIAGNOSES          1. Native vessel coronary artery disease status post non-ST elevated        myocardial infarction.        2. Ischemic cardiomyopathy.                DIAGNOSES          1. Native vessel coronary artery disease status post non-ST elevated        myocardial infarction.        2. Ischemic cardiomyopathy.                 PROCEDURES PERFORMED        1. On pump coronary artery bypass grafting using the left internal        mammary to the left anterior descending coronary artery, left radial        artery from the aorta to the first marginal, right greater saphenous vein        from the aorta to the posterior descending.        2. Left radial artery harvest.        3. Right greater saphenous vein harvest.               IMPRESSION:       ashd s/p NSTEMI ->S/p CABG          s/p ON PUMP CABG X 3, LIMA to LAD, LRAG to OM, RSVG to PDA       On vaso, epi, dobutamine and amio gtt       IABP in place       Hx DM, htn, non smoker       1. Reported Latex Allergy?               RECOMMENDATIONS/PLAN:       1. Doing well following extubation       2. drips per CTS       3. Post op analgesia       4. Glycemic control       5. IABP per Cardiology/CTS       6. Gi/dvt/early mobilization              PCO2 32 Po2 77 glu 128        CXR - Increased retrocardiac opacification which could related to atelectasis versus       developing airspace disease.  Otherwise unchanged.              PT/OT eval and treat              Sinus tachycardia        Incomplete left bundle branch block        ST & T wave abnormality, consider inferolateral ischemia        When compared with ECG of 27-SEP-2017 05:27,        Incomplete left bundle branch block is now present        T wave inversion now evident in Inferior leads        QT has lengthened              ivf 50cc/hr       mag ox po x1       morphine iv x1       Percocet po x1       buminate iv x2       ancef iv x3       amiodarone drip       amiodarone po x1       precedex drip       dobutamine drip       epinephrine drip       insulin drip       cardene drip       pot chlor iv x1       propofol drip       vasopressin drip           Cardiovascular Surgery or Procedure GRG - Clinical Indications for Procedure by Alondra Paiz        Review Status Review Entered       Completed 10/2/2017       Details              Clinical Indications for Procedure       Most Recent : Letitia Noyola Most Recent Date: 10/2/2017 2:42 PM EDT       (X) Surgery or other procedure covered by this guideline is indicated for 1 or more of the following         (1) (2) (3) (4):          (X) Other vascular procedure needed (eg, denervation) [F] (87) (88)          10/2/2017 2:42 PM EDT by Letitia Noyola            CABG for ischemic cardiomyopathy s/p NSTEMI

## 2017-10-04 NOTE — PROGRESS NOTES
Discussed patient in interdisciplinary rounds. Discussed morning blood sugar 82. Diabetes treatment ok to proceed with 30 units of lantus dose today. 1045 PT in to work with patient. 1200 Patient sitting up in chair and eating lunch. No complaints voiced at this time. 406 [facility] and Martina Beavers MD in to see patient and preparing for patients discharge. 1600 Prescriptions and discharge papers given to patient. Instructions and medications reviewed with patient and patients wife. Questions answered. Reviewed medications previously administered today and reviewed medications that patient still needs to receive today. Reviewed upcoming follow up appointments with patient. Patients wife assisted patient in putting on life vest. Belongings with patient. Patient discharged via front entrance of hospital via wheelchair.

## 2017-10-04 NOTE — PROGRESS NOTES
Problem: Mobility Impaired (Adult and Pediatric)  Goal: *Acute Goals and Plan of Care (Insert Text)  Physical Therapy Goals  Initiated 10/2/2017  1. Patient will move from supine to sit and sit to supine in bed with independence within 5 days. 2. Patient will perform sit to/from stand with independence within 5 days. 3. Patient will ambulate 300 feet with least restrictive assistive device and independence within 5 days. 4. Patient will ascend/descend 7 stairs with 1 handrail(s) with independence within 5 days. 5. Patient will perform cardiac exercises per protocol with independence within 5 days. 6. Patient will verbally and functionally recall 3/3 sternal precautions within 5 days. PHYSICAL THERAPY TREATMENT     Patient: Reyna Olivares (71 y.o. male)  Date: 10/4/2017  Diagnosis: NSTEMI (non-ST elevated myocardial infarction) ([facility] [address]  CAD   CAD (coronary artery disease), native coronary artery  CAD (coronary artery disease) NSTEMI (non-ST elevated myocardial infarction) ([facility])  Procedure(s) (LRB):  ON PUMP CABG X 3 USING RIGHT SAPHENOUS VEIN AND LEFT RADIAL ARTERY VIA EVH WITH INTRAOPERATIVE ALFREDO (N/A) 5 Days Post-Op  Precautions: Sternal  Chart, physical therapy assessment, plan of care and goals were reviewed. ASSESSMENT:  Pt cleared by nurse to mobilize. Pt received in bed supine finishing up with case management. Pt performed supine to sit at supervision with cueing to not use arms. Pt performed  sit to stand at supervision. Pt ambulated 400ft with no device at SBA. Pt with a few moments of unsteadiness but able to self correct. Pts with minimal SOB but able to recover quickly. Pts HR increased only to 107 from 94 intially. Pt requring cueing for hand placement with stand to sit transfer. Pt performed UE exercises 1 set 5 reps. Pt able to recall 2/3 sternal precautions. Pt will benift from [address] [place] [address] after discharge.    Progression toward goals:  [X]      Improving appropriately and progressing toward goals  [ ]      Improving slowly and progressing toward goals  [ ]      Not making progress toward goals and plan of care will be adjusted       PLAN:  Patient continues to benefit from skilled intervention to address the above impairments. Continue treatment per established plan of care. Discharge Recommendations:  Outpatient  Further Equipment Recommendations for Discharge:  none       SUBJECTIVE:   Patient stated I always forget that last one.    The patient stated 2/3 sternal precautions. Reviewed all 3 with patient. OBJECTIVE DATA SUMMARY:   Patient mobilized on continuous portable monitor/telemetry.      Critical Behavior:  Neurologic State: Alert, Appropriate for age  Orientation Level: Oriented X4  Cognition: Appropriate for age attention/concentration  Safety/Judgement: Awareness of environment, Fall prevention, Home safety  Functional Mobility Training:  Bed Mobility:  Log Rolling: Supervision (cueing to not use arms)        Scooting: Independent                    Transfers:  Sit to Stand: Supervision  Stand to Sit: Supervision                             Balance:  Sitting: Intact  Standing: Intact  Standing - Static: Good  Standing - Dynamic : Good  Ambulation/Gait Training:  Distance (ft): 400 Feet (ft)  Assistive Device: Gait belt  Ambulation - Level of Assistance: Stand-by asssistance        Gait Abnormalities: Decreased step clearance        Base of Support: Widened     Speed/Mere: Slow  Step Length: Left shortened;Right shortened                                              Therapeutic Exercises:   CARDIAC  EXERCISE   Sets   Reps   Active Active Assist   Passive Self ROM   Comments   Shoulder flexion 1 5 [X]                         [ ]                                  [ ]                                  [ ]                Shoulder abduction 1 5 [X]                                  [ ]                                  [ ]                                  [ ] Scapular elevation 1 5 [X]                                  [ ]                                  [ ]                                  [ ]                                      Scapular retraction 1 5 [X]                                  [ ]                                  [ ]                                  [ ]                                      Trunk rotation 1 5 [X]                                  [ ]                                  [ ]                                  [ ]                                      Trunk sidebending 1 5 [X]                                  [ ]                                  [ ]                                  [ ]                                            [ ]                                  [ ]                                  [ ]                                  [ ]                                            Pain:  Pain Scale 1: Numeric (0 - 10)  Pain Intensity 1: 0              Activity Tolerance:   Pt with increased ambulation distance and endurance today. Please refer to the flowsheet for vital signs taken during this treatment.   After treatment:   [X]  Patient left in no apparent distress in chair  [ ]  Patient left in no apparent distress in bed  [X]  Call bell left within reach  [X]  Nursing notified  [X]  Caregiver present  [X]  Bed alarm activated      COMMUNICATION/COLLABORATION:   The patients plan of care was discussed with: Registered Nurse     China Obregon   Time Calculation: 30 mins

## 2017-10-04 NOTE — PROGRESS NOTES
[address] [address]  [phone]      Cardiology Progress Note      10/4/2017 9:00 AM    Admit Date: 9/26/2017    Admit Diagnosis:   NSTEMI (non-ST elevated myocardial infarction) [facility])  CAD   CAD (coronary artery disease), native coronary artery  CAD (coronary artery disease)    Subjective:     Sly Porter feeling well this AM.  Lifevest placed last evening.   Diuresing slowly    Visit Vitals    /71    Pulse 91    Temp 98.6 °F (37 °C)    Resp 23    Ht 6' 2\" (1.88 m)    Wt 110.8 kg (244 lb 4.3 oz)    SpO2 95%    BMI 31.36 kg/m2       Current Facility-Administered Medications   Medication Dose Route Frequency    carvedilol (COREG) tablet 12.5 mg  12.5 mg Oral BID WITH MEALS    atorvastatin (LIPITOR) tablet 20 mg  20 mg Oral QHS    ondansetron (ZOFRAN) injection 8 mg  8 mg IntraVENous Q4H PRN    furosemide (LASIX) tablet 40 mg  40 mg Oral DAILY    potassium chloride SR (KLOR-CON 10) tablet 20 mEq  20 mEq Oral DAILY    polyethylene glycol (MIRALAX) packet 34 g  34 g Oral DAILY    enoxaparin (LOVENOX) injection 40 mg  40 mg SubCUTAneous Q24H    metoclopramide HCl (REGLAN) injection 10 mg  10 mg IntraVENous Q6H PRN    insulin glargine (LANTUS) injection 30 Units  30 Units SubCUTAneous DAILY    glipiZIDE (GLUCOTROL) tablet 5 mg  5 mg Oral ACB&D    0.9% sodium chloride infusion 250 mL  250 mL IntraVENous PRN    lisinopril (PRINIVIL, ZESTRIL) tablet 2.5 mg  2.5 mg Oral DAILY    sodium chloride (NS) flush 5-10 mL  5-10 mL IntraVENous Q8H    sodium chloride (NS) flush 5-10 mL  5-10 mL IntraVENous PRN    oxyCODONE-acetaminophen (PERCOCET) 5-325 mg per tablet 1 Tab  1 Tab Oral Q4H PRN    oxyCODONE-acetaminophen (PERCOCET) 5-325 mg per tablet 2 Tab  2 Tab Oral Q4H PRN    morphine injection 4 mg  4 mg IntraVENous Q2H PRN    naloxone (NARCAN) injection 0.4 mg  0.4 mg IntraVENous PRN    albuterol (PROVENTIL VENTOLIN) nebulizer solution 2.5 mg  2.5 mg Nebulization Q4H PRN    aspirin chewable tablet 81 mg  81 mg Oral DAILY    famotidine (PEPCID) tablet 20 mg  20 mg Oral Q12H    magnesium oxide (MAG-OX) tablet 400 mg  400 mg Oral BID    calcium chloride 1 g in 0.9% sodium chloride 50 mL IVPB  1 g IntraVENous PRN    bisacodyl (DULCOLAX) suppository 10 mg  10 mg Rectal DAILY PRN    senna-docusate (PERICOLACE) 8.6-50 mg per tablet 1 Tab  1 Tab Oral BID    ELECTROLYTE REPLACEMENT PROTOCOL  1 Each Other PRN    magnesium sulfate 1 g/100 ml IVPB (premix or compounded)  1 g IntraVENous PRN    glucose chewable tablet 16 g  4 Tab Oral PRN    dextrose (D50W) injection syrg 12.5-25 g  12.5-25 g IntraVENous PRN    glucagon (GLUCAGEN) injection 1 mg  1 mg IntraMUSCular PRN    insulin lispro (HUMALOG) injection   SubCUTAneous AC&HS    sodium chloride 0.9 % bolus infusion 250 mL  250 mL IntraVENous ONCE PRN    chlorhexidine (PERIDEX) 0.12 % mouthwash 15 mL  15 mL Oral Q12H       Objective:      Physical Exam:  General Appearance:  WNWD  male in no acute distress  Chest:   basilar rales  Cardiovascular:  ST, no murmur.   Abdomen:   Soft, non-tender, bowel sounds are active.   Extremities: brian LE +2  Skin:  Warm and dry.     Data Review:   Recent Labs      10/02/17   0352   WBC  15.1*   HGB  9.6*   HCT  28.0*   PLT  137*     Recent Labs      10/02/17   0352   NA  137   K  4.3   CL  105   CO2  22   GLU  189*   BUN  24*   CREA  0.99   CA  7.7*   MG  2.4   ALB  2.8*   TBILI  0.5   SGOT  17   ALT  16       No results for input(s): TROIQ, CPK, CKMB in the last 72 hours.       Intake/Output Summary (Last 24 hours) at 10/04/17 1145  Last data filed at 10/04/17 0400   Gross per 24 hour   Intake              360 ml   Output             1500 ml   Net            -1140 ml        Telemetry: ST  EKG:  Cxray:    Assessment:     Principal Problem:    NSTEMI (non-ST elevated myocardial infarction) ([facility] [address] (9/26/2017)    Active Problems:    Family history of early CAD (9/26/2017)      Overview: Significant for mother, brother      Systolic heart failure ([facility] [address] (9/27/2017)      Coronary artery disease involving native coronary artery with unstable angina pectoris ([facility] [address] (9/27/2017)      Type II diabetes mellitus, uncontrolled ([facility] [address] (9/27/2017)      CAD (coronary artery disease), native coronary artery (9/27/2017)      CAD (coronary artery disease) (9/29/2017)      S/P CABG x 3 (9/29/2017)      Overview: ON PUMP CABG X 3, LIMA to LAD, LRAG to OM, RSVG to PDA      Left radial artery harvest      Right greater saphenous vein harvest        Plan:     ICM with Acute systolic HF:  Increasing BB, continues on ACE and diuretic, increasing each as tolerated  Daily weights, monitor I/O, labs - recheck weight - admission wt 218# now at 244#s  Limited echo with EF remaining < 35%, Lifevest in place  Continues with slight atelectasis at lung bases - encouraging incentive spirometer use, ambulation      CAD:  S/p CABG  Continue on ASA, BB, statin       DM: Started on 30 units insulin, glipizide, BS labile will have DM management work with patient       Follow up with Dr. Tee Ye within 2 weeks. [facility]  Cardiology      [facility]    10/4/2017         Agree with note as outlined by  NP. I confirm findings in history and physical exam. No additional findings noted. Agree with plan as outlined above.      Claudia Dejesus MD

## 2017-10-06 NOTE — PROCEDURES
[place] [facility], [address]   PULMONARY FUNCTION       Name:  Justin Zapata   MR#:  [id number]   :  1965   Account #:  [de-identified]    Date of Procedure:  2017   Date of Adm:  2017       CLINICAL DIAGNOSIS/INDICATIONS: Dyspnea. INTERPRETATION: Spirometry and lung volumes were performed and   they reveal   1. No airflow obstruction. 2. Mild reduction FVC. 3. Normal flow volume loop. 4. ABG reveals normal oxygenation, normal acid-base status.             Zo Tucker MD      ERG / RLJENNIFER   D:  10/06/2017   10:45   T:  10/06/2017   12:54   Job #:  [id number]

## 2017-10-11 ENCOUNTER — HOSPITAL ENCOUNTER (OUTPATIENT)
Dept: DIABETES SERVICES | Age: 52
Discharge: HOME | End: 2017-10-11
Payer: COMMERCIAL

## 2017-10-11 ENCOUNTER — OFFICE VISIT (OUTPATIENT)
Dept: CARDIOTHORACIC SURGERY | Age: 52
End: 2017-10-11

## 2017-10-11 ENCOUNTER — HOSPITAL ENCOUNTER (OUTPATIENT)
Dept: GENERAL RADIOLOGY | Age: 52
Discharge: HOME | End: 2017-10-11
Payer: COMMERCIAL

## 2017-10-11 VITALS
HEART RATE: 95 BPM | BODY MASS INDEX: 28.23 KG/M2 | HEIGHT: 74 IN | DIASTOLIC BLOOD PRESSURE: 78 MMHG | OXYGEN SATURATION: 96 % | WEIGHT: 220 LBS | SYSTOLIC BLOOD PRESSURE: 126 MMHG

## 2017-10-11 DIAGNOSIS — Z95.1 S/P CABG X 3: ICD-10-CM

## 2017-10-11 DIAGNOSIS — E11.9 DIABETES MELLITUS WITHOUT COMPLICATION (HCC): ICD-10-CM

## 2017-10-11 DIAGNOSIS — Z95.1 S/P CABG X 3: Primary | ICD-10-CM

## 2017-10-11 PROCEDURE — G0108 DIAB MANAGE TRN  PER INDIV: HCPCS

## 2017-10-11 PROCEDURE — 71020 XR CHEST PA LAT: CPT

## 2017-10-11 NOTE — PROGRESS NOTES
Patient: Manuel Morales   Age: 45 y.o. 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10    CAD (coronary artery disease) I25.10    S/P CABG x 3 Z95.1       Surgery:   Past Surgical History:   Procedure Laterality Date    HX ORTHOPAEDIC      left side face metal        Current Medications:   Current Outpatient Prescriptions   Medication Sig Dispense Refill    aspirin 81 mg chewable tablet Take 1 Tab by mouth daily for 360 days. 30 Tab 11    atorvastatin (LIPITOR) 20 mg tablet Take 1 Tab by mouth nightly. 30 Tab 2    carvedilol (COREG) 12.5 mg tablet Take 1 Tab by mouth two (2) times daily (with meals). 60 Tab 2    furosemide (LASIX) 40 mg tablet Take 1 Tab by mouth daily for 7 days. 7 Tab 1    glipiZIDE (GLUCOTROL) 5 mg tablet Take 1 Tab by mouth Before breakfast and dinner. 60 Tab 2    lisinopril (PRINIVIL, ZESTRIL) 2.5 mg tablet Take 1 Tab by mouth daily. 30 Tab 2    potassium chloride SR (K-TAB) 20 mEq tablet Take 1 Tab by mouth daily for 7 days. 7 Tab 1    insulin glargine (LANTUS) 100 unit/mL injection 25 Units by SubCUTAneous route daily. 2 Vial 2    glucose blood VI test strips (ACCU-CHEK GUIDE) strip 1 Each by Does Not Apply route See Admin Instructions. 50 Strip 1    Lancets (ACCU-CHEK FASTCLIX) misc by Does Not Apply route two (2) times a day. 1 Each 2    insulin glargine (LANTUS,BASAGLAR) 100 unit/mL (3 mL) inpn 25 Units by SubCUTAneous route daily for 60 days.  15 mL 1    Insulin Needles, Disposable, (CHELSEY PEN NEEDLE) 32 gauge x 5/32\" ndle Use as directed 100 Pen Needle 1    oxyCODONE-acetaminophen (PERCOCET) 5-325 mg per tablet Take 1-2 Tabs by mouth every six (6) hours as needed. Max Daily Amount: 8 Tabs. Indications: Pain 40 Tab 0    senna-docusate (PERICOLACE) 8.6-50 mg per tablet Take 1 Tab by mouth two (2) times a day. 60 Tab 0       HPI: No pain. Breathless with talking sometimes. Life Vest in place. Vitals: Blood pressure 126/78, pulse 95, height 6' 2.02\" (1.88 m), weight 220 lb (99.8 kg), SpO2 96 %. Allergies: is allergic to latex and influenza virus vaccine, specific. Wounds: Healing    Sternum: stable    Lungs: LLL diminished    Heart: regular rate and rhythm: No murmur    Abdomen: soft    Extremities: No edema    Rehab : y    Walking: y    Glucometer: < 120    Assessment/Plan:     1. Keep wounds clean, increase walking, and continue sternal precautions.     2. CXR

## 2017-10-11 NOTE — MR AVS SNAPSHOT
Visit Information Date & Time Provider Department Dept. Phone Encounter #  
 10/11/2017  3:00 PM Tyler Knowles MD [facility] [phone] [id number] Your Appointments 11/6/2017  1:15 PM  
POST OP with Tyler Knowles MD  
[facility] ([facility]) Appt Note: 1 month post op [address] [facility] [place] [phone]  
[address] [place] [phone]  
  
    
 11/7/2017 11:15 AM  
HOSPITAL FOLLOW-UP with Keri Mcfadden MD  
[facility]) Appt Note: per [facility] @ [facility] [address] [facility] [address]  
[phone] [address] [facility] [address]  
  
    
 11/21/2017 11:00 AM  
New Patient with Victor Manuel Schofield III, DO [facility]) Appt Note: Establish PCP  
 [facility] [address] [id number]  
[address] [facility] [address] Upcoming Health Maintenance Date Due Hepatitis C Screening 1965 FOOT EXAM Q1 11/26/1974 MICROALBUMIN Q1 11/26/1974 EYE EXAM RETINAL OR DILATED Q1 11/26/1974 Pneumococcal 19-64 Medium Risk (1 of 1 - PPSV23) 11/26/1983 DTaP/Tdap/Td series (1 - Tdap) 11/26/1985 FOBT Q 1 YEAR AGE 50-75 11/26/2014 INFLUENZA AGE 9 TO ADULT 8/1/2017 HEMOGLOBIN A1C Q6M 3/27/2018 LIPID PANEL Q1 9/27/2018 Allergies as of 10/11/2017  Review Complete On: 10/11/2017 By: ITZ Mehta Severity Noted Reaction Type Reactions Latex  09/27/2017    Other (comments) Allergy documented in admission data base Influenza Virus Vaccine, Specific  09/27/2017    Other (comments) Allergy found in admission data base Current Immunizations  Never Reviewed No immunizations on file. Not reviewed this visit You Were Diagnosed With   
  
 Codes Comments S/P CABG x 3    -  Primary ICD-10-CM: Z95.1 ICD-9-CM: V45.81 Vitals BP Pulse Height(growth percentile) Weight(growth percentile) SpO2 BMI  
 126/78 95 6' 2.02\" (1.88 m) 220 lb (99.8 kg) 96% 28.23 kg/m2 Smoking Status Never Smoker BMI and BSA Data Body Mass Index Body Surface Area  
 28.23 kg/m 2 2.28 m 2 Your Updated Medication List  
  
   
This list is accurate as of: 10/11/17  4:28 PM.  Always use your most recent med list.  
  
  
  
  
 aspirin 81 mg chewable tablet Take 1 Tab by mouth daily for 360 days. atorvastatin 20 mg tablet Commonly known as:  LIPITOR Take 1 Tab by mouth nightly. carvedilol 12.5 mg tablet Commonly known as:  Anthony Been Take 1 Tab by mouth two (2) times daily (with meals). furosemide 40 mg tablet Commonly known as:  LASIX Take 1 Tab by mouth daily for 7 days. glipiZIDE 5 mg tablet Commonly known as:  Deedee Dayhoff Take 1 Tab by mouth Before breakfast and dinner. glucose blood VI test strips strip Commonly known as:  ACCU-CHEK GUIDE  
1 Each by Does Not Apply route See Admin Instructions. * insulin glargine 100 unit/mL injection Commonly known as:  LANTUS  
25 Units by SubCUTAneous route daily. * insulin glargine 100 unit/mL (3 mL) Inpn Commonly known as:  LANTUS,BASAGLAR  
25 Units by SubCUTAneous route daily for 60 days. Insulin Needles (Disposable) 32 gauge x 5/32\" Ndle Commonly known as:  Cinthia Pen Needle Use as directed Lancets Misc Commonly known as:  ACCU-CHEK FASTCLIX  
by Does Not Apply route two (2) times a day. lisinopril 2.5 mg tablet Commonly known as:  Mollie Ripa Take 1 Tab by mouth daily. oxyCODONE-acetaminophen 5-325 mg per tablet Commonly known as:  PERCOCET Take 1-2 Tabs by mouth every six (6) hours as needed. Max Daily Amount: 8 Tabs. Indications: Pain potassium chloride SR 20 mEq tablet Commonly known as:  K-TAB Take 1 Tab by mouth daily for 7 days. senna-docusate 8.6-50 mg per tablet Commonly known as:  [place] Take 1 Tab by mouth two (2) times a day. * Notice: This list has 2 medication(s) that are the same as other medications prescribed for you. Read the directions carefully, and ask your doctor or other care provider to review them with you. To-Do List   
 As directed Imaging:  XR CHEST PA LAT Introducing \[facility]\"" & HEALTH SERVICES! Ricardo Elizondo introduces Urge patient portal. Now you can access parts of your medical record, email your doctor's office, and request medication refills online. 1. In your internet browser, go to [email or web address] 2. Click on the First Time User? Click Here link in the Sign In box. You will see the New Member Sign Up page. 3. Enter your Urge Access Code exactly as it appears below. You will not need to use this code after youve completed the sign-up process. If you do not sign up before the expiration date, you must request a new code. · Urge Access Code: [email or web address] Expires: 12/25/2017  1:40 PM 
 
4. Enter the last four digits of your Social Security Number (xxxx) and Date of Birth (mm/dd/yyyy) as indicated and click Submit. You will be taken to the next sign-up page. 5. Create a Urge ID. This will be your Urge login ID and cannot be changed, so think of one that is secure and easy to remember. 6. Create a Urge password. You can change your password at any time. 7. Enter your Password Reset Question and Answer. This can be used at a later time if you forget your password. 8. Enter your e-mail address. You will receive e-mail notification when new information is available in [address] 9. Click Sign Up. You can now view and download portions of your medical record. 10. Click the Download Summary menu link to download a portable copy of your medical information. If you have questions, please visit the Frequently Asked Questions section of the Mango website. Remember, Mango is NOT to be used for urgent needs. For medical emergencies, dial 911. Now available from your iPhone and Android! Please provide this summary of care documentation to your next provider. Your primary care clinician is listed as Med [facility] If you have any questions after today's visit, please call [phone].

## 2017-10-12 NOTE — DIABETES MGMT
[facility]TC Post Op Cardiac Surgery Education Note    Recommendations/ Comments:  Strongly encouraged pt and wife to schedule f/u visit with DTC to further discuss his DM dietary guidelines to meet carbohydrate intakes with his 1 month f/u visit. Chart reviewed and initial evaluation complete on [facility]. Patient is a 45 y.o. male with known DM pt has been referred for outpatient education s/p CABG 10 days ago. Pt is seen today prior to visit w/ Cardiology - due to rescheduling of his appointment, DTC staff only had 30 minutes with patient. Pt shared that he was testing BG values at one point - but, once in control, haven't tested in >4-5 years. A1c was reported to be 9.4% prior to CABG. Pt states that he would like to control his BG without using insulin - currently using 25 units of Basaglar daily along w/ Glipizide 5 mg bid ac. Advised pt that currently he would not be able to discontinue his basal insulin due to poor BG control and very high risk for post op wound infection. Pt also was advised that until he can demonstrate good to excellent control (<7%) for >6 months might he be able to be transitioned off his basal insulin. Also advised pt that if A1c improves or experiences frequent hypoglycemia, may need adjustments to other oral agents if he no longer needs the basal insulin. We discussed that he may not be able to control his BG w/out medication. As we discussed options, pt realized that he may not be able to control \"naturally\" with diet and exercise. Review of his BG log - all within target range. Had early am 74 mg/dl value - question if this was related to smaller intakes at dinner or more activity during the day. Unfortunately, pt did not bring food diary and could not recall po intakes for assessment. A1c:   Lab Results   Component Value Date/Time    Hemoglobin A1c 9.4 09/27/2017 05:35 AM        BG monitoring at home 4 times per day.   Patient reports BG levels at home trend: stable. Assessed and instructed patient on the following:   · risk of sternal wound infection/ delayed healing   · exercise, SMBG skills, nutrition, site rotation, use of insulin pen and self-injection of insulin. Encouraged the following: dietary modifications: continue with current po intakes due to time constraints - enocuraged to f/u at 1 month post surgery follow up, regular blood sugar monitorin times daily, and daily activity as medically permitted. Thank you.   [address]  [phone]

## 2017-10-13 ENCOUNTER — HOSPITAL ENCOUNTER (OUTPATIENT)
Dept: GENERAL RADIOLOGY | Age: 52
Discharge: HOME | End: 2017-10-13
Payer: COMMERCIAL

## 2017-10-13 ENCOUNTER — OFFICE VISIT (OUTPATIENT)
Dept: CARDIOTHORACIC SURGERY | Age: 52
End: 2017-10-13

## 2017-10-13 DIAGNOSIS — Z98.890 S/P THORACENTESIS: ICD-10-CM

## 2017-10-13 DIAGNOSIS — Z95.1 S/P CABG X 3: Primary | ICD-10-CM

## 2017-10-13 DIAGNOSIS — J90 PLEURAL EFFUSION ON LEFT: Primary | ICD-10-CM

## 2017-10-13 DIAGNOSIS — J90 PLEURAL EFFUSION ON LEFT: ICD-10-CM

## 2017-10-13 PROCEDURE — 71020 XR CHEST PA LAT: CPT

## 2017-10-13 NOTE — MR AVS SNAPSHOT
Visit Information Date & Time Provider Department Dept. Phone Encounter #  
 10/13/2017  1:00 PM London Allison MD [facility] [phone] [id number] Your Appointments 11/6/2017  1:15 PM  
POST OP with August Jean MD  
[facility] [address] [facility] ([address]) Appt Note: 1 month post op [address] [facility] [phone]  
[address] [place] [phone]  
  
    
 11/7/2017 11:15 AM  
HOSPITAL FOLLOW-UP with Mateus Olson MD  
[facility] [address]) Appt Note: per [facility] @ [facility] [address] [facility] [address]  
[phone] [address] [facility] [address]  
  
    
 11/21/2017 11:00 AM  
New Patient with James Elm III, DO [address]) Appt Note: Establish PCP  
 [facility] [address] [phone]  
[address] [facility] [address] Upcoming Health Maintenance Date Due Hepatitis C Screening 1965 FOOT EXAM Q1 11/26/1974 MICROALBUMIN Q1 11/26/1974 EYE EXAM RETINAL OR DILATED Q1 11/26/1974 Pneumococcal 19-64 Medium Risk (1 of 1 - PPSV23) 11/26/1983 DTaP/Tdap/Td series (1 - Tdap) 11/26/1985 FOBT Q 1 YEAR AGE 50-75 11/26/2014 INFLUENZA AGE 9 TO ADULT 8/1/2017 HEMOGLOBIN A1C Q6M 3/27/2018 LIPID PANEL Q1 9/27/2018 Allergies as of 10/13/2017  Review Complete On: 10/11/2017 By: ITZ Phillips Severity Noted Reaction Type Reactions Latex  09/27/2017    Other (comments) Allergy documented in admission data base Influenza Virus Vaccine, Specific  09/27/2017    Other (comments) Allergy found in admission data base Current Immunizations  Never Reviewed No immunizations on file. Not reviewed this visit You Were Diagnosed With   
  
 Codes Comments S/P CABG x 3    -  Primary ICD-10-CM: Z95.1 ICD-9-CM: V45.81 S/P thoracentesis     ICD-10-CM: S26.340 ICD-9-CM: V45.89 Pleural effusion on left     ICD-10-CM: J90 ICD-9-CM: 511.9 Vitals Smoking Status Never Smoker Your Updated Medication List  
  
   
This list is accurate as of: 10/13/17  1:40 PM.  Always use your most recent med list.  
  
  
  
  
 aspirin 81 mg chewable tablet Take 1 Tab by mouth daily for 360 days. atorvastatin 20 mg tablet Commonly known as:  LIPITOR Take 1 Tab by mouth nightly. carvedilol 12.5 mg tablet Commonly known as:  Gaylin [place] Take 1 Tab by mouth two (2) times daily (with meals). glipiZIDE 5 mg tablet Commonly known as:  Ninfa Petri Take 1 Tab by mouth Before breakfast and dinner. glucose blood VI test strips strip Commonly known as:  ACCU-CHEK GUIDE  
1 Each by Does Not Apply route See Admin Instructions. * insulin glargine 100 unit/mL injection Commonly known as:  LANTUS  
25 Units by SubCUTAneous route daily. * insulin glargine 100 unit/mL (3 mL) Inpn Commonly known as:  LANTUS,BASAGLAR  
25 Units by SubCUTAneous route daily for 60 days. Insulin Needles (Disposable) 32 gauge x 5/32\" Ndle Commonly known as:  Cinthia Pen Needle Use as directed Lancets Misc Commonly known as:  ACCU-CHEK FASTCLIX  
by Does Not Apply route two (2) times a day. lisinopril 2.5 mg tablet Commonly known as:  Anne Edman Take 1 Tab by mouth daily. oxyCODONE-acetaminophen 5-325 mg per tablet Commonly known as:  PERCOCET Take 1-2 Tabs by mouth every six (6) hours as needed. Max Daily Amount: 8 Tabs. Indications: Pain  
  
 senna-docusate 8.6-50 mg per tablet Commonly known as:  Lucía Nguyen Take 1 Tab by mouth two (2) times a day. * Notice:   This list has 2 medication(s) that are the same as other medications prescribed for you. Read the directions carefully, and ask your doctor or other care provider to review them with you. We Performed the Following CULTURE, BODY FLUID Ric Rayius STAIN [04509 CPT(R)] To-Do List   
 As directed Imaging:  XR CHEST PA LAT Introducing [facility] & HEALTH SERVICES! Wang Morales introduces Victorious patient portal. Now you can access parts of your medical record, email your doctor's office, and request medication refills online. 1. In your internet browser, go to [email or web address] 2. Click on the First Time User? Click Here link in the Sign In box. You will see the New Member Sign Up page. 3. Enter your Victorious Access Code exactly as it appears below. You will not need to use this code after youve completed the sign-up process. If you do not sign up before the expiration date, you must request a new code. · Victorious Access Code: [email or web address] Expires: 12/25/2017  1:40 PM 
 
4. Enter the last four digits of your Social Security Number (xxxx) and Date of Birth (mm/dd/yyyy) as indicated and click Submit. You will be taken to the next sign-up page. 5. Create a Victorious ID. This will be your Victorious login ID and cannot be changed, so think of one that is secure and easy to remember. 6. Create a Victorious password. You can change your password at any time. 7. Enter your Password Reset Question and Answer. This can be used at a later time if you forget your password. 8. Enter your e-mail address. You will receive e-mail notification when new information is available in [address] 9. Click Sign Up. You can now view and download portions of your medical record. 10. Click the Download Summary menu link to download a portable copy of your medical information. If you have questions, please visit the Frequently Asked Questions section of the Victorious website.  Remember, Victorious is NOT to be used for urgent needs. For medical emergencies, dial 911. Now available from your iPhone and Android! Please provide this summary of care documentation to your next provider. Your primary care clinician is listed as Gisell Reese If you have any questions after today's visit, please call [phone].

## 2017-10-13 NOTE — PROGRESS NOTES
Dyspnea. Fever  (but 101 last evening). Wounds quiet. No drainage. No sputum production. 126/70. Pulse 74/min. CXR- large left effusion    Thoracentesis  With the patient sitting on the exam table the back was painted with chlorhexidine and 1% lidocaine 10 ml was infiltrated for local anesthesia. The Arrow thoracentesis device was introduced low posteriorly into the left pleural space. 2000 ml of innocuous serosanguineous fluid was aspirated. An aliquot was sent for culture. He tolerated the procedure well. Completion radiograph was ordered. Plan to repeat CXR in 7-10 days. If fever persists after the thoracentesis, consider post-pericardiotomy syndrome and treat with NSAID, maybe systemic steroid.

## 2017-10-13 NOTE — PROGRESS NOTES
10/13/2017 at [place] [address] asked CM to follow up regarding [facility] orders. [facility] providing [facility] care. [phone]  Cm spoke with [facility]. Patient was seen on 10/5/2017. Had appointment scheduled today but was not seen due to scheduled follow up appointment.     Dio reviewed [facility] orders and faxed copy to:  Gearld White RN CM  Ext [phone]

## 2017-10-16 DIAGNOSIS — E11.9 DIABETES MELLITUS WITHOUT COMPLICATION (HCC): Primary | ICD-10-CM

## 2017-10-19 ENCOUNTER — HOSPITAL ENCOUNTER (OUTPATIENT)
Dept: GENERAL RADIOLOGY | Age: 52
Discharge: HOME | End: 2017-10-19
Payer: COMMERCIAL

## 2017-10-19 ENCOUNTER — DOCUMENTATION ONLY (OUTPATIENT)
Dept: CARDIOTHORACIC SURGERY | Age: 52
End: 2017-10-19

## 2017-10-19 DIAGNOSIS — J90 PLEURAL EFFUSION ON LEFT: ICD-10-CM

## 2017-10-19 PROCEDURE — 71020 XR CHEST PA LAT: CPT

## 2017-10-19 NOTE — PROGRESS NOTES
Spoke with patient, he is breathing much better and ambulating wo difficulty    Pleural aspirate for culture grew Micococcus in broth only, Anjali Hooker and Marly Rojas said that is a skin contaminate and would not treat. Repeat CXR today.

## 2017-10-20 LAB — BACTERIA FLD CULT: ABNORMAL

## 2017-11-07 ENCOUNTER — OFFICE VISIT (OUTPATIENT)
Dept: CARDIOLOGY CLINIC | Age: 52
End: 2017-11-07

## 2017-11-07 VITALS
DIASTOLIC BLOOD PRESSURE: 86 MMHG | WEIGHT: 218 LBS | HEIGHT: 74 IN | BODY MASS INDEX: 27.98 KG/M2 | HEART RATE: 90 BPM | SYSTOLIC BLOOD PRESSURE: 110 MMHG | OXYGEN SATURATION: 97 % | RESPIRATION RATE: 16 BRPM

## 2017-11-07 DIAGNOSIS — E08.29 DIABETES MELLITUS DUE TO UNDERLYING CONDITION WITH MICROALBUMINURIA, UNSPECIFIED LONG TERM INSULIN USE STATUS: ICD-10-CM

## 2017-11-07 DIAGNOSIS — I25.810 CORONARY ARTERY DISEASE INVOLVING OTHER CORONARY ARTERY BYPASS GRAFT WITHOUT ANGINA PECTORIS: ICD-10-CM

## 2017-11-07 DIAGNOSIS — I50.20 SYSTOLIC HEART FAILURE, UNSPECIFIED HEART FAILURE CHRONICITY: Primary | ICD-10-CM

## 2017-11-07 DIAGNOSIS — Z95.1 S/P CABG X 3: ICD-10-CM

## 2017-11-07 DIAGNOSIS — R80.9 DIABETES MELLITUS DUE TO UNDERLYING CONDITION WITH MICROALBUMINURIA, UNSPECIFIED LONG TERM INSULIN USE STATUS: ICD-10-CM

## 2017-11-07 RX ORDER — LISINOPRIL 5 MG/1
5 TABLET ORAL DAILY
Qty: 30 TAB | Refills: 3 | Status: SHIPPED | OUTPATIENT
Start: 2017-11-07 | End: 2018-01-08 | Stop reason: SDUPTHER

## 2017-11-07 NOTE — PROGRESS NOTES
NAME:  Lisset Desai   :   1965   MRN:   [id number]   PCP:  Mario Fernandez III, DO           Subjective: The patient is a 45y.o. year old male  who returns for a routine follow-up. Since the last visit, patient reports no change in exercise tolerance, chest pain, edema, medication intolerance, palpitations, shortness of breath, PND/orthopnea wheezing, sputum, syncope, dizziness or light headedness. Doing well. Past Medical History:   Diagnosis Date    Coronary artery disease involving native coronary artery with unstable angina pectoris ([facility] [address] 2017    Family history of early CAD 2017    Significant for mother, brother    Type II diabetes mellitus, uncontrolled ([facility] [address] 2017        ICD-10-CM ICD-9-CM    1. Systolic heart failure, unspecified heart failure chronicity ([facility]) I50.20 428.20 AMB POC EKG ROUTINE W/ 12 LEADS, INTER & REP   2. Coronary artery disease involving other coronary artery bypass graft without angina pectoris I25.810 414.05    3. S/P CABG x 3 Z95.1 V45.81    4. Diabetes mellitus due to underlying condition with microalbuminuria, unspecified long term insulin use status ([facility]) E08.29 249.40     R80.9 791.0       Social History   Substance Use Topics    Smoking status: Never Smoker    Smokeless tobacco: Never Used    Alcohol use No      History reviewed. No pertinent family history. Review of Systems  General: Pt denies excessive weight gain or loss. Pt is able to conduct ADL's  HEENT: Denies blurred vision, headaches, epistaxis and difficulty swallowing. Respiratory: Denies shortness of breath, MALONEY, wheezing or stridor.   Cardiovascular: Denies precordial pain, palpitations, edema or PND  Gastrointestinal: Denies poor appetite, indigestion, abdominal pain or blood in stool  Musculoskeletal: Denies pain or swelling from muscles or joints  Neurologic: Denies tremor, paresthesias, or sensory motor disturbance  Skin: Denies rash, itching or texture change. Objective:       Vitals:    11/07/17 1045 11/07/17 1049   BP: 110/90 110/86   Pulse: 90    Resp: 16    SpO2: 97%    Weight: 218 lb (98.9 kg)    Height: 6' 2\" (1.88 m)     Body mass index is 27.99 kg/(m^2). General PE  Mental Status - Alert. General Appearance - Not in acute distress. Chest and Lung Exam   Inspection: Accessory muscles - No use of accessory muscles in breathing. Auscultation:   Breath sounds: - Normal.    Cardiovascular   Inspection: Jugular vein - Bilateral - Inspection Normal.  Palpation/Percussion:   Apical Impulse: - Normal.  Auscultation: Rhythm - Regular. Heart Sounds - S1 WNL and S2 WNL. No S3 or S4. Murmurs & Other Heart Sounds: Auscultation of the heart reveals - No Murmurs. Peripheral Vascular   Upper Extremity: Inspection - Bilateral - No Cyanotic nailbeds or Digital clubbing. Lower Extremity:   Palpation: Edema - Bilateral - No edema. Data Review:     EKG -EKG: normal EKG, normal sinus rhythm, unchanged from previous tracings. Medications reviewed  Current Outpatient Prescriptions   Medication Sig    aspirin 81 mg chewable tablet Take 1 Tab by mouth daily for 360 days.  atorvastatin (LIPITOR) 20 mg tablet Take 1 Tab by mouth nightly.  carvedilol (COREG) 12.5 mg tablet Take 1 Tab by mouth two (2) times daily (with meals).  glipiZIDE (GLUCOTROL) 5 mg tablet Take 1 Tab by mouth Before breakfast and dinner.  lisinopril (PRINIVIL, ZESTRIL) 2.5 mg tablet Take 1 Tab by mouth daily.  glucose blood VI test strips (ACCU-CHEK GUIDE) strip 1 Each by Does Not Apply route See Admin Instructions.  Lancets (ACCU-CHEK FASTCLIX) misc by Does Not Apply route two (2) times a day.  insulin glargine (LANTUS,BASAGLAR) 100 unit/mL (3 mL) inpn 25 Units by SubCUTAneous route daily for 60 days.     Insulin Needles, Disposable, (CHELSEY PEN NEEDLE) 32 gauge x 5/32\" ndle Use as directed    oxyCODONE-acetaminophen (PERCOCET) 5-325 mg per tablet Take 1-2 Tabs by mouth every six (6) hours as needed. Max Daily Amount: 8 Tabs. Indications: Pain    senna-docusate (PERICOLACE) 8.6-50 mg per tablet Take 1 Tab by mouth two (2) times a day. No current facility-administered medications for this visit. Assessment:       ICD-10-CM ICD-9-CM    1. Systolic heart failure, unspecified heart failure chronicity ([facility]) I50.20 428.20 AMB POC EKG ROUTINE W/ 12 LEADS, INTER & REP   2. Coronary artery disease involving other coronary artery bypass graft without angina pectoris I25.810 414.05    3. S/P CABG x 3 Z95.1 V45.81    4. Diabetes mellitus due to underlying condition with microalbuminuria, unspecified long term insulin use status ([facility]) E08.29 249.40     R80.9 791.0         Plan:     Patient presents doing well and stable from cardiac standpoint. Recovering from his CABG. Has life vest due to poor LV function. Had thoracentesis since discharge. CHF well compensated. Will  inresae lisinopril to 5 mg, decrease insulin to 20 units until seen by new pcp. Continue current care and follow up in 2-3 weeks.     Marilu Jacob MD

## 2017-11-07 NOTE — MR AVS SNAPSHOT
Visit Information Date & Time Provider Department Dept. Phone Encounter #  
 11/7/2017 11:15 AM Diane Lebron MD [facility] [phone] [id number] Your Appointments 11/10/2017  2:30 PM  
POST OP with Sumeet Hatch MD  
[facility] ([facility]) Appt Note: 1 month post op; 1 month post op [address] [facility]  
[phone]  
  
   
 [facility] [address]. [phone]  
  
    
 11/21/2017 11:00 AM  
New Patient with Joya Garciast III, DO [facility]) Appt Note: Establish PCP  
 [address] [address]  
[address] [facility] [address] [place] [address] Upcoming Health Maintenance Date Due Hepatitis C Screening 1965 FOOT EXAM Q1 11/26/1974 MICROALBUMIN Q1 11/26/1974 EYE EXAM RETINAL OR DILATED Q1 11/26/1974 Pneumococcal 19-64 Medium Risk (1 of 1 - PPSV23) 11/26/1983 DTaP/Tdap/Td series (1 - Tdap) 11/26/1985 FOBT Q 1 YEAR AGE 50-75 11/26/2014 Influenza Age 5 to Adult 8/1/2017 HEMOGLOBIN A1C Q6M 3/27/2018 LIPID PANEL Q1 9/27/2018 Allergies as of 11/7/2017  Review Complete On: 11/7/2017 By: Ashlie Cohen LPN Severity Noted Reaction Type Reactions Latex  09/27/2017    Other (comments) Allergy documented in admission data base Influenza Virus Vaccine, Specific  09/27/2017    Other (comments) Allergy found in admission data base Current Immunizations  Never Reviewed No immunizations on file. Not reviewed this visit You Were Diagnosed With   
  
 Codes Comments Systolic heart failure, unspecified heart failure chronicity ([facility] [address]    -  Primary ICD-10-CM: I50.20 ICD-9-CM: 428.20  Coronary artery disease involving other coronary artery bypass graft without angina pectoris     ICD-10-CM: I25.810 ICD-9-CM: 414.05 Vitals BP Pulse Resp Height(growth percentile) Weight(growth percentile) SpO2  
 110/86 (BP 1 Location: Left arm, BP Patient Position: Sitting) 90 16 6' 2\" (1.88 m) 218 lb (98.9 kg) 97% BMI Smoking Status 27.99 kg/m2 Never Smoker Vitals History BMI and BSA Data Body Mass Index Body Surface Area  
 27.99 kg/m 2 2.27 m 2 Your Updated Medication List  
  
   
This list is accurate as of: 11/7/17 11:36 AM.  Always use your most recent med list.  
  
  
  
  
 aspirin 81 mg chewable tablet Take 1 Tab by mouth daily for 360 days. atorvastatin 20 mg tablet Commonly known as:  LIPITOR Take 1 Tab by mouth nightly. carvedilol 12.5 mg tablet Commonly known as:  Cabral Servant Take 1 Tab by mouth two (2) times daily (with meals). glipiZIDE 5 mg tablet Commonly known as:  Janith Ket Take 1 Tab by mouth Before breakfast and dinner. glucose blood VI test strips strip Commonly known as:  ACCU-CHEK GUIDE  
1 Each by Does Not Apply route See Admin Instructions. insulin glargine 100 unit/mL (3 mL) Inpn Commonly known as:  ADRIÁN ARAMBULA  
25 Units by SubCUTAneous route daily for 60 days. Insulin Needles (Disposable) 32 gauge x 5/32\" Ndle Commonly known as:  Cinthia Pen Needle Use as directed Lancets Misc Commonly known as:  ACCU-CHEK FASTCLIX  
by Does Not Apply route two (2) times a day. lisinopril 2.5 mg tablet Commonly known as:  Isidro Shutters Take 1 Tab by mouth daily. oxyCODONE-acetaminophen 5-325 mg per tablet Commonly known as:  PERCOCET Take 1-2 Tabs by mouth every six (6) hours as needed. Max Daily Amount: 8 Tabs. Indications: Pain  
  
 senna-docusate 8.6-50 mg per tablet Commonly known as:  Angely Manna Take 1 Tab by mouth two (2) times a day. We Performed the Following AMB POC EKG ROUTINE W/ 12 LEADS, INTER & REP [51344 CPT(R)] Introducing [facility]! Hernando De Paz introduces [employer]trip patient portal. Now you can access parts of your medical record, email your doctor's office, and request medication refills online. 1. In your internet browser, go to [email or web address] 2. Click on the First Time User? Click Here link in the Sign In box. You will see the New Member Sign Up page. 3. Enter your Breadtrip Access Code exactly as it appears below. You will not need to use this code after youve completed the sign-up process. If you do not sign up before the expiration date, you must request a new code. · Breadtrip Access Code: [email or web address] Expires: 12/25/2017 12:40 PM 
 
4. Enter the last four digits of your Social Security Number (xxxx) and Date of Birth (mm/dd/yyyy) as indicated and click Submit. You will be taken to the next sign-up page. 5. Create a Breadtrip ID. This will be your Breadtrip login ID and cannot be changed, so think of one that is secure and easy to remember. 6. Create a Breadtrip password. You can change your password at any time. 7. Enter your Password Reset Question and Answer. This can be used at a later time if you forget your password. 8. Enter your e-mail address. You will receive e-mail notification when new information is available in [address] 9. Click Sign Up. You can now view and download portions of your medical record. 10. Click the Download Summary menu link to download a portable copy of your medical information. If you have questions, please visit the Frequently Asked Questions section of the Breadtrip website. Remember, [employer]trip is NOT to be used for urgent needs. For medical emergencies, dial 911. Now available from your iPhone and Android! Please provide this summary of care documentation to your next provider. Your primary care clinician is listed as Shana Lester  If you have any questions after today's visit, please call [phone].

## 2017-11-10 ENCOUNTER — OFFICE VISIT (OUTPATIENT)
Dept: CARDIOTHORACIC SURGERY | Age: 52
End: 2017-11-10

## 2017-11-10 VITALS
HEART RATE: 92 BPM | TEMPERATURE: 98.4 F | RESPIRATION RATE: 16 BRPM | BODY MASS INDEX: 27.59 KG/M2 | OXYGEN SATURATION: 97 % | SYSTOLIC BLOOD PRESSURE: 110 MMHG | DIASTOLIC BLOOD PRESSURE: 80 MMHG | WEIGHT: 215 LBS | HEIGHT: 74 IN

## 2017-11-10 DIAGNOSIS — Z95.1 S/P CABG X 3: Primary | ICD-10-CM

## 2017-11-10 LAB
ARTERIAL PATENCY WRIST A: ABNORMAL
BASE DEFICIT BLDA-SCNC: 2.2 MMOL/L
BDY SITE: ABNORMAL
EPAP/CPAP/PEEP, PAPEEP: 6
FIO2 ON VENT: 40 %
GAS FLOW.O2 SETTING OXYMISER: 12 L/MIN
HCO3 BLDA-SCNC: 21 MMOL/L (ref 22–26)
PCO2 BLDA: 30 MMHG (ref 35–45)
PH BLDA: 7.46 [PH] (ref 7.35–7.45)
PO2 BLDA: 76 MMHG (ref 80–100)
PRESSURE SUPPORT SETTING VENT: 10 CM[H2O]
SAO2% DEVICE SAO2% SENSOR NAME: ABNORMAL
SPECIMEN SITE: ABNORMAL
VENTILATION MODE VENT: ABNORMAL
VT SETTING VENT: 600 ML

## 2017-11-10 NOTE — PROGRESS NOTES
Patient: Reyna Olivares   Age: 45 y.o. Patient Care Team:  David Cuevas III, DO as PCP - General (Internal Medicine)  Navdeep Lobato MD (Cardiothoracic Surgery)  Danielito Tee MD (Cardiology)    Diagnosis: The encounter diagnosis was S/P CABG x 3. Problem List:   Patient Active Problem List   Diagnosis Code    NSTEMI (non-ST elevated myocardial infarction) ([facility] [address] I21.4    Family history of early CAD W81.62    Systolic heart failure ([address] I50.20    Coronary artery disease involving native coronary artery with unstable angina pectoris ([address] I25.110    Type II diabetes mellitus, uncontrolled ([address] E11.65    CAD (coronary artery disease), native coronary artery I25.10    CAD (coronary artery disease) I25.10    S/P CABG x 3 Z95.1    Pleural effusion on left J90        Date of Surgery: 10/1/17     Surgery: S/p CABG x 3    HPI: Pt is here for routine postop appt. Pt reports he is feeling well. Cont to have some L hand numbness (L radial artery harvest) but has improved a little. He is becoming more active and feels less fatigued. He denies any CP, palpitations, SOB or edema. Current Medications:   Current Outpatient Prescriptions   Medication Sig Dispense Refill    lisinopril (PRINIVIL, ZESTRIL) 5 mg tablet Take 1 Tab by mouth daily. 30 Tab 3    aspirin 81 mg chewable tablet Take 1 Tab by mouth daily for 360 days. 30 Tab 11    atorvastatin (LIPITOR) 20 mg tablet Take 1 Tab by mouth nightly. 30 Tab 2    carvedilol (COREG) 12.5 mg tablet Take 1 Tab by mouth two (2) times daily (with meals). 60 Tab 2    glipiZIDE (GLUCOTROL) 5 mg tablet Take 1 Tab by mouth Before breakfast and dinner. 60 Tab 2    glucose blood VI test strips (ACCU-CHEK GUIDE) strip 1 Each by Does Not Apply route See Admin Instructions. 50 Strip 1    Lancets (ACCU-CHEK FASTCLIX) misc by Does Not Apply route two (2) times a day.  1 Each 2    insulin glargine (LANTUS,BASAGLAR) 100 unit/mL (3 mL) inpn 25 Units by SubCUTAneous route daily for 60 days. (Patient taking differently: 20 Units by SubCUTAneous route daily. ) 15 mL 1    Insulin Needles, Disposable, (CHELSEY PEN NEEDLE) 32 gauge x 5/32\" ndle Use as directed 100 Pen Needle 1       Vitals: Blood pressure 110/80, pulse 92, temperature 98.4 °F (36.9 °C), temperature source Oral, resp. rate 16, height 6' 2\" (1.88 m), weight 215 lb (97.5 kg), SpO2 97 %. Allergies: is allergic to latex and influenza virus vaccine, specific. Physical Exam:  Wounds: healed    Lungs: clear to auscultation bilaterally    Heart: regular rate and rhythm, S1, S2 normal, no murmur, click, rub or gallop    Extremities: no edema    Assessment/Plan:   1. S/p CABG: on ASA, statin, BB  2. HTN: well controlled on current meds  3. DM: cont insulin, glipizide  4. Chronic systolic CHF: f/u with cardiology. Cont BB, ACE  5.  F/u with cards, PCP    Rehab - pt reports he has not heard from cardiac rehab, advised him to call - [phone] number provided  Walking: yes  Glucometer: yes

## 2017-11-10 NOTE — MR AVS SNAPSHOT
Visit Information Date & Time Provider Department Dept. Phone Encounter #  
 11/10/2017  2:30 PM August Jean MD [facility] [phone] [id number] Your Appointments 11/21/2017 11:00 AM  
New Patient with James Elm III, DO [facility]) Appt Note: Establish PCP  
 [address] [facility]  
[phone]  
  
   
 [address] [facility]  [address] [address]  
  
    
 12/1/2017 10:45 AM  
3 WEEK with China Swanson MD  
[facility]) Appt Note: Dr. Yonas Bah [facility]  
[phone] [address] Upcoming Health Maintenance Date Due Hepatitis C Screening 1965 FOOT EXAM Q1 11/26/1974 MICROALBUMIN Q1 11/26/1974 EYE EXAM RETINAL OR DILATED Q1 11/26/1974 Pneumococcal 19-64 Medium Risk (1 of 1 - PPSV23) 11/26/1983 DTaP/Tdap/Td series (1 - Tdap) 11/26/1985 FOBT Q 1 YEAR AGE 50-75 11/26/2014 Influenza Age 5 to Adult 8/1/2017 HEMOGLOBIN A1C Q6M 3/27/2018 LIPID PANEL Q1 9/27/2018 Allergies as of 11/10/2017  Review Complete On: 11/10/2017 By: Lana Lerner NP Severity Noted Reaction Type Reactions Latex  09/27/2017    Other (comments) Allergy documented in admission data base Influenza Virus Vaccine, Specific  09/27/2017    Other (comments) Allergy found in admission data base Current Immunizations  Never Reviewed No immunizations on file. Not reviewed this visit You Were Diagnosed With   
  
 Codes Comments S/P CABG x 3    -  Primary ICD-10-CM: Z95.1 ICD-9-CM: V45.81 Vitals BP Pulse Temp Resp Height(growth percentile) Weight(growth percentile)  110/80 (BP 1 Location: Left arm, BP Patient Position: Sitting) 92 98.4 °F (36.9 °C) (Oral) 16 6' 2\" (1.88 m) 215 lb (97.5 kg) SpO2 BMI Smoking Status 97% 27.6 kg/m2 Never Smoker Vitals History BMI and BSA Data Body Mass Index Body Surface Area  
 27.6 kg/m 2 2.26 m 2 Preferred Pharmacy Pharmacy Name Phone [id number] [address] [phone] Your Updated Medication List  
  
   
This list is accurate as of: 11/10/17  3:00 PM.  Always use your most recent med list.  
  
  
  
  
 aspirin 81 mg chewable tablet Take 1 Tab by mouth daily for 360 days. atorvastatin 20 mg tablet Commonly known as:  LIPITOR Take 1 Tab by mouth nightly. carvedilol 12.5 mg tablet Commonly known as:  Gillermina Begun Take 1 Tab by mouth two (2) times daily (with meals). glipiZIDE 5 mg tablet Commonly known as:  Maxcine Chavira Take 1 Tab by mouth Before breakfast and dinner. glucose blood VI test strips strip Commonly known as:  ACCU-CHEK GUIDE  
1 Each by Does Not Apply route See Admin Instructions. insulin glargine 100 unit/mL (3 mL) Inpn Commonly known as:  LANTUS,BASAGLAR  
25 Units by SubCUTAneous route daily for 60 days. Insulin Needles (Disposable) 32 gauge x 5/32\" Ndle Commonly known as:  Cinthia Pen Needle Use as directed Lancets Misc Commonly known as:  ACCU-CHEK FASTCLIX  
by Does Not Apply route two (2) times a day. lisinopril 5 mg tablet Commonly known as:  Daryl Hu Take 1 Tab by mouth daily. Introducing [facility]! Dear Suzanne Dela Cruz: Thank you for requesting a PostPath account. Our records indicate that you already have an active PostPath account. You can access your account anytime at [email or web address] Did you know that you can access your hospital and ER discharge instructions at any time in PostPath? You can also review all of your test results from your hospital stay or ER visit. Additional Information If you have questions, please visit the Frequently Asked Questions section of the [employer] website at [email or web address] Remember, [address] [employer] I is NOT to be used for urgent needs. For medical emergencies, dial 911. Now available from your iPhone and Android! Please provide this summary of care documentation to your next provider. Your primary care clinician is listed as Shruti Aguilar If you have any questions after today's visit, please call [phone].

## 2017-11-21 ENCOUNTER — OFFICE VISIT (OUTPATIENT)
Dept: INTERNAL MEDICINE CLINIC | Age: 52
End: 2017-11-21

## 2017-11-21 VITALS
SYSTOLIC BLOOD PRESSURE: 134 MMHG | WEIGHT: 208 LBS | TEMPERATURE: 98.3 F | HEART RATE: 90 BPM | RESPIRATION RATE: 14 BRPM | DIASTOLIC BLOOD PRESSURE: 95 MMHG | BODY MASS INDEX: 27.57 KG/M2 | OXYGEN SATURATION: 99 % | HEIGHT: 73 IN

## 2017-11-21 DIAGNOSIS — Z95.1 S/P CABG X 3: ICD-10-CM

## 2017-11-21 DIAGNOSIS — Z23 ENCOUNTER FOR IMMUNIZATION: ICD-10-CM

## 2017-11-21 DIAGNOSIS — I50.20 SYSTOLIC HEART FAILURE, UNSPECIFIED HEART FAILURE CHRONICITY: ICD-10-CM

## 2017-11-21 DIAGNOSIS — Z00.00 ROUTINE ADULT HEALTH MAINTENANCE: ICD-10-CM

## 2017-11-21 RX ORDER — METFORMIN HYDROCHLORIDE 500 MG/1
500 TABLET ORAL 2 TIMES DAILY WITH MEALS
Qty: 60 TAB | Refills: 9 | Status: SHIPPED | OUTPATIENT
Start: 2017-11-21 | End: 2018-01-17 | Stop reason: SDUPTHER

## 2017-11-21 NOTE — LETTER
11/27/2017 12:09 PM 
 
Mr. Garcia Running [address] [place] [id number] Dear Radha Running: 
 
Please find your most recent results below. Resulted Orders CBC WITH AUTOMATED DIFF Result Value Ref Range WBC 7.9 3.4 - 10.8 x10E3/uL  
 RBC 4.79 4.14 - 5.80 x10E6/uL HGB 12.5 (L) 12.6 - 17.7 g/dL Comment: **Effective December 4, 2017 the reference interval** 
  for Hemoglobin MALES only will be changing to: Males 13-15 years: 12.6 - 17.7 Males   >15 years: 13.0 - 17.7 HCT 38.2 37.5 - 51.0 % MCV 80 79 - 97 fL  
 MCH 26.1 (L) 26.6 - 33.0 pg  
 MCHC 32.7 31.5 - 35.7 g/dL  
 RDW 15.2 12.3 - 15.4 % PLATELET 965 624 - 832 x10E3/uL NEUTROPHILS 60 Not Estab. % Lymphocytes 27 Not Estab. % MONOCYTES 8 Not Estab. % EOSINOPHILS 5 Not Estab. % BASOPHILS 0 Not Estab. %  
 ABS. NEUTROPHILS 4.7 1.4 - 7.0 x10E3/uL Abs Lymphocytes 2.1 0.7 - 3.1 x10E3/uL  
 ABS. MONOCYTES 0.6 0.1 - 0.9 x10E3/uL  
 ABS. EOSINOPHILS 0.4 0.0 - 0.4 x10E3/uL  
 ABS. BASOPHILS 0.0 0.0 - 0.2 x10E3/uL IMMATURE GRANULOCYTES 0 Not Estab. %  
 ABS. IMM. GRANS. 0.0 0.0 - 0.1 x10E3/uL Narrative Performed at:  [address]  [id number] : Kristal Reynolds MD, Phone:  [phone] METABOLIC PANEL, COMPREHENSIVE Result Value Ref Range Glucose 159 (H) 65 - 99 mg/dL BUN 23 6 - 24 mg/dL Creatinine 1.31 (H) 0.76 - 1.27 mg/dL GFR est non-AA 62 >59 mL/min/1.73 GFR est AA 72 >59 mL/min/1.73  
 BUN/Creatinine ratio 18 9 - 20 Sodium 139 134 - 144 mmol/L Potassium 4.6 3.5 - 5.2 mmol/L Chloride 99 96 - 106 mmol/L  
 CO2 22 18 - 29 mmol/L Calcium 9.2 8.7 - 10.2 mg/dL Protein, total 6.5 6.0 - 8.5 g/dL Albumin 4.4 3.5 - 5.5 g/dL GLOBULIN, TOTAL 2.1 1.5 - 4.5 g/dL A-G Ratio 2.1 1.2 - 2.2 Bilirubin, total 0.3 0.0 - 1.2 mg/dL Alk. phosphatase 96 39 - 117 IU/L  
 AST (SGOT) 16 0 - 40 IU/L  
 ALT (SGPT) 17 0 - 44 IU/L Narrative Performed at:  [address]  [id number] : Isa Almendarez MD, Phone:  [phone] MICROALBUMIN, UR, RAND W/ MICROALBUMIN/CREA RATIO Result Value Ref Range Creatinine, urine 129.3 Not Estab. mg/dL Microalbumin, urine 4.7 Not Estab. ug/mL Microalb/Creat ratio (ug/mg creat.) 3.6 0.0 - 30.0 mg/g creat Narrative Performed at:  [address]  [id number] : Isa Almendarez MD, Phone:  [phone] PSA, DIAGNOSTIC (PROSTATE SPECIFIC AG) Result Value Ref Range Prostate Specific Ag 0.2 0.0 - 4.0 ng/mL Comment:  
   Roche ECLIA methodology. According to the American Urological Association, Serum PSA should 
decrease and remain at undetectable levels after radical 
prostatectomy. The AUA defines biochemical recurrence as an initial 
PSA value 0.2 ng/mL or greater followed by a subsequent confirmatory PSA value 0.2 ng/mL or greater. Values obtained with different assay methods or kits cannot be used 
interchangeably. Results cannot be interpreted as absolute evidence 
of the presence or absence of malignant disease. Narrative Performed at:  [address]  [id number] : Isa Almendarez MD, Phone:  [phone] HEPATITIS C AB Result Value Ref Range Hep C Virus Ab <0.1 0.0 - 0.9 s/co ratio Comment:  
                                     Negative:     < 0.8 Indeterminate: 0.8 - 0.9 Positive:     > 0.9 The CDC recommends that a positive HCV antibody result 
 be followed up with a HCV Nucleic Acid Amplification 
 test ([id number]). Narrative Performed at:  [address]  [id number] : Rajesh Mitchell MD, Phone:  [phone] RECOMMENDATIONS: 
Labs much improved.  Anemia numbers just about back to normal. Urine with no significant protein in it. Prostate and hep c both normal.  
 
Please call me if you have any questions: [phone] Sincerely, Julia Larry III, DO

## 2017-11-21 NOTE — MR AVS SNAPSHOT
Visit Information Date & Time Provider Department Dept. Phone Encounter #  
 11/21/2017 11:00 AM Ayse Lopez, [address]  [id number] Follow-up Instructions Return in about 3 months (around 2/21/2018). Your Appointments 12/1/2017 10:45 AM  
3 WEEK with [address], MD  
[facility] [address] [facility]) Appt Note: Dr. Phil Martinez [facility]  
[phone] [address] [facility] Upcoming Health Maintenance Date Due Hepatitis C Screening 1965 FOOT EXAM Q1 11/26/1974 MICROALBUMIN Q1 11/26/1974 EYE EXAM RETINAL OR DILATED Q1 11/26/1974 Pneumococcal 19-64 Medium Risk (1 of 1 - PPSV23) 11/26/1983 DTaP/Tdap/Td series (1 - Tdap) 11/26/1985 FOBT Q 1 YEAR AGE 50-75 11/26/2014 Influenza Age 5 to Adult 8/1/2017 HEMOGLOBIN A1C Q6M 3/27/2018 LIPID PANEL Q1 9/27/2018 Allergies as of 11/21/2017  Review Complete On: 11/21/2017 By: Saniya Terry III, DO Severity Noted Reaction Type Reactions Latex  09/27/2017    Other (comments) Allergy documented in admission data base Influenza Virus Vaccine, Specific  09/27/2017    Other (comments) Allergy found in admission data base Current Immunizations  Never Reviewed No immunizations on file. Not reviewed this visit You Were Diagnosed With   
  
 Codes Comments Uncontrolled type 2 diabetes mellitus without complication, without long-term current use of insulin ([facility]    -  Primary ICD-10-CM: E11.65 ICD-9-CM: 250.02 S/P CABG x 3     ICD-10-CM: Z95.1 ICD-9-CM: V45.81 Systolic heart failure, unspecified heart failure chronicity (HCC)     ICD-10-CM: I50.20 ICD-9-CM: 428.20 Routine adult health maintenance     ICD-10-CM: Z00.00 ICD-9-CM: V70.0 Vitals BP Pulse Temp Resp Height(growth percentile) Weight(growth percentile) (!) 134/95 (BP 1 Location: Right arm, BP Patient Position: Sitting) 90 98.3 °F (36.8 °C) (Oral) 14 6' 0.75\" (1.848 m) 208 lb (94.3 kg) SpO2 BMI Smoking Status 99% 27.63 kg/m2 Never Smoker Vitals History BMI and BSA Data Body Mass Index Body Surface Area  
 27.63 kg/m 2 2.2 m 2 Preferred Pharmacy Pharmacy Name Phone [facility]/PHARMACY #[facility] [address] [address] [phone] Your Updated Medication List  
  
   
This list is accurate as of: 11/21/17 12:20 PM.  Always use your most recent med list.  
  
  
  
  
 aspirin 81 mg chewable tablet Take 1 Tab by mouth daily for 360 days. atorvastatin 20 mg tablet Commonly known as:  LIPITOR Take 1 Tab by mouth nightly. carvedilol 12.5 mg tablet Commonly known as:  Anthony Been Take 1 Tab by mouth two (2) times daily (with meals). CENTRUM SILVER PO Take  by mouth daily. glucose blood VI test strips strip Commonly known as:  ACCU-CHEK GUIDE  
1 Each by Does Not Apply route See Admin Instructions. Insulin Needles (Disposable) 32 gauge x 5/32\" Ndle Commonly known as:  Cinthia Pen Needle Use as directed Lancets Misc Commonly known as:  ACCU-CHEK FASTCLIX  
by Does Not Apply route two (2) times a day. lisinopril 5 mg tablet Commonly known as:  Mollie Ripa Take 1 Tab by mouth daily. metFORMIN 500 mg tablet Commonly known as:  GLUCOPHAGE Take 1 Tab by mouth two (2) times daily (with meals). Prescriptions Sent to Pharmacy Refills  
 metFORMIN (GLUCOPHAGE) 500 mg tablet 9 Sig: Take 1 Tab by mouth two (2) times daily (with meals). Class: Normal  
 [id number] [facility] [address] Ph #: [phone] Route: Oral  
  
We Performed the Following CBC WITH AUTOMATED DIFF [62760 CPT(R)] HEPATITIS C AB [77587 CPT(R)] METABOLIC PANEL, COMPREHENSIVE [12233 CPT(R)] MICROALBUMIN, UR, RAND W/ MICROALBUMIN/CREA RATIO [id number] CPT(R)] PSA, DIAGNOSTIC (PROSTATE SPECIFIC AG) [id number] CPT(R)] Follow-up Instructions Return in about 3 months (around 2/21/2018). Patient Instructions Learning About Diabetes Food Guidelines Your Care Instructions Meal planning is important to manage diabetes. It helps keep your blood sugar at a target level (which you set with your doctor). You don't have to eat special foods. You can eat what your family eats, including sweets once in a while. But you do have to pay attention to how often you eat and how much you eat of certain foods. You may want to work with a dietitian or a certified diabetes educator (CDE) to help you plan meals and snacks. A dietitian or CDE can also help you lose weight if that is one of your goals. What should you know about eating carbs? Managing the amount of carbohydrate (carbs) you eat is an important part of healthy meals when you have diabetes. Carbohydrate is found in many foods. · Learn which foods have carbs. And learn the amounts of carbs in different foods. ¨ Bread, cereal, pasta, and rice have about 15 grams of carbs in a serving. A serving is 1 slice of bread (1 ounce), ½ cup of cooked cereal, or 1/3 cup of cooked pasta or rice. ¨ Fruits have 15 grams of carbs in a serving. A serving is 1 small fresh fruit, such as an apple or orange; ½ of a banana; ½ cup of cooked or canned fruit; ½ cup of fruit juice; 1 cup of melon or raspberries; or 2 tablespoons of dried fruit. ¨ Milk and no-sugar-added yogurt have 15 grams of carbs in a serving. A serving is 1 cup of milk or 2/3 cup of no-sugar-added yogurt. ¨ Starchy vegetables have 15 grams of carbs in a serving.  A serving is ½ cup of mashed potatoes or sweet potato; 1 cup winter squash; ½ of a small baked potato; ½ cup of cooked beans; or ½ cup cooked corn or green peas. · Learn how much carbs to eat each day and at each meal. A dietitian or CDE can teach you how to keep track of the amount of carbs you eat. This is called carbohydrate counting. · If you are not sure how to count carbohydrate grams, use the Plate Method to plan meals. It is a good, quick way to make sure that you have a balanced meal. It also helps you spread carbs throughout the day. ¨ Divide your plate by types of foods. Put non-starchy vegetables on half the plate, meat or other protein food on one-quarter of the plate, and a grain or starchy vegetable in the final quarter of the plate. To this you can add a small piece of fruit and 1 cup of milk or yogurt, depending on how many carbs you are supposed to eat at a meal. 
· Try to eat about the same amount of carbs at each meal. Do not \"save up\" your daily allowance of carbs to eat at one meal. 
· Proteins have very little or no carbs per serving. Examples of proteins are beef, chicken, turkey, fish, eggs, tofu, cheese, cottage cheese, and peanut butter. A serving size of meat is 3 ounces, which is about the size of a deck of cards. Examples of meat substitute serving sizes (equal to 1 ounce of meat) are 1/4 cup of cottage cheese, 1 egg, 1 tablespoon of peanut butter, and ½ cup of tofu. How can you eat out and still eat healthy? · Learn to estimate the serving sizes of foods that have carbohydrate. If you measure food at home, it will be easier to estimate the amount in a serving of restaurant food. · If the meal you order has too much carbohydrate (such as potatoes, corn, or baked beans), ask to have a low-carbohydrate food instead. Ask for a salad or green vegetables. · If you use insulin, check your blood sugar before and after eating out to help you plan how much to eat in the future.  
· If you eat more carbohydrate at a meal than you had planned, take a walk or do other exercise. This will help lower your blood sugar. What else should you know? · Limit saturated fat, such as the fat from meat and dairy products. This is a healthy choice because people who have diabetes are at higher risk of heart disease. So choose lean cuts of meat and nonfat or low-fat dairy products. Use olive or canola oil instead of butter or shortening when cooking. · Don't skip meals. Your blood sugar may drop too low if you skip meals and take insulin or certain medicines for diabetes. · Check with your doctor before you drink alcohol. Alcohol can cause your blood sugar to drop too low. Alcohol can also cause a bad reaction if you take certain diabetes medicines. Follow-up care is a key part of your treatment and safety. Be sure to make and go to all appointments, and call your doctor if you are having problems. It's also a good idea to know your test results and keep a list of the medicines you take. Where can you learn more? Go to http://maria de jesus-erinn.info/. Enter F904 in the search box to learn more about \"Learning About Diabetes Food Guidelines. \" Current as of: March 13, 2017 Content Version: 11.4 © 2331-4365 [employer]. Care instructions adapted under license by [employer] (which disclaims liability or warranty for this information). If you have questions about a medical condition or this instruction, always ask your healthcare professional. [address] any warranty or liability for your use of this information. Take your last dose of insulin tonight. Start glucophage/metformin wed am.  Stop glizide. Continue to follow sugars. Introducing [address]! Dear Ana Maria Weber: Thank you for requesting a [employer] account. Our records indicate that you already have an active [employer] account. You can access your account anytime at [email or web address] Did you know that you can access your hospital and ER discharge instructions at any time in [employer]? You can also review all of your test results from your hospital stay or ER visit. Additional Information If you have questions, please visit the Frequently Asked Questions section of the [employer] website at [email or web address] Remember, [employer] is NOT to be used for urgent needs. For medical emergencies, dial 911. Now available from your iPhone and Android! Please provide this summary of care documentation to your next provider. Your primary care clinician is listed as Franklin Parnell If you have any questions after today's visit, please call [phone].

## 2017-11-21 NOTE — PROGRESS NOTES
Seema Stewart is a 45 y.o. male who presents for evaluation of new pt visit. No pcp ever. Was inpt [facility] sept 26-oct 4, had cabg x 3. Was found to have diabetes, a1c 9.4. LDL only 79. Dc from hospital taking glipizide and lantus. He does not want to take lantus any longer, as he is unwilling to give himself any shots. He also goes on work road trips and can be gone for few weeks at a time, so his wife would not be able to give him his shots either.       ROS:  Constitutional: negative for fevers, chills, anorexia and weight loss  Eyes:   negative for visual disturbance and irritation  ENT:   negative for tinnitus,sore throat,nasal congestion,ear pain,hoarseness  Respiratory:  negative for cough, hemoptysis, dyspnea,wheezing  CV:   negative for chest pain, palpitations, lower extremity edema  GI:   negative for nausea, vomiting, diarrhea, abdominal pain,melena  Genitourinary: negative for frequency, dysuria and hematuria  Musculoskel: negative for myalgias, arthralgias, back pain, muscle weakness, joint pain  Neurological:  negative for headaches, dizziness, focal weakness, numbness  Psychiatric:     Negative for depression or anxiety      Past Medical History:   Diagnosis Date    Coronary artery disease involving native coronary artery with unstable angina pectoris ([address] 9/27/2017    Family history of early CAD 9/26/2017    Significant for mother, brother    Hypercholesterolemia     Hypertension     Type II diabetes mellitus, uncontrolled ([address] 9/27/2017       Past Surgical History:   Procedure Laterality Date    CARDIAC SURG PROCEDURE UNLIST      triple bypass    HX ORTHOPAEDIC      left side face metal        Family History   Problem Relation Age of Onset    Diabetes Mother     Heart Disease Mother     No Known Problems Father        Social History     Social History    Marital status:      Spouse name: N/A    Number of children: N/A    Years of education: N/A     Occupational History  Not on file. Social History Main Topics    Smoking status: Never Smoker    Smokeless tobacco: Never Used    Alcohol use No    Drug use: No    Sexual activity: Yes     Partners: Female     Other Topics Concern    Not on file     Social History Narrative            Visit Vitals    BP (!) 134/95 (BP 1 Location: Right arm, BP Patient Position: Sitting)    Pulse 90    Temp 98.3 °F (36.8 °C) (Oral)    Resp 14    Ht 6' 0.75\" (1.848 m)    Wt 208 lb (94.3 kg)    SpO2 99%    BMI 27.63 kg/m2       Physical Examination:   General - Well appearing male  HEENT - PERRL, TM no erythema/opacification, normal nasal turbinates, no oropharyngeal erythema or exudate, MMM  Neck - supple, no bruits, no thyroidomegaly, no lymphadenopathy  Pulm - clear to auscultation bilaterally  Cardio - RRR, normal S1 S2, no murmur  Abd - soft, nontender, no masses, no HSM  Extrem - no edema, +2 distal pulses  Neuro-  No focal deficits, CN intact     Assessment/Plan:    1. Cad with cabg x 3--on asa, coreg, lisinopril. Follows with dr Maryellen Ziegler. 2.  Dm, type 2--wants to stop lantus. Will also stop glizide. Start glucophage  3.  htn--continue coreg, lisinopril  4.  hyperlipids--LDL 79, on lipitor  5. Systolic chf--due to repeat echo in few months, EF was 20-25%. On bb, acei. No signs of any edema  6. Routine adult health maintenance--tdap and pneumovax 23 both given today.     rtc 3 months        Maria Elena Mcmahon III, DO

## 2017-11-21 NOTE — PATIENT INSTRUCTIONS

## 2017-11-21 NOTE — PROGRESS NOTES
Reviewed record in preparation for visit and have obtained necessary documentation. Identified pt with two pt identifiers(name and ). Chief Complaint   Patient presents with   [address] Maintenance Due   Topic Date Due    Hepatitis C Screening  1965    FOOT EXAM Q1  1974    MICROALBUMIN Q1  1974    EYE EXAM RETINAL OR DILATED Q1  1974    Pneumococcal 19-64 Medium Risk (1 of 1 - PPSV23) 1983    DTaP/Tdap/Td series (1 - Tdap) 1985    FOBT Q 1 YEAR AGE 50-75  2014    Influenza Age 5 to Adult  2017       Mr. Garay Knows has a reminder for a \"due or due soon\" health maintenance. I have asked that he discuss health maintenance topic(s) due with His  primary care provider. Coordination of Care Questionnaire:  :     1) Have you been to an emergency room, urgent care clinic since your last visit? no   Hospitalized since your last visit? no             2) Have you seen or consulted any other health care providers outside of [address] [place] [address] since your last visit? no  (Include any pap smears or colon screenings in this section.)    3) Do you have an Advance Directive on file? no    4) Are you interested in receiving information on Advance Directives? yes    Patient is accompanied by self I have received verbal consent from Philly Collier to discuss any/all medical information while they are present in the room.

## 2017-11-22 LAB
ALBUMIN SERPL-MCNC: 4.4 G/DL (ref 3.5–5.5)
ALBUMIN/CREAT UR: 3.6 MG/G CREAT (ref 0–30)
ALBUMIN/GLOB SERPL: 2.1 {RATIO} (ref 1.2–2.2)
ALP SERPL-CCNC: 96 IU/L (ref 39–117)
ALT SERPL-CCNC: 17 IU/L (ref 0–44)
AST SERPL-CCNC: 16 IU/L (ref 0–40)
BASOPHILS # BLD AUTO: 0 X10E3/UL (ref 0–0.2)
BASOPHILS NFR BLD AUTO: 0 %
BILIRUB SERPL-MCNC: 0.3 MG/DL (ref 0–1.2)
BUN SERPL-MCNC: 23 MG/DL (ref 6–24)
BUN/CREAT SERPL: 18 (ref 9–20)
CALCIUM SERPL-MCNC: 9.2 MG/DL (ref 8.7–10.2)
CHLORIDE SERPL-SCNC: 99 MMOL/L (ref 96–106)
CO2 SERPL-SCNC: 22 MMOL/L (ref 18–29)
CREAT SERPL-MCNC: 1.31 MG/DL (ref 0.76–1.27)
CREAT UR-MCNC: 129.3 MG/DL
EOSINOPHIL # BLD AUTO: 0.4 X10E3/UL (ref 0–0.4)
EOSINOPHIL NFR BLD AUTO: 5 %
ERYTHROCYTE [DISTWIDTH] IN BLOOD BY AUTOMATED COUNT: 15.2 % (ref 12.3–15.4)
GFR SERPLBLD CREATININE-BSD FMLA CKD-EPI: 62 ML/MIN/1.73
GFR SERPLBLD CREATININE-BSD FMLA CKD-EPI: 72 ML/MIN/1.73
GLOBULIN SER CALC-MCNC: 2.1 G/DL (ref 1.5–4.5)
GLUCOSE SERPL-MCNC: 159 MG/DL (ref 65–99)
HCT VFR BLD AUTO: 38.2 % (ref 37.5–51)
HCV AB S/CO SERPL IA: <0.1 S/CO RATIO (ref 0–0.9)
HGB BLD-MCNC: 12.5 G/DL (ref 12.6–17.7)
IMM GRANULOCYTES # BLD: 0 X10E3/UL (ref 0–0.1)
IMM GRANULOCYTES NFR BLD: 0 %
LYMPHOCYTES # BLD AUTO: 2.1 X10E3/UL (ref 0.7–3.1)
LYMPHOCYTES NFR BLD AUTO: 27 %
MCH RBC QN AUTO: 26.1 PG (ref 26.6–33)
MCHC RBC AUTO-ENTMCNC: 32.7 G/DL (ref 31.5–35.7)
MCV RBC AUTO: 80 FL (ref 79–97)
MICROALBUMIN UR-MCNC: 4.7 UG/ML
MONOCYTES # BLD AUTO: 0.6 X10E3/UL (ref 0.1–0.9)
MONOCYTES NFR BLD AUTO: 8 %
NEUTROPHILS # BLD AUTO: 4.7 X10E3/UL (ref 1.4–7)
NEUTROPHILS NFR BLD AUTO: 60 %
PLATELET # BLD AUTO: 232 X10E3/UL (ref 150–379)
POTASSIUM SERPL-SCNC: 4.6 MMOL/L (ref 3.5–5.2)
PROT SERPL-MCNC: 6.5 G/DL (ref 6–8.5)
PSA SERPL-MCNC: 0.2 NG/ML (ref 0–4)
RBC # BLD AUTO: 4.79 X10E6/UL (ref 4.14–5.8)
SODIUM SERPL-SCNC: 139 MMOL/L (ref 134–144)
WBC # BLD AUTO: 7.9 X10E3/UL (ref 3.4–10.8)

## 2017-11-22 NOTE — PROGRESS NOTES
Labs much improved. Anemia numbers just about back to normal.  Urine with no significant protein in it.   Prostate and hep c both normal.

## 2017-11-27 NOTE — PROGRESS NOTES
I have attempted to contact this patient by phone with the following results:   Labs much improved.  Anemia numbers just about back to normal.  Urine with no significant protein in it. Prostate and hep c both normal.     Results mailed to patient's home.

## 2017-12-01 ENCOUNTER — OFFICE VISIT (OUTPATIENT)
Dept: CARDIOLOGY CLINIC | Age: 52
End: 2017-12-01

## 2017-12-01 VITALS
WEIGHT: 211.11 LBS | HEIGHT: 73 IN | HEART RATE: 85 BPM | DIASTOLIC BLOOD PRESSURE: 82 MMHG | SYSTOLIC BLOOD PRESSURE: 122 MMHG | RESPIRATION RATE: 18 BRPM | BODY MASS INDEX: 27.98 KG/M2 | OXYGEN SATURATION: 96 %

## 2017-12-01 DIAGNOSIS — I25.10 CORONARY ARTERY DISEASE INVOLVING NATIVE CORONARY ARTERY OF NATIVE HEART WITHOUT ANGINA PECTORIS: ICD-10-CM

## 2017-12-01 DIAGNOSIS — I50.22 CHRONIC SYSTOLIC HEART FAILURE (HCC): Primary | ICD-10-CM

## 2017-12-01 DIAGNOSIS — Z95.1 S/P CABG X 3: ICD-10-CM

## 2017-12-01 RX ORDER — CARVEDILOL 25 MG/1
25 TABLET ORAL 2 TIMES DAILY WITH MEALS
Qty: 60 TAB | Refills: 6 | Status: SHIPPED | OUTPATIENT
Start: 2017-12-01 | End: 2018-01-19 | Stop reason: SDUPTHER

## 2017-12-01 RX ORDER — ATORVASTATIN CALCIUM 20 MG/1
20 TABLET, FILM COATED ORAL
Qty: 90 TAB | Refills: 3 | Status: SHIPPED | OUTPATIENT
Start: 2017-12-01 | End: 2017-12-04 | Stop reason: SDUPTHER

## 2017-12-01 NOTE — PROGRESS NOTES
NAME:  Bear Meehan   :   1965   MRN:   [id number]   PCP:  Joya Bolivar III, DO           Subjective: The patient is a 47y.o. year old male  who returns for a routine follow-up. Since the last visit, patient reports no change in exercise tolerance, chest pain, edema, medication intolerance, palpitations, shortness of breath, PND/orthopnea wheezing, sputum, syncope, dizziness or light headedness. Doing well. Past Medical History:   Diagnosis Date    Coronary artery disease involving native coronary artery with unstable angina pectoris ([facility] 2017    Family history of early CAD 2017    Significant for mother, brother    Hypercholesterolemia     Hypertension     Type II diabetes mellitus, uncontrolled ([facility] 2017        ICD-10-CM ICD-9-CM    1. Chronic systolic heart failure (HCC) I50.22 428.22 2D ECHO COMPLETE ADULT (TTE) W OR WO CONTR   2. Coronary artery disease involving native coronary artery of native heart without angina pectoris I25.10 414.01    3. S/P CABG x 3 Z95.1 V45.81    4. Uncontrolled type 2 diabetes mellitus without complication, without long-term current use of insulin (HCC) E11.65 250.02       Social History   Substance Use Topics    Smoking status: Never Smoker    Smokeless tobacco: Never Used    Alcohol use No      Family History   Problem Relation Age of Onset    Diabetes Mother     Heart Disease Mother     No Known Problems Father         Review of Systems  General: Pt denies excessive weight gain or loss. Pt is able to conduct ADL's  HEENT: Denies blurred vision, headaches, epistaxis and difficulty swallowing. Respiratory: Denies shortness of breath, MALONEY, wheezing or stridor.   Cardiovascular: Denies precordial pain, palpitations, edema or PND  Gastrointestinal: Denies poor appetite, indigestion, abdominal pain or blood in stool  Musculoskeletal: Denies pain or swelling from muscles or joints  Neurologic: Denies tremor, paresthesias, or sensory motor disturbance  Skin: Denies rash, itching or texture change. Objective:       Vitals:    12/01/17 1032 12/01/17 1040   BP: 120/82 122/82   Pulse: 85    Resp: 18    SpO2: 96%    Weight: 211 lb 1.8 oz (95.8 kg)    Height: 6' 0.75\" (1.848 m)     Body mass index is 28.04 kg/(m^2). General PE  Mental Status - Alert. General Appearance - Not in acute distress. Chest and Lung Exam   Inspection: Accessory muscles - No use of accessory muscles in breathing. Auscultation:   Breath sounds: - Normal.    Cardiovascular   Inspection: Jugular vein - Bilateral - Inspection Normal.  Palpation/Percussion:   Apical Impulse: - Normal.  Auscultation: Rhythm - Regular. Heart Sounds - S1 WNL and S2 WNL. No S3 or S4. Murmurs & Other Heart Sounds: Auscultation of the heart reveals - No Murmurs. Peripheral Vascular   Upper Extremity: Inspection - Bilateral - No Cyanotic nailbeds or Digital clubbing. Lower Extremity:   Palpation: Edema - Bilateral - No edema. Data Review:     EKG -EKG: normal EKG, normal sinus rhythm, unchanged from previous tracings. Medications reviewed  Current Outpatient Prescriptions   Medication Sig    atorvastatin (LIPITOR) 20 mg tablet Take 1 Tab by mouth nightly.  carvedilol (COREG) 25 mg tablet Take 1 Tab by mouth two (2) times daily (with meals).  FOLIC ACID/MULTIVIT-MIN/LUTEIN (CENTRUM SILVER PO) Take  by mouth daily.  metFORMIN (GLUCOPHAGE) 500 mg tablet Take 1 Tab by mouth two (2) times daily (with meals).  lisinopril (PRINIVIL, ZESTRIL) 5 mg tablet Take 1 Tab by mouth daily.  aspirin 81 mg chewable tablet Take 1 Tab by mouth daily for 360 days.  glucose blood VI test strips (ACCU-CHEK GUIDE) strip 1 Each by Does Not Apply route See Admin Instructions.  Lancets (ACCU-CHEK FASTCLIX) misc by Does Not Apply route two (2) times a day.     Insulin Needles, Disposable, (CHELSEY PEN NEEDLE) 32 gauge x 5/32\" ndle Use as directed     No current facility-administered medications for this visit. Assessment:       ICD-10-CM ICD-9-CM    1. Chronic systolic heart failure (HCC) I50.22 428.22 2D ECHO COMPLETE ADULT (TTE) W OR WO CONTR   2. Coronary artery disease involving native coronary artery of native heart without angina pectoris I25.10 414.01    3. S/P CABG x 3 Z95.1 V45.81    4. Uncontrolled type 2 diabetes mellitus without complication, without long-term current use of insulin ([facility] E11.65 250.02         Plan:     Patient presents doing well and stable from cardiac standpoint. CHF well compensated. HR in 80's. Increase coreg. Echo next visit. He is off lifevest ( felt he did not need it ). Continue current care and follow up in one month and echo at that time.     Claudia Dejesus MD

## 2017-12-01 NOTE — MR AVS SNAPSHOT
Visit Information Date & Time Provider Department Dept. Phone Encounter #  
 12/1/2017 10:45 AM Elsy Giang MD [facility] [phone] [id number] Your Appointments 2/19/2018 11:30 AM  
ROUTINE CARE with Saniyaviry Terry III, DO TOI [facility]) Appt Note: 3 mo f/u  
 [place] [address] [address]  
[address] [facility] [address] [place] [address] Upcoming Health Maintenance Date Due  
 FOOT EXAM Q1 11/26/1974 EYE EXAM RETINAL OR DILATED Q1 11/26/1974 FOBT Q 1 YEAR AGE 50-75 11/26/2014 HEMOGLOBIN A1C Q6M 3/27/2018 LIPID PANEL Q1 9/27/2018 MICROALBUMIN Q1 11/21/2018 DTaP/Tdap/Td series (2 - Td) 11/21/2027 Allergies as of 12/1/2017  Review Complete On: 12/1/2017 By: Gabe Lindsay LPN Severity Noted Reaction Type Reactions Latex  09/27/2017    Other (comments) Allergy documented in admission data base Influenza Virus Vaccine, Specific  09/27/2017    Other (comments) Allergy found in admission data base Current Immunizations  Never Reviewed Name Date Pneumococcal Polysaccharide (PPSV-23) 11/21/2017 Tdap 11/21/2017 Not reviewed this visit You Were Diagnosed With   
  
 Codes Comments Chronic systolic heart failure (HCC)    -  Primary ICD-10-CM: Z40.03 ICD-9-CM: 428.22 Coronary artery disease involving native coronary artery of native heart without angina pectoris     ICD-10-CM: I25.10 ICD-9-CM: 414.01 S/P CABG x 3     ICD-10-CM: Z95.1 ICD-9-CM: V45.81 Uncontrolled type 2 diabetes mellitus without complication, without long-term current use of insulin ([facility]     ICD-10-CM: E11.65 ICD-9-CM: 250.02 Vitals  BP Pulse Resp Height(growth percentile) Weight(growth percentile) SpO2  
 122/82 (BP 1 Location: Right arm, BP Patient Position: Sitting) 85 18 6' 0.75\" (1.848 m) 211 lb 1.8 oz (95.8 kg) 96% BMI Smoking Status 28.04 kg/m2 Never Smoker Vitals History BMI and BSA Data Body Mass Index Body Surface Area 28.04 kg/m 2 2.22 m 2 Preferred Pharmacy Pharm[facility] Name Phone [id number] [address] [phone] Your Updated Medication List  
  
   
This list is accurate as of: 12/1/17 11:37 AM.  Always use your most recent med list.  
  
  
  
  
 aspirin 81 mg chewable tablet Take 1 Tab by mouth daily for 360 days. atorvastatin 20 mg tablet Commonly known as:  LIPITOR Take 1 Tab by mouth nightly. carvedilol 12.5 mg tablet Commonly known as:  Vergia Mingo Take 1 Tab by mouth two (2) times daily (with meals). CENTRUM SILVER PO Take  by mouth daily. glucose blood VI test strips strip Commonly known as:  ACCU-CHEK GUIDE  
1 Each by Does Not Apply route See Admin Instructions. Insulin Needles (Disposable) 32 gauge x 5/32\" Ndle Commonly known as:  Cinthia Pen Needle Use as directed Lancets Misc Commonly known as:  ACCU-CHEK FASTCLIX  
by Does Not Apply route two (2) times a day. lisinopril 5 mg tablet Commonly known as:  Kelsi Shames Take 1 Tab by mouth daily. metFORMIN 500 mg tablet Commonly known as:  GLUCOPHAGE Take 1 Tab by mouth two (2) times daily (with meals). To-Do List   
 Around 01/02/2018 ECHO:  2D ECHO COMPLETE ADULT (TTE) W OR WO CONTR Introducing [facility] & HEALTH SERVICES! Dear Lois Moreira: Thank you for requesting a Manatron account. Our records indicate that you already have an active Manatron account. You can access your account anytime at [email or web address] [email or web address]. [email or web address]/Closet Couture Did you know that you can access your hospital and ER discharge instructions at any time in Manatron? You can also review all of your test results from your hospital stay or ER visit. Additional Information If you have questions, please visit the Frequently Asked Questions section of the eWell[employer]t website at [email or web address] Remember, [employer] is NOT to be used for urgent needs. For medical emergencies, dial 911. Now available from your iPhone and Android! Please provide this summary of care documentation to your next provider. Your primary care clinician is listed as Diego Bolden If you have any questions after today's visit, please call [phone].

## 2017-12-06 RX ORDER — ATORVASTATIN CALCIUM 20 MG/1
20 TABLET, FILM COATED ORAL
Qty: 90 TAB | Refills: 3 | Status: SHIPPED | OUTPATIENT
Start: 2017-12-06 | End: 2019-02-16 | Stop reason: SDUPTHER

## 2017-12-21 ENCOUNTER — TELEPHONE (OUTPATIENT)
Dept: CARDIAC REHAB | Age: 52
End: 2017-12-21

## 2017-12-21 NOTE — TELEPHONE ENCOUNTER
Called and left a second message for patient regarding referral to outpatient cardiac rehab and scheduling.   (First msg left on 11/28/17.)

## 2018-01-08 RX ORDER — LISINOPRIL 5 MG/1
5 TABLET ORAL DAILY
Qty: 90 TAB | Refills: 3 | Status: SHIPPED | OUTPATIENT
Start: 2018-01-08 | End: 2018-01-12 | Stop reason: SDUPTHER

## 2018-01-12 ENCOUNTER — CLINICAL SUPPORT (OUTPATIENT)
Dept: CARDIOLOGY CLINIC | Age: 53
End: 2018-01-12

## 2018-01-12 ENCOUNTER — OFFICE VISIT (OUTPATIENT)
Dept: CARDIOLOGY CLINIC | Age: 53
End: 2018-01-12

## 2018-01-12 VITALS
SYSTOLIC BLOOD PRESSURE: 112 MMHG | HEIGHT: 73 IN | OXYGEN SATURATION: 98 % | BODY MASS INDEX: 28.48 KG/M2 | HEART RATE: 88 BPM | DIASTOLIC BLOOD PRESSURE: 82 MMHG | WEIGHT: 214.9 LBS | RESPIRATION RATE: 16 BRPM

## 2018-01-12 DIAGNOSIS — I50.22 CHRONIC SYSTOLIC HEART FAILURE (HCC): Primary | ICD-10-CM

## 2018-01-12 DIAGNOSIS — I25.110 CORONARY ARTERY DISEASE INVOLVING NATIVE CORONARY ARTERY OF NATIVE HEART WITH UNSTABLE ANGINA PECTORIS (HCC): ICD-10-CM

## 2018-01-12 DIAGNOSIS — Z95.1 S/P CABG X 3: ICD-10-CM

## 2018-01-12 DIAGNOSIS — I50.22 CHRONIC SYSTOLIC HEART FAILURE (HCC): ICD-10-CM

## 2018-01-12 RX ORDER — LISINOPRIL 10 MG/1
10 TABLET ORAL DAILY
Qty: 90 TAB | Refills: 3 | Status: SHIPPED | OUTPATIENT
Start: 2018-01-12 | End: 2019-01-02 | Stop reason: SDUPTHER

## 2018-01-12 NOTE — PROGRESS NOTES
NAME:  Mary Francisco   :   1965   MRN:   [id number]   PCP:  Gera Garrido III, DO           Subjective: The patient is a 47y.o. year old male  who returns for a routine follow-up. Since the last visit, patient reports no change in exercise tolerance, chest pain, edema, medication intolerance, palpitations, shortness of breath, PND/orthopnea wheezing, sputum, syncope, dizziness or light headedness. Doing well. Past Medical History:   Diagnosis Date    Coronary artery disease involving native coronary artery with unstable angina pectoris ([facility] 2017    Family history of early CAD 2017    Significant for mother, brother    Hypercholesterolemia     Hypertension     Type II diabetes mellitus, uncontrolled ([facility] 2017        ICD-10-CM ICD-9-CM    1. Chronic systolic heart failure (HCC) I50.22 428.22    2. Coronary artery disease involving native coronary artery of native heart with unstable angina pectoris ([facility]) I25.110 414.01      411.1    3. S/P CABG x 3 Z95.1 V45.81       Social History   Substance Use Topics    Smoking status: Never Smoker    Smokeless tobacco: Never Used    Alcohol use No      Family History   Problem Relation Age of Onset    Diabetes Mother     Heart Disease Mother     No Known Problems Father         Review of Systems  General: Pt denies excessive weight gain or loss. Pt is able to conduct ADL's  HEENT: Denies blurred vision, headaches, epistaxis and difficulty swallowing. Respiratory: Denies shortness of breath, MALONEY, wheezing or stridor. Cardiovascular: Denies precordial pain, palpitations, edema or PND  Gastrointestinal: Denies poor appetite, indigestion, abdominal pain or blood in stool  Musculoskeletal: Denies pain or swelling from muscles or joints  Neurologic: Denies tremor, paresthesias, or sensory motor disturbance  Skin: Denies rash, itching or texture change.     Objective:       Vitals:    18 0934 BP: 112/82   Pulse: 88   Resp: 16   SpO2: 98%   Weight: 214 lb 14.4 oz (97.5 kg)   Height: 6' 0.75\" (1.848 m)    Body mass index is 28.55 kg/(m^2). General PE  Mental Status - Alert. General Appearance - Not in acute distress. Chest and Lung Exam   Inspection: Accessory muscles - No use of accessory muscles in breathing. Auscultation:   Breath sounds: - Normal.    Cardiovascular   Inspection: Jugular vein - Bilateral - Inspection Normal.  Palpation/Percussion:   Apical Impulse: - Normal.  Auscultation: Rhythm - Regular. Heart Sounds - S1 WNL and S2 WNL. No S3 or S4. Murmurs & Other Heart Sounds: Auscultation of the heart reveals - No Murmurs. Peripheral Vascular   Upper Extremity: Inspection - Bilateral - No Cyanotic nailbeds or Digital clubbing. Lower Extremity:   Palpation: Edema - Bilateral - No edema. Data Review:     EKG -EKG: normal EKG, normal sinus rhythm, unchanged from previous tracings. Medications reviewed  Current Outpatient Prescriptions   Medication Sig    lisinopril (PRINIVIL, ZESTRIL) 10 mg tablet Take 1 Tab by mouth daily.  atorvastatin (LIPITOR) 20 mg tablet Take 1 Tab by mouth nightly.  carvedilol (COREG) 25 mg tablet Take 1 Tab by mouth two (2) times daily (with meals).  metFORMIN (GLUCOPHAGE) 500 mg tablet Take 1 Tab by mouth two (2) times daily (with meals).  aspirin 81 mg chewable tablet Take 1 Tab by mouth daily for 360 days.  glucose blood VI test strips (ACCU-CHEK GUIDE) strip 1 Each by Does Not Apply route See Admin Instructions.  Lancets (ACCU-CHEK FASTCLIX) misc by Does Not Apply route two (2) times a day.  Insulin Needles, Disposable, (CHELSEY PEN NEEDLE) 32 gauge x 5/32\" ndle Use as directed    FOLIC ACID/MULTIVIT-MIN/LUTEIN (CENTRUM SILVER PO) Take  by mouth daily. No current facility-administered medications for this visit. Assessment:       ICD-10-CM ICD-9-CM    1. Chronic systolic heart failure (HCC) I50.22 428.22    2. Coronary artery disease involving native coronary artery of native heart with unstable angina pectoris ([facility]) I25.110 414.01      411.1    3. S/P CABG x 3 Z95.1 V45.81         Plan:     Patient presents doing well and stable from cardiac standpoint. CHF well compensated. Echo shows EF 50-55%, increase lisinopril to 10 mg. Continue current care and follow up in 3 months. .    Anne-Marie Santiago MD

## 2018-01-12 NOTE — PROGRESS NOTES
Chief Complaint   Patient presents with    CHF     Visit Vitals    /82 (BP 1 Location: Left arm, BP Patient Position: Sitting)    Pulse 88    Resp 16    Ht 6' 0.75\" (1.848 m)    Wt 214 lb 14.4 oz (97.5 kg)    SpO2 98%    BMI 28.55 kg/m2     1. Have you been to the ER, urgent care clinic since your last visit? Hospitalized since your last visit?no    2. Have you seen or consulted any other health care providers outside of the [address] [place] [address] since your last visit? Include any pap smears or colon screening.  no

## 2018-01-12 NOTE — MR AVS SNAPSHOT
Visit Information Date & Time Provider Department Dept. Phone Encounter #  
 1/12/2018  9:45 AM Orlando Herman MD [facility] [phone] [id number] Your Appointments 2/19/2018 11:30 AM  
ROUTINE CARE with Deandra Deng III,  TOI [facility] [address]) Appt Note: 3 mo f/u  
 [facility] [address] [address]  
[address] 235 [facility] [address] [place] [address] Upcoming Health Maintenance Date Due  
 FOOT EXAM Q1 11/26/1974 EYE EXAM RETINAL OR DILATED Q1 11/26/1974 FOBT Q 1 YEAR AGE 50-75 11/26/2014 HEMOGLOBIN A1C Q6M 3/27/2018 LIPID PANEL Q1 9/27/2018 MICROALBUMIN Q1 11/21/2018 DTaP/Tdap/Td series (2 - Td) 11/21/2027 Allergies as of 1/12/2018  Review Complete On: 1/12/2018 By: Chintan Vergara LPN Severity Noted Reaction Type Reactions Latex  09/27/2017    Other (comments) Allergy documented in admission data base Influenza Virus Vaccine, Specific  09/27/2017    Other (comments) Allergy found in admission data base Current Immunizations  Never Reviewed Name Date Pneumococcal Polysaccharide (PPSV-23) 11/21/2017 Tdap 11/21/2017 Not reviewed this visit You Were Diagnosed With   
  
 Codes Comments Chronic systolic heart failure (HCC)    -  Primary ICD-10-CM: F49.80 ICD-9-CM: 428.22 Coronary artery disease involving native coronary artery of native heart with unstable angina pectoris ([facility]     ICD-10-CM: I25.110 
ICD-9-CM: 414.01, 411.1 S/P CABG x 3     ICD-10-CM: Z95.1 ICD-9-CM: V45.81 Vitals BP Pulse Resp Height(growth percentile) Weight(growth percentile) SpO2  
 112/82 (BP 1 Location: Left arm, BP Patient Position: Sitting) 88 16 6' 0.75\" (1.848 m) 214 lb 14.4 oz (97.5 kg) 98% BMI Smoking Status 28.55 kg/m2 Never Smoker BMI and BSA Data Body Mass Index Body Surface Area 28.55 kg/m 2 2.24 m 2 Preferred Pharmacy Pharmacy Name Phone [id number] [address] [phone] Your Updated Medication List  
  
   
This list is accurate as of: 1/12/18 10:32 AM.  Always use your most recent med list.  
  
  
  
  
 aspirin 81 mg chewable tablet Take 1 Tab by mouth daily for 360 days. atorvastatin 20 mg tablet Commonly known as:  LIPITOR Take 1 Tab by mouth nightly. carvedilol 25 mg tablet Commonly known as:  Lindaann Nilda Take 1 Tab by mouth two (2) times daily (with meals). CENTRUM SILVER PO Take  by mouth daily. glucose blood VI test strips strip Commonly known as:  ACCU-CHEK GUIDE  
1 Each by Does Not Apply route See Admin Instructions. Insulin Needles (Disposable) 32 gauge x 5/32\" Ndle Commonly known as:  Cinthia Pen Needle Use as directed Lancets Misc Commonly known as:  ACCU-CHEK FASTCLIX  
by Does Not Apply route two (2) times a day. lisinopril 10 mg tablet Commonly known as:  Everett Kansky Take 1 Tab by mouth daily. metFORMIN 500 mg tablet Commonly known as:  GLUCOPHAGE Take 1 Tab by mouth two (2) times daily (with meals). Prescriptions Sent to Pharmacy Refills  
 lisinopril (PRINIVIL, ZESTRIL) 10 mg tablet 3 Sig: Take 1 Tab by mouth daily. Class: Normal  
 Pharmacy: [facility] #[id number] [address]  OF [address] Ph #: [phone] Route: Oral  
  
Introducing [facility] & HEALTH SERVICES! Dear Alejandra Bruce: Thank you for requesting a ViSSee account. Our records indicate that you already have an active ViSSee account. You can access your account anytime at [email or web address] Medical. Ohana [employer]/Movi Medical Did you know that you can access your hospital and ER discharge instructions at any time in ViSSee?   You can also review all of your test results from your hospital stay or ER visit. Additional Information If you have questions, please visit the Frequently Asked Questions section of the Cloud[employer] website at [email or web address] [email or web address]. [email or web address]/. Remember, CloudGenix is NOT to be used for urgent needs. For medical emergencies, dial 911. Now available from your iPhone and Android! Please provide this summary of care documentation to your next provider. Your primary care clinician is listed as Beula Cashing If you have any questions after today's visit, please call [phone].

## 2018-01-17 ENCOUNTER — TELEPHONE (OUTPATIENT)
Dept: CARDIAC REHAB | Age: 53
End: 2018-01-17

## 2018-01-17 RX ORDER — METFORMIN HYDROCHLORIDE 500 MG/1
500 TABLET ORAL 2 TIMES DAILY WITH MEALS
Qty: 180 TAB | Refills: 3 | Status: SHIPPED | OUTPATIENT
Start: 2018-01-17 | End: 2019-04-04 | Stop reason: SDUPTHER

## 2018-01-17 NOTE — TELEPHONE ENCOUNTER
From: Cornelius Armstrong  To:  Alejandro Lopez III, DO  Sent: 1/17/2018 10:24 AM EST  Subject: Medication Renewal Request    Original authorizing provider: DO Cornelius Jolly III would like a refill of the following medications:  metFORMIN (GLUCOPHAGE) 500 mg tablet Alejandro Lopez III, DO]    Preferred pharmacy: [facility] #[id number] - [facility] [address] [address]    Comment:  Hi My insurance would like me to go to 90 day refills, Thanks Cornelius Armstrong

## 2018-01-19 ENCOUNTER — TELEPHONE (OUTPATIENT)
Dept: CARDIAC REHAB | Age: 53
End: 2018-01-19

## 2018-01-19 NOTE — TELEPHONE ENCOUNTER
Attempted three times to contact patient regarding outpatient Cardiac Rehab,11/28/2017,12/21/2017 and 1/18/2018. No return phone calls. Referral for Cardiac Rehab discarded.  If pt decides to attend or shows interest please send a new referral.

## 2018-01-26 RX ORDER — CARVEDILOL 25 MG/1
25 TABLET ORAL 2 TIMES DAILY WITH MEALS
Qty: 180 TAB | Refills: 3 | Status: SHIPPED | OUTPATIENT
Start: 2018-01-26 | End: 2019-01-14 | Stop reason: SDUPTHER

## 2018-02-02 ENCOUNTER — TELEPHONE (OUTPATIENT)
Dept: INTERNAL MEDICINE CLINIC | Age: 53
End: 2018-02-02

## 2018-02-02 NOTE — TELEPHONE ENCOUNTER
Patient's wife Arline Brown needs a call back to discuss patient's Bad cold symptoms & what she can give him Over the Counter that won't interact with his prescription medications. Please call to discuss.  Thank you

## 2018-02-02 NOTE — TELEPHONE ENCOUNTER
Spoke with patient after 2 patient identifiers being note and advised per Dr. Carmela Bull to take OTC mucinex, or coricidin for his cough. Patient expressed understanding and has no further questions at this time.

## 2018-02-19 ENCOUNTER — OFFICE VISIT (OUTPATIENT)
Dept: INTERNAL MEDICINE CLINIC | Age: 53
End: 2018-02-19

## 2018-02-19 VITALS
OXYGEN SATURATION: 99 % | HEART RATE: 82 BPM | WEIGHT: 214 LBS | SYSTOLIC BLOOD PRESSURE: 132 MMHG | DIASTOLIC BLOOD PRESSURE: 84 MMHG | HEIGHT: 73 IN | BODY MASS INDEX: 28.36 KG/M2 | RESPIRATION RATE: 16 BRPM | TEMPERATURE: 98.2 F

## 2018-02-19 DIAGNOSIS — Z12.11 SCREEN FOR COLON CANCER: ICD-10-CM

## 2018-02-19 DIAGNOSIS — I25.10 CORONARY ARTERY DISEASE INVOLVING NATIVE CORONARY ARTERY OF NATIVE HEART WITHOUT ANGINA PECTORIS: ICD-10-CM

## 2018-02-19 DIAGNOSIS — E11.69 HYPERLIPIDEMIA ASSOCIATED WITH TYPE 2 DIABETES MELLITUS (HCC): Chronic | ICD-10-CM

## 2018-02-19 DIAGNOSIS — I25.5 ISCHEMIC CARDIOMYOPATHY: Chronic | ICD-10-CM

## 2018-02-19 DIAGNOSIS — E78.5 HYPERLIPIDEMIA ASSOCIATED WITH TYPE 2 DIABETES MELLITUS (HCC): Chronic | ICD-10-CM

## 2018-02-19 NOTE — MR AVS SNAPSHOT
[address] [facility] 
[phone] Patient: Torey Sarmiento MRN: [id number] :1965 Visit Information Date & Time Provider Department Dept. Phone Encounter #  
 2018 11:30 AM Ashley Valdes, [address] [place] [address],[address] [phone] [id number] Follow-up Instructions Return in about 6 months (around 2018). Your Appointments 2018  1:45 PM  
ESTABLISHED PATIENT with Keri Mcfadden MD  
[facility] [address] [facility]) Appt Note: Dr. Timur Antoine f/u,jaa  
 [address] [facility]  
[phone] [address] [facility] Upcoming Health Maintenance Date Due  
 FOOT EXAM Q1 1974 EYE EXAM RETINAL OR DILATED Q1 1974 FOBT Q 1 YEAR AGE 50-75 2014 HEMOGLOBIN A1C Q6M 3/27/2018 LIPID PANEL Q1 2018 MICROALBUMIN Q1 2018 DTaP/Tdap/Td series (2 - Td) 2027 Allergies as of 2018  Review Complete On: 2018 By: Ty Alvarez III, DO Severity Noted Reaction Type Reactions Latex  2017    Other (comments) Allergy documented in admission data base Influenza Virus Vaccine, Specific  2017    Other (comments) Allergy found in admission data base Current Immunizations  Never Reviewed Name Date Pneumococcal Polysaccharide (PPSV-23) 2017 Tdap 2017 Not reviewed this visit You Were Diagnosed With   
  
 Codes Comments Uncontrolled type 2 diabetes mellitus without complication, without long-term current use of insulin ([facility]    -  Primary ICD-10-CM: E11.65 ICD-9-CM: 250.02 Coronary artery disease involving native coronary artery of native heart without angina pectoris     ICD-10-CM: I25.10 ICD-9-CM: 414.01   
 Hyperlipidemia associated with type 2 diabetes mellitus ([facility]     ICD-10-CM: E11.69, E78.5 ICD-9-CM: 250.80, 272.4 Ischemic cardiomyopathy     ICD-10-CM: I25.5 ICD-9-CM: 414.8 Screen for colon cancer     ICD-10-CM: Z12.11 ICD-9-CM: V76.51 Vitals BP Pulse Temp Resp Height(growth percentile) Weight(growth percentile) 132/84 (BP 1 Location: Left arm, BP Patient Position: Sitting) 82 98.2 °F (36.8 °C) (Oral) 16 6' 0.75\" (1.848 m) 214 lb (97.1 kg) SpO2 BMI Smoking Status 99% 28.43 kg/m2 Never Smoker Vitals History BMI and BSA Data Body Mass Index Body Surface Area  
 28.43 kg/m 2 2.23 m 2 Preferred Pharmacy [facility] Name Phone [id number] [address] [address] [phone] Your Updated Medication List  
  
   
This list is accurate as of: 2/19/18 11:51 AM.  Always use your most recent med list.  
  
  
  
  
 aspirin 81 mg chewable tablet Take 1 Tab by mouth daily for 360 days. atorvastatin 20 mg tablet Commonly known as:  LIPITOR Take 1 Tab by mouth nightly. carvedilol 25 mg tablet Commonly known as:  Delphina [place] Take 1 Tab by mouth two (2) times daily (with meals). CENTRUM SILVER PO Take  by mouth daily. glucose blood VI test strips strip Commonly known as:  ACCU-CHEK GUIDE  
1 Each by Does Not Apply route See Admin Instructions. Insulin Needles (Disposable) 32 gauge x 5/32\" Ndle Commonly known as:  Cinthia Pen Needle Use as directed Lancets Misc Commonly known as:  ACCU-CHEK FASTCLIX  
by Does Not Apply route two (2) times a day. lisinopril 10 mg tablet Commonly known as:  Alexander Pre[place] Take 1 Tab by mouth daily. metFORMIN 500 mg tablet Commonly known as:  GLUCOPHAGE Take 1 Tab by mouth two (2) times daily (with meals). We Performed the Following HEMOGLOBIN A1C WITH EAG [64904 CPT(R)] METABOLIC PANEL, BASIC [97722 CPT(R)] OCCULT BLOOD, IMMUNOASSAY (FIT) [id number] CPT(R)] Follow-up Instructions Return in about 6 months (around 8/19/2018). Patient Instructions Colon Cancer Screening: Care Instructions Your Care Instructions Colorectal cancer occurs in the colon or rectum. That's the lower part of your digestive system. It is the second-leading cause of cancer deaths in the [place]. It often starts with small growths called polyps in the colon or rectum. Polyps are usually found with screening tests. Depending on the type of test, any polyps found may be removed during the tests. Colorectal cancer usually does not cause symptoms at first. But regular tests can help find it early, before it spreads and becomes harder to treat. Experts advise routine tests for colon cancer for people starting at age 48. And they advise people with a higher risk of colon cancer to get tested sooner. Talk with your doctor about when you should start testing. Discuss which tests you need. Follow-up care is a key part of your treatment and safety. Be sure to make and go to all appointments, and call your doctor if you are having problems. It's also a good idea to know your test results and keep a list of the medicines you take. What are the main screening tests for colon cancer? · Stool tests. These include the fecal immunochemical test (FIT) and the fecal occult blood test (FOBT). These tests check stool samples for signs of cancer. If your test is positive, you will need to have a colonoscopy. · Sigmoidoscopy. This test lets your doctor look at the lining of your rectum and the lowest part of your colon. Your doctor uses a lighted tube called a sigmoidoscope. This test can't find cancers or polyps in the upper part of your colon. In some cases, polyps that are found can be removed.  But if your doctor finds polyps, you will need to have a colonoscopy to check the upper part of your colon. · Colonoscopy. This test lets your doctor look at the lining of your rectum and your entire colon. The doctor uses a thin, flexible tool called a colonoscope. It can also be used to remove polyps or get a tissue sample (biopsy). What tests do you need? The following guidelines are for people age 48 and over who are not at high risk for colorectal cancer. You may have at least one of these tests as directed by your doctor. · Fecal immunochemical test (FIT) or fecal occult blood test (FOBT) every year · Sigmoidoscopy every 5 years · Colonoscopy every 10 years If you are age 68 to 80, you can work with your doctor to decide if screening is a good option. If you are age 80 or older, your doctor will likely advise that screening is not helpful. Talk with your doctor about when you need to be tested. And discuss which tests are right for you. Your doctor may recommend earlier or more frequent testing if you: 
· Have had colorectal cancer before. · Have had colon polyps. · Have symptoms of colorectal cancer. These include blood in your stool and changes in your bowel habits. · Have a parent, brother or sister, or child with colon polyps or colorectal cancer. · Have a bowel disease. This includes ulcerative colitis and Crohn's disease. · Have a rare polyp syndrome that runs in families, such as familial adenomatous polyposis (FAP). · Have had radiation treatments to the belly or pelvis. When should you call for help? Watch closely for changes in your health, and be sure to contact your doctor if: 
? · You have any changes in your bowel habits. ? · You have any problems. Where can you learn more? Go to http://maria de jesus-erinn.info/. Enter M541 in the search box to learn more about \"Colon Cancer Screening: Care Instructions. \" Current as of: May 12, 2017 Content Version: 11.4 © 7178-4167 Healthwise, Incorporated. Care instructions adapted under license by Stazoo.com (which disclaims liability or warranty for this information). If you have questions about a medical condition or this instruction, always ask your healthcare professional. [address] any warranty or liability for your use of this information. Introducing [facility] & HEALTH SERVICES! Dear Praveena Garcia: Thank you for requesting a Catch Resources account. Our records indicate that you already have an active Catch Resources account. You can access your account anytime at [email or web address]://Lending [email or web address]. Reko [employer]/Lending Works Did you know that you can access your hospital and ER discharge instructions at any time in Catch Resources? You can also review all of your test results from your hospital stay or ER visit. Additional Information If you have questions, please visit the Frequently Asked Questions section of the Catch Resources website at [email or web address] Remember, Catch Resources is NOT to be used for urgent needs. For medical emergencies, dial 911. Now available from your iPhone and Android! Please provide this summary of care documentation to your next provider. Your primary care clinician is listed as Zohra Clinton If you have any questions after today's visit, please call [phone].

## 2018-02-19 NOTE — LETTER
2/20/2018 1:08 PM 
 
Mr. Wells May [address] [facility] [address] Dear Russell May: 
 
Please find your most recent results below. Resulted Orders HEMOGLOBIN A1C WITH EAG Result Value Ref Range Hemoglobin A1c 6.2 (H) 4.8 - 5.6 % Comment:  
            Pre-diabetes: 5.7 - 6.4 Diabetes: >6.4 Glycemic control for adults with diabetes: <7.0 Estimated average glucose 131 mg/dL Narrative Performed at:  [address]  [id number] : Fartun Miller MD, Phone:  [phone] METABOLIC PANEL, BASIC Result Value Ref Range Glucose 143 (H) 65 - 99 mg/dL BUN 18 6 - 24 mg/dL Creatinine 1.13 0.76 - 1.27 mg/dL GFR est non-AA 74 >59 mL/min/1.73 GFR est AA 85 >59 mL/min/1.73  
 BUN/Creatinine ratio 16 9 - 20 Sodium 143 134 - 144 mmol/L Potassium 5.2 3.5 - 5.2 mmol/L Chloride 104 96 - 106 mmol/L  
 CO2 25 18 - 29 mmol/L Calcium 9.2 8.7 - 10.2 mg/dL Narrative Performed at:  [address]  [id number] : Fartun Miller MD, Phone:  [phone] RECOMMENDATIONS: 
Diabetes markedly improved, a1c down to 6.2 for average sugar of 131.  Keep up excellent job. Continue with same medications.  No new recommendations. Please call me if you have any questions: [phone] Sincerely, Elliot Angeles III, DO

## 2018-02-19 NOTE — PROGRESS NOTES
Russell Lora is a 47 y.o. male who presents for evaluation of routine follow up. Last seen by me nov 21, 2017. Since then, had echo done, and EF now up to 50-55% (was 20-25% in oct 2017). He feels well, has no complaints. States sugars usually 160-180 or so      ROS:  Constitutional: negative for fevers, chills, anorexia and weight loss  Eyes:   negative for visual disturbance and irritation  ENT:   negative for tinnitus,sore throat,nasal congestion,ear pain,hoarseness  Respiratory:  negative for cough, hemoptysis, dyspnea,wheezing  CV:   negative for chest pain, palpitations, lower extremity edema  GI:   negative for nausea, vomiting, diarrhea, abdominal pain,melena  Genitourinary: negative for frequency, dysuria and hematuria  Musculoskel: negative for myalgias, arthralgias, back pain, muscle weakness, joint pain  Neurological:  negative for headaches, dizziness, focal weakness, numbness  Psychiatric:     Negative for depression or anxiety      Past Medical History:   Diagnosis Date    Coronary artery disease involving native coronary artery with unstable angina pectoris ([facility] [address] 9/27/2017    Family history of early CAD 9/26/2017    Significant for mother, brother    Hypercholesterolemia     Hypertension     Type II diabetes mellitus, uncontrolled ([address] 9/27/2017       Past Surgical History:   Procedure Laterality Date    CARDIAC SURG PROCEDURE UNLIST      triple bypass    HX ORTHOPAEDIC      left side face metal        Family History   Problem Relation Age of Onset    Diabetes Mother     Heart Disease Mother     No Known Problems Father        Social History     Social History    Marital status:      Spouse name: N/A    Number of children: N/A    Years of education: N/A     Occupational History    Not on file.      Social History Main Topics    Smoking status: Never Smoker    Smokeless tobacco: Never Used    Alcohol use No    Drug use: No    Sexual activity: Yes     Partners: Female     Other Topics Concern    Not on file     Social History Narrative            Visit Vitals    /84 (BP 1 Location: Left arm, BP Patient Position: Sitting)    Pulse 82    Temp 98.2 °F (36.8 °C) (Oral)    Resp 16    Ht 6' 0.75\" (1.848 m)    Wt 214 lb (97.1 kg)    SpO2 99%    BMI 28.43 kg/m2       Physical Examination:   General - Well appearing male  HEENT - PERRL, TM no erythema/opacification, normal nasal turbinates, no oropharyngeal erythema or exudate, MMM  Neck - supple, no bruits, no thyroidomegaly, no lymphadenopathy  Pulm - clear to auscultation bilaterally  Cardio - RRR, normal S1 S2, no murmur  Abd - soft, nontender, no masses, no HSM  Extrem - no edema, +2 distal pulses  Neuro-  No focal deficits, CN intact     Assessment/Plan:    1.  Dm, type 2--check a1c. On glucophage now, can increase dose to 1000 bid if needed  2.  htn--controlled with coreg, lisinopril  3. Cad with hx cabg--on asa, bb, acei  4.  hyperlipids--on lipitor  5. Cardiomyopathy, ischemic--ef improved nicely sp cabg and addition of coreg and lisinopril  6.  Routine adult health maintenance--declines colon, eye exam, foot exam.  Will do FIT cards    rtc 6 months        Nicho Co III, DO

## 2018-02-19 NOTE — PATIENT INSTRUCTIONS
Colon Cancer Screening: Care Instructions  Your Care Instructions    Colorectal cancer occurs in the colon or rectum. That's the lower part of your digestive system. It is the second-leading cause of cancer deaths in the [place]. It often starts with small growths called polyps in the colon or rectum. Polyps are usually found with screening tests. Depending on the type of test, any polyps found may be removed during the tests. Colorectal cancer usually does not cause symptoms at first. But regular tests can help find it early, before it spreads and becomes harder to treat. Experts advise routine tests for colon cancer for people starting at age 48. And they advise people with a higher risk of colon cancer to get tested sooner. Talk with your doctor about when you should start testing. Discuss which tests you need. Follow-up care is a key part of your treatment and safety. Be sure to make and go to all appointments, and call your doctor if you are having problems. It's also a good idea to know your test results and keep a list of the medicines you take. What are the main screening tests for colon cancer? · Stool tests. These include the fecal immunochemical test (FIT) and the fecal occult blood test (FOBT). These tests check stool samples for signs of cancer. If your test is positive, you will need to have a colonoscopy. · Sigmoidoscopy. This test lets your doctor look at the lining of your rectum and the lowest part of your colon. Your doctor uses a lighted tube called a sigmoidoscope. This test can't find cancers or polyps in the upper part of your colon. In some cases, polyps that are found can be removed. But if your doctor finds polyps, you will need to have a colonoscopy to check the upper part of your colon. · Colonoscopy. This test lets your doctor look at the lining of your rectum and your entire colon. The doctor uses a thin, flexible tool called a colonoscope.  It can also be used to remove polyps or get a tissue sample (biopsy). What tests do you need? The following guidelines are for people age 48 and over who are not at high risk for colorectal cancer. You may have at least one of these tests as directed by your doctor. · Fecal immunochemical test (FIT) or fecal occult blood test (FOBT) every year  · Sigmoidoscopy every 5 years  · Colonoscopy every 10 years  If you are age 68 to 80, you can work with your doctor to decide if screening is a good option. If you are age 80 or older, your doctor will likely advise that screening is not helpful. Talk with your doctor about when you need to be tested. And discuss which tests are right for you. Your doctor may recommend earlier or more frequent testing if you:  · Have had colorectal cancer before. · Have had colon polyps. · Have symptoms of colorectal cancer. These include blood in your stool and changes in your bowel habits. · Have a parent, brother or sister, or child with colon polyps or colorectal cancer. · Have a bowel disease. This includes ulcerative colitis and Crohn's disease. · Have a rare polyp syndrome that runs in families, such as familial adenomatous polyposis (FAP). · Have had radiation treatments to the belly or pelvis. When should you call for help? Watch closely for changes in your health, and be sure to contact your doctor if:  ? · You have any changes in your bowel habits. ? · You have any problems. Where can you learn more? Go to http://maria de jesus-erinn.info/. Enter M541 in the search box to learn more about \"Colon Cancer Screening: Care Instructions. \"  Current as of: May 12, 2017  Content Version: 11.4  © 8525-5839 [employer]. Care instructions adapted under license by [employer] (which disclaims liability or warranty for this information).  If you have questions about a medical condition or this instruction, always ask your healthcare professional. Madyson Olson disclaims any warranty or liability for your use of this information.

## 2018-02-19 NOTE — PROGRESS NOTES
Reviewed record in preparation for visit and have obtained necessary documentation. Identified pt with two pt identifiers(name and ). Chief Complaint   Patient presents with    Diabetes     follow up    Hypertension       Health Maintenance Due   Topic Date Due    FOOT EXAM Q1  1974    EYE EXAM RETINAL OR DILATED Q1  1974    FOBT Q 1 YEAR AGE 50-75  2014       Mr. Julia Ramirez has a reminder for a \"due or due soon\" health maintenance. I have asked that he discuss health maintenance topic(s) due with His  primary care provider. Coordination of Care Questionnaire:  :     1) Have you been to an emergency room, urgent care clinic since your last visit? no   Hospitalized since your last visit? no             2) Have you seen or consulted any other health care providers outside of [address] [place] [address] since your last visit? no  (Include any pap smears or colon screenings in this section.)    3) Do you have an Advance Directive on file? no    4) Are you interested in receiving information on Advance Directives? NO    Patient is accompanied by self I have received verbal consent from [facility] to discuss any/all medical information while they are present in the room.

## 2018-02-20 LAB
BUN SERPL-MCNC: 18 MG/DL (ref 6–24)
BUN/CREAT SERPL: 16 (ref 9–20)
CALCIUM SERPL-MCNC: 9.2 MG/DL (ref 8.7–10.2)
CHLORIDE SERPL-SCNC: 104 MMOL/L (ref 96–106)
CO2 SERPL-SCNC: 25 MMOL/L (ref 18–29)
CREAT SERPL-MCNC: 1.13 MG/DL (ref 0.76–1.27)
EST. AVERAGE GLUCOSE BLD GHB EST-MCNC: 131 MG/DL
GFR SERPLBLD CREATININE-BSD FMLA CKD-EPI: 74 ML/MIN/1.73
GFR SERPLBLD CREATININE-BSD FMLA CKD-EPI: 85 ML/MIN/1.73
GLUCOSE SERPL-MCNC: 143 MG/DL (ref 65–99)
HBA1C MFR BLD: 6.2 % (ref 4.8–5.6)
POTASSIUM SERPL-SCNC: 5.2 MMOL/L (ref 3.5–5.2)
SODIUM SERPL-SCNC: 143 MMOL/L (ref 134–144)

## 2018-02-20 NOTE — PROGRESS NOTES
Diabetes markedly improved, a1c down to 6.2 for average sugar of 131. Keep up excellent job. Continue with same meds. No new recs.

## 2018-02-20 NOTE — PROGRESS NOTES
I have attempted to contact this patient by phone with the following results:   Diabetes markedly improved, a1c down to 6.2 for average sugar of 131.  Keep up excellent job. Al Black with same meds.  No new recommendations.        Results mailed to patient's home.

## 2018-03-01 LAB — HEMOCCULT STL QL IA: NEGATIVE

## 2018-04-06 ENCOUNTER — OFFICE VISIT (OUTPATIENT)
Dept: CARDIOLOGY CLINIC | Age: 53
End: 2018-04-06

## 2018-04-06 VITALS
DIASTOLIC BLOOD PRESSURE: 70 MMHG | HEIGHT: 73 IN | RESPIRATION RATE: 20 BRPM | OXYGEN SATURATION: 97 % | HEART RATE: 80 BPM | BODY MASS INDEX: 29.22 KG/M2 | SYSTOLIC BLOOD PRESSURE: 136 MMHG | WEIGHT: 220.5 LBS

## 2018-04-06 DIAGNOSIS — I50.22 CHRONIC SYSTOLIC HEART FAILURE (HCC): ICD-10-CM

## 2018-04-06 DIAGNOSIS — Z95.1 S/P CABG X 3: ICD-10-CM

## 2018-04-06 DIAGNOSIS — I25.110 CORONARY ARTERY DISEASE INVOLVING NATIVE CORONARY ARTERY OF NATIVE HEART WITH UNSTABLE ANGINA PECTORIS (HCC): Primary | ICD-10-CM

## 2018-04-06 NOTE — PROGRESS NOTES
1. Have you been to the ER, urgent care clinic since your last visit? Hospitalized since your last visit? NO    2. Have you seen or consulted any other health care providers outside of the [address] [place] [address] since your last visit? Include any pap smears or colon screening. NO    3 MONTH FOLLOW UP. C/O SLIGHT LIGHTHEADEDNESS WHEN BENDING OVER.

## 2018-04-06 NOTE — MR AVS SNAPSHOT
[address] [facility] 
[phone] Patient: Maria Eugenia Gardner MRN: [id number] :1965 Visit Information Date & Time Provider Department Dept. Phone Encounter #  
 2018  1:45 PM Uriel Valenzuela, Luis [facility] [phone] [id number] Your Appointments 2018  8:00 AM  
ROUTINE CARE with DO TOI Jay III [facility]) Appt Note: 6 month follow up  
 [facility] [address] [facility]  
[phone]  
  
   
 [facility] [address] [facility] [address] [address]  
  
    
 10/9/2018  9:45 AM  
ESTABLISHED PATIENT with Uriel Valenzuela MD  
[facility]) Appt Note: six month follow up - [facility] [address] [facility]  
[phone] [address] [facility] Upcoming Health Maintenance Date Due  
 FOOT EXAM Q1 1974 EYE EXAM RETINAL OR DILATED Q1 1974 HEMOGLOBIN A1C Q6M 2018 LIPID PANEL Q1 2018 MICROALBUMIN Q1 2018 FOBT Q 1 YEAR AGE 50-75 2019 DTaP/Tdap/Td series (2 - Td) 2027 Allergies as of 2018  Review Complete On: 2018 By: Carolyn Bernal LPN Severity Noted Reaction Type Reactions Latex  2017    Other (comments) Allergy documented in admission data base Influenza Virus Vaccine, Specific  2017    Other (comments) Allergy found in admission data base Current Immunizations  Never Reviewed Name Date Pneumococcal Polysaccharide (PPSV-23) 2017 Tdap 2017 Not reviewed this visit You Were Diagnosed With   
  
 Codes Comments  Coronary artery disease involving native coronary artery of native heart with unstable angina pectoris ([facility] [place] [address]    -  Primary ICD-10-CM: I25.110 
 ICD-9-CM: 414.01, 411.1 Chronic systolic heart failure (HCC)     ICD-10-CM: I50.22 ICD-9-CM: 428.22 S/P CABG x 3     ICD-10-CM: Z95.1 ICD-9-CM: V45.81 Vitals BP Pulse Resp Height(growth percentile) Weight(growth percentile) SpO2  
 136/70 80 20 6' 0.5\" (1.842 m) 220 lb 8 oz (100 kg) 97% BMI Smoking Status 29.49 kg/m2 Never Smoker Vitals History BMI and BSA Data Body Mass Index Body Surface Area  
 29.49 kg/m 2 2.26 m 2 Preferred Pharmacy Pharmacy Name Phone [id number], [address] [phone] Your Updated Medication List  
  
   
This list is accurate as of 4/6/18  2:45 PM.  Always use your most recent med list.  
  
  
  
  
 aspirin 81 mg chewable tablet Take 1 Tab by mouth daily for 360 days. atorvastatin 20 mg tablet Commonly known as:  LIPITOR Take 1 Tab by mouth nightly. carvedilol 25 mg tablet Commonly known as:  Alexandria Ready Take 1 Tab by mouth two (2) times daily (with meals). CENTRUM SILVER PO Take  by mouth daily. glucose blood VI test strips strip Commonly known as:  ACCU-CHEK GUIDE  
1 Each by Does Not Apply route See Admin Instructions. Insulin Needles (Disposable) 32 gauge x 5/32\" Ndle Commonly known as:  Cinthia Pen Needle Use as directed Lancets Misc Commonly known as:  ACCU-CHEK FASTCLIX  
by Does Not Apply route two (2) times a day. lisinopril 10 mg tablet Commonly known as:  Lollie Jump Take 1 Tab by mouth daily. metFORMIN 500 mg tablet Commonly known as:  GLUCOPHAGE Take 1 Tab by mouth two (2) times daily (with meals). We Performed the Following AMB POC EKG ROUTINE W/ 12 LEADS, INTER & REP [56315 CPT(R)] Introducing [facility]! Dear Saul Goldsmith: Thank you for requesting a [employer] account.   Our records indicate that you already have an active [employer] account. You can access your account anytime at [email or web address] Did you know that you can access your hospital and ER discharge instructions at any time in [employer]? You can also review all of your test results from your hospital stay or ER visit. Additional Information If you have questions, please visit the Frequently Asked Questions section of the [employer] website at [email or web address] Remember, [employer] is NOT to be used for urgent needs. For medical emergencies, dial 911. Now available from your iPhone and Android! Please provide this summary of care documentation to your next provider. Your primary care clinician is listed as Finn Barry If you have any questions after today's visit, please call [phone].

## 2018-04-10 NOTE — PROGRESS NOTES
NAME:  Elysa Kussmaul   :   1965   MRN:   [id number]   PCP:  Sharyle Irani III, DO           Subjective: The patient is a 47y.o. year old male  who returns for a routine follow-up. Since the last visit, patient reports no change in exercise tolerance, chest pain, edema, medication intolerance, palpitations, shortness of breath, PND/orthopnea wheezing, sputum, syncope, dizziness or light headedness. Doing well. Past Medical History:   Diagnosis Date    Coronary artery disease involving native coronary artery with unstable angina pectoris ([facility] [address] 2017    Family history of early CAD 2017    Significant for mother, brother    Hypercholesterolemia     Hypertension     Type II diabetes mellitus, uncontrolled ([facility] 2017        ICD-10-CM ICD-9-CM    1. Coronary artery disease involving native coronary artery of native heart with unstable angina pectoris ([facility]) I25.110 414.01 AMB POC EKG ROUTINE W/ 12 LEADS, INTER & REP     411.1    2. Chronic systolic heart failure ([facility]) I50.22 428.22    3. S/P CABG x 3 Z95.1 V45.81       Social History   Substance Use Topics    Smoking status: Never Smoker    Smokeless tobacco: Never Used    Alcohol use No      Family History   Problem Relation Age of Onset    Diabetes Mother     Heart Disease Mother     No Known Problems Father         Review of Systems  General: Pt denies excessive weight gain or loss. Pt is able to conduct ADL's  HEENT: Denies blurred vision, headaches, epistaxis and difficulty swallowing. Respiratory: Denies shortness of breath, MALONEY, wheezing or stridor.   Cardiovascular: Denies precordial pain, palpitations, edema or PND  Gastrointestinal: Denies poor appetite, indigestion, abdominal pain or blood in stool  Musculoskeletal: Denies pain or swelling from muscles or joints  Neurologic: Denies tremor, paresthesias, or sensory motor disturbance  Skin: Denies rash, itching or texture change. Objective:       Vitals:    04/06/18 1347 04/06/18 1404   BP: 138/72 136/70   Pulse: 80    Resp: 20    SpO2: 97%    Weight: 220 lb 8 oz (100 kg)    Height: 6' 0.5\" (1.842 m)     Body mass index is 29.49 kg/(m^2). General PE  Mental Status - Alert. General Appearance - Not in acute distress. Chest and Lung Exam   Inspection: Accessory muscles - No use of accessory muscles in breathing. Auscultation:   Breath sounds: - Normal.    Cardiovascular   Inspection: Jugular vein - Bilateral - Inspection Normal.  Palpation/Percussion:   Apical Impulse: - Normal.  Auscultation: Rhythm - Regular. Heart Sounds - S1 WNL and S2 WNL. No S3 or S4. Murmurs & Other Heart Sounds: Auscultation of the heart reveals - No Murmurs. Peripheral Vascular   Upper Extremity: Inspection - Bilateral - No Cyanotic nailbeds or Digital clubbing. Lower Extremity:   Palpation: Edema - Bilateral - No edema. Data Review:     EKG -EKG: normal EKG, normal sinus rhythm, unchanged from previous tracings. Medications reviewed  Current Outpatient Prescriptions   Medication Sig    carvedilol (COREG) 25 mg tablet Take 1 Tab by mouth two (2) times daily (with meals).  metFORMIN (GLUCOPHAGE) 500 mg tablet Take 1 Tab by mouth two (2) times daily (with meals).  lisinopril (PRINIVIL, ZESTRIL) 10 mg tablet Take 1 Tab by mouth daily.  atorvastatin (LIPITOR) 20 mg tablet Take 1 Tab by mouth nightly.  aspirin 81 mg chewable tablet Take 1 Tab by mouth daily for 360 days.  glucose blood VI test strips (ACCU-CHEK GUIDE) strip 1 Each by Does Not Apply route See Admin Instructions.  Lancets (ACCU-CHEK FASTCLIX) misc by Does Not Apply route two (2) times a day.  Insulin Needles, Disposable, (CHELSEY PEN NEEDLE) 32 gauge x 5/32\" ndle Use as directed    FOLIC ACID/MULTIVIT-MIN/LUTEIN (CENTRUM SILVER PO) Take  by mouth daily. No current facility-administered medications for this visit.           Assessment:       ICD-10-CM ICD-9-CM    1. Coronary artery disease involving native coronary artery of native heart with unstable angina pectoris (HCC) I25.110 414.01 AMB POC EKG ROUTINE W/ 12 LEADS, INTER & REP     411.1    2. Chronic systolic heart failure (HCC) I50.22 428.22    3. S/P CABG x 3 Z95.1 V45.81         Plan:     Patient presents doing well and stable from cardiac standpoint. CHF well compensated. Echo showed EF 50-55%. He is at his functional baseline and doing well. Continue current care and follow up in 6 months. .    Sherry Dutta MD

## 2018-09-14 ENCOUNTER — OFFICE VISIT (OUTPATIENT)
Dept: INTERNAL MEDICINE CLINIC | Age: 53
End: 2018-09-14

## 2018-09-14 VITALS
TEMPERATURE: 98.2 F | DIASTOLIC BLOOD PRESSURE: 81 MMHG | HEIGHT: 74 IN | OXYGEN SATURATION: 99 % | RESPIRATION RATE: 16 BRPM | BODY MASS INDEX: 28.75 KG/M2 | SYSTOLIC BLOOD PRESSURE: 134 MMHG | WEIGHT: 224 LBS | HEART RATE: 67 BPM

## 2018-09-14 DIAGNOSIS — I25.5 ISCHEMIC CARDIOMYOPATHY: Chronic | ICD-10-CM

## 2018-09-14 DIAGNOSIS — I25.110 CORONARY ARTERY DISEASE INVOLVING NATIVE CORONARY ARTERY OF NATIVE HEART WITH UNSTABLE ANGINA PECTORIS (HCC): ICD-10-CM

## 2018-09-14 DIAGNOSIS — Z00.00 ROUTINE ADULT HEALTH MAINTENANCE: Primary | ICD-10-CM

## 2018-09-14 DIAGNOSIS — E11.69 HYPERLIPIDEMIA ASSOCIATED WITH TYPE 2 DIABETES MELLITUS (HCC): Chronic | ICD-10-CM

## 2018-09-14 DIAGNOSIS — Z95.1 S/P CABG X 3: ICD-10-CM

## 2018-09-14 DIAGNOSIS — E78.5 HYPERLIPIDEMIA ASSOCIATED WITH TYPE 2 DIABETES MELLITUS (HCC): Chronic | ICD-10-CM

## 2018-09-14 NOTE — PROGRESS NOTES
Ari Bradley is a 47 y.o. male who presents for evaluation of cpe. Last seen by me feb 19, 2018. Doing well overall, though gets rare spells of lightheadedness when he stands up quickly, usually from a squatting position. Feels bit dizzy, but only for a few seconds. No other symptoms  Wife with him today. ROS:  Constitutional: negative for fevers, chills, anorexia and weight loss  Eyes:   negative for visual disturbance and irritation  ENT:   negative for tinnitus,sore throat,nasal congestion,ear pain,hoarseness  Respiratory:  negative for cough, hemoptysis, dyspnea,wheezing  CV:   negative for chest pain, palpitations, lower extremity edema  GI:   negative for nausea, vomiting, diarrhea, abdominal pain,melena  Genitourinary: negative for frequency, dysuria and hematuria  Musculoskel: negative for myalgias, arthralgias, back pain, muscle weakness, joint pain  Neurological:  negative for headaches, dizziness, focal weakness, numbness  Psychiatric:     Negative for depression or anxiety      Past Medical History:   Diagnosis Date    Coronary artery disease involving native coronary artery with unstable angina pectoris ([address] 9/27/2017    Family history of early CAD 9/26/2017    Significant for mother, brother    Hypercholesterolemia     Hypertension     Type II diabetes mellitus, uncontrolled ([address] 9/27/2017       Past Surgical History:   Procedure Laterality Date    CARDIAC SURG PROCEDURE UNLIST      triple bypass    HX ORTHOPAEDIC      left side face metal        Family History   Problem Relation Age of Onset    Diabetes Mother     Heart Disease Mother     No Known Problems Father        Social History     Social History    Marital status:      Spouse name: N/A    Number of children: N/A    Years of education: N/A     Occupational History    Not on file.      Social History Main Topics    Smoking status: Never Smoker    Smokeless tobacco: Never Used    Alcohol use No    Drug use: No    Sexual activity: Yes     Partners: Female     Other Topics Concern    Not on file     Social History Narrative            Visit Vitals    /81 (BP 1 Location: Right arm, BP Patient Position: Sitting)    Pulse 67    Temp 98.2 °F (36.8 °C) (Oral)    Resp 16    Ht 6' 2\" (1.88 m)    Wt 224 lb (101.6 kg)    SpO2 99%    BMI 28.76 kg/m2       Physical Examination:   General - Well appearing male  HEENT - PERRL, TM no erythema/opacification, normal nasal turbinates, no oropharyngeal erythema or exudate, MMM  Neck - supple, no bruits, no thyroidomegaly, no lymphadenopathy  Pulm - clear to auscultation bilaterally  Cardio - RRR, normal S1 S2, no murmur  Abd - soft, nontender, no masses, no HSM  Extrem - no edema, +2 distal pulses  Neuro-  No focal deficits, CN intact     Assessment/Plan:    1. Routine adult health maintenance--check cbc, cmp, tsh  2.  Dm, type 2--on glucophage, check a1c, urine micro  3.  hyperlipids--check flp, on lipitor  4. Cad sp cabg x 3--on asa, coreg, lisinopril  5. Post op systolic dysfunction--resolved, most recent EF normal  6. Intermittent lightheadedness--asked him to discuss with dr Tessy Jordan possibility of decreasing coreg. Declines flu shot.   rtc 6 months        Caleb Gates III, DO

## 2018-09-14 NOTE — PATIENT INSTRUCTIONS

## 2018-09-14 NOTE — MR AVS SNAPSHOT
[address] [facility] 
[phone] Patient: Ari Bradley MRN: [id number] :1965 Visit Information Date & Time Provider Department Dept. Phone Encounter #  
 2018  9:30 AM Melody Simon, [address] [id number] Follow-up Instructions Return in about 6 months (around 3/14/2019). Your Appointments 10/9/2018  9:45 AM  
ESTABLISHED PATIENT with Santiago Sánchez MD  
[facility]) Appt Note: six month follow up - [facility] [address]  
[phone] [address] Upcoming Health Maintenance Date Due  
 FOOT EXAM Q1 1974 EYE EXAM RETINAL OR DILATED Q1 1974 Influenza Age 5 to Adult 2018 HEMOGLOBIN A1C Q6M 2018 LIPID PANEL Q1 2018 MICROALBUMIN Q1 2018 FOBT Q 1 YEAR AGE 50-75 2019 DTaP/Tdap/Td series (2 - Td) 2027 Allergies as of 2018  Review Complete On: 2018 By: Vadim Guerrero III,  Severity Noted Reaction Type Reactions Latex  2017    Other (comments) Allergy documented in admission data base Influenza Virus Vaccine, Specific  2017    Other (comments) Allergy found in admission data base Current Immunizations  Never Reviewed Name Date Pneumococcal Polysaccharide (PPSV-23) 2017 Tdap 2017 Not reviewed this visit You Were Diagnosed With   
  
 Codes Comments Routine adult health maintenance    -  Primary ICD-10-CM: Z00.00 ICD-9-CM: V70.0 Uncontrolled type 2 diabetes mellitus without complication, without long-term current use of insulin ([facility] [address]     ICD-10-CM: E11.65 ICD-9-CM: 250.02  Hyperlipidemia associated with type 2 diabetes mellitus ([facility] [address] ICD-10-CM: E11.69, E78.5 ICD-9-CM: 250.80, 272.4 Ischemic cardiomyopathy     ICD-10-CM: I25.5 ICD-9-CM: 414.8 Coronary artery disease involving native coronary artery of native heart with unstable angina pectoris ([facility] [address]     ICD-10-CM: I25.110 
ICD-9-CM: 414.01, 411.1 S/P CABG x 3     ICD-10-CM: Z95.1 ICD-9-CM: V45.81 Vitals BP Pulse Temp Resp Height(growth percentile) Weight(growth percentile) 134/81 (BP 1 Location: Right arm, BP Patient Position: Sitting) 67 98.2 °F (36.8 °C) (Oral) 16 6' 2\" (1.88 m) 224 lb (101.6 kg) SpO2 BMI Smoking Status 99% 28.76 kg/m2 Never Smoker BMI and BSA Data Body Mass Index Body Surface Area 28.76 kg/m 2 2.3 m 2 Preferred Pharmacy Pharmacy Name Phone [facility]/PHARMACY #[id number]- [address] [phone] Your Updated Medication List  
  
   
This list is accurate as of 9/14/18 10:02 AM.  Always use your most recent med list.  
  
  
  
  
 aspirin 81 mg chewable tablet Take 1 Tab by mouth daily for 360 days. atorvastatin 20 mg tablet Commonly known as:  LIPITOR Take 1 Tab by mouth nightly. carvedilol 25 mg tablet Commonly known as:  Cleatis Kenton Take 1 Tab by mouth two (2) times daily (with meals). CENTRUM SILVER PO Take  by mouth daily. glucose blood VI test strips strip Commonly known as:  ACCU-CHEK GUIDE  
1 Each by Does Not Apply route See Admin Instructions. Insulin Needles (Disposable) 32 gauge x 5/32\" Ndle Commonly known as:  Cinthia Pen Needle Use as directed Lancets Misc Commonly known as:  ACCU-CHEK FASTCLIX LANCING DEV  
by Does Not Apply route two (2) times a day. lisinopril 10 mg tablet Commonly known as:  Rabia Escort Take 1 Tab by mouth daily. metFORMIN 500 mg tablet Commonly known as:  GLUCOPHAGE Take 1 Tab by mouth two (2) times daily (with meals). We Performed the Following CBC WITH AUTOMATED DIFF [20162 CPT(R)] HEMOGLOBIN A1C WITH EAG [13838 CPT(R)] LIPID PANEL [88293 CPT(R)] METABOLIC PANEL, COMPREHENSIVE [18443 CPT(R)] MICROALBUMIN, UR, RAND W/ MICROALB/CREAT RATIO [id number] CPT(R)] TSH 3RD GENERATION [22356 CPT(R)] Follow-up Instructions Return in about 6 months (around 3/14/2019). Patient Instructions Learning About Diabetes Food Guidelines Your Care Instructions Meal planning is important to manage diabetes. It helps keep your blood sugar at a target level (which you set with your doctor). You don't have to eat special foods. You can eat what your family eats, including sweets once in a while. But you do have to pay attention to how often you eat and how much you eat of certain foods. You may want to work with a dietitian or a certified diabetes educator (CDE) to help you plan meals and snacks. A dietitian or CDE can also help you lose weight if that is one of your goals. What should you know about eating carbs? Managing the amount of carbohydrate (carbs) you eat is an important part of healthy meals when you have diabetes. Carbohydrate is found in many foods. · Learn which foods have carbs. And learn the amounts of carbs in different foods. ¨ Bread, cereal, pasta, and rice have about 15 grams of carbs in a serving. A serving is 1 slice of bread (1 ounce), ½ cup of cooked cereal, or 1/3 cup of cooked pasta or rice. ¨ Fruits have 15 grams of carbs in a serving. A serving is 1 small fresh fruit, such as an apple or orange; ½ of a banana; ½ cup of cooked or canned fruit; ½ cup of fruit juice; 1 cup of melon or raspberries; or 2 tablespoons of dried fruit. ¨ Milk and no-sugar-added yogurt have 15 grams of carbs in a serving. A serving is 1 cup of milk or 2/3 cup of no-sugar-added yogurt. ¨ Starchy vegetables have 15 grams of carbs in a serving.  A serving is ½ cup of mashed potatoes or sweet potato; 1 cup winter squash; ½ of a small baked potato; ½ cup of cooked beans; or ½ cup cooked corn or green peas. · Learn how much carbs to eat each day and at each meal. A dietitian or CDE can teach you how to keep track of the amount of carbs you eat. This is called carbohydrate counting. · If you are not sure how to count carbohydrate grams, use the Plate Method to plan meals. It is a good, quick way to make sure that you have a balanced meal. It also helps you spread carbs throughout the day. ¨ Divide your plate by types of foods. Put non-starchy vegetables on half the plate, meat or other protein food on one-quarter of the plate, and a grain or starchy vegetable in the final quarter of the plate. To this you can add a small piece of fruit and 1 cup of milk or yogurt, depending on how many carbs you are supposed to eat at a meal. 
· Try to eat about the same amount of carbs at each meal. Do not \"save up\" your daily allowance of carbs to eat at one meal. 
· Proteins have very little or no carbs per serving. Examples of proteins are beef, chicken, turkey, fish, eggs, tofu, cheese, cottage cheese, and peanut butter. A serving size of meat is 3 ounces, which is about the size of a deck of cards. Examples of meat substitute serving sizes (equal to 1 ounce of meat) are 1/4 cup of cottage cheese, 1 egg, 1 tablespoon of peanut butter, and ½ cup of tofu. How can you eat out and still eat healthy? · Learn to estimate the serving sizes of foods that have carbohydrate. If you measure food at home, it will be easier to estimate the amount in a serving of restaurant food. · If the meal you order has too much carbohydrate (such as potatoes, corn, or baked beans), ask to have a low-carbohydrate food instead. Ask for a salad or green vegetables. · If you use insulin, check your blood sugar before and after eating out to help you plan how much to eat in the future. · If you eat more carbohydrate at a meal than you had planned, take a walk or do other exercise. This will help lower your blood sugar. What else should you know? · Limit saturated fat, such as the fat from meat and dairy products. This is a healthy choice because people who have diabetes are at higher risk of heart disease. So choose lean cuts of meat and nonfat or low-fat dairy products. Use olive or canola oil instead of butter or shortening when cooking. · Don't skip meals. Your blood sugar may drop too low if you skip meals and take insulin or certain medicines for diabetes. · Check with your doctor before you drink alcohol. Alcohol can cause your blood sugar to drop too low. Alcohol can also cause a bad reaction if you take certain diabetes medicines. Follow-up care is a key part of your treatment and safety. Be sure to make and go to all appointments, and call your doctor if you are having problems. It's also a good idea to know your test results and keep a list of the medicines you take. Where can you learn more? Go to http://maria de jesus-erinn.info/. Enter T755 in the search box to learn more about \"Learning About Diabetes Food Guidelines. \" Current as of: December 7, 2017 Content Version: 11.7 © 9723-2803 [employer]. Care instructions adapted under license by [employer] (which disclaims liability or warranty for this information). If you have questions about a medical condition or this instruction, always ask your healthcare professional. [place] [address] any warranty or liability for your use of this information. At your visit with dr Cisco High, ask him about possibly decreasing dose of coreg to see if that helps with your lightheaded symptoms. Introducing [facility]! Dear Óscar Wright: Thank you for requesting a SkyCache account. Our records indicate that you already have an active SkyCache account.   You can access your account anytime at [email or web address] IQ Did you know that you can access your hospital and ER discharge instructions at any time in MC10? You can also review all of your test results from your hospital stay or ER visit. Additional Information If you have questions, please visit the Frequently Asked Questions section of the MC10 website at [email or web address] Remember, MC10 is NOT to be used for urgent needs. For medical emergencies, dial 911. Now available from your iPhone and Android! Please provide this summary of care documentation to your next provider. Your primary care clinician is listed as Tobias First If you have any questions after today's visit, please call [phone].

## 2018-09-17 LAB
ALBUMIN SERPL-MCNC: 4.5 G/DL (ref 3.5–5.5)
ALBUMIN/CREAT UR: <5.2 MG/G CREAT (ref 0–30)
ALBUMIN/GLOB SERPL: 2.4 {RATIO} (ref 1.2–2.2)
ALP SERPL-CCNC: 74 IU/L (ref 39–117)
ALT SERPL-CCNC: 20 IU/L (ref 0–44)
AST SERPL-CCNC: 16 IU/L (ref 0–40)
BASOPHILS # BLD AUTO: 0.1 X10E3/UL (ref 0–0.2)
BASOPHILS NFR BLD AUTO: 1 %
BILIRUB SERPL-MCNC: 0.3 MG/DL (ref 0–1.2)
BUN SERPL-MCNC: 32 MG/DL (ref 6–24)
BUN/CREAT SERPL: 26 (ref 9–20)
CALCIUM SERPL-MCNC: 9.5 MG/DL (ref 8.7–10.2)
CHLORIDE SERPL-SCNC: 104 MMOL/L (ref 96–106)
CHOLEST SERPL-MCNC: 109 MG/DL (ref 100–199)
CO2 SERPL-SCNC: 22 MMOL/L (ref 20–29)
CREAT SERPL-MCNC: 1.23 MG/DL (ref 0.76–1.27)
CREAT UR-MCNC: 57.8 MG/DL
EOSINOPHIL # BLD AUTO: 0.3 X10E3/UL (ref 0–0.4)
EOSINOPHIL NFR BLD AUTO: 4 %
ERYTHROCYTE [DISTWIDTH] IN BLOOD BY AUTOMATED COUNT: 13.6 % (ref 12.3–15.4)
EST. AVERAGE GLUCOSE BLD GHB EST-MCNC: 123 MG/DL
GLOBULIN SER CALC-MCNC: 1.9 G/DL (ref 1.5–4.5)
GLUCOSE SERPL-MCNC: 113 MG/DL (ref 65–99)
HBA1C MFR BLD: 5.9 % (ref 4.8–5.6)
HCT VFR BLD AUTO: 38.1 % (ref 37.5–51)
HDLC SERPL-MCNC: 45 MG/DL
HGB BLD-MCNC: 12.5 G/DL (ref 13–17.7)
IMM GRANULOCYTES # BLD: 0 X10E3/UL (ref 0–0.1)
IMM GRANULOCYTES NFR BLD: 0 %
LDLC SERPL CALC-MCNC: 48 MG/DL (ref 0–99)
LYMPHOCYTES # BLD AUTO: 2.3 X10E3/UL (ref 0.7–3.1)
LYMPHOCYTES NFR BLD AUTO: 27 %
MCH RBC QN AUTO: 29.3 PG (ref 26.6–33)
MCHC RBC AUTO-ENTMCNC: 32.8 G/DL (ref 31.5–35.7)
MCV RBC AUTO: 89 FL (ref 79–97)
MICROALBUMIN UR-MCNC: <3 UG/ML
MONOCYTES # BLD AUTO: 0.7 X10E3/UL (ref 0.1–0.9)
MONOCYTES NFR BLD AUTO: 9 %
NEUTROPHILS # BLD AUTO: 5 X10E3/UL (ref 1.4–7)
NEUTROPHILS NFR BLD AUTO: 59 %
PLATELET # BLD AUTO: 191 X10E3/UL (ref 150–379)
POTASSIUM SERPL-SCNC: 5.1 MMOL/L (ref 3.5–5.2)
PROT SERPL-MCNC: 6.4 G/DL (ref 6–8.5)
RBC # BLD AUTO: 4.27 X10E6/UL (ref 4.14–5.8)
SODIUM SERPL-SCNC: 141 MMOL/L (ref 134–144)
TRIGL SERPL-MCNC: 78 MG/DL (ref 0–149)
TSH SERPL DL<=0.005 MIU/L-ACNC: 1.9 UIU/ML (ref 0.45–4.5)
VLDLC SERPL CALC-MCNC: 16 MG/DL (ref 5–40)
WBC # BLD AUTO: 8.4 X10E3/UL (ref 3.4–10.8)

## 2018-10-09 ENCOUNTER — OFFICE VISIT (OUTPATIENT)
Dept: CARDIOLOGY CLINIC | Age: 53
End: 2018-10-09

## 2018-10-09 VITALS
WEIGHT: 227.1 LBS | SYSTOLIC BLOOD PRESSURE: 108 MMHG | HEIGHT: 74 IN | OXYGEN SATURATION: 99 % | BODY MASS INDEX: 29.14 KG/M2 | RESPIRATION RATE: 18 BRPM | HEART RATE: 72 BPM | DIASTOLIC BLOOD PRESSURE: 82 MMHG

## 2018-10-09 DIAGNOSIS — E11.69 HYPERLIPIDEMIA ASSOCIATED WITH TYPE 2 DIABETES MELLITUS (HCC): Chronic | ICD-10-CM

## 2018-10-09 DIAGNOSIS — I25.10 CORONARY ARTERY DISEASE INVOLVING NATIVE CORONARY ARTERY OF NATIVE HEART WITHOUT ANGINA PECTORIS: Primary | ICD-10-CM

## 2018-10-09 DIAGNOSIS — Z95.1 S/P CABG X 3: ICD-10-CM

## 2018-10-09 DIAGNOSIS — E78.5 HYPERLIPIDEMIA ASSOCIATED WITH TYPE 2 DIABETES MELLITUS (HCC): Chronic | ICD-10-CM

## 2018-10-09 RX ORDER — ASPIRIN 81 MG/1
TABLET ORAL DAILY
COMMUNITY

## 2018-10-09 NOTE — PROGRESS NOTES
NAME:  Katherin Lewis   :   1965   MRN:   [id number]   PCP:  Javid Prince III, DO           Subjective: The patient is a 47y.o. year old male  who returns for a routine follow-up. Since the last visit, patient reports no change in exercise tolerance, chest pain, edema, medication intolerance, palpitations, shortness of breath, PND/orthopnea wheezing, sputum, syncope, dizziness or light headedness. Doing well. Past Medical History:   Diagnosis Date    Coronary artery disease involving native coronary artery with unstable angina pectoris ([facility] [address] 2017    Family history of early CAD 2017    Significant for mother, brother    Hypercholesterolemia     Hypertension     Type II diabetes mellitus, uncontrolled ([facility] [address] 2017        ICD-10-CM ICD-9-CM    1. Coronary artery disease involving native coronary artery of native heart without angina pectoris I25.10 414.01 AMB POC EKG ROUTINE W/ 12 LEADS, INTER & REP   2. S/P CABG x 3 Z95.1 V45.81    3. Hyperlipidemia associated with type 2 diabetes mellitus (HCC) E11.69 250.80     E78.5 272.4       Social History   Substance Use Topics    Smoking status: Never Smoker    Smokeless tobacco: Never Used    Alcohol use No      Family History   Problem Relation Age of Onset    Diabetes Mother     Heart Disease Mother     No Known Problems Father         Review of Systems  General: Pt denies excessive weight gain or loss. Pt is able to conduct ADL's  HEENT: Denies blurred vision, headaches, epistaxis and difficulty swallowing. Respiratory: Denies shortness of breath, MALONEY, wheezing or stridor.   Cardiovascular: Denies precordial pain, palpitations, edema or PND  Gastrointestinal: Denies poor appetite, indigestion, abdominal pain or blood in stool  Musculoskeletal: Denies pain or swelling from muscles or joints  Neurologic: Denies tremor, paresthesias, or sensory motor disturbance  Skin: Denies rash, itching or texture change. Objective:       Vitals:    10/09/18 0947 10/09/18 0953 10/09/18 0954 10/09/18 0955   BP: 104/74 112/74 104/72 108/82   Pulse: 72      Resp: 18      SpO2: 99%      Weight: 227 lb 1.6 oz (103 kg)      Height: 6' 2\" (1.88 m)       Body mass index is 29.16 kg/(m^2). General PE  Mental Status - Alert. General Appearance - Not in acute distress. Chest and Lung Exam   Inspection: Accessory muscles - No use of accessory muscles in breathing. Auscultation:   Breath sounds: - Normal.    Cardiovascular   Inspection: Jugular vein - Bilateral - Inspection Normal.  Palpation/Percussion:   Apical Impulse: - Normal.  Auscultation: Rhythm - Regular. Heart Sounds - S1 WNL and S2 WNL. No S3 or S4. Murmurs & Other Heart Sounds: Auscultation of the heart reveals - No Murmurs. Peripheral Vascular   Upper Extremity: Inspection - Bilateral - No Cyanotic nailbeds or Digital clubbing. Lower Extremity:   Palpation: Edema - Bilateral - No edema. Data Review:     EKG -EKG: normal EKG, normal sinus rhythm, unchanged from previous tracings. Medications reviewed  Current Outpatient Prescriptions   Medication Sig    aspirin delayed-release 81 mg tablet Take  by mouth daily.  carvedilol (COREG) 25 mg tablet Take 1 Tab by mouth two (2) times daily (with meals).  metFORMIN (GLUCOPHAGE) 500 mg tablet Take 1 Tab by mouth two (2) times daily (with meals).  lisinopril (PRINIVIL, ZESTRIL) 10 mg tablet Take 1 Tab by mouth daily.  atorvastatin (LIPITOR) 20 mg tablet Take 1 Tab by mouth nightly.  glucose blood VI test strips (ACCU-CHEK GUIDE) strip 1 Each by Does Not Apply route See Admin Instructions.  Lancets (ACCU-CHEK FASTCLIX) misc by Does Not Apply route two (2) times a day.  Insulin Needles, Disposable, (CHELSEY PEN NEEDLE) 32 gauge x 5/32\" ndle Use as directed    FOLIC ACID/MULTIVIT-MIN/LUTEIN (CENTRUM SILVER PO) Take  by mouth daily.      No current facility-administered medications for this visit. Assessment:       ICD-10-CM ICD-9-CM    1. Coronary artery disease involving native coronary artery of native heart without angina pectoris I25.10 414.01 AMB POC EKG ROUTINE W/ 12 LEADS, INTER & REP   2. S/P CABG x 3 Z95.1 V45.81    3. Hyperlipidemia associated with type 2 diabetes mellitus ([facility] [address] E11.69 250.80     E78.5 272.4         Plan:     Patient presents doing well and stable from cardiac standpoint. Lipids at goal. Notes occasional dizzines. Discussed exercise. Continue current care and follow up in 6 months. Echo next visit. If normal, can probably have annual visits.     Brie Augustin MD

## 2018-10-09 NOTE — PROGRESS NOTES
Chief Complaint   Patient presents with    Coronary Artery Disease     6 month follow up     1. Have you been to the ER, urgent care clinic since your last visit? Hospitalized since your last visit? No    2. Have you seen or consulted any other health care providers outside of the [facility] [place] since your last visit? Include any pap smears or colon screening.  No

## 2018-10-09 NOTE — MR AVS SNAPSHOT
[address] [facility] 
[phone] Patient: Maru Garner MRN: [id number] :1965 Visit Information Date & Time Provider Department Dept. Phone Encounter #  
 10/9/2018  9:45 AM [address], MD [facility] [phone] [id number] Follow-up Instructions Return in about 6 months (around 2019). Routing History Follow-up and Disposition History Your Appointments 3/15/2019  8:30 AM  
ROUTINE CARE with Marlyn Ahumada III, DO [facility] [address] [facility]) Appt Note: 6 month follow up  
 [phone]  
[address] [facility] [address] [place] [address] Upcoming Health Maintenance Date Due  
 FOOT EXAM Q1 1974 EYE EXAM RETINAL OR DILATED Q1 1974 Shingrix Vaccine Age 50> (1 of 2) 2014 Influenza Age 5 to Adult 3/31/2019* FOBT Q 1 YEAR AGE 50-75 2019 HEMOGLOBIN A1C Q6M 3/14/2019 MICROALBUMIN Q1 2019 LIPID PANEL Q1 2019 DTaP/Tdap/Td series (2 - Td) 2027 *Topic was postponed. The date shown is not the original due date. Allergies as of 10/9/2018  Review Complete On: 10/9/2018 By: Haritha Chris LPN Severity Noted Reaction Type Reactions Latex  2017    Other (comments) Allergy documented in admission data base Influenza Virus Vaccine, Specific  2017    Other (comments) Allergy found in admission data base Current Immunizations  Never Reviewed Name Date Pneumococcal Polysaccharide (PPSV-23) 2017 Tdap 2017 Not reviewed this visit You Were Diagnosed With   
  
 Codes Comments Coronary artery disease involving native coronary artery of native heart without angina pectoris    -  Primary ICD-10-CM: I25.10 ICD-9-CM: 414.01   
 S/P CABG x 3     ICD-10-CM: Z95.1 ICD-9-CM: V45.81 Hyperlipidemia associated with type [facility] diabetes mellitus [facility]     ICD-10-CM: E11.69, E78.5 ICD-9-CM: 250.80, 272.4 Vitals BP Pulse Resp Height(growth percentile) Weight(growth percentile) SpO2  
 108/82 (BP 1 Location: Left arm, BP Patient Position: Standing) 72 18 6' 2\" (1.88 m) 227 lb 1.6 oz (103 kg) 99% BMI Smoking Status 29.16 kg/m2 Never Smoker Vitals History BMI and BSA Data Body Mass Index Body Surface Area  
 29.16 kg/m 2 2.32 m 2 Preferred Pharmacy Pharmacy Name Phone [id number] [address] [phone] Your Updated Medication List  
  
   
This list is accurate as of 10/9/18 10:38 AM.  Always use your most recent med list.  
  
  
  
  
 aspirin delayed-release 81 mg tablet Take  by mouth daily. atorvastatin 20 mg tablet Commonly known as:  LIPITOR Take 1 Tab by mouth nightly. carvedilol 25 mg tablet Commonly known as:  Lorrayne Griffes Take 1 Tab by mouth two (2) times daily (with meals). CENTRUM SILVER PO Take  by mouth daily. glucose blood VI test strips strip Commonly known as:  ACCU-CHEK GUIDE  
1 Each by Does Not Apply route See Admin Instructions. Insulin Needles (Disposable) 32 gauge x 5/32\" Ndle Commonly known as:  Cinthia Pen Needle Use as directed Lancets Misc Commonly known as:  ACCU-CHEK FASTCLIX LANCING DEV  
by Does Not Apply route two (2) times a day. lisinopril 10 mg tablet Commonly known as:  Charm Farrell Take 1 Tab by mouth daily. metFORMIN 500 mg tablet Commonly known as:  GLUCOPHAGE Take 1 Tab by mouth two (2) times daily (with meals). We Performed the Following AMB POC EKG ROUTINE W/ 12 LEADS, INTER & REP [38210 CPT(R)] Follow-up Instructions Return in about 6 months (around 4/9/2019). Introducing hospitals & HEALTH SERVICES! Dear Rosina Fontenot: Thank you for requesting a MobileWeaver account. Our records indicate that you already have an active MobileWeaver account. You can access your account anytime at [email or web address] Bank Did you know that you can access your hospital and ER discharge instructions at any time in MobileWeaver? You can also review all of your test results from your hospital stay or ER visit. Additional Information If you have questions, please visit the Frequently Asked Questions section of the MobileWeaver website at [email or web address] Remember, MobileWeaver is NOT to be used for urgent needs. For medical emergencies, dial 911. Now available from your iPhone and Android! Please provide this summary of care documentation to your next provider. Your primary care clinician is listed as Zina Hodgkins If you have any questions after today's visit, please call [phone].

## 2019-01-03 RX ORDER — LISINOPRIL 10 MG/1
TABLET ORAL
Qty: 90 TAB | Refills: 3 | Status: SHIPPED | OUTPATIENT
Start: 2019-01-03 | End: 2020-01-28

## 2019-04-05 ENCOUNTER — OFFICE VISIT (OUTPATIENT)
Dept: INTERNAL MEDICINE CLINIC | Age: 54
End: 2019-04-05

## 2019-04-05 VITALS
OXYGEN SATURATION: 98 % | BODY MASS INDEX: 30.29 KG/M2 | SYSTOLIC BLOOD PRESSURE: 118 MMHG | DIASTOLIC BLOOD PRESSURE: 75 MMHG | HEIGHT: 74 IN | TEMPERATURE: 98 F | WEIGHT: 236 LBS | RESPIRATION RATE: 16 BRPM | HEART RATE: 65 BPM

## 2019-04-05 DIAGNOSIS — R42 EPISODIC LIGHTHEADEDNESS: ICD-10-CM

## 2019-04-05 DIAGNOSIS — I25.110 CORONARY ARTERY DISEASE INVOLVING NATIVE CORONARY ARTERY OF NATIVE HEART WITH UNSTABLE ANGINA PECTORIS (HCC): ICD-10-CM

## 2019-04-05 DIAGNOSIS — Z95.1 S/P CABG X 3: ICD-10-CM

## 2019-04-05 DIAGNOSIS — I10 ESSENTIAL HYPERTENSION: ICD-10-CM

## 2019-04-05 DIAGNOSIS — I25.5 ISCHEMIC CARDIOMYOPATHY: ICD-10-CM

## 2019-04-05 DIAGNOSIS — E11.65 UNCONTROLLED TYPE 2 DIABETES MELLITUS WITH HYPERGLYCEMIA (HCC): Primary | ICD-10-CM

## 2019-04-05 DIAGNOSIS — Z12.11 SCREEN FOR COLON CANCER: ICD-10-CM

## 2019-04-05 DIAGNOSIS — E11.69 HYPERLIPIDEMIA ASSOCIATED WITH TYPE 2 DIABETES MELLITUS (HCC): ICD-10-CM

## 2019-04-05 DIAGNOSIS — E78.5 HYPERLIPIDEMIA ASSOCIATED WITH TYPE 2 DIABETES MELLITUS (HCC): ICD-10-CM

## 2019-04-05 LAB — HBA1C MFR BLD HPLC: 6.2 %

## 2019-04-05 RX ORDER — CARVEDILOL 12.5 MG/1
12.5 TABLET ORAL 2 TIMES DAILY WITH MEALS
Qty: 180 TAB | Refills: 3 | Status: SHIPPED | OUTPATIENT
Start: 2019-04-05 | End: 2020-04-03

## 2019-04-05 NOTE — PROGRESS NOTES
Anabell Castellanos is a 46 y.o. male who presents for evaluation of routine follow up. Last seen by me sept 14, 2018 in [facility]. Doing well overall, though still has frequent bouts of lightheadedness. Usually 3-4 times per day, only lasts briefly. Discussed with dr Cecilia Miller at his last visit with him, but dr Cecilia Miller wanted him to stay on max dose of coreg. Pt would like to decrease dose to see if the spells lessen. Wife with him today.       ROS:  Constitutional: negative for fevers, chills, anorexia and weight loss  Eyes:   negative for visual disturbance and irritation  ENT:   negative for tinnitus,sore throat,nasal congestion,ear pain,hoarseness  Respiratory:  negative for cough, hemoptysis, dyspnea,wheezing  CV:   negative for chest pain, palpitations, lower extremity edema  GI:   negative for nausea, vomiting, diarrhea, abdominal pain,melena  Genitourinary: negative for frequency, dysuria and hematuria  Musculoskel: negative for myalgias, arthralgias, back pain, muscle weakness, joint pain  Neurological:  negative for headaches, dizziness, focal weakness, numbness  Psychiatric:     Negative for depression or anxiety      Past Medical History:   Diagnosis Date    Coronary artery disease involving native coronary artery with unstable angina pectoris ([facility] [address] 9/27/2017    Family history of early CAD 9/26/2017    Significant for mother, brother    Hypercholesterolemia     Hypertension     Type II diabetes mellitus, uncontrolled ([facility] [address] 9/27/2017       Past Surgical History:   Procedure Laterality Date    CARDIAC SURG PROCEDURE UNLIST      triple bypass    HX ORTHOPAEDIC      left side face metal        Family History   Problem Relation Age of Onset    Diabetes Mother     Heart Disease Mother     No Known Problems Father        Social History     Socioeconomic History    Marital status:      Spouse name: Not on file    Number of children: Not on file    Years of education: Not on file   [employer] education level: Not on file   Occupational History    Not on file   Social Needs    Financial resource strain: Not on file    Food insecurity:     Worry: Not on file     Inability: Not on file    Transportation needs:     Medical: Not on file     Non-medical: Not on file   Tobacco Use    Smoking status: Never Smoker    Smokeless tobacco: Never Used   Substance and Sexual Activity    Alcohol use: Yes     Comment: occassionally    Drug use: No    Sexual activity: Yes     Partners: Female   Lifestyle    Physical activity:     Days per week: Not on file     Minutes per session: Not on file    Stress: Not on file   Relationships    Social connections:     Talks on phone: Not on file     Gets together: Not on file     Attends Buddhism service: Not on file     Active member of club or organization: Not on file     Attends meetings of clubs or organizations: Not on file     Relationship status: Not on file    Intimate partner violence:     Fear of current or ex partner: Not on file     Emotionally abused: Not on file     Physically abused: Not on file     Forced sexual activity: Not on file   Other Topics Concern    Not on file   Social History Narrative    Not on file            Visit Vitals  /75 (BP 1 Location: Left arm, BP Patient Position: Sitting)   Pulse 65   Temp 98 °F (36.7 °C) (Oral)   Resp 16   Ht 6' 2\" (1.88 m)   Wt 236 lb (107 kg)   SpO2 98%   BMI 30.30 kg/m²       Physical Examination:   General - Well appearing male  HEENT - PERRL, TM no erythema/opacification, normal nasal turbinates, no oropharyngeal erythema or exudate, MMM  Neck - supple, no bruits, no thyroidomegaly, no lymphadenopathy  Pulm - clear to auscultation bilaterally  Cardio - RRR, normal S1 S2, no murmur  Abd - soft, nontender, no masses, no HSM  Extrem - no edema, +2 distal pulses  Neuro-  No focal deficits, CN intact         Diabetic foot exam performed by Sridevi Hanna DO     Measurement  Response Nurse Comment Physician Comment   Monofilament  R - normal sensation with micro filament  L - normal sensation with micro filament     Pulse DP R - present  L - present     Pulse TP R - present  L - present     Structural deformity R - None  L - None     Skin Integrity / Deformity R - None  L - None        Reviewed by:              Assessment/Plan:    1. Lightheadedness, positional (orthostatic)--continue lisinopril, will decrease dose of coreg to 12.5 mg bid  2.  Dm, type 2--last a1c 5.9. Check again. On glucophage  3.  hyperlipids--on lipitor, last LDL 48  4. Cad with hx cabg (sept 2017)--on asa, bb, acei. 5.  Routine adult health maintenance--enough time has passed now s/p cabg for him to have colonoscopy (referral to gi dr Sunil Coy). Also referral to dr Augusto Vera for eye exam    He does not think he ever had chicken pox. Will check titers at next blood draw.     rtc 6 months        Musselshell Guerrero III, DO

## 2019-04-05 NOTE — PATIENT INSTRUCTIONS
Diabetes Foot Health: Care Instructions  Your Care Instructions    When you have diabetes, your feet need extra care and attention. Diabetes can damage the nerve endings and blood vessels in your feet, making you less likely to notice when your feet are injured. Diabetes also limits your body's ability to fight infection and get blood to areas that need it. If you get a minor foot injury, it could become an ulcer or a serious infection. With good foot care, you can prevent most of these problems. Caring for your feet can be quick and easy. Most of the care can be done when you are bathing or getting ready for bed. Follow-up care is a key part of your treatment and safety. Be sure to make and go to all appointments, and call your doctor if you are having problems. It's also a good idea to know your test results and keep a list of the medicines you take. How can you care for yourself at home? · Keep your blood sugar close to normal by watching what and how much you eat, monitoring blood sugar, taking medicines if prescribed, and getting regular exercise. · Do not smoke. Smoking affects blood flow and can make foot problems worse. If you need help quitting, talk to your doctor about stop-smoking programs and medicines. These can increase your chances of quitting for good. · Eat a diet that is low in fats. High fat intake can cause fat to build up in your blood vessels and decrease blood flow. · Inspect your feet daily for blisters, cuts, cracks, or sores. If you cannot see well, use a mirror or have someone help you. · Take care of your feet:  ? Wash your feet every day. Use warm (not hot) water. Check the water temperature with your wrists or other part of your body, not your feet. ? Dry your feet well. Pat them dry. Do not rub the skin on your feet too hard. Dry well between your toes. If the skin on your feet stays moist, bacteria or a fungus can grow, which can lead to infection. ?  Keep your skin soft. Use moisturizing skin cream to keep the skin on your feet soft and prevent calluses and cracks. But do not put the cream between your toes, and stop using any cream that causes a rash. ? Clean underneath your toenails carefully. Do not use a sharp object to clean underneath your toenails. Use the blunt end of a nail file or other rounded tool. ? Trim and file your toenails straight across to prevent ingrown toenails. Use a nail clipper, not scissors. Use an emery board to smooth the edges. · Change socks daily. Socks without seams are best, because seams often rub the feet. You can find socks for people with diabetes from specialty catalogs. · Look inside your shoes every day for things like gravel or torn linings, which could cause blisters or sores. · Buy shoes that fit well:  ? Look for shoes that have plenty of space around the toes. This helps prevent bunions and blisters. ? Try on shoes while wearing the kind of socks you will usually wear with the shoes. ? Avoid plastic shoes. They may rub your feet and cause blisters. Good shoes should be made of materials that are flexible and breathable, such as leather or cloth. ? Break in new shoes slowly by wearing them for no more than an hour a day for several days. Take extra time to check your feet for red areas, blisters, or other problems after you wear new shoes. · Do not go barefoot. Do not wear sandals, and do not wear shoes with very thin soles. Thin soles are easy to puncture. They also do not protect your feet from hot pavement or cold weather. · Have your doctor check your feet during each visit. If you have a foot problem, see your doctor. Do not try to treat an early foot problem at home. Home remedies or treatments that you can buy without a prescription (such as corn removers) can be harmful. · Always get early treatment for foot problems. A minor irritation can lead to a major problem if not properly cared for early.   When should you call for help? Call your doctor now or seek immediate medical care if:    · You have a foot sore, an ulcer or break in the skin that is not healing after 4 days, bleeding corns or calluses, or an ingrown toenail.     · You have blue or black areas, which can mean bruising or blood flow problems.     · You have peeling skin or tiny blisters between your toes or cracking or oozing of the skin.     · You have a fever for more than 24 hours and a foot sore.     · You have new numbness or tingling in your feet that does not go away after you move your feet or change positions.     · You have unexplained or unusual swelling of the foot or ankle.    Watch closely for changes in your health, and be sure to contact your doctor if:    · You cannot do proper foot care. Where can you learn more? Go to http://maria de jesus-erinn.info/. Enter A739 in the search box to learn more about \"Diabetes Foot Health: Care Instructions. \"  Current as of: July 25, 2018  Content Version: 11.9  © 0149-4539 [employer]. Care instructions adapted under license by [employer] (which disclaims liability or warranty for this information). If you have questions about a medical condition or this instruction, always ask your healthcare professional. [address] any warranty or liability for your use of this information. Check your bp few times each week. Notify me if consistently above 135/85. Make an appt to have your eyes examined. Make an appt with gi for colonoscopy.

## 2019-04-05 NOTE — PROGRESS NOTES
Reviewed record in preparation for visit and have obtained necessary documentation. Identified pt with two pt identifiers(name and ). Chief Complaint   Patient presents with    Diabetes     follow up; fasting    Hypertension       Health Maintenance Due   Topic Date Due    FOOT EXAM Q1  1974    EYE EXAM RETINAL OR DILATED  1974    Shingrix Vaccine Age 50> (1 of 2) 2014    FOBT Q 1 YEAR AGE 50-75  2019    HEMOGLOBIN A1C Q6M  2019       Mr. Todd Freedman has a reminder for a \"due or due soon\" health maintenance. I have asked that he discuss health maintenance topic(s) due with His  primary care provider. Coordination of Care Questionnaire:  :     1) Have you been to an emergency room, urgent care clinic since your last visit? no   Hospitalized since your last visit? no             2) Have you seen or consulted any other health care providers outside of [address] [place] [address] since your last visit? no  (Include any pap smears or colon screenings in this section.)    3) Do you have an Advance Directive on file? no    4) Are you interested in receiving information on Advance Directives? NO    Patient is accompanied by self I have received verbal consent from Constantin Rubi to discuss any/all medical information while they are present in the room.

## 2019-04-15 ENCOUNTER — OFFICE VISIT (OUTPATIENT)
Dept: CARDIOLOGY CLINIC | Age: 54
End: 2019-04-15

## 2019-04-15 VITALS
SYSTOLIC BLOOD PRESSURE: 120 MMHG | BODY MASS INDEX: 29.84 KG/M2 | HEIGHT: 74 IN | RESPIRATION RATE: 16 BRPM | HEART RATE: 76 BPM | WEIGHT: 232.5 LBS | DIASTOLIC BLOOD PRESSURE: 80 MMHG | OXYGEN SATURATION: 97 %

## 2019-04-15 DIAGNOSIS — I25.10 ASHD (ARTERIOSCLEROTIC HEART DISEASE): Primary | ICD-10-CM

## 2019-04-15 DIAGNOSIS — E78.2 MIXED HYPERLIPIDEMIA: ICD-10-CM

## 2019-04-15 DIAGNOSIS — Z95.1 S/P CABG X 3: ICD-10-CM

## 2019-04-15 DIAGNOSIS — E11.65 UNCONTROLLED TYPE 2 DIABETES MELLITUS WITH HYPERGLYCEMIA (HCC): ICD-10-CM

## 2019-04-15 NOTE — PROGRESS NOTES
1. Have you been to the ER, urgent care clinic since your last visit? Hospitalized since your last visit? No    2. Have you seen or consulted any other health care providers outside of the [address] [place] [address] since your last visit? Include any pap smears or colon screening. No    Chief Complaint   Patient presents with    Follow-up     Coronary artery disease involving native coronary artery with unstable angina pectoris      Patient had echocardiogram this am he has no cardiac complaints has noted improvement with dizziness since decrease of Coreg by PCP.

## 2019-04-15 NOTE — PROGRESS NOTES
[address], [address] [facility]  [phone]     Subjective:      Jose Santos is a 46 y.o. male is here for routine f/u. Reports improved dizziness since PCP decreased Coreg to 12.5 mg BID last 4/5/19    The patient denies chest pain/ shortness of breath, orthopnea, PND, LE edema, palpitations, syncope, or presyncope.        Patient Active Problem List    Diagnosis Date Noted    Hyperlipidemia associated with type 2 diabetes mellitus ([facility] [address] 02/19/2018    Ischemic cardiomyopathy 02/19/2018    Pleural effusion on left 10/13/2017    S/P CABG x 3 25/14/7442    Systolic heart failure ([address] 09/27/2017    Coronary artery disease involving native coronary artery with unstable angina pectoris ([address] 09/27/2017    Type II diabetes mellitus, uncontrolled ([address] 09/27/2017    CAD (coronary artery disease), native coronary artery 09/27/2017    NSTEMI (non-ST elevated myocardial infarction) ([address] 09/26/2017    Family history of early CAD 09/26/2017      Carey Code, DO  Past Medical History:   Diagnosis Date    Coronary artery disease involving native coronary artery with unstable angina pectoris ([address] 9/27/2017    Family history of early CAD 9/26/2017    Significant for mother, brother    Hypercholesterolemia     Hypertension     Type II diabetes mellitus, uncontrolled ([address] 9/27/2017      Past Surgical History:   Procedure Laterality Date    CARDIAC SURG PROCEDURE UNLIST      triple bypass    HX ORTHOPAEDIC      left side face metal      Allergies   Allergen Reactions    Latex Other (comments)     Allergy documented in admission data base    Influenza Virus Vaccine, Specific Other (comments)     Allergy found in admission data base      Family History   Problem Relation Age of Onset    Diabetes Mother     Heart Disease Mother     No Known Problems Father       Social History     Socioeconomic History    Marital status:      Spouse name: Not on file  Number of children: Not on file    Years of education: Not on file    Highest education level: Not on file   Occupational History    Not on file   Social Needs    Financial resource strain: Not on file    Food insecurity:     Worry: Not on file     Inability: Not on file    Transportation needs:     Medical: Not on file     Non-medical: Not on file   Tobacco Use    Smoking status: Never Smoker    Smokeless tobacco: Never Used   Substance and Sexual Activity    Alcohol use: Yes     Comment: occassionally    Drug use: No    Sexual activity: Yes     Partners: Female   Lifestyle    Physical activity:     Days per week: Not on file     Minutes per session: Not on file    Stress: Not on file   Relationships    Social connections:     Talks on phone: Not on file     Gets together: Not on file     Attends Voodoo service: Not on file     Active member of club or organization: Not on file     Attends meetings of clubs or organizations: Not on file     Relationship status: Not on file    Intimate partner violence:     Fear of current or ex partner: Not on file     Emotionally abused: Not on file     Physically abused: Not on file     Forced sexual activity: Not on file   Other Topics Concern    Not on file   Social History Narrative    Not on file      Current Outpatient Medications   Medication Sig    carvedilol (COREG) 12.5 mg tablet Take 1 Tab by mouth two (2) times daily (with meals).  metFORMIN (GLUCOPHAGE) 500 mg tablet TAKE 1 TAB BY MOUTH TWO (2) TIMES DAILY (WITH MEALS).  atorvastatin (LIPITOR) 20 mg tablet TAKE 1 TAB BY MOUTH NIGHTLY.  lisinopril (PRINIVIL, ZESTRIL) 10 mg tablet TAKE 1 TABLET BY MOUTH EVERY DAY    aspirin delayed-release 81 mg tablet Take  by mouth daily.  glucose blood VI test strips (ACCU-CHEK GUIDE) strip 1 Each by Does Not Apply route See Admin Instructions.  Lancets (ACCU-CHEK FASTCLIX) misc by Does Not Apply route two (2) times a day.     Insulin Needles, Disposable, (CHELSEY PEN NEEDLE) 32 gauge x 5/32\" ndle Use as directed     No current facility-administered medications for this visit. Review of Symptoms:  11 systems reviewed, negative other than as stated in the HPI    Physical ExamPhysical Exam:    Vitals:    04/15/19 0915   BP: 120/80   Pulse: 76   Resp: 16   SpO2: 97%   Weight: 232 lb 8 oz (105.5 kg)   Height: 6' 2\" (1.88 m)     Body mass index is 29.85 kg/m². General PE   Gen:  NAD  Mental Status - Alert. General Appearance - Not in acute distress. Chest and Lung Exam   Inspection: Accessory muscles - No use of accessory muscles in breathing. Auscultation:   Breath sounds: - Normal.   Cardiovascular   Inspection: Jugular vein - Bilateral - Inspection Normal.   Palpation/Percussion:   Apical Impulse: - Normal.   Auscultation: Rhythm - Regular. Heart Sounds - S1 WNL and S2 WNL. No S3 or S4. Murmurs & Other Heart Sounds: Auscultation of the heart reveals - No Murmurs. Peripheral Vascular   Upper Extremity: Inspection - Bilateral - No Cyanotic nailbeds or Digital clubbing. Lower Extremity:   Palpation: Edema - Bilateral - No edema. Abdomen:   Soft, non-tender, bowel sounds are active.   Neuro: A&O times 3, CN and motor grossly WNL    Labs:   Lab Results   Component Value Date/Time    Cholesterol, total 109 09/14/2018 10:30 AM    Cholesterol, total 158 09/27/2017 05:35 AM    HDL Cholesterol 45 09/14/2018 10:30 AM    HDL Cholesterol 39 09/27/2017 05:35 AM    LDL, calculated 48 09/14/2018 10:30 AM    LDL, calculated 79 09/27/2017 05:35 AM    Triglyceride 78 09/14/2018 10:30 AM    Triglyceride 200 (H) 09/27/2017 05:35 AM    CHOL/HDL Ratio 4.1 09/27/2017 05:35 AM     Lab Results   Component Value Date/Time     (H) 09/26/2017 04:42 PM     Lab Results   Component Value Date/Time    Sodium 141 09/14/2018 10:30 AM    Potassium 5.1 09/14/2018 10:30 AM    Chloride 104 09/14/2018 10:30 AM    CO2 22 09/14/2018 10:30 AM    Anion gap 10 10/02/2017 03:52 AM    Glucose 113 (H) 09/14/2018 10:30 AM    BUN 32 (H) 09/14/2018 10:30 AM    Creatinine 1.23 09/14/2018 10:30 AM    BUN/Creatinine ratio 26 (H) 09/14/2018 10:30 AM    GFR est AA 77 09/14/2018 10:30 AM    GFR est non-AA 67 09/14/2018 10:30 AM    Calcium 9.5 09/14/2018 10:30 AM    Bilirubin, total 0.3 09/14/2018 10:30 AM    AST (SGOT) 16 09/14/2018 10:30 AM    Alk. phosphatase 74 09/14/2018 10:30 AM    Protein, total 6.4 09/14/2018 10:30 AM    Albumin 4.5 09/14/2018 10:30 AM    Globulin 3.0 10/02/2017 03:52 AM    A-G Ratio 2.4 (H) 09/14/2018 10:30 AM    ALT (SGPT) 20 09/14/2018 10:30 AM       EKG:  NSR      Assessment:     Assessment:      1. ASHD (arteriosclerotic heart disease)    2. S/P CABG x 3    3. Mixed hyperlipidemia    4. Uncontrolled type 2 diabetes mellitus with hyperglycemia ([facility] [address]        Orders Placed This Encounter    AMB POC EKG ROUTINE W/ 12 LEADS, INTER & REP     Order Specific Question:   Reason for Exam:     Answer:   routine        Plan:     Patient presents for f/u, doing well and stable from cardiac standpoint. His job is labor intensive, able to perform with complaint of griffin/cp. Reports improved dizziness since PCP decreased Coreg to 12.5 mg BID last 4/5/19    ASHD, Hx CABG x 3 in 2017  Stable. Continue ASA, BB statin    Hx ICM 25-30% in 3585  Improved systolic function, 08% in 4/19, 1/18  Wt up 6 lbs since last OV 10/18 d/t increased caloric intake  Continue Coreg, lisinopril    HLD  9/18 LDL 48. On statin. Lipids and labs followed by PCP. DM  On oral agent      Continue current care and f/u in 1 yr    Stan Powers NP       [facility]    4/15/2019         Patient seen, examined by me personally. Plan discussed as detailed. Agree with note as outlined by  NP. I confirm findings in history and physical exam. No additional findings noted. Agree with plan as outlined above.      Demario Hull MD

## 2019-10-14 ENCOUNTER — OFFICE VISIT (OUTPATIENT)
Dept: INTERNAL MEDICINE CLINIC | Age: 54
End: 2019-10-14

## 2019-10-14 VITALS
DIASTOLIC BLOOD PRESSURE: 81 MMHG | BODY MASS INDEX: 29.39 KG/M2 | HEIGHT: 74 IN | HEART RATE: 73 BPM | OXYGEN SATURATION: 100 % | RESPIRATION RATE: 16 BRPM | TEMPERATURE: 97.9 F | SYSTOLIC BLOOD PRESSURE: 131 MMHG | WEIGHT: 229 LBS

## 2019-10-14 DIAGNOSIS — I10 ESSENTIAL HYPERTENSION: ICD-10-CM

## 2019-10-14 DIAGNOSIS — Z12.11 SCREEN FOR COLON CANCER: ICD-10-CM

## 2019-10-14 DIAGNOSIS — Z95.1 S/P CABG X 3: ICD-10-CM

## 2019-10-14 DIAGNOSIS — Z00.00 ROUTINE ADULT HEALTH MAINTENANCE: Primary | ICD-10-CM

## 2019-10-14 DIAGNOSIS — E11.65 UNCONTROLLED TYPE 2 DIABETES MELLITUS WITH HYPERGLYCEMIA (HCC): ICD-10-CM

## 2019-10-14 DIAGNOSIS — E11.69 HYPERLIPIDEMIA ASSOCIATED WITH TYPE 2 DIABETES MELLITUS (HCC): ICD-10-CM

## 2019-10-14 DIAGNOSIS — E78.5 HYPERLIPIDEMIA ASSOCIATED WITH TYPE 2 DIABETES MELLITUS (HCC): ICD-10-CM

## 2019-10-14 DIAGNOSIS — I25.5 ISCHEMIC CARDIOMYOPATHY: ICD-10-CM

## 2019-10-14 DIAGNOSIS — I25.110 CORONARY ARTERY DISEASE INVOLVING NATIVE CORONARY ARTERY OF NATIVE HEART WITH UNSTABLE ANGINA PECTORIS (HCC): ICD-10-CM

## 2019-10-14 PROBLEM — I50.20 SYSTOLIC HEART FAILURE (HCC): Status: RESOLVED | Noted: 2017-09-27 | Resolved: 2019-10-14

## 2019-10-14 NOTE — PROGRESS NOTES
Chief Complaint   Patient presents with    Hypertension     6 month follow up    Cholesterol Problem     6 month follow up    Diabetes     6 month follow up    Labs     Fasting

## 2019-10-14 NOTE — PROGRESS NOTES
Radha Cueto is a 46 y.o. male who presents for evaluation of cpe. Last seen by me April 5, 2019. Doing much better since decreased his dose of coreg from 25 bid to 12.5 bid. No longer having lightheaded spells. Weight down 7 lbs. He has no complaints. Wife with him today.       ROS:  Constitutional: negative for fevers, chills, anorexia and weight loss  Eyes:   negative for visual disturbance and irritation  ENT:   negative for tinnitus,sore throat,nasal congestion,ear pain,hoarseness  Respiratory:  negative for cough, hemoptysis, dyspnea,wheezing  CV:   negative for chest pain, palpitations, lower extremity edema  GI:   negative for nausea, vomiting, diarrhea, abdominal pain,melena  Genitourinary: negative for frequency, dysuria and hematuria  Musculoskel: negative for myalgias, arthralgias, back pain, muscle weakness, joint pain  Neurological:  negative for headaches, dizziness, focal weakness, numbness  Psychiatric:     Negative for depression or anxiety      Past Medical History:   Diagnosis Date    Coronary artery disease involving native coronary artery with unstable angina pectoris ([facility] [address] 9/27/2017    Family history of early CAD 9/26/2017    Significant for mother, brother    Hypercholesterolemia     Hypertension     Type II diabetes mellitus, uncontrolled ([address] 9/27/2017       Past Surgical History:   Procedure Laterality Date    CARDIAC SURG PROCEDURE UNLIST      triple bypass    HX ORTHOPAEDIC      left side face metal        Family History   Problem Relation Age of Onset    Diabetes Mother     Heart Disease Mother     No Known Problems Father        Social History     Socioeconomic History    Marital status:      Spouse name: Not on file    Number of children: Not on file    Years of education: Not on file    Highest education level: Not on file   Occupational History    Not on file   Social Needs    Financial resource strain: Not on file    Food insecurity:     Worry: Not on file     Inability: Not on file    Transportation needs:     Medical: Not on file     Non-medical: Not on file   Tobacco Use    Smoking status: Never Smoker    Smokeless tobacco: Never Used   Substance and Sexual Activity    Alcohol use: Yes     Frequency: Monthly or less     Drinks per session: 1 or 2     Comment: occassionally    Drug use: No    Sexual activity: Yes     Partners: Female   Lifestyle    Physical activity:     Days per week: Not on file     Minutes per session: Not on file    Stress: Not on file   Relationships    Social connections:     Talks on phone: Not on file     Gets together: Not on file     Attends Sabianist service: Not on file     Active member of club or organization: Not on file     Attends meetings of clubs or organizations: Not on file     Relationship status: Not on file    Intimate partner violence:     Fear of current or ex partner: Not on file     Emotionally abused: Not on file     Physically abused: Not on file     Forced sexual activity: Not on file   Other Topics Concern    Not on file   Social History Narrative    Not on file            Visit Vitals  /81 (BP 1 Location: Left arm, BP Patient Position: Sitting)   Pulse 73   Temp 97.9 °F (36.6 °C) (Oral)   Resp 16   Ht 6' 2\" (1.88 m)   Wt 229 lb (103.9 kg)   SpO2 100%   BMI 29.40 kg/m²       Physical Examination:   General - Well appearing male  HEENT - PERRL, TM no erythema/opacification, normal nasal turbinates, no oropharyngeal erythema or exudate, MMM  Neck - supple, no bruits, no thyroidomegaly, no lymphadenopathy  Pulm - clear to auscultation bilaterally  Cardio - RRR, normal S1 S2, no murmur  Abd - soft, nontender, no masses, no HSM  Extrem - no edema, +2 distal pulses  Neuro-  No focal deficits, CN intact     Assessment/Plan:    1. Routine adult health maintenance--check psa, cbc, cmp, flp, tsh, a1c  2  Dm,type 2--on glucophage. Check a1c, urine micro  3.   Cad with hx cabg--doing well with lower dose of coreg. Continue lisinopril as well. Also on asa  4.  htn--coreg, lisinopril  5.  hyperlipids--on lipitor, check flp, cmp    Had allergies to vaccines.   Referral again for colon and eye exam.  rtc 6 months        Urmila Poe III, DO

## 2019-10-14 NOTE — PATIENT INSTRUCTIONS
Office Policies    Phone calls/patient messages:            Please allow up to 24 hours for someone in the office to contact you about your call or message. Be mindful your provider may be out of the office or your message may require further review. We encourage you to use [employer] for your messages as this is a faster, more efficient way to communicate with our office                         Medication Refills:            Prescription medications require 48-72 business hours to process. We encourage you to use [employer] for your refills. For controlled medications: Please allow 72 business hours to process. Certain medications may require you to  a written prescription at our office. NO narcotic/controlled medications will be prescribed after 4pm Monday through Friday or on weekends              Form/Paperwork Completion:            Please note a $25 fee may incur for all paperwork for completed by our providers. We ask that you allow 7-10 business days. Pre-payment is due prior to picking up/faxing the completed form. You may also download your forms to [employer] to have your doctor print off. Learning About Tests When You Have Diabetes  Why do you need regular diabetes tests? Diabetes can be hard on your body if it's not well controlled. But having tests on a regular schedule can help you and your doctor find problems early, when it's easier to start managing them. What tests do you need? The tests you may have, how often you should have them, and the goals of the tests are:  A1c blood test. This test shows the average level of blood sugar over the past 2 to 3 months. It helps your doctor see whether blood sugar levels have been staying within your target range. · How often: Every 3 to 6 months  · Goal: A blood sugar level in your target range  Blood pressure test: This test measures the pressure of blood flow in the arteries.  Controlling blood pressure can help prevent damage to nerves and blood vessels. · How often: Every 3 to 6 months  · Goal: A blood pressure level in your target range  Cholesterol test: This test measures the amount of a type of fat in the blood. It is common for people with diabetes to also have high cholesterol. Too much cholesterol in the blood can build up inside the blood vessels and raise the risk for heart attack and stroke. · How often: At the time of your diabetes diagnosis, and as often as your doctor recommends after that  · Goal: A cholesterol level in your target range  Albumin-creatinine ratio test: This test checks for kidney damage by looking for the protein albumin (say \"al-BYOO-louann\") in the urine. Albumin is normally found in the blood. Kidney damage can let small amounts of it (microalbumin) leak into the urine. · How often: Once a year  · Goal: No protein in the urine  Blood creatinine test/estimated glomerular filtration (eGFR): The blood creatinine (say \"kxsl-HV-vl-neen\") level shows how well your kidneys are working. Creatinine is a waste product that muscles release into the blood. Blood creatinine is used to estimate the glomerular filtration rate. A high level of creatinine and/or a low eGFR may mean your kidneys are not working as well as they should. · How often: Once a year  · Goal: Normal level of creatinine in the blood. The eGFR goal is greater than 60 mL/min/1.73 m². Complete foot exam: The doctor checks for foot sores and whether any sensation has been lost.  · How often: Once a year  · Goal: Healthy feet with no foot ulcers or loss of feeling  Dental exam and cleaning: The dentist checks for gum disease and tooth decay. People with high blood sugar are more likely to have these problems. · How often: Every 6 months  · Goal: Healthy teeth and gums  Complete eye exam: High blood sugar levels can damage the eyes.  This exam is done by an ophthalmologist or optometrist. It includes a dilated eye exam. The exam shows whether there's damage to the back of the eye (diabetic retinopathy). · How often: Once a year. If you don't have any signs of diabetic retinopathy, your doctor may recommend an exam every 2 years. · Goal: No damage to the back of the eye  Thyroid-stimulating hormone (TSH) blood test: This test checks for thyroid disease. Too little thyroid hormone can cause some medicines (like insulin) to stay in the body longer. This can cause low blood sugar. You may be tested if you have high cholesterol or are a woman over 48years old. · How often: As part of your diabetes diagnosis, and as often as your doctor recommends after that  · Goal: Normal level of TSH in the blood  Follow-up care is a key part of your treatment and safety. Be sure to make and go to all appointments, and call your doctor if you are having problems. It's also a good idea to know your test results and keep a list of the medicines you take. Where can you learn more? Go to http://maria de jesus-erinn.info/. Enter [id number] in the search box to learn more about \"Learning About Tests When You Have Diabetes. \"  Current as of: April 16, 2019  Content Version: 12.2  © 9226-8580 [employer]. Care instructions adapted under license by [employer] (which disclaims liability or warranty for this information). If you have questions about a medical condition or this instruction, always ask your healthcare professional. [place] [address] any warranty or liability for your use of this information. Contact your insurance to see about covering a diabetic retinal eye exam.  Try to get your colonoscopy done before next visit with me.

## 2019-10-15 LAB
ALBUMIN SERPL-MCNC: 4.6 G/DL (ref 3.5–5.5)
ALBUMIN/CREAT UR: <3.2 MG/G CREAT (ref 0–30)
ALBUMIN/GLOB SERPL: 2.3 {RATIO} (ref 1.2–2.2)
ALP SERPL-CCNC: 87 IU/L (ref 39–117)
ALT SERPL-CCNC: 27 IU/L (ref 0–44)
AST SERPL-CCNC: 17 IU/L (ref 0–40)
BASOPHILS # BLD AUTO: 0.1 X10E3/UL (ref 0–0.2)
BASOPHILS NFR BLD AUTO: 1 %
BILIRUB SERPL-MCNC: 0.3 MG/DL (ref 0–1.2)
BUN SERPL-MCNC: 20 MG/DL (ref 6–24)
BUN/CREAT SERPL: 18 (ref 9–20)
CALCIUM SERPL-MCNC: 9.6 MG/DL (ref 8.7–10.2)
CHLORIDE SERPL-SCNC: 103 MMOL/L (ref 96–106)
CHOLEST SERPL-MCNC: 112 MG/DL (ref 100–199)
CO2 SERPL-SCNC: 23 MMOL/L (ref 20–29)
CREAT SERPL-MCNC: 1.13 MG/DL (ref 0.76–1.27)
CREAT UR-MCNC: 92.6 MG/DL
EOSINOPHIL # BLD AUTO: 0.4 X10E3/UL (ref 0–0.4)
EOSINOPHIL NFR BLD AUTO: 5 %
ERYTHROCYTE [DISTWIDTH] IN BLOOD BY AUTOMATED COUNT: 12.6 % (ref 12.3–15.4)
EST. AVERAGE GLUCOSE BLD GHB EST-MCNC: 134 MG/DL
GLOBULIN SER CALC-MCNC: 2 G/DL (ref 1.5–4.5)
GLUCOSE SERPL-MCNC: 151 MG/DL (ref 65–99)
HBA1C MFR BLD: 6.3 % (ref 4.8–5.6)
HCT VFR BLD AUTO: 38.7 % (ref 37.5–51)
HDLC SERPL-MCNC: 42 MG/DL
HGB BLD-MCNC: 12.6 G/DL (ref 13–17.7)
IMM GRANULOCYTES # BLD AUTO: 0 X10E3/UL (ref 0–0.1)
IMM GRANULOCYTES NFR BLD AUTO: 0 %
LDLC SERPL CALC-MCNC: 50 MG/DL (ref 0–99)
LYMPHOCYTES # BLD AUTO: 1.7 X10E3/UL (ref 0.7–3.1)
LYMPHOCYTES NFR BLD AUTO: 24 %
MCH RBC QN AUTO: 27.4 PG (ref 26.6–33)
MCHC RBC AUTO-ENTMCNC: 32.6 G/DL (ref 31.5–35.7)
MCV RBC AUTO: 84 FL (ref 79–97)
MICROALBUMIN UR-MCNC: <3 UG/ML
MONOCYTES # BLD AUTO: 0.6 X10E3/UL (ref 0.1–0.9)
MONOCYTES NFR BLD AUTO: 9 %
NEUTROPHILS # BLD AUTO: 4.3 X10E3/UL (ref 1.4–7)
NEUTROPHILS NFR BLD AUTO: 61 %
PLATELET # BLD AUTO: 215 X10E3/UL (ref 150–450)
POTASSIUM SERPL-SCNC: 4.7 MMOL/L (ref 3.5–5.2)
PROT SERPL-MCNC: 6.6 G/DL (ref 6–8.5)
PSA SERPL-MCNC: 0.1 NG/ML (ref 0–4)
RBC # BLD AUTO: 4.6 X10E6/UL (ref 4.14–5.8)
SODIUM SERPL-SCNC: 142 MMOL/L (ref 134–144)
TRIGL SERPL-MCNC: 98 MG/DL (ref 0–149)
TSH SERPL DL<=0.005 MIU/L-ACNC: 2.38 UIU/ML (ref 0.45–4.5)
VLDLC SERPL CALC-MCNC: 20 MG/DL (ref 5–40)
WBC # BLD AUTO: 7.1 X10E3/UL (ref 3.4–10.8)

## 2020-01-28 RX ORDER — LISINOPRIL 10 MG/1
TABLET ORAL
Qty: 90 TAB | Refills: 3 | Status: SHIPPED | OUTPATIENT
Start: 2020-01-28 | End: 2020-08-16 | Stop reason: SDUPTHER

## 2020-04-03 RX ORDER — CARVEDILOL 12.5 MG/1
TABLET ORAL
Qty: 180 TAB | Refills: 3 | Status: SHIPPED | OUTPATIENT
Start: 2020-04-03 | End: 2020-04-03 | Stop reason: SDUPTHER

## 2020-04-09 RX ORDER — CARVEDILOL 12.5 MG/1
12.5 TABLET ORAL 2 TIMES DAILY WITH MEALS
Qty: 180 TAB | Refills: 3 | Status: SHIPPED | OUTPATIENT
Start: 2020-04-09 | End: 2021-03-05 | Stop reason: SDUPTHER

## 2020-05-04 RX ORDER — METFORMIN HYDROCHLORIDE 500 MG/1
TABLET ORAL
Qty: 180 TAB | Refills: 3 | Status: SHIPPED | OUTPATIENT
Start: 2020-05-04 | End: 2020-08-16 | Stop reason: SDUPTHER

## 2020-06-08 ENCOUNTER — TELEPHONE (OUTPATIENT)
Dept: CARDIOLOGY CLINIC | Age: 55
End: 2020-06-08

## 2020-06-08 NOTE — TELEPHONE ENCOUNTER
Verified patient with two identifiers. Spoke with pt advised pt he will need to schedule an appt before any refills. Pt stated he will call insurance and ask if covered to do a VV and will call back.

## 2020-06-19 ENCOUNTER — VIRTUAL VISIT (OUTPATIENT)
Dept: INTERNAL MEDICINE CLINIC | Age: 55
End: 2020-06-19

## 2020-06-19 DIAGNOSIS — Z95.1 S/P CABG X 3: ICD-10-CM

## 2020-06-19 DIAGNOSIS — E11.9 TYPE 2 DIABETES MELLITUS WITHOUT COMPLICATION, WITHOUT LONG-TERM CURRENT USE OF INSULIN (HCC): ICD-10-CM

## 2020-06-19 DIAGNOSIS — E78.5 HYPERLIPIDEMIA ASSOCIATED WITH TYPE 2 DIABETES MELLITUS (HCC): ICD-10-CM

## 2020-06-19 DIAGNOSIS — E11.69 HYPERLIPIDEMIA ASSOCIATED WITH TYPE 2 DIABETES MELLITUS (HCC): ICD-10-CM

## 2020-06-19 DIAGNOSIS — I25.110 CORONARY ARTERY DISEASE INVOLVING NATIVE CORONARY ARTERY OF NATIVE HEART WITH UNSTABLE ANGINA PECTORIS (HCC): Primary | ICD-10-CM

## 2020-06-19 DIAGNOSIS — I10 ESSENTIAL HYPERTENSION: ICD-10-CM

## 2020-06-19 NOTE — PROGRESS NOTES
Fareed Rai is a 53 y.o. male who was seen by synchronous (real-time) audio-video technology on 6/19/2020. Consent: Fareed Rai, who was seen by synchronous (real-time) audio-video technology, and/or his healthcare decision maker, is aware that this patient-initiated, Telehealth encounter on 6/19/2020 is a billable service, with coverage as determined by his insurance carrier. He is aware that he may receive a bill and has provided verbal consent to proceed: Yes. Assessment & Plan:   Diagnoses and all orders for this visit:    1. Coronary artery disease involving native coronary artery of native heart with unstable angina pectoris ([facility] [address]    2. Hyperlipidemia associated with type 2 diabetes mellitus ([facility] [address]    3. Essential hypertension    4. S/P CABG x 3    5. Type 2 diabetes mellitus without complication, without long-term current use of insulin (HCC)        I spent at least 15 minutes on this visit with this established patient. [id number]  Subjective:   Fareed Rai is a 53 y.o. male who was seen for Follow-up    [place] [facility] [place] well since then. Has no complaints. Weight down about 10 lbs. Follows sugar and bp regularly, and readings look good. This is a virtual visit due to covid 19. Prior to Admission medications    Medication Sig Start Date End Date Taking? Authorizing Provider   metFORMIN (GLUCOPHAGE) 500 mg tablet TAKE 1 TABLET BY MOUTH TWICE A DAY WITH MEALS 5/4/20  Yes Noe Montero III, DO   carvediloL (COREG) 12.5 mg tablet Take 1 Tab by mouth two (2) times daily (with meals). 4/9/20  Yes Boubacar Bartlett MD   lisinopril (PRINIVIL, ZESTRIL) 10 mg tablet TAKE 1 TABLET BY MOUTH EVERY DAY 1/28/20  Yes Damien Reyes MD   atorvastatin (LIPITOR) 20 mg tablet TAKE 1 TABLET BY MOUTH EVERY DAY AT NIGHT 5/20/19  Yes Damien Reyes MD   aspirin delayed-release 81 mg tablet Take  by mouth daily.    Yes Provider, Historical   glucose blood VI test strips (ACCU-CHEK GUIDE) strip 1 Each by Does Not Apply route See Admin Instructions. 10/4/17  Yes Price Sage PA-C   Lancets (ACCU-CHEK FASTCLIX) misc by Does Not Apply route two (2) times a day. 10/4/17  Yes Price Sage PA-C   Insulin Needles, Disposable, (CHELSEY PEN NEEDLE) 32 gauge x 5/32\" ndle Use as directed 10/4/17  Yes Price Sage PA-C     Allergies   Allergen Reactions    Latex Other (comments)     Allergy documented in admission data base    Influenza Virus Vaccine, Specific Other (comments)     Allergy found in admission data base           ROS      Objective: There were no vitals taken for this visit. General: alert, cooperative, no distress   Mental  status: normal mood, behavior, speech, dress, motor activity, and thought processes, able to follow commands   HENT: NCAT   Neck: no visualized mass   Resp: no respiratory distress   Neuro: no gross deficits   Skin: no discoloration or lesions of concern on visible areas   Psychiatric: normal affect, consistent with stated mood, no evidence of hallucinations     Additional exam findings:     1. Cad with cabg--on asa, coreg, lisinopril  2. Dm, type 2--on glucophage. Last a1c 6.3  3.  hyperlipids--on lipitor, last LDL 50  4.  htn--controlled with coreg, lisinopril. rtc 6 months. Reminded about retinal exam and colon. We discussed the expected course, resolution and complications of the diagnosis(es) in detail. Medication risks, benefits, costs, interactions, and alternatives were discussed as indicated. I advised him to contact the office if his condition worsens, changes or fails to improve as anticipated. He expressed understanding with the diagnosis(es) and plan. Jennifer Rueda is a 53 y.o. male who was evaluated by a video visit encounter for concerns as above. Patient identification was verified prior to start of the visit. A caregiver was present when appropriate.  Due to this being a TeleHealth encounter (During GJHRN-47 public health emergency), evaluation of the following organ systems was limited: Vitals/Constitutional/EENT/Resp/CV/GI//MS/Neuro/Skin/Heme-Lymph-Imm. Pursuant to the emergency declaration under the [address] [place] [address], [facility] waiver authority and the [employer] Act, this Virtual  Visit was conducted, with patient's (and/or legal guardian's) consent, to reduce the patient's risk of exposure to COVID-19 and provide necessary medical care. Services were provided through a video synchronous discussion virtually to substitute for in-person clinic visit. Patient and provider were located at their individual homes.       Harris Valdes III, DO

## 2020-08-17 RX ORDER — METFORMIN HYDROCHLORIDE 500 MG/1
500 TABLET ORAL 2 TIMES DAILY WITH MEALS
Qty: 180 TAB | Refills: 3 | Status: SHIPPED | OUTPATIENT
Start: 2020-08-17 | End: 2021-03-03 | Stop reason: SDUPTHER

## 2020-08-19 RX ORDER — LISINOPRIL 10 MG/1
10 TABLET ORAL DAILY
Qty: 30 TAB | Refills: 0 | Status: SHIPPED | OUTPATIENT
Start: 2020-08-19 | End: 2021-02-01

## 2020-08-28 PROBLEM — I10 ESSENTIAL HYPERTENSION: Status: ACTIVE | Noted: 2020-08-28

## 2020-08-28 PROBLEM — I21.4 NSTEMI (NON-ST ELEVATED MYOCARDIAL INFARCTION) (HCC): Status: RESOLVED | Noted: 2017-09-26 | Resolved: 2020-08-28

## 2020-08-28 NOTE — PROGRESS NOTES
Subjective/HPI:     Gregorio Eduardo is a 53 y.o. male is here for routine f/u. He has a PMHx of CAD s/p CABG x3, ischemic cardiomyopathy now resolved, HTN, HLD and DM2. PCP Provider  Sona Larkin DO    Past Medical History:   Diagnosis Date    Coronary artery disease involving native coronary artery with unstable angina pectoris ([facility] [address] 9/27/2017    Family history of early CAD 9/26/2017    Significant for mother, brother    Hypercholesterolemia     Hypertension     Type II diabetes mellitus, uncontrolled ([facility] [address] 9/27/2017        Allergies   Allergen Reactions    Latex Other (comments)     Allergy documented in admission data base    Influenza Virus Vaccine, Specific Other (comments)     Allergy found in admission data base        Outpatient Encounter Medications as of 8/31/2020   Medication Sig Dispense Refill    lisinopriL (PRINIVIL, ZESTRIL) 10 mg tablet Take 1 Tab by mouth daily. 30 Tab 0    metFORMIN (GLUCOPHAGE) 500 mg tablet Take 1 Tab by mouth two (2) times daily (with meals). 180 Tab 3    atorvastatin (LIPITOR) 20 mg tablet Take 1 Tab by mouth daily. 90 Tab 3    carvediloL (COREG) 12.5 mg tablet Take 1 Tab by mouth two (2) times daily (with meals). 180 Tab 3    aspirin delayed-release 81 mg tablet Take  by mouth daily.  glucose blood VI test strips (ACCU-CHEK GUIDE) strip 1 Each by Does Not Apply route See Admin Instructions. 50 Strip 1    Lancets (ACCU-CHEK FASTCLIX) misc by Does Not Apply route two (2) times a day. 1 Each 2    Insulin Needles, Disposable, (CHELSEY PEN NEEDLE) 32 gauge x 5/32\" ndle Use as directed 100 Pen Needle 1     No facility-administered encounter medications on file as of 8/31/2020. Review of Symptoms:    Review of Systems   Constitutional: Negative. Negative for chills, fever and weight loss. HENT: Negative. Negative for hearing loss and nosebleeds. Eyes: Negative for blurred vision and double vision. Respiratory: Negative. Negative for cough, hemoptysis, shortness of breath and wheezing. Cardiovascular: Negative. Negative for chest pain, palpitations, orthopnea, claudication, leg swelling and PND. Gastrointestinal: Negative. Negative for abdominal pain, blood in stool, diarrhea, nausea and vomiting. Musculoskeletal: Negative for joint pain and myalgias. Skin: Negative. Negative for rash. Neurological: Negative. Negative for dizziness, tingling, loss of consciousness and headaches. Endo/Heme/Allergies: Does not bruise/bleed easily. Physical Exam:      General: Well developed, in no acute distress, cooperative and alert  HEENT: No carotid bruits, no JVD, trach is midline. Neck Supple, PEERL, EOM intact. Heart:  reg rate and rhythm; normal S1/S2; no murmurs, no gallops or rubs. Respiratory: Clear bilaterally x 4, no wheezing or rales  Abdomen:   Soft, non-tender, no distention, no masses. + BS. Extremities:  Normal cap refill, no cyanosis, atraumatic. No edema. Neuro: A&Ox3, speech clear, gait stable. Skin: Skin color is normal. No rashes or lesions. Non diaphoretic  Vascular: 2+ pulses symmetric in all extremities    There were no vitals filed for this visit.     ECG: sinus rythm    Cardiology Labs:    Lab Results   Component Value Date/Time    Cholesterol, total 112 10/14/2019 08:22 AM    HDL Cholesterol 42 10/14/2019 08:22 AM    LDL, calculated 50 10/14/2019 08:22 AM    LDL, calculated 48 09/14/2018 10:30 AM    LDL, calculated 79 09/27/2017 05:35 AM    VLDL, calculated 20 10/14/2019 08:22 AM    CHOL/HDL Ratio 4.1 09/27/2017 05:35 AM       Lab Results   Component Value Date/Time    Hemoglobin A1c 6.3 (H) 10/14/2019 08:22 AM    Hemoglobin A1c (POC) 6.2 04/05/2019 08:41 AM       Lab Results   Component Value Date/Time    Sodium 142 10/14/2019 08:22 AM    Potassium 4.7 10/14/2019 08:22 AM    Chloride 103 10/14/2019 08:22 AM    CO2 23 10/14/2019 08:22 AM    Glucose 151 (H) 10/14/2019 08:22 AM    BUN 20 10/14/2019 08:22 AM    Creatinine 1.13 10/14/2019 08:22 AM    BUN/Creatinine ratio 18 10/14/2019 08:22 AM    GFR est AA 85 10/14/2019 08:22 AM    GFR est non-AA 73 10/14/2019 08:22 AM    Calcium 9.6 10/14/2019 08:22 AM    Anion gap 10 10/02/2017 03:52 AM    Bilirubin, total 0.3 10/14/2019 08:22 AM    ALT (SGPT) 27 10/14/2019 08:22 AM    Alk. phosphatase 87 10/14/2019 08:22 AM    Protein, total 6.6 10/14/2019 08:22 AM    Albumin 4.6 10/14/2019 08:22 AM    Globulin 3.0 10/02/2017 03:52 AM    A-G Ratio 2.3 (H) 10/14/2019 08:22 AM          Assessment:       ICD-10-CM ICD-9-CM    1. Coronary artery disease involving native coronary artery of native heart without angina pectoris  I25.10 414.01    2. Ischemic cardiomyopathy  I25.5 414.8    3. Essential hypertension  I10 401.9    4. Hyperlipidemia associated with type 2 diabetes mellitus (HCC)  E11.69 250.80     E78.5 272.4    5. S/P CABG x 3  Z95.1 V45.81         Plan:     1. Coronary artery disease involving native coronary artery of native heart without angina pectoris  S/p CABG x3 in 2017  Without anginal or anginal equivalent symptoms  Continue BB, statin, ASA    2. Ischemic cardiomyopathy  Echo done 4/2019 with preserved LVEF 50%, mild MR  Euvolemic on exam  Continue carvedilol, lisinopril    3. Essential hypertension  BP controlled. Continue anti-hypertensive therapy and low sodium diet    4. Hyperlipidemia associated with type 2 diabetes mellitus ([facility] [address]  LDL 50 in 10/2019  Continue statin therapy and low fat, low cholesterol diet  Lipids managed by PCP . Statin refilled.     5. S/P CABG x 3  S/p LIMA to LAD, RA to OM1, VG to PDA in 9/2017    F/u with Dr. Romel Collazo in 1 year    Javier Cisse MD

## 2020-08-31 ENCOUNTER — OFFICE VISIT (OUTPATIENT)
Dept: CARDIOLOGY CLINIC | Age: 55
End: 2020-08-31
Payer: COMMERCIAL

## 2020-08-31 VITALS
WEIGHT: 221.7 LBS | SYSTOLIC BLOOD PRESSURE: 142 MMHG | RESPIRATION RATE: 18 BRPM | HEIGHT: 74 IN | HEART RATE: 79 BPM | OXYGEN SATURATION: 96 % | DIASTOLIC BLOOD PRESSURE: 86 MMHG | BODY MASS INDEX: 28.45 KG/M2

## 2020-08-31 DIAGNOSIS — E78.5 HYPERLIPIDEMIA ASSOCIATED WITH TYPE 2 DIABETES MELLITUS (HCC): Chronic | ICD-10-CM

## 2020-08-31 DIAGNOSIS — E11.69 HYPERLIPIDEMIA ASSOCIATED WITH TYPE 2 DIABETES MELLITUS (HCC): Chronic | ICD-10-CM

## 2020-08-31 DIAGNOSIS — I25.5 ISCHEMIC CARDIOMYOPATHY: Chronic | ICD-10-CM

## 2020-08-31 DIAGNOSIS — Z95.1 S/P CABG X 3: ICD-10-CM

## 2020-08-31 DIAGNOSIS — I10 ESSENTIAL HYPERTENSION: ICD-10-CM

## 2020-08-31 DIAGNOSIS — I25.10 CORONARY ARTERY DISEASE INVOLVING NATIVE CORONARY ARTERY OF NATIVE HEART WITHOUT ANGINA PECTORIS: Primary | ICD-10-CM

## 2020-08-31 PROCEDURE — 93000 ELECTROCARDIOGRAM COMPLETE: CPT | Performed by: INTERNAL MEDICINE

## 2020-08-31 PROCEDURE — 99213 OFFICE O/P EST LOW 20 MIN: CPT | Performed by: INTERNAL MEDICINE

## 2020-08-31 RX ORDER — ATORVASTATIN CALCIUM 20 MG/1
20 TABLET, FILM COATED ORAL DAILY
Qty: 90 TAB | Refills: 3 | Status: SHIPPED | OUTPATIENT
Start: 2020-08-31 | End: 2021-03-05 | Stop reason: SDUPTHER

## 2020-08-31 NOTE — LETTER
8/31/20 Patient: María Lombardo YOB: 1965 Date of Visit: 8/31/2020 Professor Maricruz Miranda DO 
[address] [address] VIA In Basket Dear Professor Maricruz Miranda DO, Thank you for referring Mr. Ana Maria Pollack to [address] [place] [address] for evaluation. My notes for this consultation are attached. If you have questions, please do not hesitate to call me. I look forward to following your patient along with you. Sincerely, Stefan Moses MD

## 2020-08-31 NOTE — PROGRESS NOTES
Chief Complaint   Patient presents with    Cholesterol Problem     Annual follow up -     Dizziness     At times - believes it is med related

## 2020-08-31 NOTE — PROGRESS NOTES
1. Have you been to the ER, urgent care clinic since your last visit? Hospitalized since your last visit? No    2. Have you seen or consulted any other health care providers outside of the [address] [place] [address] since your last visit? Include any pap smears or colon screening.  No

## 2021-01-28 ENCOUNTER — TELEPHONE (OUTPATIENT)
Dept: CARDIOLOGY CLINIC | Age: 56
End: 2021-01-28

## 2021-01-29 NOTE — TELEPHONE ENCOUNTER
Left message on cell phone voicemail with change of appointment time only    Changed from 8:15am to 9:45AM    Thanks  Kolton Monte

## 2021-02-01 RX ORDER — LISINOPRIL 10 MG/1
TABLET ORAL
Qty: 90 TAB | Refills: 3 | Status: SHIPPED | OUTPATIENT
Start: 2021-02-01 | End: 2021-03-05 | Stop reason: SDUPTHER

## 2021-03-03 ENCOUNTER — TELEPHONE (OUTPATIENT)
Dept: INTERNAL MEDICINE CLINIC | Age: 56
End: 2021-03-03

## 2021-03-03 NOTE — TELEPHONE ENCOUNTER
----- Message from Brooke Claudio sent at 3/3/2021 10:56 AM EST -----  Regarding: Dr. Tiara Osei: [phone]  General Message/Vendor Calls    Caller's first and last name: N/A      Reason for call: Lab work appt request      Callback required yes/no and why: yes/scheduling      Best contact number(s): [phone]        Message from [facility]

## 2021-03-04 RX ORDER — METFORMIN HYDROCHLORIDE 500 MG/1
500 TABLET ORAL 2 TIMES DAILY WITH MEALS
Qty: 180 TAB | Refills: 3 | Status: SHIPPED | OUTPATIENT
Start: 2021-03-04 | End: 2021-06-12 | Stop reason: SDUPTHER

## 2021-03-05 RX ORDER — CARVEDILOL 12.5 MG/1
12.5 TABLET ORAL 2 TIMES DAILY WITH MEALS
Qty: 180 TAB | Refills: 3 | Status: SHIPPED | OUTPATIENT
Start: 2021-03-05 | End: 2021-06-12 | Stop reason: SDUPTHER

## 2021-03-05 RX ORDER — LISINOPRIL 10 MG/1
10 TABLET ORAL DAILY
Qty: 90 TAB | Refills: 3 | Status: SHIPPED | OUTPATIENT
Start: 2021-03-05 | End: 2021-06-12 | Stop reason: SDUPTHER

## 2021-03-05 RX ORDER — ATORVASTATIN CALCIUM 20 MG/1
20 TABLET, FILM COATED ORAL DAILY
Qty: 90 TAB | Refills: 3 | Status: SHIPPED | OUTPATIENT
Start: 2021-03-05 | End: 2022-01-10 | Stop reason: SDUPTHER

## 2021-04-08 ENCOUNTER — OFFICE VISIT (OUTPATIENT)
Dept: INTERNAL MEDICINE CLINIC | Age: 56
End: 2021-04-08
Payer: COMMERCIAL

## 2021-04-08 VITALS
TEMPERATURE: 98.1 F | HEIGHT: 74 IN | HEART RATE: 72 BPM | DIASTOLIC BLOOD PRESSURE: 75 MMHG | OXYGEN SATURATION: 99 % | BODY MASS INDEX: 28.83 KG/M2 | RESPIRATION RATE: 12 BRPM | WEIGHT: 224.6 LBS | SYSTOLIC BLOOD PRESSURE: 120 MMHG

## 2021-04-08 DIAGNOSIS — I10 ESSENTIAL HYPERTENSION: ICD-10-CM

## 2021-04-08 DIAGNOSIS — I25.110 CORONARY ARTERY DISEASE INVOLVING NATIVE CORONARY ARTERY OF NATIVE HEART WITH UNSTABLE ANGINA PECTORIS (HCC): ICD-10-CM

## 2021-04-08 DIAGNOSIS — Z12.11 SCREEN FOR COLON CANCER: ICD-10-CM

## 2021-04-08 DIAGNOSIS — E11.69 HYPERLIPIDEMIA ASSOCIATED WITH TYPE 2 DIABETES MELLITUS (HCC): ICD-10-CM

## 2021-04-08 DIAGNOSIS — Z95.1 S/P CABG X 3: ICD-10-CM

## 2021-04-08 DIAGNOSIS — Z00.00 ANNUAL PHYSICAL EXAM: Primary | ICD-10-CM

## 2021-04-08 DIAGNOSIS — E11.9 TYPE 2 DIABETES MELLITUS WITHOUT COMPLICATION, WITHOUT LONG-TERM CURRENT USE OF INSULIN (HCC): ICD-10-CM

## 2021-04-08 DIAGNOSIS — E78.5 HYPERLIPIDEMIA ASSOCIATED WITH TYPE 2 DIABETES MELLITUS (HCC): ICD-10-CM

## 2021-04-08 DIAGNOSIS — N40.0 BENIGN PROSTATIC HYPERPLASIA, UNSPECIFIED WHETHER LOWER URINARY TRACT SYMPTOMS PRESENT: ICD-10-CM

## 2021-04-08 PROCEDURE — 99396 PREV VISIT EST AGE 40-64: CPT | Performed by: INTERNAL MEDICINE

## 2021-04-08 NOTE — PATIENT INSTRUCTIONS
Well Visit, Men 48 to 72: Care Instructions  Overview     Well visits can help you stay healthy. Your doctor has checked your overall health and may have suggested ways to take good care of yourself. Your doctor also may have recommended tests. At home, you can help prevent illness with healthy eating, regular exercise, and other steps. Follow-up care is a key part of your treatment and safety. Be sure to make and go to all appointments, and call your doctor if you are having problems. It's also a good idea to know your test results and keep a list of the medicines you take. How can you care for yourself at home? · Get screening tests that you and your doctor decide on. Screening helps find diseases before any symptoms appear. · Eat healthy foods. Choose fruits, vegetables, whole grains, protein, and low-fat dairy foods. Limit fat, especially saturated fat. Reduce salt in your diet. · Limit alcohol. Have no more than 2 drinks a day or 14 drinks a week. · Get at least 30 minutes of exercise on most days of the week. Walking is a good choice. You also may want to do other activities, such as running, swimming, cycling, or playing tennis or team sports. · Reach and stay at a healthy weight. This will lower your risk for many problems, such as obesity, diabetes, heart disease, and high blood pressure. · Do not smoke. Smoking can make health problems worse. If you need help quitting, talk to your doctor about stop-smoking programs and medicines. These can increase your chances of quitting for good. · Care for your mental health. It is easy to get weighed down by worry and stress. Learn strategies to manage stress, like deep breathing and mindfulness, and stay connected with your family and community. If you find you often feel sad or hopeless, talk with your doctor. Treatment can help. · Talk to your doctor about whether you have any risk factors for sexually transmitted infections (STIs).  You can help prevent STIs if you wait to have sex with a new partner (or partners) until you've each been tested for STIs. It also helps if you use condoms (male or female condoms) and if you limit your sex partners to one person who only has sex with you. Vaccines are available for some STIs. · If it's important to you to prevent pregnancy with your partner, talk with your doctor about birth control options that might be best for you. · If you think you may have a problem with alcohol or drug use, talk to your doctor. This includes prescription medicines (such as amphetamines and opioids) and illegal drugs (such as cocaine and methamphetamine). Your doctor can help you figure out what type of treatment is best for you. · Protect your skin from too much sun. When you're outdoors from 10 a.m. to 4 p.m., stay in the shade or cover up with clothing and a hat with a wide brim. Wear sunglasses that block UV rays. Even when it's cloudy, put broad-spectrum sunscreen (SPF 30 or higher) on any exposed skin. · See a dentist one or two times a year for checkups and to have your teeth cleaned. · Wear a seat belt in the car. When should you call for help? Watch closely for changes in your health, and be sure to contact your doctor if you have any problems or symptoms that concern you. Where can you learn more? Go to http://www.gray.com/  Enter W040 in the search box to learn more about \"Well Visit, Men 48 to 72: Care Instructions. \"  Current as of: May 27, 2020               Content Version: 12.8  © 8598-4106 Healthwise, Incorporated. Care instructions adapted under license by [employer] (which disclaims liability or warranty for this information). If you have questions about a medical condition or this instruction, always ask your healthcare professional. [address] any warranty or liability for your use of this information.

## 2021-04-08 NOTE — PROGRESS NOTES
Erika Evans is a 63 y.o. male who presents for evaluation of annual cpe. Last seen by me June 19, 2020 in virtual visit. He has done well since then. Has no complaints. Did not do eye exam or have colonoscopy.       ROS:  Constitutional: negative for fevers, chills, anorexia and weight loss  Eyes:   negative for visual disturbance and irritation  ENT:   negative for tinnitus,sore throat,nasal congestion,ear pain,hoarseness  Respiratory:  negative for cough, hemoptysis, dyspnea,wheezing  CV:   negative for chest pain, palpitations, lower extremity edema  GI:   negative for nausea, vomiting, diarrhea, abdominal pain,melena  Genitourinary: negative for frequency, dysuria and hematuria  Musculoskel: negative for myalgias, arthralgias, back pain, muscle weakness, joint pain  Neurological:  negative for headaches, dizziness, focal weakness, numbness  Psychiatric:     Negative for depression or anxiety      Past Medical History:   Diagnosis Date    Coronary artery disease involving native coronary artery with unstable angina pectoris ([facility] [address] 9/27/2017    Family history of early CAD 9/26/2017    Significant for mother, brother    Hypercholesterolemia     Hypertension     Type II diabetes mellitus, uncontrolled ([facility] [address] 9/27/2017       Past Surgical History:   Procedure Laterality Date    HX ORTHOPAEDIC      left side face metal     [place] CARDIAC SURG PROCEDURE UNLIST      triple bypass       Family History   Problem Relation Age of Onset    Diabetes Mother     Heart Disease Mother     No Known Problems Father        Social History     Socioeconomic History    Marital status:      Spouse name: Not on file    Number of children: Not on file    Years of education: Not on file    Highest education level: Not on file   Occupational History    Not on file   Social Needs    Financial resource strain: Not on file    Food insecurity     Worry: Not on file     Inability: Not on file   [employer] needs     Medical: Not on file     Non-medical: Not on file   Tobacco Use    Smoking status: Never Smoker    Smokeless tobacco: Never Used   Substance and Sexual Activity    Alcohol use: Yes     Frequency: Monthly or less     Drinks per session: 1 or 2     Comment: occassionally    Drug use: No    Sexual activity: Yes     Partners: Female   Lifestyle    Physical activity     Days per week: Not on file     Minutes per session: Not on file    Stress: Not on file   Relationships    Social connections     Talks on phone: Not on file     Gets together: Not on file     Attends Am[employer] service: Not on file     Active member of club or organization: Not on file     Attends meetings of clubs or organizations: Not on file     Relationship status: Not on file    Intimate partner violence     Fear of current or ex partner: Not on file     Emotionally abused: Not on file     Physically abused: Not on file     Forced sexual activity: Not on file   Other Topics Concern    Not on file   Social History Narrative    Not on file            Visit Vitals  /75 (BP 1 Location: Left upper arm, BP Patient Position: Sitting)   Pulse 72   Temp 98.1 °F (36.7 °C) (Temporal)   Resp 12   Ht 6' 2\" (1.88 m)   Wt 224 lb 9.6 oz (101.9 kg)   SpO2 99%   BMI 28.84 kg/m²       Physical Examination:   General - Well appearing male  HEENT - PERRL, TM no erythema/opacification, normal nasal turbinates, no oropharyngeal erythema or exudate, MMM  Neck - supple, no bruits, no thyroidomegaly, no lymphadenopathy  Pulm - clear to auscultation bilaterally  Cardio - RRR, normal S1 S2, no murmur  Abd - soft, nontender, no masses, no HSM  Extrem - no edema, +2 distal pulses  Neuro-  No focal deficits, CN intact     Assessment/Plan:    1. Annual cpe--check cbc, cmp, psa  2. Dm, type 2--on glucophaeg. Check a1c, urine micro  3.  htn--controlled with coreg, lisinopril  4.  hyperlpids--on lipitir, check flp  5. Screen colon cancer--fit cards given. Declines colonoscpy    Reminded to get retinal eye exam  rtc 6 months        Parminder Simon III, DO

## 2021-05-04 LAB — HEMOCCULT STL QL IA: NEGATIVE

## 2021-06-14 RX ORDER — CARVEDILOL 12.5 MG/1
12.5 TABLET ORAL 2 TIMES DAILY WITH MEALS
Qty: 180 TABLET | Refills: 1 | Status: SHIPPED | OUTPATIENT
Start: 2021-06-14 | End: 2022-01-07 | Stop reason: SDUPTHER

## 2021-06-14 RX ORDER — LISINOPRIL 10 MG/1
10 TABLET ORAL DAILY
Qty: 90 TABLET | Refills: 1 | Status: SHIPPED | OUTPATIENT
Start: 2021-06-14 | End: 2021-09-26 | Stop reason: SDUPTHER

## 2021-06-14 RX ORDER — METFORMIN HYDROCHLORIDE 500 MG/1
500 TABLET ORAL 2 TIMES DAILY WITH MEALS
Qty: 180 TABLET | Refills: 3 | Status: SHIPPED | OUTPATIENT
Start: 2021-06-14 | End: 2022-01-07 | Stop reason: SDUPTHER

## 2021-06-14 NOTE — TELEPHONE ENCOUNTER
Appt due Aug 2021. Had labs at PCP April 2021. All good. PCP already has refill order for Atorvastatin, they are filling.

## 2021-06-14 NOTE — TELEPHONE ENCOUNTER
Future Appointments:  Future Appointments   Date Time Provider Kamlesh Laurent   9/3/2021  9:45 AM Lennox Doherty MD [facility] BS AMB   10/15/2021  8:00 AM Dennys Watson DO [facility] BS AMB        Last Appointment With Me:  4/8/2021     Requested Prescriptions     Pending Prescriptions Disp Refills    metFORMIN (GLUCOPHAGE) 500 mg tablet 180 Tablet 3     Sig: Take 1 Tablet by mouth two (2) times daily (with meals).

## 2021-09-27 RX ORDER — LISINOPRIL 10 MG/1
10 TABLET ORAL DAILY
Qty: 90 TABLET | Refills: 0 | Status: SHIPPED | OUTPATIENT
Start: 2021-09-27 | End: 2022-01-07 | Stop reason: SDUPTHER

## 2021-10-15 ENCOUNTER — OFFICE VISIT (OUTPATIENT)
Dept: INTERNAL MEDICINE CLINIC | Age: 56
End: 2021-10-15
Payer: COMMERCIAL

## 2021-10-15 VITALS
WEIGHT: 231 LBS | RESPIRATION RATE: 19 BRPM | HEART RATE: 73 BPM | BODY MASS INDEX: 29.65 KG/M2 | DIASTOLIC BLOOD PRESSURE: 71 MMHG | SYSTOLIC BLOOD PRESSURE: 126 MMHG | OXYGEN SATURATION: 97 % | TEMPERATURE: 98.1 F | HEIGHT: 74 IN

## 2021-10-15 DIAGNOSIS — E11.69 HYPERLIPIDEMIA ASSOCIATED WITH TYPE 2 DIABETES MELLITUS (HCC): ICD-10-CM

## 2021-10-15 DIAGNOSIS — Z95.1 S/P CABG X 3: ICD-10-CM

## 2021-10-15 DIAGNOSIS — I10 ESSENTIAL HYPERTENSION: ICD-10-CM

## 2021-10-15 DIAGNOSIS — I25.110 CORONARY ARTERY DISEASE INVOLVING NATIVE CORONARY ARTERY OF NATIVE HEART WITH UNSTABLE ANGINA PECTORIS (HCC): ICD-10-CM

## 2021-10-15 DIAGNOSIS — E78.5 HYPERLIPIDEMIA ASSOCIATED WITH TYPE 2 DIABETES MELLITUS (HCC): ICD-10-CM

## 2021-10-15 DIAGNOSIS — N18.30 TYPE 2 DIABETES MELLITUS WITH STAGE 3 CHRONIC KIDNEY DISEASE, WITHOUT LONG-TERM CURRENT USE OF INSULIN, UNSPECIFIED WHETHER STAGE 3A OR 3B CKD (HCC): Primary | ICD-10-CM

## 2021-10-15 DIAGNOSIS — E11.22 TYPE 2 DIABETES MELLITUS WITH STAGE 3 CHRONIC KIDNEY DISEASE, WITHOUT LONG-TERM CURRENT USE OF INSULIN, UNSPECIFIED WHETHER STAGE 3A OR 3B CKD (HCC): Primary | ICD-10-CM

## 2021-10-15 PROCEDURE — 99213 OFFICE O/P EST LOW 20 MIN: CPT | Performed by: INTERNAL MEDICINE

## 2021-10-15 NOTE — PROGRESS NOTES
Norma Quevedo is a 63 y.o. male who presents for evaluation of routine follow up. Last seen by me April 8, 2021 in [facility]. He has done well since then, though has gained 7 lbs. Has no complaints today.   Follows bp and sugars at home on occasion, and readings are all at goal.      ROS:  Constitutional: negative for fevers, chills, anorexia and weight loss  Eyes:   negative for visual disturbance and irritation  ENT:   negative for tinnitus,sore throat,nasal congestion,ear pain,hoarseness  Respiratory:  negative for cough, hemoptysis, dyspnea,wheezing  CV:   negative for chest pain, palpitations, lower extremity edema  GI:   negative for nausea, vomiting, diarrhea, abdominal pain,melena  Genitourinary: negative for frequency, dysuria and hematuria  Musculoskel: negative for myalgias, arthralgias, back pain, muscle weakness, joint pain  Neurological:  negative for headaches, dizziness, focal weakness, numbness  Psychiatric:     Negative for depression or anxiety      Past Medical History:   Diagnosis Date    Coronary artery disease involving native coronary artery with unstable angina pectoris ([facility] [address] 9/27/2017    Family history of early CAD 9/26/2017    Significant for mother, brother    Hypercholesterolemia     Hypertension     Type II diabetes mellitus, uncontrolled ([facility] [address] 9/27/2017       Past Surgical History:   Procedure Laterality Date    HX ORTHOPAEDIC      left side face metal     [place] CARDIAC SURG PROCEDURE UNLIST      triple bypass       Family History   Problem Relation Age of Onset    Diabetes Mother     Heart Disease Mother     No Known Problems Father        Social History     Socioeconomic History    Marital status:      Spouse name: Not on file    Number of children: Not on file    Years of education: Not on file    Highest education level: Not on file   Occupational History    Not on file   Tobacco Use    Smoking status: Never Smoker    Smokeless tobacco: Never Used   Vaping Use    Vaping Use: Never used   Substance and Sexual Activity    Alcohol use: Never     Comment: occassionally    Drug use: Never    Sexual activity: Yes     Partners: Female     Birth control/protection: None   Other Topics Concern    Not on file   Social History Narrative    Not on file     Social Determinants of Health     Financial Resource Strain:     Difficulty of Paying Living Expenses:    Food Insecurity:     Worried About Running Out of Food in the Last Year:     [address] in the Last Year:    Transportation Needs:     Lack of Transportation (Medical):  Lack of Transportation (Non-Medical):    Physical Activity:     Days of Exercise per Week:     Minutes of Exercise per Session:    Stress:     Feeling of Stress :    Social Connections:     Frequency of Communication with Friends and Family:     Frequency of Social Gatherings with Friends and Family:     Attends Denominational Services:     Active Member of Clubs or Organizations:     Attends Club or Organization Meetings:     Marital Status:    Intimate Partner Violence:     Fear of Current or Ex-Partner:     Emotionally Abused:     Physically Abused:     Sexually Abused:             Visit Vitals  /71 (BP 1 Location: Left arm, BP Patient Position: Sitting, BP Cuff Size: Adult)   Pulse 73   Temp 98.1 °F (36.7 °C) (Oral)   Resp 19   Ht 6' 2\" (1.88 m)   Wt 231 lb (104.8 kg)   SpO2 97%   BMI 29.66 kg/m²       Physical Examination:   General - Well appearing male. Bit overweight  HEENT - PERRL, TM no erythema/opacification, normal nasal turbinates, no oropharyngeal erythema or exudate, MMM  Neck - supple, no bruits, no thyroidomegaly, no lymphadenopathy  Pulm - clear to auscultation bilaterally  Cardio - RRR, normal S1 S2, no murmur  Abd - soft, nontender, no masses, no HSM  Extrem - no edema, +2 distal pulses  Neuro-  No focal deficits, CN intact     Assessment/Plan:    1.   Dm, type 2--continue with glucophage.   Last a1c 6.0  2. Htn--controlled with coreg, lisinopril  3. Cad with hx cabg--continue asa, coreg, lisinopril  4.  hyperlipids--on lipitor  5.  ckd 3--has been stable.     Encouraged to get retinal exam.        Miranda Smith III, DO

## 2021-10-15 NOTE — PROGRESS NOTES
Identified pt with two pt identifiers(name and ). Reviewed record in preparation for visit and have obtained necessary documentation. Chief Complaint   Patient presents with    Follow-up     6 months        Vitals:    10/15/21 0810   BP: 126/71   Pulse: 73   Resp: 19   Temp: 98.1 °F (36.7 °C)   TempSrc: Oral   SpO2: 97%   Weight: 231 lb (104.8 kg)   Height: 6' 2\" (1.88 m)       Health Maintenance Due   Topic    Eye Exam Retinal or Dilated     COVID-19 Vaccine (1)    Shingrix Vaccine Age 50> (1 of 2)    Foot Exam Q1        Coordination of Care Questionnaire:  :   1) Have you been to an emergency room, urgent care, or hospitalized since your last visit? If yes, where when, and reason for visit? no       2. Have seen or consulted any other health care provider since your last visit? If yes, where when, and reason for visit? NO      Patient is accompanied by self I have received verbal consent from Faye Ugalde to discuss any/all medical information while they are present in the room.

## 2021-10-15 NOTE — PATIENT INSTRUCTIONS
Kidney Disease and High Blood Pressure: Care Instructions  Overview     Long-term (chronic) kidney disease happens when the kidneys cannot remove waste and keep your body's fluids and chemicals in balance. Usually, the kidneys remove waste from the blood through the urine. When the kidneys are not working well, waste can build up so much that it poisons the body. Kidney disease can make you very tired. It also can cause swelling, or edema, in your legs or other areas of your body. High blood pressure is one of the major causes of chronic kidney disease. And kidney disease can also cause high blood pressure. No matter which came first, having high blood pressure damages the tiny blood vessels in the kidneys. If you have high blood pressure, it is important to lower it. There are many things you can do to lower your blood pressure, which may help slow or stop the damage to your kidneys. Follow-up care is a key part of your treatment and safety. Be sure to make and go to all appointments, and call your doctor if you are having problems. It's also a good idea to know your test results and keep a list of the medicines you take. How can you care for yourself at home? · Be safe with medicines. Take your medicines exactly as prescribed. Call your doctor if you have any problems with your medicine. You will probably need more than one medicine to lower your blood pressure. You will get more details on the specific medicines your doctor prescribes. · Work with your doctor and a dietitian to plan meals that have the right amount of nutrients for you. You will probably have to limit salt, fluids, and protein. · Stay at a healthy weight. This is very important if you put on weight around the waist. Losing even 10 pounds can help you lower your blood pressure. · Manage other health problems such as diabetes and high cholesterol.  You can help lower your risk for heart disease and blood vessel problems with a healthy lifestyle along with medicines. · Do not take ibuprofen (Advil, Motrin) or naproxen (Aleve), or similar medicines, unless your doctor tells you to. They may make chronic kidney disease worse. It is okay to take acetaminophen (Tylenol). · If your doctor recommends it, get more exercise. Walking is a good choice. Bit by bit, increase the amount you walk every day. Try for at least 30 minutes on most days of the week. You also may want to swim, bike, or do other activities. · Limit or avoid alcohol. Talk to your doctor about whether you can drink any alcohol. · Do not smoke or allow others to smoke around you. If you need help quitting, talk to your doctor about stop-smoking programs and medicines. These can increase your chances of quitting for good. When should you call for help? Call 911 anytime you think you may need emergency care. For example, call if:    · You passed out (lost consciousness). Call your doctor now or seek immediate medical care if:    · You have new or worse nausea and vomiting.     · You have much less urine than normal, or you have no urine.     · You are feeling confused or cannot think clearly.     · You have new or more blood in your urine.     · You have new swelling.     · You are dizzy or lightheaded, or you feel like you may faint. Watch closely for changes in your health, and be sure to contact your doctor if:    · You do not get better as expected. Where can you learn more? Go to http://www.gray.com/  Enter W026 in the search box to learn more about \"Kidney Disease and High Blood Pressure: Care Instructions. \"  Current as of: December 17, 2020               Content Version: 13.0  © 5020-4221 Healthwise, Incorporated. Care instructions adapted under license by [employer] (which disclaims liability or warranty for this information).  If you have questions about a medical condition or this instruction, always ask your healthcare professional. [address] any warranty or liability for your use of this information.

## 2021-11-22 NOTE — PROGRESS NOTES
Subjective/HPI:     Loi Marroquin is a 61 y.o. male is here for routine f/u. He has a PMHx of CAD s/p CABG x3, ischemic cardiomyopathy now resolved, HTN, HLD and DM2. Last seen by us in 8/2020. He remains in usual state of health. Bp running high today, but home Bp trend 130s/70s. No cardiac complaints. PCP Provider  Stephanie Jamil DO    Past Medical History:   Diagnosis Date    Coronary artery disease involving native coronary artery with unstable angina pectoris ([facility] [address] 9/27/2017    Family history of early CAD 9/26/2017    Significant for mother, brother    Hypercholesterolemia     Hypertension     Myocardial infarction ([facility] [address]     Type II diabetes mellitus, uncontrolled ([facility] [address] 9/27/2017        Allergies   Allergen Reactions    Latex Other (comments)     Allergy documented in admission data base    Influenza Virus Vaccine, Specific Other (comments)     Allergy found in admission data base        Outpatient Encounter Medications as of 12/2/2021   Medication Sig Dispense Refill    lisinopriL (PRINIVIL, ZESTRIL) 10 mg tablet Take 1 Tablet by mouth daily. Must keep Nov 2021 appt prior to further refills. 90 Tablet 0    metFORMIN (GLUCOPHAGE) 500 mg tablet Take 1 Tablet by mouth two (2) times daily (with meals). 180 Tablet 3    carvediloL (COREG) 12.5 mg tablet Take 1 Tablet by mouth two (2) times daily (with meals). appt due Aug 2021. 180 Tablet 1    atorvastatin (LIPITOR) 20 mg tablet Take 1 Tab by mouth daily. 90 Tab 3    aspirin delayed-release 81 mg tablet Take  by mouth daily. No facility-administered encounter medications on file as of 12/2/2021. Review of Symptoms:    Review of Systems   Constitutional: Negative. Negative for chills, fever and weight loss. HENT: Negative. Negative for hearing loss and nosebleeds. Eyes: Negative for blurred vision and double vision. Respiratory: Negative.   Negative for cough, hemoptysis, shortness of breath and wheezing. Cardiovascular: Negative. Negative for chest pain, palpitations, orthopnea, claudication, leg swelling and PND. Gastrointestinal: Negative. Negative for abdominal pain, blood in stool, diarrhea, nausea and vomiting. Musculoskeletal: Negative for joint pain and myalgias. Skin: Negative. Negative for rash. Neurological: Negative. Negative for dizziness, tingling, loss of consciousness and headaches. Endo/Heme/Allergies: Does not bruise/bleed easily. Physical Exam:      General: Well developed, in no acute distress, cooperative and alert  HEENT: No carotid bruits, no JVD, trach is midline. Neck Supple, PEERL, EOM intact. Heart:  reg rate and rhythm; normal S1/S2; no murmurs, no gallops or rubs. Respiratory: Clear bilaterally x 4, no wheezing or rales  Abdomen:   Soft, non-tender, no distention, no masses. + BS. Extremities:  Normal cap refill, no cyanosis, atraumatic. No edema. Neuro: A&Ox3, speech clear, gait stable. Skin: Skin color is normal. No rashes or lesions.  Non diaphoretic  Vascular: 2+ pulses symmetric in all extremities    Vitals:    12/02/21 1445 12/02/21 1457   BP: (!) 150/90 (!) 150/90   Pulse: 78    Resp: 18    SpO2: 98%    Weight: 236 lb (107 kg)    Height: 6' 2\" (1.88 m)        ECG: sinus rhythm     Cardiology Labs:    Lab Results   Component Value Date/Time    Cholesterol, total 131 04/08/2021 02:35 PM    HDL Cholesterol 49 04/08/2021 02:35 PM    LDL, calculated 62 04/08/2021 02:35 PM    LDL, calculated 50 10/14/2019 08:22 AM    LDL, calculated 48 09/14/2018 10:30 AM    VLDL, calculated 20 04/08/2021 02:35 PM    CHOL/HDL Ratio 2.7 04/08/2021 02:35 PM       Lab Results   Component Value Date/Time    Hemoglobin A1c 6.0 (H) 04/08/2021 02:35 PM    Hemoglobin A1c (POC) 6.2 04/05/2019 08:41 AM       Lab Results   Component Value Date/Time    Sodium 139 04/08/2021 02:35 PM    Potassium 5.1 04/08/2021 02:35 PM    Chloride 109 (H) 04/08/2021 02:35 PM CO2 24 04/08/2021 02:35 PM    Glucose 99 04/08/2021 02:35 PM    BUN 30 (H) 04/08/2021 02:35 PM    Creatinine 1.47 (H) 04/08/2021 02:35 PM    BUN/Creatinine ratio 20 04/08/2021 02:35 PM    GFR est AA >60 04/08/2021 02:35 PM    GFR est non-AA 50 (L) 04/08/2021 02:35 PM    Calcium 9.5 04/08/2021 02:35 PM    Anion gap 6 04/08/2021 02:35 PM    Bilirubin, total 0.5 04/08/2021 02:35 PM    ALT (SGPT) 25 04/08/2021 02:35 PM    Alk. phosphatase 92 04/08/2021 02:35 PM    Protein, total 7.6 04/08/2021 02:35 PM    Albumin 4.8 04/08/2021 02:35 PM    Globulin 2.8 04/08/2021 02:35 PM    A-G Ratio 1.7 04/08/2021 02:35 PM          Assessment:       ICD-10-CM ICD-9-CM    1. Coronary artery disease involving native coronary artery of native heart without angina pectoris  I25.10 414.01    2. S/P CABG x 3  Z95.1 V45.81    3. Essential hypertension  I10 401.9 AMB POC EKG ROUTINE W/ 12 LEADS, INTER & REP   4. Ischemic cardiomyopathy  I25.5 414.8    5. Family history of early CAD  Z80.55 V17.3    6. Hyperlipidemia associated with type 2 diabetes mellitus (HCC)  E11.69 250.80     E78.5 272.4         Plan:     1. Coronary artery disease involving native coronary artery of native heart without angina pectoris  S/p CABG x3 in 2017  Without anginal or anginal equivalent symptoms  Continue BB, statin, ASA    2. Ischemic cardiomyopathy  Echo done 4/2019 with preserved LVEF 50%, mild MR  Euvolemic on exam  Continue carvedilol, lisinopril    3. Essential hypertension  BP controlled. Continue anti-hypertensive therapy and low sodium diet    4. Hyperlipidemia associated with type 2 diabetes mellitus ([address]  4/2021 LDL 62 On atorva 20 mg daily  Continue statin therapy and low fat, low cholesterol diet  Lipids managed by PCP . 5. S/P CABG x 3  S/p LIMA to LAD, RA to OM1, VG to PDA in 9/2017    6.  DM  On Metformin      F/u with Dr. Ashlie Bro in 1 year        Heraclio Chandler, NP

## 2021-12-02 ENCOUNTER — OFFICE VISIT (OUTPATIENT)
Dept: CARDIOLOGY CLINIC | Age: 56
End: 2021-12-02
Payer: COMMERCIAL

## 2021-12-02 VITALS
WEIGHT: 236 LBS | RESPIRATION RATE: 18 BRPM | SYSTOLIC BLOOD PRESSURE: 140 MMHG | DIASTOLIC BLOOD PRESSURE: 80 MMHG | OXYGEN SATURATION: 98 % | HEIGHT: 74 IN | BODY MASS INDEX: 30.29 KG/M2 | HEART RATE: 78 BPM

## 2021-12-02 DIAGNOSIS — I10 ESSENTIAL HYPERTENSION: ICD-10-CM

## 2021-12-02 DIAGNOSIS — E11.69 HYPERLIPIDEMIA ASSOCIATED WITH TYPE 2 DIABETES MELLITUS (HCC): ICD-10-CM

## 2021-12-02 DIAGNOSIS — I25.5 ISCHEMIC CARDIOMYOPATHY: ICD-10-CM

## 2021-12-02 DIAGNOSIS — I25.10 CORONARY ARTERY DISEASE INVOLVING NATIVE CORONARY ARTERY OF NATIVE HEART WITHOUT ANGINA PECTORIS: Primary | ICD-10-CM

## 2021-12-02 DIAGNOSIS — E78.5 HYPERLIPIDEMIA ASSOCIATED WITH TYPE 2 DIABETES MELLITUS (HCC): ICD-10-CM

## 2021-12-02 DIAGNOSIS — Z95.1 S/P CABG X 3: ICD-10-CM

## 2021-12-02 DIAGNOSIS — Z82.49 FAMILY HISTORY OF EARLY CAD: ICD-10-CM

## 2021-12-02 PROCEDURE — 93000 ELECTROCARDIOGRAM COMPLETE: CPT | Performed by: NURSE PRACTITIONER

## 2021-12-02 PROCEDURE — 99214 OFFICE O/P EST MOD 30 MIN: CPT | Performed by: NURSE PRACTITIONER

## 2021-12-02 NOTE — PROGRESS NOTES
1. Have you been to the ER, urgent care clinic since your last visit? Hospitalized since your last visit? No    2. Have you seen or consulted any other health care providers outside of the [address] [place] [address] since your last visit? Include any pap smears or colon screening. No     Chief Complaint   Patient presents with    Hypertension     1 yr f/u.  No cardiac concerns,

## 2022-01-10 RX ORDER — ATORVASTATIN CALCIUM 20 MG/1
20 TABLET, FILM COATED ORAL DAILY
Qty: 90 TABLET | Refills: 3 | Status: SHIPPED | OUTPATIENT
Start: 2022-01-10

## 2022-01-10 RX ORDER — METFORMIN HYDROCHLORIDE 500 MG/1
500 TABLET ORAL 2 TIMES DAILY WITH MEALS
Qty: 180 TABLET | Refills: 3 | Status: SHIPPED | OUTPATIENT
Start: 2022-01-10

## 2022-01-10 NOTE — TELEPHONE ENCOUNTER
Future Appointments:  Future Appointments   Date Time Provider Kamlesh Anastasiia   4/22/2022  8:00 AM Chavo Cantu [facility] BS AMB   12/5/2022  9:00 AM Damien Reyes MD [facility] BS AMB        Last Appointment With Me:  10/15/2021     Requested Prescriptions     Pending Prescriptions Disp Refills    metFORMIN (GLUCOPHAGE) 500 mg tablet 180 Tablet 3     Sig: Take 1 Tablet by mouth two (2) times daily (with meals).

## 2022-03-18 PROBLEM — I25.10 CAD (CORONARY ARTERY DISEASE), NATIVE CORONARY ARTERY: Status: ACTIVE | Noted: 2017-09-27

## 2022-03-18 PROBLEM — I10 ESSENTIAL HYPERTENSION: Status: ACTIVE | Noted: 2020-08-28

## 2022-03-18 PROBLEM — Z95.1 S/P CABG X 3: Status: ACTIVE | Noted: 2017-09-29

## 2022-03-19 PROBLEM — E11.22 TYPE 2 DIABETES MELLITUS WITH CHRONIC KIDNEY DISEASE (HCC): Status: ACTIVE | Noted: 2021-10-15

## 2022-03-19 PROBLEM — J90 PLEURAL EFFUSION ON LEFT: Status: ACTIVE | Noted: 2017-10-13

## 2022-03-19 PROBLEM — Z82.49 FAMILY HISTORY OF EARLY CAD: Status: ACTIVE | Noted: 2017-09-26

## 2022-03-20 PROBLEM — E78.5 HYPERLIPIDEMIA ASSOCIATED WITH TYPE 2 DIABETES MELLITUS (HCC): Status: ACTIVE | Noted: 2018-02-19

## 2022-03-20 PROBLEM — E11.69 HYPERLIPIDEMIA ASSOCIATED WITH TYPE 2 DIABETES MELLITUS (HCC): Status: ACTIVE | Noted: 2018-02-19

## 2022-03-20 PROBLEM — I25.5 ISCHEMIC CARDIOMYOPATHY: Status: ACTIVE | Noted: 2018-02-19

## 2022-04-22 ENCOUNTER — OFFICE VISIT (OUTPATIENT)
Dept: INTERNAL MEDICINE CLINIC | Age: 57
End: 2022-04-22
Payer: COMMERCIAL

## 2022-04-22 VITALS
RESPIRATION RATE: 16 BRPM | HEIGHT: 74 IN | DIASTOLIC BLOOD PRESSURE: 84 MMHG | BODY MASS INDEX: 29.34 KG/M2 | SYSTOLIC BLOOD PRESSURE: 134 MMHG | OXYGEN SATURATION: 100 % | WEIGHT: 228.6 LBS | HEART RATE: 78 BPM

## 2022-04-22 DIAGNOSIS — E78.5 HYPERLIPIDEMIA ASSOCIATED WITH TYPE 2 DIABETES MELLITUS (HCC): ICD-10-CM

## 2022-04-22 DIAGNOSIS — E11.22 TYPE 2 DIABETES MELLITUS WITH STAGE 3 CHRONIC KIDNEY DISEASE, WITHOUT LONG-TERM CURRENT USE OF INSULIN, UNSPECIFIED WHETHER STAGE 3A OR 3B CKD (HCC): ICD-10-CM

## 2022-04-22 DIAGNOSIS — Z12.11 SCREEN FOR COLON CANCER: ICD-10-CM

## 2022-04-22 DIAGNOSIS — N40.0 BENIGN PROSTATIC HYPERPLASIA, UNSPECIFIED WHETHER LOWER URINARY TRACT SYMPTOMS PRESENT: ICD-10-CM

## 2022-04-22 DIAGNOSIS — I25.110 CORONARY ARTERY DISEASE INVOLVING NATIVE CORONARY ARTERY OF NATIVE HEART WITH UNSTABLE ANGINA PECTORIS (HCC): ICD-10-CM

## 2022-04-22 DIAGNOSIS — Z00.00 ANNUAL PHYSICAL EXAM: Primary | ICD-10-CM

## 2022-04-22 DIAGNOSIS — E11.69 HYPERLIPIDEMIA ASSOCIATED WITH TYPE 2 DIABETES MELLITUS (HCC): ICD-10-CM

## 2022-04-22 DIAGNOSIS — N18.30 TYPE 2 DIABETES MELLITUS WITH STAGE 3 CHRONIC KIDNEY DISEASE, WITHOUT LONG-TERM CURRENT USE OF INSULIN, UNSPECIFIED WHETHER STAGE 3A OR 3B CKD (HCC): ICD-10-CM

## 2022-04-22 DIAGNOSIS — I10 ESSENTIAL HYPERTENSION: ICD-10-CM

## 2022-04-22 LAB
ALBUMIN SERPL-MCNC: 4.4 G/DL (ref 3.5–5)
ALBUMIN/GLOB SERPL: 1.8 {RATIO} (ref 1.1–2.2)
ALP SERPL-CCNC: 84 U/L (ref 45–117)
ALT SERPL-CCNC: 24 U/L (ref 12–78)
ANION GAP SERPL CALC-SCNC: 3 MMOL/L (ref 5–15)
AST SERPL-CCNC: 11 U/L (ref 15–37)
BASOPHILS # BLD: 0.1 K/UL (ref 0–0.1)
BASOPHILS NFR BLD: 1 % (ref 0–1)
BILIRUB SERPL-MCNC: 0.4 MG/DL (ref 0.2–1)
BUN SERPL-MCNC: 27 MG/DL (ref 6–20)
BUN/CREAT SERPL: 19 (ref 12–20)
CALCIUM SERPL-MCNC: 9.4 MG/DL (ref 8.5–10.1)
CHLORIDE SERPL-SCNC: 111 MMOL/L (ref 97–108)
CHOLEST SERPL-MCNC: 102 MG/DL
CO2 SERPL-SCNC: 25 MMOL/L (ref 21–32)
CREAT SERPL-MCNC: 1.39 MG/DL (ref 0.7–1.3)
CREAT UR-MCNC: 163 MG/DL
DIFFERENTIAL METHOD BLD: NORMAL
EOSINOPHIL # BLD: 0.2 K/UL (ref 0–0.4)
EOSINOPHIL NFR BLD: 4 % (ref 0–7)
ERYTHROCYTE [DISTWIDTH] IN BLOOD BY AUTOMATED COUNT: 12.9 % (ref 11.5–14.5)
EST. AVERAGE GLUCOSE BLD GHB EST-MCNC: 137 MG/DL
GLOBULIN SER CALC-MCNC: 2.5 G/DL (ref 2–4)
GLUCOSE SERPL-MCNC: 143 MG/DL (ref 65–100)
HBA1C MFR BLD: 6.4 % (ref 4–5.6)
HCT VFR BLD AUTO: 39.7 % (ref 36.6–50.3)
HDLC SERPL-MCNC: 46 MG/DL
HDLC SERPL: 2.2 {RATIO} (ref 0–5)
HGB BLD-MCNC: 12.8 G/DL (ref 12.1–17)
IMM GRANULOCYTES # BLD AUTO: 0 K/UL (ref 0–0.04)
IMM GRANULOCYTES NFR BLD AUTO: 0 % (ref 0–0.5)
LDLC SERPL CALC-MCNC: 43.2 MG/DL (ref 0–100)
LYMPHOCYTES # BLD: 1.6 K/UL (ref 0.8–3.5)
LYMPHOCYTES NFR BLD: 25 % (ref 12–49)
MCH RBC QN AUTO: 29.3 PG (ref 26–34)
MCHC RBC AUTO-ENTMCNC: 32.2 G/DL (ref 30–36.5)
MCV RBC AUTO: 90.8 FL (ref 80–99)
MICROALBUMIN UR-MCNC: 0.92 MG/DL
MICROALBUMIN/CREAT UR-RTO: 6 MG/G (ref 0–30)
MONOCYTES # BLD: 0.6 K/UL (ref 0–1)
MONOCYTES NFR BLD: 9 % (ref 5–13)
NEUTS SEG # BLD: 4.1 K/UL (ref 1.8–8)
NEUTS SEG NFR BLD: 61 % (ref 32–75)
NRBC # BLD: 0 K/UL (ref 0–0.01)
NRBC BLD-RTO: 0 PER 100 WBC
PLATELET # BLD AUTO: 212 K/UL (ref 150–400)
PMV BLD AUTO: 10.3 FL (ref 8.9–12.9)
POTASSIUM SERPL-SCNC: 5.6 MMOL/L (ref 3.5–5.1)
PROT SERPL-MCNC: 6.9 G/DL (ref 6.4–8.2)
PSA SERPL-MCNC: 0.2 NG/ML (ref 0.01–4)
RBC # BLD AUTO: 4.37 M/UL (ref 4.1–5.7)
SODIUM SERPL-SCNC: 139 MMOL/L (ref 136–145)
TRIGL SERPL-MCNC: 64 MG/DL (ref ?–150)
TSH SERPL DL<=0.05 MIU/L-ACNC: 1.02 UIU/ML (ref 0.36–3.74)
VLDLC SERPL CALC-MCNC: 12.8 MG/DL
WBC # BLD AUTO: 6.6 K/UL (ref 4.1–11.1)

## 2022-04-22 PROCEDURE — 99396 PREV VISIT EST AGE 40-64: CPT | Performed by: INTERNAL MEDICINE

## 2022-04-22 NOTE — PROGRESS NOTES
1. \"Have you been to the ER, urgent care clinic since your last visit? Hospitalized since your last visit? \" no    2. \"Have you seen or consulted any other health care providers outside of the [address] [place] [address] since your last visit? \"  no    3. For patients aged 39-70: Has the patient had a colonoscopy / FIT/ Cologuard?  Is due as of 4/30/2022 for screening      Have you had a recent eye exam? no    Have you had a recent foot exam? no

## 2022-04-22 NOTE — PROGRESS NOTES
Taryn Ramirez is a 61 y.o. male who presents for evaluation of annual cpe. Last seen by me oct 15, 2021. He has done well since then, weight down 3 lbs.       ROS:  Constitutional: negative for fevers, chills, anorexia and weight loss  Eyes:   negative for visual disturbance and irritation  ENT:   negative for tinnitus,sore throat,nasal congestion,ear pain,hoarseness  Respiratory:  negative for cough, hemoptysis, dyspnea,wheezing  CV:   negative for chest pain, palpitations, lower extremity edema  GI:   negative for nausea, vomiting, diarrhea, abdominal pain,melena  Genitourinary: negative for frequency, dysuria and hematuria  Musculoskel: negative for myalgias, arthralgias, back pain, muscle weakness, joint pain  Neurological:  negative for headaches, dizziness, focal weakness, numbness  Psychiatric:     Negative for depression or anxiety      Past Medical History:   Diagnosis Date    Coronary artery disease involving native coronary artery with unstable angina pectoris ([facility] 9/27/2017    Family history of early CAD 9/26/2017    Significant for mother, brother    Hypercholesterolemia     Hypertension     Myocardial infarction ([facility]     Type II diabetes mellitus, uncontrolled 9/27/2017       Past Surgical History:   Procedure Laterality Date    HX CORONARY ARTERY BYPASS GRAFT      HX ORTHOPAEDIC      left side face metal     CO CARDIAC SURG PROCEDURE UNLIST      triple bypass       Family History   Problem Relation Age of Onset    Diabetes Mother     Heart Disease Mother     No Known Problems Father        Social History     Socioeconomic History    Marital status:      Spouse name: Not on file    Number of children: Not on file    Years of education: Not on file    Highest education level: Not on file   Occupational History    Not on file   Tobacco Use    Smoking status: Never Smoker    Smokeless tobacco: Never Used   Vaping Use    Vaping Use: Never used   Substance and Sexual Activity    Alcohol use: Never     Comment: occassionally    Drug use: Never    Sexual activity: Yes     Partners: Female     Birth control/protection: None   Other Topics Concern    Not on file   Social History Narrative    Not on file     Social Determinants of Health     Financial Resource Strain:     Difficulty of Paying Living Expenses: Not on file   Food Insecurity:     Worried About Running Out of Food in the Last Year: Not on file    Jacquie of Food in the Last Year: Not on file   Transportation Needs:     Lack of Transportation (Medical): Not on file    Lack of Transportation (Non-Medical):  Not on file   Physical Activity:     Days of Exercise per Week: Not on file    Minutes of Exercise per Session: Not on file   Stress:     Feeling of Stress : Not on file   Social Connections:     Frequency of Communication with Friends and Family: Not on file    Frequency of Social Gatherings with Friends and Family: Not on file    Attends Voodoo Services: Not on file    Active Member of [address] [place] [address] or Organizations: Not on file    Attends Club or Organization Meetings: Not on file    Marital Status: Not on file   Intimate Partner Violence:     Fear of Current or Ex-Partner: Not on file    Emotionally Abused: Not on file    Physically Abused: Not on file    Sexually Abused: Not on file   Housing Stability:     Unable to Pay for Housing in the Last Year: Not on file    Number of [place] in the Last Year: Not on file    Unstable Housing in the Last Year: Not on file            Visit Vitals  /84 (BP 1 Location: Right upper arm, BP Patient Position: Sitting)   Pulse 78   Resp 16   Ht 6' 2\" (1.88 m)   Wt 228 lb 9.6 oz (103.7 kg)   SpO2 100%   BMI 29.35 kg/m²       Physical Examination:   General - Well appearing male  HEENT - PERRL, TM no erythema/opacification, normal nasal turbinates, no oropharyngeal erythema or exudate, MMM  Neck - supple, no bruits, no thyroidomegaly, no lymphadenopathy  Pulm - clear to auscultation bilaterally  Cardio - RRR, normal S1 S2, no murmur  Abd - soft, nontender, no masses, no HSM  Extrem - no edema, +2 distal pulses  Neuro-  No focal deficits, CN intact     Assessment/Plan:    1. Annual cpe--  2. Dm, tyep 2--on glucophage. Check a1c, urine micro. Referral to dr Rubén Presley for eye exam  3. Cad with hx cabg--continue asa, coreg, lisinopril  4.  hyperlipids--on lipitor, check flp cmp  5.  ckd 3--check cmp. Consider switching from glucophage to farxiga/jardiance  6. Screen colon cancer--cologuard ordered    Declines vaccines.   rtc 6 months        Viri Mora III, DO

## 2022-04-22 NOTE — PATIENT INSTRUCTIONS
Learning About Tests When You Have Diabetes  Why do you need regular tests? Diabetes can lead to other health problems if it's not well controlled. You'll need tests to monitor how well your diabetes is controlled and to check for other things like high cholesterol or kidney problems. Having tests on a regular schedule can help your doctor find problems early, when it's easier to manage them. What tests do you need? These are the tests you may need and how often you should have them. A1c blood test.  This test shows the average level of blood sugar over the past 2 to 3 months. It helps your doctor see whether blood sugar levels have been staying within your target range. · How often: Every 3 to 6 months  · Goal: A blood sugar level in your target range  Blood pressure test.  This test measures the pressure of blood flow in the arteries. Controlling blood pressure can help prevent damage to nerves and blood vessels. · How often: Every 3 to 6 months  · Goal: A blood pressure level in your target range  Cholesterol test.  This test measures the amount of a type of fat in the blood. It is common for people with diabetes to also have high cholesterol. Too much cholesterol in the blood can build up inside the blood vessels and raise the risk for heart attack and stroke. · How often: At the time of your diabetes diagnosis, and as often as your doctor recommends after that  · Goal: A cholesterol level in your target range  Albumin-creatinine ratio test.  This test checks for kidney damage by looking for the protein albumin (say \"al-BYOO-louann\") in the urine. Albumin is normally found in the blood. Kidney damage can let small amounts of it (microalbumin) leak into the urine. · How often: Once a year  · Goal: No protein in the urine  Blood creatinine test/estimated glomerular filtration (eGFR). The blood creatinine (say \"xyfu-SX-fv-neen\") level shows how well your kidneys are working.  Creatinine is a waste product that muscles release into the blood. Blood creatinine is used to estimate the glomerular filtration rate. A high level of creatinine and/or a low eGFR may mean your kidneys are not working as well as they should. · How often: Once a year  · Goal: Normal level of creatinine in the blood. The eGFR goal is greater than 60 mL/min/1.73 m². Complete foot exam.  The doctor checks for foot sores and whether any sensation has been lost.  · How often: Once a year  · Goal: Healthy feet with no foot ulcers or loss of feeling  Dental exam and cleaning. The dentist checks for gum disease and tooth decay. People with high blood sugar are more likely to have these problems. · How often: Every 6 months  · Goal: Healthy teeth and gums  Complete eye exam.  High blood sugar levels can damage the eyes. This exam is done by an ophthalmologist or optometrist. It includes a dilated eye exam. The exam shows whether there's damage to the back of the eye (diabetic retinopathy). · How often: Once a year. If you don't have any signs of diabetic retinopathy, your doctor may recommend an exam every 2 years. · Goal: No damage to the back of the eye  Thyroid-stimulating hormone (TSH) blood test.  This test checks for thyroid disease. Too little thyroid hormone can cause some medicines (like insulin) to stay in the body longer. This can cause low blood sugar. You may be tested if you have high cholesterol or are a woman over 48years old. · How often: As part of your diabetes diagnosis, and as often as your doctor recommends after that  · Goal: Normal level of TSH in the blood  Follow-up care is a key part of your treatment and safety. Be sure to make and go to all appointments, and call your doctor if you are having problems. It's also a good idea to know your test results and keep a list of the medicines you take. Where can you learn more?   Go to http://www.[employer].com/  Enter A243 in the search box to learn more about \"Learning About Tests When You Have Diabetes. \"  Current as of: July 28, 2021               Content Version: 13.2  © 3754-7032 [employer]wise, Incorporated. Care instructions adapted under license by [employer] (which disclaims liability or warranty for this information). If you have questions about a medical condition or this instruction, always ask your healthcare professional. [place] [address] any warranty or liability for your use of this information. Make an appt with dr Brittany Stratton to get a diabetic eye exam done.

## 2022-04-25 NOTE — PROGRESS NOTES
Letter mailed to patient. Labs look good and stable.  Kidney function bit improved.  Continue same meds.

## 2022-09-23 ENCOUNTER — HOSPITAL ENCOUNTER (OUTPATIENT)
Age: 57
Setting detail: OBSERVATION
Discharge: HOME | End: 2022-09-24
Attending: INTERNAL MEDICINE | Admitting: INTERNAL MEDICINE
Payer: COMMERCIAL

## 2022-09-23 DIAGNOSIS — R07.9 CHEST PAIN, UNSPECIFIED TYPE: ICD-10-CM

## 2022-09-23 PROBLEM — I25.10 ASHD (ARTERIOSCLEROTIC HEART DISEASE): Status: ACTIVE | Noted: 2022-09-23

## 2022-09-23 LAB
GLUCOSE BLD STRIP.AUTO-MCNC: 115 MG/DL (ref 65–117)
GLUCOSE BLD STRIP.AUTO-MCNC: 137 MG/DL (ref 65–117)
GLUCOSE BLD STRIP.AUTO-MCNC: 83 MG/DL (ref 65–117)
GLUCOSE BLD STRIP.AUTO-MCNC: 98 MG/DL (ref 65–117)
SERVICE CMNT-IMP: ABNORMAL
SERVICE CMNT-IMP: NORMAL

## 2022-09-23 PROCEDURE — 74011250636 HC RX REV CODE- 250/636: Performed by: INTERNAL MEDICINE

## 2022-09-23 PROCEDURE — C1725 CATH, TRANSLUMIN NON-LASER: HCPCS | Performed by: INTERNAL MEDICINE

## 2022-09-23 PROCEDURE — C1894 INTRO/SHEATH, NON-LASER: HCPCS | Performed by: INTERNAL MEDICINE

## 2022-09-23 PROCEDURE — 77030016704 HC CATH ANGI DX PRF1 MRTM -B: Performed by: INTERNAL MEDICINE

## 2022-09-23 PROCEDURE — C1887 CATHETER, GUIDING: HCPCS | Performed by: INTERNAL MEDICINE

## 2022-09-23 PROCEDURE — 77030004549 HC CATH ANGI DX PRF MRTM -A: Performed by: INTERNAL MEDICINE

## 2022-09-23 PROCEDURE — G0378 HOSPITAL OBSERVATION PER HR: HCPCS

## 2022-09-23 PROCEDURE — 99152 MOD SED SAME PHYS/QHP 5/>YRS: CPT | Performed by: INTERNAL MEDICINE

## 2022-09-23 PROCEDURE — 92928 PRQ TCAT PLMT NTRAC ST 1 LES: CPT | Performed by: INTERNAL MEDICINE

## 2022-09-23 PROCEDURE — 74011000250 HC RX REV CODE- 250: Performed by: INTERNAL MEDICINE

## 2022-09-23 PROCEDURE — 82962 GLUCOSE BLOOD TEST: CPT

## 2022-09-23 PROCEDURE — C1769 GUIDE WIRE: HCPCS | Performed by: INTERNAL MEDICINE

## 2022-09-23 PROCEDURE — 92978 ENDOLUMINL IVUS OCT C 1ST: CPT | Performed by: INTERNAL MEDICINE

## 2022-09-23 PROCEDURE — 74011000258 HC RX REV CODE- 258: Performed by: INTERNAL MEDICINE

## 2022-09-23 PROCEDURE — 93459 L HRT ART/GRFT ANGIO: CPT | Performed by: INTERNAL MEDICINE

## 2022-09-23 PROCEDURE — 99153 MOD SED SAME PHYS/QHP EA: CPT | Performed by: INTERNAL MEDICINE

## 2022-09-23 PROCEDURE — C1874 STENT, COATED/COV W/DEL SYS: HCPCS | Performed by: INTERNAL MEDICINE

## 2022-09-23 PROCEDURE — 74011000636 HC RX REV CODE- 636: Performed by: INTERNAL MEDICINE

## 2022-09-23 PROCEDURE — 74011250637 HC RX REV CODE- 250/637: Performed by: INTERNAL MEDICINE

## 2022-09-23 PROCEDURE — 77030004550 HC CATH ANGI DX PRF MRTM -B: Performed by: INTERNAL MEDICINE

## 2022-09-23 PROCEDURE — C1753 CATH, INTRAVAS ULTRASOUND: HCPCS | Performed by: INTERNAL MEDICINE

## 2022-09-23 PROCEDURE — 2709999900 HC NON-CHARGEABLE SUPPLY: Performed by: INTERNAL MEDICINE

## 2022-09-23 DEVICE — STENT RONYX22518UX RESOLUTE ONYX 2.25X18
Type: IMPLANTABLE DEVICE | Status: FUNCTIONAL
Brand: RESOLUTE ONYX™

## 2022-09-23 DEVICE — STENT RONYX25015UX RESOLUTE ONYX 2.50X15
Type: IMPLANTABLE DEVICE | Status: FUNCTIONAL
Brand: RESOLUTE ONYX™

## 2022-09-23 RX ORDER — FENTANYL CITRATE 50 UG/ML
INJECTION, SOLUTION INTRAMUSCULAR; INTRAVENOUS AS NEEDED
Status: DISCONTINUED | OUTPATIENT
Start: 2022-09-23 | End: 2022-09-23 | Stop reason: HOSPADM

## 2022-09-23 RX ORDER — DEXTROSE MONOHYDRATE 100 MG/ML
0-250 INJECTION, SOLUTION INTRAVENOUS AS NEEDED
Status: DISCONTINUED | OUTPATIENT
Start: 2022-09-23 | End: 2022-09-24 | Stop reason: HOSPADM

## 2022-09-23 RX ORDER — HEPARIN SODIUM 200 [USP'U]/100ML
INJECTION, SOLUTION INTRAVENOUS
Status: COMPLETED | OUTPATIENT
Start: 2022-09-23 | End: 2022-09-23

## 2022-09-23 RX ORDER — SODIUM CHLORIDE 9 MG/ML
75 INJECTION, SOLUTION INTRAVENOUS CONTINUOUS
Status: DISCONTINUED | OUTPATIENT
Start: 2022-09-23 | End: 2022-09-23

## 2022-09-23 RX ORDER — LISINOPRIL 5 MG/1
10 TABLET ORAL DAILY
Status: DISCONTINUED | OUTPATIENT
Start: 2022-09-24 | End: 2022-09-24 | Stop reason: HOSPADM

## 2022-09-23 RX ORDER — ISOSORBIDE DINITRATE 10 MG/1
TABLET ORAL 3 TIMES DAILY
COMMUNITY
End: 2022-10-20

## 2022-09-23 RX ORDER — ASPIRIN 81 MG/1
81 TABLET ORAL DAILY
Status: DISCONTINUED | OUTPATIENT
Start: 2022-09-24 | End: 2022-09-24 | Stop reason: HOSPADM

## 2022-09-23 RX ORDER — MIDAZOLAM HYDROCHLORIDE 1 MG/ML
INJECTION, SOLUTION INTRAMUSCULAR; INTRAVENOUS AS NEEDED
Status: DISCONTINUED | OUTPATIENT
Start: 2022-09-23 | End: 2022-09-23 | Stop reason: HOSPADM

## 2022-09-23 RX ORDER — METFORMIN HYDROCHLORIDE 500 MG/1
500 TABLET ORAL 2 TIMES DAILY WITH MEALS
Qty: 180 TABLET | Refills: 3
Start: 2022-09-23

## 2022-09-23 RX ORDER — ATROPINE SULFATE 1 MG/ML
0.5 INJECTION, SOLUTION INTRAVENOUS ONCE
Status: DISCONTINUED | OUTPATIENT
Start: 2022-09-23 | End: 2022-09-23

## 2022-09-23 RX ORDER — ATORVASTATIN CALCIUM 40 MG/1
20 TABLET, FILM COATED ORAL DAILY
Status: DISCONTINUED | OUTPATIENT
Start: 2022-09-24 | End: 2022-09-24 | Stop reason: HOSPADM

## 2022-09-23 RX ORDER — INSULIN LISPRO 100 [IU]/ML
INJECTION, SOLUTION INTRAVENOUS; SUBCUTANEOUS
Status: DISCONTINUED | OUTPATIENT
Start: 2022-09-23 | End: 2022-09-24 | Stop reason: HOSPADM

## 2022-09-23 RX ORDER — SODIUM CHLORIDE 0.9 % (FLUSH) 0.9 %
5-40 SYRINGE (ML) INJECTION EVERY 8 HOURS
Status: DISCONTINUED | OUTPATIENT
Start: 2022-09-23 | End: 2022-09-24 | Stop reason: HOSPADM

## 2022-09-23 RX ORDER — MAGNESIUM SULFATE 100 %
4 CRYSTALS MISCELLANEOUS AS NEEDED
Status: DISCONTINUED | OUTPATIENT
Start: 2022-09-23 | End: 2022-09-24 | Stop reason: HOSPADM

## 2022-09-23 RX ORDER — ATROPINE SULFATE 0.1 MG/ML
0.5 INJECTION INTRAVENOUS ONCE
Status: COMPLETED | OUTPATIENT
Start: 2022-09-23 | End: 2022-09-23

## 2022-09-23 RX ORDER — LIDOCAINE HYDROCHLORIDE 10 MG/ML
INJECTION, SOLUTION EPIDURAL; INFILTRATION; INTRACAUDAL; PERINEURAL AS NEEDED
Status: DISCONTINUED | OUTPATIENT
Start: 2022-09-23 | End: 2022-09-23 | Stop reason: HOSPADM

## 2022-09-23 RX ORDER — SODIUM CHLORIDE 9 MG/ML
100 INJECTION, SOLUTION INTRAVENOUS CONTINUOUS
Status: DISPENSED | OUTPATIENT
Start: 2022-09-23 | End: 2022-09-24

## 2022-09-23 RX ORDER — CARVEDILOL 12.5 MG/1
12.5 TABLET ORAL 2 TIMES DAILY WITH MEALS
Status: DISCONTINUED | OUTPATIENT
Start: 2022-09-23 | End: 2022-09-24 | Stop reason: HOSPADM

## 2022-09-23 RX ORDER — ACETAMINOPHEN 325 MG/1
650 TABLET ORAL
Status: DISCONTINUED | OUTPATIENT
Start: 2022-09-23 | End: 2022-09-24 | Stop reason: HOSPADM

## 2022-09-23 RX ORDER — SODIUM CHLORIDE 0.9 % (FLUSH) 0.9 %
5-40 SYRINGE (ML) INJECTION AS NEEDED
Status: DISCONTINUED | OUTPATIENT
Start: 2022-09-23 | End: 2022-09-24 | Stop reason: HOSPADM

## 2022-09-23 RX ADMIN — ATROPINE SULFATE 0.5 MG: 0.1 INJECTION INTRAVENOUS at 19:30

## 2022-09-23 RX ADMIN — CARVEDILOL 12.5 MG: 12.5 TABLET, FILM COATED ORAL at 17:37

## 2022-09-23 RX ADMIN — SODIUM CHLORIDE, PRESERVATIVE FREE 10 ML: 5 INJECTION INTRAVENOUS at 17:35

## 2022-09-23 RX ADMIN — SODIUM CHLORIDE 100 ML/HR: 9 INJECTION, SOLUTION INTRAVENOUS at 19:00

## 2022-09-23 RX ADMIN — SODIUM CHLORIDE, PRESERVATIVE FREE 10 ML: 5 INJECTION INTRAVENOUS at 22:34

## 2022-09-23 RX ADMIN — ACETAMINOPHEN 650 MG: 325 TABLET ORAL at 20:30

## 2022-09-23 NOTE — Clinical Note
Single view of the aortic root obtained using power injection. Total volume = 25 mL. Rate = 10 mL/sec. Pressure = 900 PSI.

## 2022-09-23 NOTE — Clinical Note
Sheath #1: sheath exchanged for SHEATH INTRO 6FR L10CM MINI GWIRE L45CM 0.035IN COR KINK. Hemostasis achieved.

## 2022-09-23 NOTE — Clinical Note
removed. Catheter used: Left 4. Treatment Number (Optional): 1 Consent: The risks of atrophy were reviewed with the patient. Include Z78.9 (Other Specified Conditions Influencing Health Status) As An Associated Diagnosis?: No Medical Necessity Clause: This procedure was medically necessary because the lesions that were treated were: Kenalog Preparation: Kenalog Total Volume Injected (Ccs- Only Use Numbers And Decimals): 0.15 Concentration Of Solution Injected (Mg/Ml): 2.5 X Size Of Lesion In Cm (Optional): 0 Detail Level: Simple

## 2022-09-23 NOTE — PROGRESS NOTES
[facility]  [address]  [phone]  [email or web address]    Brief Procedure Note    Patient: Cisco Chapa MRN: [id number]  SSN: [id number]    YOB: 1965  Age: 61 y.o. Sex: male      Date of Procedure: 9/23/2022     Pre-procedure Diagnosis: angina, abnormal stress test    Post-procedure Diagnosis: ASHD    Procedure: LHC with grafts    Performed By: Flako Malcolm MD     Anesthesia: Moderate Sedation    Estimated Blood Loss: Less than 10 mL      Specimens:  None    Findings: LIMA to LAD-patent, Radial graft to OM-patent, SVG to % at origin, RCA proximal 99%, LVEF 60%. Complications: None    Implants: None    Recommendations: PCI of native RCA by Dr. Staci Valladares.     Signed By: Flako Malcolm MD     September 23, 2022

## 2022-09-23 NOTE — PROGRESS NOTES
Cardiac Cath Lab Recovery Arrival Note:      Ana Valdovinos arrived to Cardiac Cath Lab, Recovery Area. Staff introduced to patient. Patient identifiers verified with NAME and DATE OF BIRTH. Procedure verified with patient. Consent forms reviewed and signed by patient or authorized representative and verified. Allergies verified. Patient and family oriented to department. Patient and family informed of procedure and plan of care. Questions answered with review. Patient prepped for procedure, per orders from physician, prior to arrival.    Patient on cardiac monitor, non-invasive blood pressure, SPO2 monitor. On room air. Patient is A&Ox 4. Patient reports no complaints. Patient in stretcher, in low position, with side rails up, call bell within reach, patient instructed to call if assistance as needed. Patient prep in: [address], [place] [address]. Family in: waiting room.    Prep by: Sofía Boyd, SHANA and Florencio Evans RN

## 2022-09-23 NOTE — Clinical Note
TRANSFER - OUT REPORT:     Verbal report given to: Ricco Morin RN. Report consisted of patient's Situation, Background, Assessment and   Recommendations(SBAR). Opportunity for questions and clarification was provided. Patient transported with a Registered Nurse and [address] [place] [address] / [facility]. Patient transported to: IVCU.

## 2022-09-23 NOTE — PROGRESS NOTES
Cardiac Cath Lab Recovery Arrival Note:      Johanna Barrios arrived to Cardiac Cath Lab, Recovery Area. Staff introduced to patient. Patient identifiers verified with NAME and DATE OF BIRTH. Procedure verified with patient. Consent forms reviewed and signed by patient or authorized representative and verified. Allergies verified. Patient and family oriented to department. Patient and family informed of procedure and plan of care. Questions answered with review. Patient prepped for procedure, per orders from physician, prior to arrival.    Patient on cardiac monitor, non-invasive blood pressure, SPO2 monitor. On room air. Patient is A&Ox 3. Patient reports no complaints of any pain. Patient in stretcher, in low position, with side rails up, call bell within reach, patient instructed to call if assistance as needed. Patient prep in: [address], [place] [address]. Family in: waitingroom. Prep by: Brittany Paiz  and Lj Craven, SHANA. Dual skin assessment done at bedside by Brittany Paiz and Lj Craven, RN  Mepilex applied to sacral area. No reddness noted.

## 2022-09-24 VITALS
BODY MASS INDEX: 29.13 KG/M2 | WEIGHT: 227 LBS | DIASTOLIC BLOOD PRESSURE: 68 MMHG | HEIGHT: 74 IN | RESPIRATION RATE: 19 BRPM | SYSTOLIC BLOOD PRESSURE: 126 MMHG | OXYGEN SATURATION: 99 % | HEART RATE: 79 BPM | TEMPERATURE: 98.1 F

## 2022-09-24 LAB
EST. AVERAGE GLUCOSE BLD GHB EST-MCNC: 143 MG/DL
GLUCOSE BLD STRIP.AUTO-MCNC: 126 MG/DL (ref 65–117)
HBA1C MFR BLD: 6.6 % (ref 4–5.6)
SERVICE CMNT-IMP: ABNORMAL

## 2022-09-24 PROCEDURE — 83036 HEMOGLOBIN GLYCOSYLATED A1C: CPT

## 2022-09-24 PROCEDURE — 74011250637 HC RX REV CODE- 250/637: Performed by: INTERNAL MEDICINE

## 2022-09-24 PROCEDURE — G0378 HOSPITAL OBSERVATION PER HR: HCPCS

## 2022-09-24 PROCEDURE — 74011000250 HC RX REV CODE- 250: Performed by: INTERNAL MEDICINE

## 2022-09-24 PROCEDURE — 36415 COLL VENOUS BLD VENIPUNCTURE: CPT

## 2022-09-24 PROCEDURE — 74011250636 HC RX REV CODE- 250/636: Performed by: INTERNAL MEDICINE

## 2022-09-24 PROCEDURE — 82962 GLUCOSE BLOOD TEST: CPT

## 2022-09-24 RX ADMIN — ASPIRIN 81 MG: 81 TABLET, COATED ORAL at 09:09

## 2022-09-24 RX ADMIN — TICAGRELOR 90 MG: 90 TABLET ORAL at 04:54

## 2022-09-24 RX ADMIN — SODIUM CHLORIDE 100 ML/HR: 9 INJECTION, SOLUTION INTRAVENOUS at 00:39

## 2022-09-24 RX ADMIN — SODIUM CHLORIDE, PRESERVATIVE FREE 10 ML: 5 INJECTION INTRAVENOUS at 05:04

## 2022-09-24 RX ADMIN — CARVEDILOL 12.5 MG: 12.5 TABLET, FILM COATED ORAL at 09:09

## 2022-09-24 NOTE — PROGRESS NOTES
SHEATH PULL NOTE:    Patient informed of procedure with questions answered with review. Sheath site prepped with Chloraprep swab. 6 fr sheath in rfa pulled by KERI lopez RN. Hand hold and quick clot, with manual compression to site. No bleeding, no hematoma, no pain at site. Hemostasis obtained with hand hold/manual compression at site. Patient tolerated well. No change in status. Handhold for 20 minutes. No change at site. Occlusive dressing applied to site. No bleeding, no hematoma, no pain/discomfort at site. Groin instructions provided with review. Continue to monitor procedure site and patient status. *Advised patient to keep head flat and extremity flat to decrease risk of bleeding. *Recommended that patient not drink for ONE HOUR post sheath pull completion. *Recommended that patient not eat for TWO HOURS post sheath pull completion. *Instructed patient on rationale for delay of PO products to decrease risk for aspiration and if additional treatment to procedure site is required. Patient verbalized understanding of instructions with review.

## 2022-09-24 NOTE — PROGRESS NOTES
[facility]  [address]  [phone]  [email or web address]. [email or web address]  CARDIOLOGY PROGRESS NOTE    9/24/2022 7:29 AM    Admit Date: 9/23/2022    Admit Diagnosis:   Chest pain, unspecified type [R07.9]  ASHD (arteriosclerotic heart disease) [I25.10]    Subjective:     Orville Perdomo denies chest pain, chest pressure/discomfort, dyspnea, palpitations, irregular heart beats, near-syncope, syncope, fatigue, orthopnea, paroxysmal nocturnal dyspnea, exertional chest pressure/discomfort. Visit Vitals  /68 (BP 1 Location: Left upper arm, BP Patient Position: At rest;Semi fowlers)   Pulse 78   Temp 98.3 °F (36.8 °C)   Resp 14   Ht 6' 2\" (1.88 m)   Wt 227 lb (103 kg)   SpO2 96%   BMI 29.15 kg/m²       Current Facility-Administered Medications   Medication Dose Route Frequency    sodium chloride (NS) flush 5-40 mL  5-40 mL IntraVENous Q8H    sodium chloride (NS) flush 5-40 mL  5-40 mL IntraVENous PRN    aspirin delayed-release tablet 81 mg  81 mg Oral DAILY    atorvastatin (LIPITOR) tablet 20 mg  20 mg Oral DAILY    carvediloL (COREG) tablet 12.5 mg  12.5 mg Oral BID WITH MEALS    lisinopriL (PRINIVIL, ZESTRIL) tablet 10 mg  10 mg Oral DAILY    glucose chewable tablet 16 g  4 Tablet Oral PRN    glucagon (GLUCAGEN) injection 1 mg  1 mg IntraMUSCular PRN    dextrose 10% infusion 0-250 mL  0-250 mL IntraVENous PRN    insulin lispro (HUMALOG) injection   SubCUTAneous AC&HS    ticagrelor (BRILINTA) tablet 90 mg  90 mg Oral Q12H    acetaminophen (TYLENOL) tablet 650 mg  650 mg Oral Q6H PRN         Objective:      Physical Exam    General: Well developed, in no acute distress, cooperative and alert  HEENT: No carotid bruits, no JVD, trach is midline. Neck Supple, PERRL, EOM intact. Heart:  Normal S1/S2 negative S3 or S4. Regular, no murmur, gallop or rub.    Respiratory: Clear bilaterally, no wheezing or rales  Abdomen:   Soft, non-tender, bowel sounds are active. Extremities:  No edema, no cyanosis, atraumatic. Neuro: A&Ox3, speech clear. Intake/Output Summary (Last 24 hours) at 9/24/2022 0729  Last data filed at 9/24/2022 0357  Gross per 24 hour   Intake 1358.34 ml   Output 0 ml   Net 1358.34 ml        Telemetry: normal sinus rhythm    Assessment:     Active Problems:    ASHD (arteriosclerotic heart disease) (9/23/2022)        Plan:     S/p RCA DUNCAN. Stable. Ok to dc.     Elton Moy MD

## 2022-09-24 NOTE — PROGRESS NOTES
Discharge orders written, spouse at bedside. Medicare Outpatient Observation Notice (MOON)/ [place] Outpatient Observation Notice (Lawson Scarce) provided to patient/representative with verbal explanation of the notice. Time allotted for questions regarding the notice. Patient /representative provided a completed copy of the MOON/VOON notice. Copy placed on bedside chart.      Kassandra Mooney  Management  [phone]/Available on [employer]

## 2022-09-24 NOTE — PROGRESS NOTES
Bedside shift change report given to Callie Pierce RN (oncoming nurse) by Nano Moy RN (offgoing nurse). Report included the following information SBAR, Kardex, Procedure Summary, Intake/Output, MAR, Accordion, Recent Results, Med Rec Status, Cardiac Rhythm NSR, and Alarm Parameters .

## 2022-09-24 NOTE — PROGRESS NOTES
Problem: Falls - Risk of  Goal: *Absence of Falls  Description: Document Eve Dial Fall Risk and appropriate interventions in the flowsheet.   Outcome: Progressing Towards Goal  Note: Fall Risk Interventions:            Medication Interventions: Assess postural VS orthostatic hypotension, Bed/chair exit alarm, Evaluate medications/consider consulting pharmacy, Patient to call before getting OOB, Teach patient to arise slowly                   Problem: Patient Education: Go to Patient Education Activity  Goal: Patient/Family Education  Outcome: Progressing Towards Goal     Problem: Pain  Goal: *Control of Pain  Outcome: Progressing Towards Goal  Goal: *PALLIATIVE CARE:  Alleviation of Pain  Outcome: Progressing Towards Goal     Problem: Patient Education: Go to Patient Education Activity  Goal: Patient/Family Education  Outcome: Progressing Towards Goal     Problem: Patient Education: Go to Patient Education Activity  Goal: Patient/Family Education  Outcome: Progressing Towards Goal     Problem: Cath Lab Procedures: Post-Cath Day of Procedure (Initiate SCIP Measures for Post-Op Care)  Goal: Off Pathway (Use only if patient is Off Pathway)  Outcome: Progressing Towards Goal  Goal: Activity/Safety  Outcome: Progressing Towards Goal  Goal: Consults, if ordered  Outcome: Progressing Towards Goal  Goal: Diagnostic Test/Procedures  Outcome: Progressing Towards Goal  Goal: Nutrition/Diet  Outcome: Progressing Towards Goal  Goal: Discharge Planning  Outcome: Progressing Towards Goal  Goal: Medications  Outcome: Progressing Towards Goal  Goal: Respiratory  Outcome: Progressing Towards Goal  Goal: Treatments/Interventions/Procedures  Outcome: Progressing Towards Goal  Goal: Psychosocial  Outcome: Progressing Towards Goal  Goal: *Procedure site is without bleeding and signs of infection six hours post sheath removal  Outcome: Progressing Towards Goal  Goal: *Hemodynamically stable  Outcome: Progressing Towards Goal  Goal: *Optimal pain control at patient's stated goal  Outcome: Progressing Towards Goal

## 2022-09-24 NOTE — PROGRESS NOTES
1900 - Bedside shift change report given to Delmis Chen RN (oncoming nurse) by Jules Mcmahon RN (offgoing nurse). Report included the following information SBAR, Kardex, Procedure Summary, Intake/Output, MAR, Accordion, Recent Results, Med Rec Status, Cardiac Rhythm NSR, and Alarm Parameters . 1930 - Symptomatic Vagal response (Bradycardia 31 bpm, Nausea,diaphoresis, decrease BP). Administered Atropine 0.5 mg IV. 2000 - Pt. Is asymptomatic and stable vital signs. Pt. Denied CP, SOB, dizziness and Nausea. 2115 - D/C Sheath by Fely Lema RN. Right femoral cardiac cath site clotting pad in place, no bleeding and hematoma noted. Pt. Is asymptomatic and stable vital signs.

## 2022-09-24 NOTE — DISCHARGE INSTRUCTIONS
[address] [place] [address]  [phone]      [address] [place] [address] INSTRUCTIONS      Patient ID:  Brittany Mittal  [id number]  6 y.o.  1965    Admit Date: 9/23/2022    Discharge Date: 9/24/2022     Admitting Physician: [unfilled]     Discharge Physician: Hieu Garcia MD    Admission Diagnoses:   Chest pain, unspecified type [R07.9]  ASHD (arteriosclerotic heart disease) [I25.10]    Discharge Diagnoses: Active Problems:    ASHD (arteriosclerotic heart disease) (9/23/2022)        Discharge Condition: Good    Cardiology Procedures this Admission:  Left heart catheterization with PCI    Disposition: home      Reference discharge instructions provided by nursing for diet and activity. Signed: Hieu Garcia MD  9/24/2022  7:28 AM    CARDIAC CATHETERIZATION/PCI DISCHARGE INSTRUCTIONS    It is normal to feel tired the first couple days. Take it easy and follow the physicians instructions. CHECK THE CATHETER INSERTION SITE DAILY:    You may shower 24 hours after the procedure, remove the bandage during showering. Wash with soap and water and pat dry. Gentle cleaning of the site with soap and water is sufficient, cover with a dry clean dressing or bandage. Do not apply creams or powders to the area. Do not sit in a bathtub or pool of water for 7 days or until wound has completely healed. Temporary bruising and discomfort is normal and may last a few weeks. You may have a  formation of a small lump at the site which may last up to 6 weeks. CALL THE PHYSICIANS:    If the site becomes red, swollen or feels warm to the touch  If there is bleeding or drainage or if there is unusual pain at the groin or down the leg. If there is any bleeding, lie down, apply pressure or have someone apply pressure with a clean cloth until the bleeding stops.    If the bleeding continues, call 911 to be transported to the hospital.  DO NOT [address] [facility] HAVE ANYONE ELSE DRIVE YOU - CALL [phone]. ACTIVITY:    For the first 24-48 hours or as instructed by the physician:  No lifting, pushing or pulling over 10 pounds and no straining the insertion site. Do not life grocery bags or the garbage can, do not run the vacuum  or  for 7 days. Start with short walks as in the hospital and gradually increase as tolerated each day. It is recommended to walk 30 minutes 5-7 days per week. Follow your physicians instructions on activity. Avoid walking outside in extremes of heat or cold. Walk inside when it is cold and windy or hot and humid. Things to keep in mind:  No driving for at least 24 hours, or as designated by your physician. Limit the number of times you go up and down the stairs  Take rests and pace yourself with activity. Be careful and do not strain with bowel movements. MEDICATIONS:    Take all medications as prescribed  Call your physician if you have any questions  Keep an updated list of your medications with you at all times and give a list to your physician and pharmacist        SIGNS AND SYMPTOMS:    Be cautious of symptoms of angina or recurrent symptoms such as chest discomfort, unusual shortness of breath or fatigue. These could be symptoms of restenosis, a new blockage or a heart attack. If your symptoms are relieved with rest it is still recommended that you notify your physician of recurrent chest pain or discomfort. FOR CHEST PAIN or symptoms of angina not relieved with rest:  If the discomfort is not relieved with rest, and you have been prescribed Nitroglycerin, take as directed (taken under the tongue, one at a time 5 minutes apart for a total of 3 doses). If the discomfort is not relieved after the 3rd nitroglycerin, call 911. If you have not been prescribed Nitroglycerin  and your chest discomfort is not relieved with rest, call 911. AFTER CARE:    Follow up with your physician as instructed.   Follow a heart healthy diet with proper portion control, daily stress management, daily exercise, blood pressure and cholesterol control , and smoking cessation.

## 2022-09-24 NOTE — PROGRESS NOTES
Pt. Ambulated in his room and in the henry without difficulty. Pt. Is asymptomatic and stable vital signs. Right femoral cardiac cath site dressing intact, no bleeding and hematoma.

## 2022-09-26 ENCOUNTER — TRANSCRIBE ORDER (OUTPATIENT)
Dept: CARDIAC REHAB | Age: 57
End: 2022-09-26

## 2022-09-26 DIAGNOSIS — Z95.5 STENTED CORONARY ARTERY: Primary | ICD-10-CM

## 2022-10-20 ENCOUNTER — HOSPITAL ENCOUNTER (OUTPATIENT)
Age: 57
Setting detail: OUTPATIENT SURGERY
Discharge: HOME | End: 2022-10-20
Attending: INTERNAL MEDICINE | Admitting: INTERNAL MEDICINE
Payer: COMMERCIAL

## 2022-10-20 VITALS
BODY MASS INDEX: 29.13 KG/M2 | OXYGEN SATURATION: 96 % | HEART RATE: 69 BPM | WEIGHT: 227 LBS | TEMPERATURE: 98.2 F | SYSTOLIC BLOOD PRESSURE: 120 MMHG | DIASTOLIC BLOOD PRESSURE: 66 MMHG | RESPIRATION RATE: 17 BRPM | HEIGHT: 74 IN

## 2022-10-20 DIAGNOSIS — R07.9 CHEST PAIN, UNSPECIFIED TYPE: ICD-10-CM

## 2022-10-20 LAB
GLUCOSE BLD STRIP.AUTO-MCNC: 156 MG/DL (ref 65–117)
SERVICE CMNT-IMP: ABNORMAL

## 2022-10-20 PROCEDURE — C1753 CATH, INTRAVAS ULTRASOUND: HCPCS | Performed by: INTERNAL MEDICINE

## 2022-10-20 PROCEDURE — 92978 ENDOLUMINL IVUS OCT C 1ST: CPT | Performed by: INTERNAL MEDICINE

## 2022-10-20 PROCEDURE — 77030019569 HC BND COMPR RAD TERU -B: Performed by: INTERNAL MEDICINE

## 2022-10-20 PROCEDURE — 77030019697 HC SYR ANGI INFL MRTM -B: Performed by: INTERNAL MEDICINE

## 2022-10-20 PROCEDURE — C1894 INTRO/SHEATH, NON-LASER: HCPCS | Performed by: INTERNAL MEDICINE

## 2022-10-20 PROCEDURE — C1725 CATH, TRANSLUMIN NON-LASER: HCPCS | Performed by: INTERNAL MEDICINE

## 2022-10-20 PROCEDURE — 77030015766: Performed by: INTERNAL MEDICINE

## 2022-10-20 PROCEDURE — 77030010221 HC SPLNT WR POS TELE -B: Performed by: INTERNAL MEDICINE

## 2022-10-20 PROCEDURE — C1769 GUIDE WIRE: HCPCS | Performed by: INTERNAL MEDICINE

## 2022-10-20 PROCEDURE — 93454 CORONARY ARTERY ANGIO S&I: CPT | Performed by: INTERNAL MEDICINE

## 2022-10-20 PROCEDURE — 99153 MOD SED SAME PHYS/QHP EA: CPT | Performed by: INTERNAL MEDICINE

## 2022-10-20 PROCEDURE — 77030008543 HC TBNG MON PRSS MRTM -A: Performed by: INTERNAL MEDICINE

## 2022-10-20 PROCEDURE — 74011000258 HC RX REV CODE- 258: Performed by: INTERNAL MEDICINE

## 2022-10-20 PROCEDURE — C1887 CATHETER, GUIDING: HCPCS | Performed by: INTERNAL MEDICINE

## 2022-10-20 PROCEDURE — 74011000250 HC RX REV CODE- 250: Performed by: INTERNAL MEDICINE

## 2022-10-20 PROCEDURE — 74011000636 HC RX REV CODE- 636: Performed by: INTERNAL MEDICINE

## 2022-10-20 PROCEDURE — 74011250636 HC RX REV CODE- 250/636: Performed by: INTERNAL MEDICINE

## 2022-10-20 PROCEDURE — C1874 STENT, COATED/COV W/DEL SYS: HCPCS | Performed by: INTERNAL MEDICINE

## 2022-10-20 PROCEDURE — 92928 PRQ TCAT PLMT NTRAC ST 1 LES: CPT | Performed by: INTERNAL MEDICINE

## 2022-10-20 PROCEDURE — 99152 MOD SED SAME PHYS/QHP 5/>YRS: CPT | Performed by: INTERNAL MEDICINE

## 2022-10-20 PROCEDURE — 77030019698 HC SYR ANGI MDLON MRTM -A: Performed by: INTERNAL MEDICINE

## 2022-10-20 PROCEDURE — 2709999900 HC NON-CHARGEABLE SUPPLY: Performed by: INTERNAL MEDICINE

## 2022-10-20 PROCEDURE — 76937 US GUIDE VASCULAR ACCESS: CPT | Performed by: INTERNAL MEDICINE

## 2022-10-20 PROCEDURE — 82962 GLUCOSE BLOOD TEST: CPT

## 2022-10-20 PROCEDURE — 77030012468 HC VLV BLEEDBK CNTRL ABBT -B: Performed by: INTERNAL MEDICINE

## 2022-10-20 DEVICE — STENT RONYX25038UX RESOLUTE ONYX 2.50X38
Type: IMPLANTABLE DEVICE | Status: FUNCTIONAL
Brand: RESOLUTE ONYX™

## 2022-10-20 RX ORDER — MIDAZOLAM HYDROCHLORIDE 1 MG/ML
INJECTION, SOLUTION INTRAMUSCULAR; INTRAVENOUS AS NEEDED
Status: DISCONTINUED | OUTPATIENT
Start: 2022-10-20 | End: 2022-10-20 | Stop reason: HOSPADM

## 2022-10-20 RX ORDER — LIDOCAINE HYDROCHLORIDE 10 MG/ML
INJECTION, SOLUTION EPIDURAL; INFILTRATION; INTRACAUDAL; PERINEURAL AS NEEDED
Status: DISCONTINUED | OUTPATIENT
Start: 2022-10-20 | End: 2022-10-20 | Stop reason: HOSPADM

## 2022-10-20 RX ORDER — HEPARIN SODIUM 200 [USP'U]/100ML
INJECTION, SOLUTION INTRAVENOUS
Status: COMPLETED | OUTPATIENT
Start: 2022-10-20 | End: 2022-10-20

## 2022-10-20 RX ORDER — FENTANYL CITRATE 50 UG/ML
INJECTION, SOLUTION INTRAMUSCULAR; INTRAVENOUS AS NEEDED
Status: DISCONTINUED | OUTPATIENT
Start: 2022-10-20 | End: 2022-10-20 | Stop reason: HOSPADM

## 2022-10-20 NOTE — PROGRESS NOTES
Primary Nurse Timothy Albright RN and Josselin Trotter RN performed a dual skin assessment on this patient No impairment noted  Jareth score is 23.   Meplex applied to sacrum

## 2022-10-20 NOTE — PROGRESS NOTES
Cardiac Cath Lab Recovery Arrival Note:      Hans Guerrero arrived to Cardiac Cath Lab, Recovery Area. Staff introduced to patient. Patient identifiers verified with NAME and DATE OF BIRTH. Procedure verified with patient. Consent forms reviewed and signed by patient or authorized representative and verified. Allergies verified. Patient and family oriented to department. Patient and family informed of procedure and plan of care. Questions answered with review. Patient prepped for procedure, per orders from physician, prior to arrival.    Patient on cardiac monitor, non-invasive blood pressure, SPO2 monitor. On room air. Patient is A&Ox 4. Patient reports no pain. Patient in stretcher, in low position, with side rails up, call bell within reach, patient instructed to call if assistance as needed. Patient prep in: [address], [place] [address] . Patient family has pager # none  Family in: [de-identified] - wife Smooth Bartlett.    Prep by: Jade Quevedo RN and Shawanda Aleman RN

## 2022-10-20 NOTE — Clinical Note
TRANSFER - OUT REPORT:     Verbal report given to: Dollie Gaucher RN. Report consisted of patient's Situation, Background, Assessment and   Recommendations(SBAR). Opportunity for questions and clarification was provided. Patient transported with a Registered Nurse and [address] [place] [address] / [facility]. Patient transported to: [facility].

## 2022-10-20 NOTE — PROGRESS NOTES
Ambulate with pt in henry to BR. Gait steady. Right radial dressing dry and intact. Pt denies c/o. DC instructions reviewed with pt and wife. Both verbalize understanding. SL dc'd without difficulty. PT wheeled to front door to be driven home by wife.

## 2022-10-20 NOTE — ROUTINE PROCESS
TRANSFER - IN REPORT:    Verbal report received from KERI Garcia CVT on Anne Blanco  being received from [facility] for routine progression of care. Report consisted of patients Situation, Background, Assessment and Recommendations(SBAR). Information from the following report(s) Procedure Summary and MAR was reviewed with the receiving clinician. Opportunity for questions and clarification was provided. Assessment completed upon patients arrival to [address] [place] [address] and care [facility]. Cardiac Cath Lab Recovery Arrival Note:    Anne Blanco arrived to [facility] recovery area. Patient procedure= PCI. Patient on cardiac monitor, non-invasive blood pressure, SPO2 monitor. On O2 @ 2 lpm via NC.  IV  of NS on pump at 75 ml/hr. Patient status doing well without problems. Patient is A&Ox 3. Patient reports no c/o. PROCEDURE SITE CHECK:    Procedure site:without any bleeding and no hematoma, no pain/discomfort reported at procedure site. No change in patient status. Continue to monitor patient and status.

## 2022-10-21 ENCOUNTER — TRANSCRIBE ORDER (OUTPATIENT)
Dept: CARDIAC REHAB | Age: 57
End: 2022-10-21

## 2022-10-21 DIAGNOSIS — Z95.5 STENTED CORONARY ARTERY: Primary | ICD-10-CM

## 2022-10-21 NOTE — CARDIO/PULMONARY
Cardiac Rehab Note: chart review/referral     Cardiac cath with intervention 10/20/22    Smoking history assessed. Patient is a non smoker. Smoking Cessation Program link has not been added to the AVS.     CAD education folder, with heart heathy diet, warning signs, heart facts, catheterization brochure, and out patient cardiac rehab program mailed to [facility].                                                 Patient d/c. CP Rehab will follow up

## 2022-11-18 ENCOUNTER — OFFICE VISIT (OUTPATIENT)
Dept: INTERNAL MEDICINE CLINIC | Age: 57
End: 2022-11-18
Payer: COMMERCIAL

## 2022-11-18 VITALS
DIASTOLIC BLOOD PRESSURE: 76 MMHG | HEART RATE: 82 BPM | BODY MASS INDEX: 29.08 KG/M2 | HEIGHT: 74 IN | TEMPERATURE: 98.3 F | RESPIRATION RATE: 16 BRPM | SYSTOLIC BLOOD PRESSURE: 127 MMHG | OXYGEN SATURATION: 99 % | WEIGHT: 226.6 LBS

## 2022-11-18 DIAGNOSIS — E78.5 HYPERLIPIDEMIA ASSOCIATED WITH TYPE 2 DIABETES MELLITUS (HCC): ICD-10-CM

## 2022-11-18 DIAGNOSIS — E11.69 HYPERLIPIDEMIA ASSOCIATED WITH TYPE 2 DIABETES MELLITUS (HCC): ICD-10-CM

## 2022-11-18 DIAGNOSIS — E11.22 TYPE 2 DIABETES MELLITUS WITH STAGE 3A CHRONIC KIDNEY DISEASE, WITHOUT LONG-TERM CURRENT USE OF INSULIN (HCC): Primary | ICD-10-CM

## 2022-11-18 DIAGNOSIS — N18.31 TYPE 2 DIABETES MELLITUS WITH STAGE 3A CHRONIC KIDNEY DISEASE, WITHOUT LONG-TERM CURRENT USE OF INSULIN (HCC): Primary | ICD-10-CM

## 2022-11-18 DIAGNOSIS — I10 ESSENTIAL HYPERTENSION: ICD-10-CM

## 2022-11-18 DIAGNOSIS — I25.110 CORONARY ARTERY DISEASE INVOLVING NATIVE CORONARY ARTERY OF NATIVE HEART WITH UNSTABLE ANGINA PECTORIS (HCC): ICD-10-CM

## 2022-11-18 PROCEDURE — 99214 OFFICE O/P EST MOD 30 MIN: CPT | Performed by: INTERNAL MEDICINE

## 2022-11-18 RX ORDER — NITROGLYCERIN 0.4 MG/1
TABLET SUBLINGUAL
COMMUNITY

## 2022-11-18 RX ORDER — AMLODIPINE BESYLATE 10 MG/1
10 TABLET ORAL DAILY
COMMUNITY

## 2022-11-18 RX ORDER — AMLODIPINE BESYLATE 5 MG/1
5 TABLET ORAL DAILY
COMMUNITY
Start: 2022-09-20 | End: 2022-11-18

## 2022-11-18 RX ORDER — ISOSORBIDE MONONITRATE 30 MG/1
30 TABLET, EXTENDED RELEASE ORAL DAILY
COMMUNITY
Start: 2022-08-31 | End: 2022-11-18 | Stop reason: SINTOL

## 2022-11-18 NOTE — PROGRESS NOTES
1. \"Have you been to the ER, urgent care clinic since your last visit? Hospitalized since your last visit? \" Yes see encounters    2. \"Have you seen or consulted any other health care providers outside of the [address] [place] [address] since your last visit? \" No     3. For patients aged 39-70: Has the patient had a colonoscopy / FIT/ Cologuard? Yes - no Care Gap present      If the patient is female:    4. For patients aged 41-77: Has the patient had a mammogram within the past 2 years? NA - based on age or sex      11. For patients aged 21-65: Has the patient had a pap smear?  NA - based on age or sex

## 2022-11-18 NOTE — PATIENT INSTRUCTIONS
Try JOBST compression stockings, mid range compression would be fine. Wear them when you have any trip more than a few hours.

## 2022-11-18 NOTE — PROGRESS NOTES
[facility] is a 61 y.o. male who presents for evaluation of routine follow up for dm, htn, hld. Last seen by me April 22, 2022 in cpe. Since then he was having some more chest pains, and had an abn stress test. He then had heart cath in sept, and had 2 more stents placed. He then had continued discomfort, and had another heart cath and a 3rd stent placed. He has done much better since then. Plans to retire from current job end of this year.       ROS:  Constitutional: negative for fevers, chills, anorexia and weight loss  Eyes:   negative for visual disturbance and irritation  ENT:   negative for tinnitus,sore throat,nasal congestion,ear pain,hoarseness  Respiratory:  negative for cough, hemoptysis, dyspnea,wheezing  CV:   negative for chest pain, palpitations, lower extremity edema  GI:   negative for nausea, vomiting, diarrhea, abdominal pain,melena  Genitourinary: negative for frequency, dysuria and hematuria  Musculoskel: negative for myalgias, arthralgias, back pain, muscle weakness, joint pain  Neurological:  negative for headaches, dizziness, focal weakness, numbness  Psychiatric:     Negative for depression or anxiety      Past Medical History:   Diagnosis Date    Coronary artery disease involving native coronary artery with unstable angina pectoris ([facility] [address] 9/27/2017    Family history of early CAD 9/26/2017    Significant for mother, brother    Hypercholesterolemia     Hypertension     Myocardial infarction ([facility] [address]     Type II diabetes mellitus, uncontrolled 9/27/2017       Past Surgical History:   Procedure Laterality Date    HX CORONARY ARTERY BYPASS GRAFT      HX ORTHOPAEDIC      left side face metal     LA CARDIAC SURG PROCEDURE UNLIST      triple bypass       Family History   Problem Relation Age of Onset    Diabetes Mother     Heart Disease Mother     No Known Problems Father        Social History     Socioeconomic History    Marital status:      Spouse name: Not on file    Number of children: Not on file    Years of education: Not on file    Highest education level: Not on file   Occupational History    Not on file   Tobacco Use    Smoking status: Never    Smokeless tobacco: Never   Vaping Use    Vaping Use: Never used   Substance and Sexual Activity    Alcohol use: Never     Comment: occassionally    Drug use: Never    Sexual activity: Yes     Partners: Female     Birth control/protection: None   Other Topics Concern    Not on file   Social History Narrative    Not on file     Social Determinants of Health     Financial Resource Strain: Low Risk     Difficulty of Paying Living Expenses: Not hard at all   Food Insecurity: No Food Insecurity    Worried About Running Out of Food in the Last Year: Never true    Ran Out of Food in the Last Year: Never true   Transportation Needs: Not on file   Physical Activity: Not on file   Stress: Not on file   Social Connections: Not on file   Intimate Partner Violence: Not on file   Housing Stability: Not on file          Visit Vitals  /76 (BP 1 Location: Left upper arm, BP Patient Position: Sitting)   Pulse 82   Temp 98.3 °F (36.8 °C) (Temporal)   Resp 16   Ht 6' 2\" (1.88 m)   Wt 226 lb 9.6 oz (102.8 kg)   SpO2 99%   BMI 29.09 kg/m²       Physical Examination:   General - Well appearing male  HEENT - PERRL, TM no erythema/opacification, normal nasal turbinates, no oropharyngeal erythema or exudate, MMM  Neck - supple, no bruits, no thyroidomegaly, no lymphadenopathy  Pulm - clear to auscultation bilaterally  Cardio - RRR, normal S1 S2, no murmur  Abd - soft, nontender, no masses, no HSM  Extrem - no edema, +2 distal pulses  Neuro-  No focal deficits, CN intact         Diabetic foot exam performed by Kian Parisi III, DO     Measurement  Response Nurse Comment Physician Comment   Monofilament  R - normal sensation with micro filament  L - normal sensation with micro filament     Pulse DP R - present  L - present     Pulse TP R - present  L - present     Structural deformity R - None  L - None     Skin Integrity / Deformity R - None  L - None        Reviewed by:             Assessment/Plan:     Ckd 3a--rx to start farxiga  Dm, type 2--will stop glucophage, switch to farxiga  Hyperlipids--on lipitor  Cad--initially had CABG, then had 3 stents done fall 2022. On asa, brilinta, coreg, lisinopril, prn sl ntg  Intermittent right leg edema--suspect due to vein removal for cabg.   Suggested compression stockings, jobst.    Declines shingrix  Rtc 6 months  Encouraged/reminded to get eye exam        Viri oHward III, DO

## 2023-02-27 RX ORDER — ATORVASTATIN CALCIUM 20 MG/1
20 TABLET, FILM COATED ORAL DAILY
Qty: 90 TABLET | Refills: 3 | Status: SHIPPED | OUTPATIENT
Start: 2023-02-27

## 2023-02-27 NOTE — TELEPHONE ENCOUNTER
Future Appointments:  Future Appointments   Date Time Provider Kamlesh Anastasiia   6/9/2023 12:00 PM Anais Torrez [facility] BS AMB        Last Appointment With Me:  11/18/2022     Requested Prescriptions     Pending Prescriptions Disp Refills    atorvastatin (LIPITOR) 20 mg tablet 90 Tablet 3     Sig: Take 1 Tablet by mouth daily.

## 2023-05-18 ENCOUNTER — TELEPHONE (OUTPATIENT)
Age: 58
End: 2023-05-18

## 2023-05-18 RX ORDER — CARVEDILOL 12.5 MG/1
12.5 TABLET ORAL 2 TIMES DAILY WITH MEALS
Qty: 60 TABLET | Refills: 11 | Status: SHIPPED | OUTPATIENT
Start: 2023-05-18

## 2023-05-18 NOTE — TELEPHONE ENCOUNTER
Patient states he needs a call back to discuss if Dr. Cruzito Ritchie can refill his Medication,. carvediloL (COREG) 12.5 mg tablet as he reports he had insurance Change & Cardiologist is Not in Network Now & also needs to discuss who PCP would suggest to see as New Cardiologist.     Patient was also advised to check with Insurance to get name of In Network Cardiologist for PCP to advise who he thinks would be best.       Please call to advise on medication management until [employer]

## 2023-06-29 ENCOUNTER — OFFICE VISIT (OUTPATIENT)
Age: 58
End: 2023-06-29
Payer: COMMERCIAL

## 2023-06-29 VITALS
TEMPERATURE: 98.2 F | DIASTOLIC BLOOD PRESSURE: 85 MMHG | OXYGEN SATURATION: 100 % | WEIGHT: 218.8 LBS | HEART RATE: 60 BPM | SYSTOLIC BLOOD PRESSURE: 142 MMHG | RESPIRATION RATE: 14 BRPM | BODY MASS INDEX: 28.08 KG/M2 | HEIGHT: 74 IN

## 2023-06-29 DIAGNOSIS — N18.31 TYPE 2 DIABETES MELLITUS WITH STAGE 3A CHRONIC KIDNEY DISEASE, WITHOUT LONG-TERM CURRENT USE OF INSULIN (HCC): ICD-10-CM

## 2023-06-29 DIAGNOSIS — Z00.00 ANNUAL PHYSICAL EXAM: Primary | ICD-10-CM

## 2023-06-29 DIAGNOSIS — I25.10 CORONARY ARTERY DISEASE DUE TO LIPID RICH PLAQUE: ICD-10-CM

## 2023-06-29 DIAGNOSIS — E11.69 HYPERLIPIDEMIA ASSOCIATED WITH TYPE 2 DIABETES MELLITUS (HCC): ICD-10-CM

## 2023-06-29 DIAGNOSIS — I10 ESSENTIAL (PRIMARY) HYPERTENSION: ICD-10-CM

## 2023-06-29 DIAGNOSIS — I25.83 CORONARY ARTERY DISEASE DUE TO LIPID RICH PLAQUE: ICD-10-CM

## 2023-06-29 DIAGNOSIS — E11.22 TYPE 2 DIABETES MELLITUS WITH STAGE 3A CHRONIC KIDNEY DISEASE, WITHOUT LONG-TERM CURRENT USE OF INSULIN (HCC): ICD-10-CM

## 2023-06-29 DIAGNOSIS — E78.5 HYPERLIPIDEMIA ASSOCIATED WITH TYPE 2 DIABETES MELLITUS (HCC): ICD-10-CM

## 2023-06-29 PROCEDURE — 3077F SYST BP >= 140 MM HG: CPT | Performed by: INTERNAL MEDICINE

## 2023-06-29 PROCEDURE — 3079F DIAST BP 80-89 MM HG: CPT | Performed by: INTERNAL MEDICINE

## 2023-06-29 PROCEDURE — 99396 PREV VISIT EST AGE 40-64: CPT | Performed by: INTERNAL MEDICINE

## 2023-06-29 SDOH — ECONOMIC STABILITY: INCOME INSECURITY: HOW HARD IS IT FOR YOU TO PAY FOR THE VERY BASICS LIKE FOOD, HOUSING, MEDICAL CARE, AND HEATING?: NOT HARD AT ALL

## 2023-06-29 SDOH — ECONOMIC STABILITY: FOOD INSECURITY: WITHIN THE PAST 12 MONTHS, YOU WORRIED THAT YOUR FOOD WOULD RUN OUT BEFORE YOU GOT MONEY TO BUY MORE.: NEVER TRUE

## 2023-06-29 SDOH — ECONOMIC STABILITY: FOOD INSECURITY: WITHIN THE PAST 12 MONTHS, THE FOOD YOU BOUGHT JUST DIDN'T LAST AND YOU DIDN'T HAVE MONEY TO GET MORE.: NEVER TRUE

## 2023-06-29 SDOH — ECONOMIC STABILITY: HOUSING INSECURITY
IN THE LAST 12 MONTHS, WAS THERE A TIME WHEN YOU DID NOT HAVE A STEADY PLACE TO SLEEP OR SLEPT IN A SHELTER (INCLUDING NOW)?: NO

## 2023-06-29 ASSESSMENT — PATIENT HEALTH QUESTIONNAIRE - PHQ9
SUM OF ALL RESPONSES TO PHQ QUESTIONS 1-9: 0
2. FEELING DOWN, DEPRESSED OR HOPELESS: 0
SUM OF ALL RESPONSES TO PHQ QUESTIONS 1-9: 0
SUM OF ALL RESPONSES TO PHQ QUESTIONS 1-9: 0
1. LITTLE INTEREST OR PLEASURE IN DOING THINGS: 0
SUM OF ALL RESPONSES TO PHQ QUESTIONS 1-9: 0
SUM OF ALL RESPONSES TO PHQ9 QUESTIONS 1 & 2: 0

## 2023-06-30 LAB
ALBUMIN SERPL-MCNC: 4.3 G/DL (ref 3.5–5)
ALBUMIN/GLOB SERPL: 1.6 (ref 1.1–2.2)
ALP SERPL-CCNC: 72 U/L (ref 45–117)
ALT SERPL-CCNC: 28 U/L (ref 12–78)
ANION GAP SERPL CALC-SCNC: 4 MMOL/L (ref 5–15)
AST SERPL-CCNC: 16 U/L (ref 15–37)
BASOPHILS # BLD: 0.1 K/UL (ref 0–0.1)
BASOPHILS NFR BLD: 1 % (ref 0–1)
BILIRUB SERPL-MCNC: 0.4 MG/DL (ref 0.2–1)
BUN SERPL-MCNC: 25 MG/DL (ref 6–20)
BUN/CREAT SERPL: 20 (ref 12–20)
CALCIUM SERPL-MCNC: 9.2 MG/DL (ref 8.5–10.1)
CHLORIDE SERPL-SCNC: 111 MMOL/L (ref 97–108)
CHOLEST SERPL-MCNC: 128 MG/DL
CO2 SERPL-SCNC: 26 MMOL/L (ref 21–32)
CREAT SERPL-MCNC: 1.27 MG/DL (ref 0.7–1.3)
CREAT UR-MCNC: 43.3 MG/DL
DIFFERENTIAL METHOD BLD: ABNORMAL
EOSINOPHIL # BLD: 0.3 K/UL (ref 0–0.4)
EOSINOPHIL NFR BLD: 4 % (ref 0–7)
ERYTHROCYTE [DISTWIDTH] IN BLOOD BY AUTOMATED COUNT: 12.7 % (ref 11.5–14.5)
EST. AVERAGE GLUCOSE BLD GHB EST-MCNC: 123 MG/DL
GLOBULIN SER CALC-MCNC: 2.7 G/DL (ref 2–4)
GLUCOSE SERPL-MCNC: 87 MG/DL (ref 65–100)
HBA1C MFR BLD: 5.9 % (ref 4–5.6)
HCT VFR BLD AUTO: 37 % (ref 36.6–50.3)
HDLC SERPL-MCNC: 45 MG/DL
HDLC SERPL: 2.8 (ref 0–5)
HGB BLD-MCNC: 11.8 G/DL (ref 12.1–17)
IMM GRANULOCYTES # BLD AUTO: 0 K/UL (ref 0–0.04)
IMM GRANULOCYTES NFR BLD AUTO: 0 % (ref 0–0.5)
LDLC SERPL CALC-MCNC: 66.8 MG/DL (ref 0–100)
LYMPHOCYTES # BLD: 2.3 K/UL (ref 0.8–3.5)
LYMPHOCYTES NFR BLD: 32 % (ref 12–49)
MCH RBC QN AUTO: 28.7 PG (ref 26–34)
MCHC RBC AUTO-ENTMCNC: 31.9 G/DL (ref 30–36.5)
MCV RBC AUTO: 90 FL (ref 80–99)
MICROALBUMIN UR-MCNC: <0.5 MG/DL
MICROALBUMIN/CREAT UR-RTO: <12 MG/G (ref 0–30)
MONOCYTES # BLD: 0.7 K/UL (ref 0–1)
MONOCYTES NFR BLD: 10 % (ref 5–13)
NEUTS SEG # BLD: 4 K/UL (ref 1.8–8)
NEUTS SEG NFR BLD: 53 % (ref 32–75)
NRBC # BLD: 0 K/UL (ref 0–0.01)
NRBC BLD-RTO: 0 PER 100 WBC
PLATELET # BLD AUTO: 180 K/UL (ref 150–400)
PMV BLD AUTO: 10 FL (ref 8.9–12.9)
POTASSIUM SERPL-SCNC: 5 MMOL/L (ref 3.5–5.1)
PROT SERPL-MCNC: 7 G/DL (ref 6.4–8.2)
PSA SERPL-MCNC: 0.2 NG/ML (ref 0.01–4)
RBC # BLD AUTO: 4.11 M/UL (ref 4.1–5.7)
SODIUM SERPL-SCNC: 141 MMOL/L (ref 136–145)
TRIGL SERPL-MCNC: 81 MG/DL
TSH SERPL DL<=0.05 MIU/L-ACNC: 1.31 UIU/ML (ref 0.36–3.74)
VLDLC SERPL CALC-MCNC: 16.2 MG/DL
WBC # BLD AUTO: 7.4 K/UL (ref 4.1–11.1)

## 2023-10-24 ENCOUNTER — TELEPHONE (OUTPATIENT)
Age: 58
End: 2023-10-24

## 2023-10-24 NOTE — TELEPHONE ENCOUNTER
----- Message from Sheba Handley MD sent at 10/23/2023 10:03 PM EDT -----  Please advise echo is basically stable, low normal heart function to minimally reduced, on good medications. Proceed with healthy diet and exercise, continue current medications with tight blood pressure control, and follow-up as planned    Left msge on voice mail to call office back at [phone]  .

## 2023-10-26 NOTE — TELEPHONE ENCOUNTER
Pt. Calling back for results, stated his phone service is not the best, wants a detailed message left on his VM.     [phone]

## 2023-10-30 ENCOUNTER — TELEPHONE (OUTPATIENT)
Age: 58
End: 2023-10-30

## 2023-10-30 NOTE — TELEPHONE ENCOUNTER
----- Message from Michael Velasco MD sent at 10/23/2023 10:03 PM EDT -----  Please advise echo is basically stable, low normal heart function to minimally reduced, on good medications. Proceed with healthy diet and exercise, continue current medications with tight blood pressure control, and follow-up as planned. Verified Patient with two identifiers  Spoke with patient regarding results and recommendations. Patient voiced understanding.

## 2024-01-04 ENCOUNTER — OFFICE VISIT (OUTPATIENT)
Age: 59
End: 2024-01-04
Payer: COMMERCIAL

## 2024-01-04 VITALS
BODY MASS INDEX: 28.44 KG/M2 | WEIGHT: 221.6 LBS | HEART RATE: 76 BPM | DIASTOLIC BLOOD PRESSURE: 77 MMHG | RESPIRATION RATE: 20 BRPM | OXYGEN SATURATION: 100 % | TEMPERATURE: 98.2 F | SYSTOLIC BLOOD PRESSURE: 127 MMHG | HEIGHT: 74 IN

## 2024-01-04 DIAGNOSIS — I25.83 CORONARY ARTERY DISEASE DUE TO LIPID RICH PLAQUE: Primary | ICD-10-CM

## 2024-01-04 DIAGNOSIS — E11.22 TYPE 2 DIABETES MELLITUS WITH STAGE 3A CHRONIC KIDNEY DISEASE, WITHOUT LONG-TERM CURRENT USE OF INSULIN (HCC): ICD-10-CM

## 2024-01-04 DIAGNOSIS — I25.5 ISCHEMIC CARDIOMYOPATHY: ICD-10-CM

## 2024-01-04 DIAGNOSIS — N18.31 TYPE 2 DIABETES MELLITUS WITH STAGE 3A CHRONIC KIDNEY DISEASE, WITHOUT LONG-TERM CURRENT USE OF INSULIN (HCC): ICD-10-CM

## 2024-01-04 DIAGNOSIS — E78.5 HYPERLIPIDEMIA ASSOCIATED WITH TYPE 2 DIABETES MELLITUS (HCC): ICD-10-CM

## 2024-01-04 DIAGNOSIS — E11.69 HYPERLIPIDEMIA ASSOCIATED WITH TYPE 2 DIABETES MELLITUS (HCC): ICD-10-CM

## 2024-01-04 DIAGNOSIS — I10 ESSENTIAL (PRIMARY) HYPERTENSION: ICD-10-CM

## 2024-01-04 DIAGNOSIS — I25.10 CORONARY ARTERY DISEASE DUE TO LIPID RICH PLAQUE: Primary | ICD-10-CM

## 2024-01-04 PROCEDURE — 99214 OFFICE O/P EST MOD 30 MIN: CPT | Performed by: INTERNAL MEDICINE

## 2024-01-04 PROCEDURE — 3078F DIAST BP <80 MM HG: CPT | Performed by: INTERNAL MEDICINE

## 2024-01-04 PROCEDURE — 3074F SYST BP LT 130 MM HG: CPT | Performed by: INTERNAL MEDICINE

## 2024-01-04 SDOH — ECONOMIC STABILITY: FOOD INSECURITY: WITHIN THE PAST 12 MONTHS, YOU WORRIED THAT YOUR FOOD WOULD RUN OUT BEFORE YOU GOT MONEY TO BUY MORE.: NEVER TRUE

## 2024-01-04 SDOH — ECONOMIC STABILITY: FOOD INSECURITY: WITHIN THE PAST 12 MONTHS, THE FOOD YOU BOUGHT JUST DIDN'T LAST AND YOU DIDN'T HAVE MONEY TO GET MORE.: NEVER TRUE

## 2024-01-04 SDOH — ECONOMIC STABILITY: INCOME INSECURITY: HOW HARD IS IT FOR YOU TO PAY FOR THE VERY BASICS LIKE FOOD, HOUSING, MEDICAL CARE, AND HEATING?: NOT HARD AT ALL

## 2024-01-04 ASSESSMENT — PATIENT HEALTH QUESTIONNAIRE - PHQ9
SUM OF ALL RESPONSES TO PHQ9 QUESTIONS 1 & 2: 0
1. LITTLE INTEREST OR PLEASURE IN DOING THINGS: 0
SUM OF ALL RESPONSES TO PHQ QUESTIONS 1-9: 0
SUM OF ALL RESPONSES TO PHQ QUESTIONS 1-9: 0
2. FEELING DOWN, DEPRESSED OR HOPELESS: 0
SUM OF ALL RESPONSES TO PHQ QUESTIONS 1-9: 0
SUM OF ALL RESPONSES TO PHQ QUESTIONS 1-9: 0

## 2024-01-04 NOTE — PROGRESS NOTES
Stool sample normal, no signs of any blood.
Stool sample results reviewed with patient. Patient verbalized understanding and does not have any questions at this time.
Yes

## 2024-01-04 NOTE — PROGRESS NOTES
Sung Arguelles is a 58 y.o. male who presents for evaluation of routine follow up for dm, htn, hld, ckd 3a, cad.  Last seen by me June 29, 2023 in cpe.  He has done well.  Was unable to get started on jardiance, cost was $700/month.  We were never notified.  He had tried farxiga in past, but did not tolerate it--caused excessive drying out.  He continues to walk daily, feels well.  Did have echo done recently, EF down to 45-50%.  Had been about 50% when last checked in 2018 and 2019.  Was down to 25% though in 2017.      ROS:  Constitutional: negative for fevers, chills, anorexia and weight loss  Eyes:   negative for visual disturbance and irritation  ENT:   negative for tinnitus,sore throat,nasal congestion,ear pain,hoarseness  Respiratory:  negative for cough, hemoptysis, dyspnea,wheezing  CV:   negative for chest pain, palpitations, lower extremity edema  GI:   negative for nausea, vomiting, diarrhea, abdominal pain,melena  Genitourinary: negative for frequency, dysuria and hematuria  Musculoskel: negative for myalgias, arthralgias, back pain, muscle weakness, joint pain  Neurological:  negative for headaches, dizziness, focal weakness, numbness  Psychiatric:     Negative for depression or anxiety      Past Medical History:   Diagnosis Date    Coronary artery disease involving native coronary artery with unstable angina pectoris (HCC) 9/27/2017    Family history of early CAD 9/26/2017    Significant for mother, brother    Hypercholesterolemia     Hypertension     Myocardial infarction (HCC)     Type II diabetes mellitus, uncontrolled 9/27/2017       Past Surgical History:   Procedure Laterality Date    CORONARY ARTERY BYPASS GRAFT      ORTHOPEDIC SURGERY      left side face metal     DC UNLISTED PROCEDURE CARDIAC SURGERY      triple bypass       Family History   Problem Relation Age of Onset    Heart Disease Mother     Diabetes Mother     No Known Problems Father        Social History     Socioeconomic History

## 2024-01-04 NOTE — PROGRESS NOTES
1. \"Have you been to the ER, urgent care clinic since your last visit?  Hospitalized since your last visit?\" No    2. \"Have you seen or consulted any other health care providers outside of the [facility] since your last visit?\" No     3. For patients aged 45-75: Has the patient had a colonoscopy / FIT/ Cologuard? No      If the patient is female:    4. For patients aged 40-74: Has the patient had a mammogram within the past 2 years? NA - based on age or sex      5. For patients aged 21-65: Has the patient had a pap smear? NA - based on age or sex

## 2024-01-10 ENCOUNTER — TELEPHONE (OUTPATIENT)
Age: 59
End: 2024-01-10

## 2024-01-10 NOTE — TELEPHONE ENCOUNTER
Pt states that his insurance states there needs to be a PA done on the jardiance.     Can this be started?

## 2024-02-28 RX ORDER — ATORVASTATIN CALCIUM 20 MG/1
20 TABLET, FILM COATED ORAL DAILY
Qty: 90 TABLET | Refills: 3 | Status: SHIPPED | OUTPATIENT
Start: 2024-02-28

## 2024-06-02 RX ORDER — CARVEDILOL 12.5 MG/1
12.5 TABLET ORAL 2 TIMES DAILY WITH MEALS
Qty: 180 TABLET | Refills: 3 | Status: SHIPPED | OUTPATIENT
Start: 2024-06-02

## 2024-07-08 ENCOUNTER — OFFICE VISIT (OUTPATIENT)
Age: 59
End: 2024-07-08
Payer: COMMERCIAL

## 2024-07-08 VITALS
TEMPERATURE: 97.3 F | RESPIRATION RATE: 16 BRPM | DIASTOLIC BLOOD PRESSURE: 76 MMHG | WEIGHT: 216.6 LBS | OXYGEN SATURATION: 99 % | BODY MASS INDEX: 27.8 KG/M2 | HEIGHT: 74 IN | HEART RATE: 72 BPM | SYSTOLIC BLOOD PRESSURE: 127 MMHG

## 2024-07-08 DIAGNOSIS — Z12.5 PROSTATE CANCER SCREENING: ICD-10-CM

## 2024-07-08 DIAGNOSIS — N18.31 TYPE 2 DIABETES MELLITUS WITH STAGE 3A CHRONIC KIDNEY DISEASE, WITHOUT LONG-TERM CURRENT USE OF INSULIN (HCC): ICD-10-CM

## 2024-07-08 DIAGNOSIS — I10 ESSENTIAL (PRIMARY) HYPERTENSION: ICD-10-CM

## 2024-07-08 DIAGNOSIS — E78.5 HYPERLIPIDEMIA ASSOCIATED WITH TYPE 2 DIABETES MELLITUS (HCC): ICD-10-CM

## 2024-07-08 DIAGNOSIS — Z00.00 ANNUAL PHYSICAL EXAM: ICD-10-CM

## 2024-07-08 DIAGNOSIS — E11.22 TYPE 2 DIABETES MELLITUS WITH STAGE 3A CHRONIC KIDNEY DISEASE, WITHOUT LONG-TERM CURRENT USE OF INSULIN (HCC): ICD-10-CM

## 2024-07-08 DIAGNOSIS — I25.83 CORONARY ARTERY DISEASE DUE TO LIPID RICH PLAQUE: ICD-10-CM

## 2024-07-08 DIAGNOSIS — I25.5 ISCHEMIC CARDIOMYOPATHY: ICD-10-CM

## 2024-07-08 DIAGNOSIS — E11.69 HYPERLIPIDEMIA ASSOCIATED WITH TYPE 2 DIABETES MELLITUS (HCC): ICD-10-CM

## 2024-07-08 DIAGNOSIS — I25.10 CORONARY ARTERY DISEASE DUE TO LIPID RICH PLAQUE: ICD-10-CM

## 2024-07-08 DIAGNOSIS — Z00.00 ANNUAL PHYSICAL EXAM: Primary | ICD-10-CM

## 2024-07-08 PROCEDURE — 99396 PREV VISIT EST AGE 40-64: CPT | Performed by: INTERNAL MEDICINE

## 2024-07-08 PROCEDURE — 3078F DIAST BP <80 MM HG: CPT | Performed by: INTERNAL MEDICINE

## 2024-07-08 PROCEDURE — 3074F SYST BP LT 130 MM HG: CPT | Performed by: INTERNAL MEDICINE

## 2024-07-08 NOTE — PROGRESS NOTES
Chief Complaint   Patient presents with    Annual Exam       \"Have you been to the ER, urgent care clinic since your last visit?  Hospitalized since your last visit?\"    NO    “Have you seen or consulted any other health care providers outside of [facility] since your last visit?”    NO            Click Here for Release of Records Request     
Never used   Substance and Sexual Activity    Alcohol use: Never    Drug use: Never    Sexual activity: Yes     Partners: Female     Birth control/protection: None   Other Topics Concern    Not on file   Social History Narrative    Not on file     Social Determinants of Health     Financial Resource Strain: Low Risk  (1/4/2024)    Overall Financial Resource Strain (CARDIA)     Difficulty of Paying Living Expenses: Not hard at all   Food Insecurity: No Food Insecurity (1/4/2024)    Hunger Vital Sign     Worried About Running Out of Food in the Last Year: Never true     Ran Out of Food in the Last Year: Never true   Transportation Needs: Unknown (1/4/2024)    PRAPARE - Transportation     Lack of Transportation (Medical): Not on file     Lack of Transportation (Non-Medical): No   Physical Activity: Not on file   Stress: Not on file   Social Connections: Not on file   Intimate Partner Violence: Not on file   Housing Stability: Unknown (1/4/2024)    Housing Stability Vital Sign     Unable to Pay for Housing in the Last Year: Not on file     Number of Places Lived in the Last Year: Not on file     Unstable Housing in the Last Year: No          /76   Pulse 72   Temp 97.3 °F (36.3 °C) (Temporal)   Resp 16   Ht 1.88 m (6' 2\")   Wt 98.2 kg (216 lb 9.6 oz)   SpO2 99%   BMI 27.81 kg/m²     Physical Examination:   General - Well appearing male  HEENT - PERRL, TM no erythema/opacification, normal nasal turbinates, no oropharyngeal erythema or exudate, MMM  Neck - supple, no bruits, no thyroidomegaly, no lymphadenopathy  Pulm - clear to auscultation bilaterally  Cardio - RRR, normal S1 S2, no murmur  Abd - soft, nontender, no masses, no HSM  Extrem - no edema, +2 distal pulses  Neuro-  No focal deficits, CN intact     Assessment/Plan:     Annual cpe--check cbc, cmp,hiv  Dm, type 2--on jardiance.  Check A1c, cmp, urine micro.  Referral to dr tsai for eye exam  Hyperlipids--on lipitor, check flp, cmp  Screen

## 2024-07-09 LAB
ALBUMIN SERPL-MCNC: 4.6 G/DL (ref 3.5–5)
ALBUMIN/GLOB SERPL: 1.8 (ref 1.1–2.2)
ALP SERPL-CCNC: 79 U/L (ref 45–117)
ALT SERPL-CCNC: 28 U/L (ref 12–78)
ANION GAP SERPL CALC-SCNC: 3 MMOL/L (ref 5–15)
AST SERPL-CCNC: 18 U/L (ref 15–37)
BASOPHILS # BLD: 0.1 K/UL (ref 0–0.1)
BASOPHILS NFR BLD: 1 % (ref 0–1)
BILIRUB SERPL-MCNC: 0.5 MG/DL (ref 0.2–1)
BUN SERPL-MCNC: 30 MG/DL (ref 6–20)
BUN/CREAT SERPL: 19 (ref 12–20)
CALCIUM SERPL-MCNC: 9.2 MG/DL (ref 8.5–10.1)
CHLORIDE SERPL-SCNC: 111 MMOL/L (ref 97–108)
CHOLEST SERPL-MCNC: 118 MG/DL
CO2 SERPL-SCNC: 26 MMOL/L (ref 21–32)
CREAT SERPL-MCNC: 1.55 MG/DL (ref 0.7–1.3)
CREAT UR-MCNC: 131 MG/DL
DIFFERENTIAL METHOD BLD: NORMAL
EOSINOPHIL # BLD: 0.3 K/UL (ref 0–0.4)
EOSINOPHIL NFR BLD: 4 % (ref 0–7)
ERYTHROCYTE [DISTWIDTH] IN BLOOD BY AUTOMATED COUNT: 13.2 % (ref 11.5–14.5)
EST. AVERAGE GLUCOSE BLD GHB EST-MCNC: 131 MG/DL
GLOBULIN SER CALC-MCNC: 2.6 G/DL (ref 2–4)
GLUCOSE SERPL-MCNC: 130 MG/DL (ref 65–100)
HBA1C MFR BLD: 6.2 % (ref 4–5.6)
HCT VFR BLD AUTO: 43 % (ref 36.6–50.3)
HDLC SERPL-MCNC: 39 MG/DL
HDLC SERPL: 3 (ref 0–5)
HGB BLD-MCNC: 14.2 G/DL (ref 12.1–17)
HIV 1+2 AB+HIV1 P24 AG SERPL QL IA: NONREACTIVE
HIV 1/2 RESULT COMMENT: NORMAL
IMM GRANULOCYTES # BLD AUTO: 0 K/UL (ref 0–0.04)
IMM GRANULOCYTES NFR BLD AUTO: 0 % (ref 0–0.5)
LDLC SERPL CALC-MCNC: 54.8 MG/DL (ref 0–100)
LYMPHOCYTES # BLD: 2.1 K/UL (ref 0.8–3.5)
LYMPHOCYTES NFR BLD: 29 % (ref 12–49)
MCH RBC QN AUTO: 29.3 PG (ref 26–34)
MCHC RBC AUTO-ENTMCNC: 33 G/DL (ref 30–36.5)
MCV RBC AUTO: 88.7 FL (ref 80–99)
MICROALBUMIN UR-MCNC: 0.51 MG/DL
MICROALBUMIN/CREAT UR-RTO: 4 MG/G (ref 0–30)
MONOCYTES # BLD: 0.6 K/UL (ref 0–1)
MONOCYTES NFR BLD: 8 % (ref 5–13)
NEUTS SEG # BLD: 4.4 K/UL (ref 1.8–8)
NEUTS SEG NFR BLD: 58 % (ref 32–75)
NRBC # BLD: 0 K/UL (ref 0–0.01)
NRBC BLD-RTO: 0 PER 100 WBC
PLATELET # BLD AUTO: 180 K/UL (ref 150–400)
PMV BLD AUTO: 9.9 FL (ref 8.9–12.9)
POTASSIUM SERPL-SCNC: 4.6 MMOL/L (ref 3.5–5.1)
PROT SERPL-MCNC: 7.2 G/DL (ref 6.4–8.2)
PSA SERPL-MCNC: 0.2 NG/ML (ref 0.01–4)
RBC # BLD AUTO: 4.85 M/UL (ref 4.1–5.7)
SODIUM SERPL-SCNC: 140 MMOL/L (ref 136–145)
SPECIMEN HOLD: NORMAL
TRIGL SERPL-MCNC: 121 MG/DL
TSH SERPL DL<=0.05 MIU/L-ACNC: 1.36 UIU/ML (ref 0.36–3.74)
VLDLC SERPL CALC-MCNC: 24.2 MG/DL
WBC # BLD AUTO: 7.4 K/UL (ref 4.1–11.1)

## 2025-01-07 SDOH — ECONOMIC STABILITY: FOOD INSECURITY: WITHIN THE PAST 12 MONTHS, THE FOOD YOU BOUGHT JUST DIDN'T LAST AND YOU DIDN'T HAVE MONEY TO GET MORE.: NEVER TRUE

## 2025-01-07 SDOH — ECONOMIC STABILITY: FOOD INSECURITY: WITHIN THE PAST 12 MONTHS, YOU WORRIED THAT YOUR FOOD WOULD RUN OUT BEFORE YOU GOT MONEY TO BUY MORE.: NEVER TRUE

## 2025-01-07 SDOH — ECONOMIC STABILITY: INCOME INSECURITY: HOW HARD IS IT FOR YOU TO PAY FOR THE VERY BASICS LIKE FOOD, HOUSING, MEDICAL CARE, AND HEATING?: NOT HARD AT ALL

## 2025-01-07 SDOH — ECONOMIC STABILITY: TRANSPORTATION INSECURITY
IN THE PAST 12 MONTHS, HAS LACK OF TRANSPORTATION KEPT YOU FROM MEETINGS, WORK, OR FROM GETTING THINGS NEEDED FOR DAILY LIVING?: NO

## 2025-01-10 ENCOUNTER — OFFICE VISIT (OUTPATIENT)
Age: 60
End: 2025-01-10
Payer: COMMERCIAL

## 2025-01-10 VITALS
DIASTOLIC BLOOD PRESSURE: 88 MMHG | SYSTOLIC BLOOD PRESSURE: 138 MMHG | WEIGHT: 218 LBS | TEMPERATURE: 97 F | RESPIRATION RATE: 14 BRPM | HEIGHT: 74 IN | BODY MASS INDEX: 27.98 KG/M2 | HEART RATE: 67 BPM | OXYGEN SATURATION: 100 %

## 2025-01-10 DIAGNOSIS — E11.69 HYPERLIPIDEMIA ASSOCIATED WITH TYPE 2 DIABETES MELLITUS (HCC): ICD-10-CM

## 2025-01-10 DIAGNOSIS — N18.31 TYPE 2 DIABETES MELLITUS WITH STAGE 3A CHRONIC KIDNEY DISEASE, WITHOUT LONG-TERM CURRENT USE OF INSULIN (HCC): Primary | ICD-10-CM

## 2025-01-10 DIAGNOSIS — E78.5 HYPERLIPIDEMIA ASSOCIATED WITH TYPE 2 DIABETES MELLITUS (HCC): ICD-10-CM

## 2025-01-10 DIAGNOSIS — I25.10 CORONARY ARTERY DISEASE DUE TO LIPID RICH PLAQUE: ICD-10-CM

## 2025-01-10 DIAGNOSIS — E11.22 TYPE 2 DIABETES MELLITUS WITH STAGE 3A CHRONIC KIDNEY DISEASE, WITHOUT LONG-TERM CURRENT USE OF INSULIN (HCC): ICD-10-CM

## 2025-01-10 DIAGNOSIS — I25.83 CORONARY ARTERY DISEASE DUE TO LIPID RICH PLAQUE: ICD-10-CM

## 2025-01-10 DIAGNOSIS — I10 ESSENTIAL (PRIMARY) HYPERTENSION: ICD-10-CM

## 2025-01-10 DIAGNOSIS — N18.31 TYPE 2 DIABETES MELLITUS WITH STAGE 3A CHRONIC KIDNEY DISEASE, WITHOUT LONG-TERM CURRENT USE OF INSULIN (HCC): ICD-10-CM

## 2025-01-10 DIAGNOSIS — E11.22 TYPE 2 DIABETES MELLITUS WITH STAGE 3A CHRONIC KIDNEY DISEASE, WITHOUT LONG-TERM CURRENT USE OF INSULIN (HCC): Primary | ICD-10-CM

## 2025-01-10 DIAGNOSIS — I25.5 ISCHEMIC CARDIOMYOPATHY: ICD-10-CM

## 2025-01-10 LAB
ANION GAP SERPL CALC-SCNC: 4 MMOL/L (ref 2–12)
BUN SERPL-MCNC: 28 MG/DL (ref 6–20)
BUN/CREAT SERPL: 20 (ref 12–20)
CALCIUM SERPL-MCNC: 9.3 MG/DL (ref 8.5–10.1)
CHLORIDE SERPL-SCNC: 109 MMOL/L (ref 97–108)
CO2 SERPL-SCNC: 26 MMOL/L (ref 21–32)
CREAT SERPL-MCNC: 1.43 MG/DL (ref 0.7–1.3)
CREAT UR-MCNC: 71.6 MG/DL
EST. AVERAGE GLUCOSE BLD GHB EST-MCNC: 140 MG/DL
GLUCOSE SERPL-MCNC: 150 MG/DL (ref 65–100)
HBA1C MFR BLD: 6.5 % (ref 4–5.6)
MICROALBUMIN UR-MCNC: <0.5 MG/DL
MICROALBUMIN/CREAT UR-RTO: <7 MG/G (ref 0–30)
POTASSIUM SERPL-SCNC: 5.1 MMOL/L (ref 3.5–5.1)
SODIUM SERPL-SCNC: 139 MMOL/L (ref 136–145)
SPECIMEN HOLD: NORMAL

## 2025-01-10 PROCEDURE — 3079F DIAST BP 80-89 MM HG: CPT | Performed by: INTERNAL MEDICINE

## 2025-01-10 PROCEDURE — 99214 OFFICE O/P EST MOD 30 MIN: CPT | Performed by: INTERNAL MEDICINE

## 2025-01-10 PROCEDURE — 3075F SYST BP GE 130 - 139MM HG: CPT | Performed by: INTERNAL MEDICINE

## 2025-01-10 ASSESSMENT — PATIENT HEALTH QUESTIONNAIRE - PHQ9
SUM OF ALL RESPONSES TO PHQ QUESTIONS 1-9: 0
2. FEELING DOWN, DEPRESSED OR HOPELESS: NOT AT ALL
SUM OF ALL RESPONSES TO PHQ9 QUESTIONS 1 & 2: 0
SUM OF ALL RESPONSES TO PHQ QUESTIONS 1-9: 0
1. LITTLE INTEREST OR PLEASURE IN DOING THINGS: NOT AT ALL

## 2025-01-10 NOTE — PROGRESS NOTES
\"Have you been to the ER, urgent care clinic since your last visit?  Hospitalized since your last visit?\"    NO    “Have you seen or consulted any other health care providers outside our system since your last visit?”    NO      “Have you had a diabetic eye exam?”    NO     No diabetic eye exam on file          
3a--check bmp,  on jardiance, lisinopril  Hx ischemic cardiomyopathy--on jardiance, lisinpril, coreg  Cad--had cabg, then stents placed few years later--on asa, bb, acei  Hyperlipids--on lipitor  Dm, type 2--on jardiance.  Check A1c, urine micro.  Referral to dr vera vega for eye exam    Declines vaccines  Rtc 6 months for cpe        Abdiaziz Lee,

## 2025-01-14 NOTE — RESULT ENCOUNTER NOTE
Patient's spouse notified of results and response from Abdiaziz Lee III, DO:    Pt verbalized understanding.

## 2025-02-24 ENCOUNTER — TELEPHONE (OUTPATIENT)
Age: 60
End: 2025-02-24

## 2025-02-24 RX ORDER — ATORVASTATIN CALCIUM 20 MG/1
20 TABLET, FILM COATED ORAL DAILY
Qty: 90 TABLET | Refills: 3 | Status: SHIPPED | OUTPATIENT
Start: 2025-02-24

## 2025-02-24 NOTE — TELEPHONE ENCOUNTER
Delmis calling about Jardiance.      She would like to leave message about PA.    Call #[phone]      You can call this number as this would be the most fast way.     Key ID #[id number]  you can use for cover my meds.

## 2025-05-26 RX ORDER — CARVEDILOL 12.5 MG/1
12.5 TABLET ORAL 2 TIMES DAILY WITH MEALS
Qty: 180 TABLET | Refills: 3 | Status: SHIPPED | OUTPATIENT
Start: 2025-05-26

## 2025-07-16 ENCOUNTER — OFFICE VISIT (OUTPATIENT)
Age: 60
End: 2025-07-16
Payer: COMMERCIAL

## 2025-07-16 VITALS
DIASTOLIC BLOOD PRESSURE: 81 MMHG | OXYGEN SATURATION: 99 % | TEMPERATURE: 97.8 F | WEIGHT: 214.4 LBS | BODY MASS INDEX: 27.52 KG/M2 | HEART RATE: 65 BPM | RESPIRATION RATE: 14 BRPM | SYSTOLIC BLOOD PRESSURE: 126 MMHG | HEIGHT: 74 IN

## 2025-07-16 DIAGNOSIS — I25.5 ISCHEMIC CARDIOMYOPATHY: ICD-10-CM

## 2025-07-16 DIAGNOSIS — I25.10 CORONARY ARTERY DISEASE DUE TO LIPID RICH PLAQUE: ICD-10-CM

## 2025-07-16 DIAGNOSIS — E11.69 HYPERLIPIDEMIA ASSOCIATED WITH TYPE 2 DIABETES MELLITUS (HCC): ICD-10-CM

## 2025-07-16 DIAGNOSIS — Z00.00 ANNUAL PHYSICAL EXAM: Primary | ICD-10-CM

## 2025-07-16 DIAGNOSIS — E78.5 HYPERLIPIDEMIA ASSOCIATED WITH TYPE 2 DIABETES MELLITUS (HCC): ICD-10-CM

## 2025-07-16 DIAGNOSIS — Z12.5 PROSTATE CANCER SCREENING: ICD-10-CM

## 2025-07-16 DIAGNOSIS — Z12.11 COLON CANCER SCREENING: ICD-10-CM

## 2025-07-16 DIAGNOSIS — E11.22 TYPE 2 DIABETES MELLITUS WITH STAGE 3A CHRONIC KIDNEY DISEASE, WITHOUT LONG-TERM CURRENT USE OF INSULIN (HCC): ICD-10-CM

## 2025-07-16 DIAGNOSIS — N18.31 TYPE 2 DIABETES MELLITUS WITH STAGE 3A CHRONIC KIDNEY DISEASE, WITHOUT LONG-TERM CURRENT USE OF INSULIN (HCC): ICD-10-CM

## 2025-07-16 DIAGNOSIS — I10 ESSENTIAL (PRIMARY) HYPERTENSION: ICD-10-CM

## 2025-07-16 DIAGNOSIS — I25.83 CORONARY ARTERY DISEASE DUE TO LIPID RICH PLAQUE: ICD-10-CM

## 2025-07-16 DIAGNOSIS — Z00.00 ANNUAL PHYSICAL EXAM: ICD-10-CM

## 2025-07-16 PROCEDURE — 3074F SYST BP LT 130 MM HG: CPT | Performed by: INTERNAL MEDICINE

## 2025-07-16 PROCEDURE — 99396 PREV VISIT EST AGE 40-64: CPT | Performed by: INTERNAL MEDICINE

## 2025-07-16 PROCEDURE — 3079F DIAST BP 80-89 MM HG: CPT | Performed by: INTERNAL MEDICINE

## 2025-07-16 RX ORDER — NITROGLYCERIN 0.4 MG/1
0.4 TABLET SUBLINGUAL EVERY 5 MIN PRN
Qty: 25 TABLET | Refills: 0 | Status: SHIPPED | OUTPATIENT
Start: 2025-07-16

## 2025-07-16 NOTE — PROGRESS NOTES
Have you been to the ER, urgent care clinic since your last visit?  Hospitalized since your last visit?   NO    Have you seen or consulted any other health care providers outside our system since your last visit?   NO    “Have you had a colorectal cancer screening such as a colonoscopy/FIT/Cologuard?    NO    No colonoscopy on file  Date of last Cologuard: 5/3/2022  Date of last FIT: 4/30/2021   No flexible sigmoidoscopy on file     “Have you had a diabetic eye exam?”    NO     No diabetic eye exam on file

## 2025-07-16 NOTE — PROGRESS NOTES
Sung Arguelles is a 59 y.o. male who presents for evaluation of annual cpe.  Last seen by me sunita 10, 2025.  He has done well since then.  He has no complaints.  Did not contact [facility] for dm eye exam, but will do so now.        ROS:  Constitutional: negative for fevers, chills, anorexia and weight loss  Eyes:   negative for visual disturbance and irritation  ENT:   negative for tinnitus,sore throat,nasal congestion,ear pain,hoarseness  Respiratory:  negative for cough, hemoptysis, dyspnea,wheezing  CV:   negative for chest pain, palpitations, lower extremity edema  GI:   negative for nausea, vomiting, diarrhea, abdominal pain,melena  Genitourinary: negative for frequency, dysuria and hematuria  Musculoskel: negative for myalgias, arthralgias, back pain, muscle weakness, joint pain  Neurological:  negative for headaches, dizziness, focal weakness, numbness  Psychiatric:     Negative for depression or anxiety      Past Medical History:   Diagnosis Date    Coronary artery disease involving native coronary artery with unstable angina pectoris (HCC) 9/27/2017    Family history of early CAD 9/26/2017    Significant for mother, brother    Hypercholesterolemia     Hypertension     Myocardial infarction (HCC)     Type II diabetes mellitus, uncontrolled 9/27/2017       Past Surgical History:   Procedure Laterality Date    CORONARY ARTERY BYPASS GRAFT      ORTHOPEDIC SURGERY      left side face metal     IA UNLISTED PROCEDURE CARDIAC SURGERY      triple bypass       Family History   Problem Relation Age of Onset    Heart Disease Mother     Diabetes Mother     No Known Problems Father        Social History     Socioeconomic History    Marital status:      Spouse name: Not on file    Number of children: Not on file    Years of education: Not on file    Highest education level: Not on file   Occupational History    Not on file   Tobacco Use    Smoking status: Never     Passive exposure: Never    Smokeless tobacco: Never

## 2025-07-17 LAB
ALBUMIN SERPL-MCNC: 4.5 G/DL (ref 3.5–5)
ALBUMIN/GLOB SERPL: 1.8 (ref 1.1–2.2)
ALP SERPL-CCNC: 86 U/L (ref 45–117)
ALT SERPL-CCNC: 29 U/L (ref 12–78)
ANION GAP SERPL CALC-SCNC: 5 MMOL/L (ref 2–12)
AST SERPL-CCNC: 15 U/L (ref 15–37)
BASOPHILS # BLD: 0.07 K/UL (ref 0–0.1)
BASOPHILS NFR BLD: 0.8 % (ref 0–1)
BILIRUB SERPL-MCNC: 0.5 MG/DL (ref 0.2–1)
BUN SERPL-MCNC: 32 MG/DL (ref 6–20)
BUN/CREAT SERPL: 22 (ref 12–20)
CALCIUM SERPL-MCNC: 9.3 MG/DL (ref 8.5–10.1)
CHLORIDE SERPL-SCNC: 109 MMOL/L (ref 97–108)
CHOLEST SERPL-MCNC: 125 MG/DL
CO2 SERPL-SCNC: 26 MMOL/L (ref 21–32)
CREAT SERPL-MCNC: 1.43 MG/DL (ref 0.7–1.3)
CREAT UR-MCNC: 81.8 MG/DL
DIFFERENTIAL METHOD BLD: NORMAL
EOSINOPHIL # BLD: 0.38 K/UL (ref 0–0.4)
EOSINOPHIL NFR BLD: 4.6 % (ref 0–7)
ERYTHROCYTE [DISTWIDTH] IN BLOOD BY AUTOMATED COUNT: 13.1 % (ref 11.5–14.5)
EST. AVERAGE GLUCOSE BLD GHB EST-MCNC: 137 MG/DL
GLOBULIN SER CALC-MCNC: 2.5 G/DL (ref 2–4)
GLUCOSE SERPL-MCNC: 125 MG/DL (ref 65–100)
HBA1C MFR BLD: 6.4 % (ref 4–5.6)
HCT VFR BLD AUTO: 45.4 % (ref 36.6–50.3)
HDLC SERPL-MCNC: 49 MG/DL
HDLC SERPL: 2.6 (ref 0–5)
HGB BLD-MCNC: 14.3 G/DL (ref 12.1–17)
IMM GRANULOCYTES # BLD AUTO: 0.03 K/UL (ref 0–0.04)
IMM GRANULOCYTES NFR BLD AUTO: 0.4 % (ref 0–0.5)
LDLC SERPL CALC-MCNC: 58.4 MG/DL (ref 0–100)
LYMPHOCYTES # BLD: 2.7 K/UL (ref 0.8–3.5)
LYMPHOCYTES NFR BLD: 32.5 % (ref 12–49)
MCH RBC QN AUTO: 29.5 PG (ref 26–34)
MCHC RBC AUTO-ENTMCNC: 31.5 G/DL (ref 30–36.5)
MCV RBC AUTO: 93.8 FL (ref 80–99)
MICROALBUMIN UR-MCNC: <0.5 MG/DL
MICROALBUMIN/CREAT UR-RTO: <6 MG/G (ref 0–30)
MONOCYTES # BLD: 0.71 K/UL (ref 0–1)
MONOCYTES NFR BLD: 8.5 % (ref 5–13)
NEUTS SEG # BLD: 4.43 K/UL (ref 1.8–8)
NEUTS SEG NFR BLD: 53.2 % (ref 32–75)
NRBC # BLD: 0 K/UL (ref 0–0.01)
NRBC BLD-RTO: 0 PER 100 WBC
PLATELET # BLD AUTO: 200 K/UL (ref 150–400)
PMV BLD AUTO: 10.2 FL (ref 8.9–12.9)
POTASSIUM SERPL-SCNC: 5 MMOL/L (ref 3.5–5.1)
PROT SERPL-MCNC: 7 G/DL (ref 6.4–8.2)
PSA SERPL-MCNC: 0.2 NG/ML (ref 0.01–4)
RBC # BLD AUTO: 4.84 M/UL (ref 4.1–5.7)
SODIUM SERPL-SCNC: 140 MMOL/L (ref 136–145)
SPECIMEN HOLD: NORMAL
TRIGL SERPL-MCNC: 88 MG/DL
TSH SERPL DL<=0.05 MIU/L-ACNC: 1.62 UIU/ML (ref 0.36–3.74)
VLDLC SERPL CALC-MCNC: 17.6 MG/DL
WBC # BLD AUTO: 8.3 K/UL (ref 4.1–11.1)

## 2025-07-31 LAB — NONINV COLON CA DNA+OCC BLD SCRN STL QL: NEGATIVE

## (undated) DEVICE — TEMP PACING WIRE: Brand: MYO/WIRE

## (undated) DEVICE — BAG RED 3PLY 2MIL 30X40 IN

## (undated) DEVICE — SUMP PERICARD AD 20FR WT TIP VERSATILE DSGN MULT PRT VENT

## (undated) DEVICE — CORONARY IMAGING CATHETER: Brand: OPTICROSS™ 6 HD

## (undated) DEVICE — CLIP INT SM WIDE RED TI TRNSVRS GRV CHEVRON SHP W PRECIS

## (undated) DEVICE — LEAD PCMKR MYOCARDL BPLR TEMP. --

## (undated) DEVICE — LARGE BORE STOPCOCK WITH ROTATING MALE LUER LOCK

## (undated) DEVICE — 72" ARTERIAL PRESSURE TUBING: Brand: ICU MEDICAL

## (undated) DEVICE — BLANKET WRM W25XL64IN NONWOVEN SFT LTWT PLIABLE HYPR

## (undated) DEVICE — NEEDLE HYPO 18GA L1.5IN PNK S STL HUB POLYPR SHLD REG BVL

## (undated) DEVICE — Device

## (undated) DEVICE — STERILE POLYISOPRENE POWDER-FREE SURGICAL GLOVES: Brand: PROTEXIS

## (undated) DEVICE — TR BAND RADIAL ARTERY COMPRESSION DEVICE: Brand: TR BAND

## (undated) DEVICE — PROVE COVER: Brand: UNBRANDED

## (undated) DEVICE — CATH BLLN DIL 2.5 X20MM RX -- EUPHORA

## (undated) DEVICE — 6 FOOT DISPOSABLE EXTENSION CABLE WITH SAFE CONNECT / SCREW-DOWN

## (undated) DEVICE — GUIDEWIRE VASC L260CM 0.035IN J TIP L3MM PTFE FIX COR NAMIC

## (undated) DEVICE — CATHETER ETER CARD MULTIPAK MULTIPAK 5FR PERFORMA

## (undated) DEVICE — IRRIGATION KT PIST SYR 60ML -- CONVERT TO ITEM 116415

## (undated) DEVICE — BLADE ASSEMB CLP HAIR FINE --

## (undated) DEVICE — RADIFOCUS OPTITORQUE ANGIOGRAPHIC CATHETER: Brand: OPTITORQUE

## (undated) DEVICE — SUT PROL 4-0 36IN RB1 DA BLU --

## (undated) DEVICE — BLADE SAW STRNL 29.9MM NS --

## (undated) DEVICE — HI-TORQUE VERSACORE FLOPPY GUIDE WIRE SYSTEM 145 CM: Brand: HI-TORQUE VERSACORE

## (undated) DEVICE — STOPCOCK IV 3W LUER

## (undated) DEVICE — SUTURE N ABSRB MONOFILAMENT 5-0 T-10 2N 30 IN BLU DEKLENE D7174K

## (undated) DEVICE — INFECTION CONTROL KIT SYS

## (undated) DEVICE — CATHETER URETH 18FR BLLN 5CC SIL UNCOATED 2 W F

## (undated) DEVICE — Z DISCONTINUED NO SUB IDED TAPE UMB W1/8XL36IN WHT COT STRND NONRADIOPAQUE

## (undated) DEVICE — 3M™ TEGADERM™ TRANSPARENT FILM DRESSING FRAME STYLE, 1626W, 4 IN X 4-3/4 IN (10 CM X 12 CM), 50/CT 4CT/CASE: Brand: 3M™ TEGADERM™

## (undated) DEVICE — ZINACTIVE USE 2641837 CLIP LIG M BLU TI HRT SHP WIRE HORZ 600 PER BX

## (undated) DEVICE — GLIDESHEATH SLENDER ACCESS KIT: Brand: GLIDESHEATH SLENDER

## (undated) DEVICE — AORTIC PUNCHES ARE USED TO CREATE A UNIFORM OPENING IN BLOOD VESSELS DURING CARDIOVASCULAR SURGERY. THE VESSEL GRAFT IS INSERTED INTO THE CREATED OPENING AND SUTURED TO THE VESSEL WALL. AORTIC LANCETS ARE USED TO MAKE A SMALL UNIFORM CUT IN A BLOOD VESSEL TO FACILITATE INSERTION OF AN AORTIC PUNCH.  PUNCHES COME IN VARIOUS LENGTHS, DIAMETERS AND TIP CONFIGURATIONS.: Brand: CLEANCUT ROTATING AORTIC PUNCH

## (undated) DEVICE — SOLUTION IV 500ML 0.9% SOD CHL FLX CONT

## (undated) DEVICE — MEDI-VAC NON-CONDUCTIVE SUCTION TUBING: Brand: CARDINAL HEALTH

## (undated) DEVICE — NC TREK CORONARY DILATATION CATHETER 2.75 MM X 15 MM / RAPID-EXCHANGE: Brand: NC TREK

## (undated) DEVICE — SUTURE N ABSRB MONOFILAMENT 6-0 T-10 2N 30 IN BLU DEKLENE D7175K

## (undated) DEVICE — DRAIN SURG 24FR L5/16IN DIA8MM SIL RND HUBLESS FULL FLUT

## (undated) DEVICE — SOLUTION IV 1000ML PH 7.4 INJ NRMSOL R

## (undated) DEVICE — KIT BLWR MISTER 5P 15L W/ TBNG SET IRRIG MIST TO IMPROVE

## (undated) DEVICE — RUNTHROUGH NS EXTRA FLOPPY PTCA GUIDEWIRE: Brand: RUNTHROUGH

## (undated) DEVICE — SUTURE NONABSORBABLE POLYPR 4-0 / AT-2 L36 IN MFIL DEKLENE D7065K

## (undated) DEVICE — CUSTOM KT PTCA INFL DEV K05 00053H

## (undated) DEVICE — TRANSFER PK BLD PROD 300ML --

## (undated) DEVICE — GOWN,SIRUS,NONRNF,SETINSLV,XL,20/CS: Brand: MEDLINE

## (undated) DEVICE — SOL ANTI-FOG 6ML MEDC -- MEDICHOICE - CONVERT TO 358427

## (undated) DEVICE — FILTER CLP DISP FOR 5513E CLIPVAC

## (undated) DEVICE — CATH URETH INTMIT ROB 16FR FUN -- CONVERT TO ITEM 179520

## (undated) DEVICE — YANKAUER BULB TIP, NO VENT: Brand: ARGYLE

## (undated) DEVICE — CATHETER GUID 6FR L100CM DIA0.071IN NYL SHFT RBU4.0 W/O

## (undated) DEVICE — TUBING PRSS MON L6IN PVC M FEM CONN

## (undated) DEVICE — DEVON™ KNEE AND BODY STRAP 60" X 3" (1.5 M X 7.6 CM): Brand: DEVON

## (undated) DEVICE — SYRINGE MED 20ML STD CLR PLAS LUERLOCK TIP N CTRL DISP

## (undated) DEVICE — (D)SYR 10ML 1/5ML GRAD NSAF -- PKGING CHANGE USE ITEM 338027

## (undated) DEVICE — CATH GUID COR EB375 6FR 100CM -- LAUNCHER

## (undated) DEVICE — STERNUM BLADE, OFFSET (31.7 X 0.64 X 6.3MM)

## (undated) DEVICE — SUTURE POLYPR SZ 4 0 LEN 36IN C 13 NDL 1 2 CIR DEKLENE MAXX D7126K

## (undated) DEVICE — PINNACLE INTRODUCER SHEATH: Brand: PINNACLE

## (undated) DEVICE — DRAPE FLD WRM W44XL66IN C6L FOR INTRATEMP SYS THERMABASIN

## (undated) DEVICE — TRAY CATHETER 16FR F INCLUDE LUB URIN M TEMP SENS 2 STATLOK STBL

## (undated) DEVICE — SYR 50ML LR LCK 1ML GRAD NSAF --

## (undated) DEVICE — SPLINT WR POS F/ARTERIAL ACC -- BX/10

## (undated) DEVICE — DRAPE SLUSH DISC W44XL66IN ST FOR RND BSIN HUSH SLUSH SYS

## (undated) DEVICE — REM POLYHESIVE ADULT PATIENT RETURN ELECTRODE: Brand: VALLEYLAB

## (undated) DEVICE — VISUALIZATION SYSTEM: Brand: CLEARIFY

## (undated) DEVICE — Device: Brand: CLEANCUT ROTATING AORTIC PUNCH

## (undated) DEVICE — CATHETER ANGIO IM AD 5 FRX100 CM PERFORMA

## (undated) DEVICE — CATHETER ANGIO AD 6FR L100CM COR W/O HYDRPHLC RADIOGRAPHIC

## (undated) DEVICE — SYRINGE ANGIO 10 CC BRL STD PRNT POLYCARB LT BLU MEDALLION

## (undated) DEVICE — GOWN,SIRUS,FABRNF,XL,20/CS: Brand: MEDLINE

## (undated) DEVICE — AORTIC PERFUSION CANNULA: Brand: EDWARDS LIFESCIENCES AORTIC PERFUSION CANNULA

## (undated) DEVICE — 1200 GUARD II KIT W/5MM TUBE W/O VAC TUBE: Brand: GUARDIAN

## (undated) DEVICE — NEEDLE HYPO 22GA L1.5IN BLK S STL HUB POLYPR SHLD REG BVL

## (undated) DEVICE — (D)PREP SKN CHLRAPRP APPL 26ML -- CONVERT TO ITEM 371833

## (undated) DEVICE — STERILE HOOK LOCK ELASTIC BANDAGE 6IN X 10 YARD: Brand: HOOK LOCK™

## (undated) DEVICE — GAUZE SPONGES,12 PLY: Brand: CURITY

## (undated) DEVICE — CATHETER ANGIO MPA2 AD 5 FRX100 CM 5.8 CM PERFORMA

## (undated) DEVICE — PACK PROCEDURE SURG HRT CATH

## (undated) DEVICE — DUAL LUMEN STOMACH TUBE MULTI-FUNCTIONAL PORT: Brand: SALEM SUMP

## (undated) DEVICE — TUBE KT ENDFLTN INSUFFLTN SVL --

## (undated) DEVICE — SOLUTION IRRIG 1000ML H2O STRL BLT

## (undated) DEVICE — DRESSING AQUACEL ADVANTAGE ALG W4XL5IN RECT GREATER ABSRB HYDRFBR TECHNOLOGY

## (undated) DEVICE — DRESSING FOAM W7 3 10XL95IN SIL POLYUR HEEL SELF ADH W BORD

## (undated) DEVICE — COPILOT BLEEDBACK CONTROL VALVE: Brand: COPILOT

## (undated) DEVICE — CATH GUID COR EB35 6FR 100CM -- LAUNCHER

## (undated) DEVICE — SUTURE MCRYL SZ 3-0 L27IN ABSRB UD L19MM PS-2 3/8 CIR PRIM Y427H

## (undated) DEVICE — SYS VSL HARV HEMOPRO2 VASOVIEW -- HARV SYS MINIMUM ORDER 5/EA

## (undated) DEVICE — HEART CATH-MRMC: Brand: MEDLINE INDUSTRIES, INC.

## (undated) DEVICE — PRESSURE MONITORING SET: Brand: TRUWAVE

## (undated) DEVICE — CATH BLLN DIL 2.0X15MM RX -- EUPHORA

## (undated) DEVICE — Z DISCONTINUED NO SUB IDED PROBE SURG L450MM DIA1MM VASC STR SGL END PARSONNET

## (undated) DEVICE — SUTURE TICRON DBL ARMED 2 0 CV 305 42IN BLU N ABSRB BRAID 8886303551

## (undated) DEVICE — SUTURE SZ 7 L18IN NONABSORBABLE SIL CCS L48MM 1/2 CIR STRNM M655G

## (undated) DEVICE — AORTIC PERFUSION CANNULA: Brand: EDWARDS LIFESCIENCES DISPERSION AORTIC PERFUSION CANNULA

## (undated) DEVICE — SOLUTION IV 1000ML 0.9% SOD CHL

## (undated) DEVICE — SUTURE NONABSORBABLE L24IN L9.5MM SZ 7-0 POLYPR MFIL 3/8 D7004K

## (undated) DEVICE — Device: Brand: ASAHI SION BLUE

## (undated) DEVICE — SYR LR LCK 1ML GRAD NSAF 30ML --

## (undated) DEVICE — HANDLE LT SNAP ON ULT DURABLE LENS FOR TRUMPF ALC DISPOSABLE